# Patient Record
Sex: MALE
[De-identification: names, ages, dates, MRNs, and addresses within clinical notes are randomized per-mention and may not be internally consistent; named-entity substitution may affect disease eponyms.]

---

## 2017-01-13 ENCOUNTER — HOSPITAL ENCOUNTER (OUTPATIENT)
Dept: HOSPITAL 22 - LAB | Age: 52
End: 2017-01-13
Attending: FAMILY MEDICINE
Payer: MEDICAID

## 2017-01-13 DIAGNOSIS — Z79.899: Primary | ICD-10-CM

## 2017-01-13 LAB — AMPHETAMINES/METAMPHETAMINES: NEGATIVE NG/ML (ref ?–1000)

## 2017-02-17 ENCOUNTER — HOSPITAL ENCOUNTER (OUTPATIENT)
Dept: HOSPITAL 22 - LAB | Age: 52
End: 2017-02-17
Attending: FAMILY MEDICINE
Payer: MEDICAID

## 2017-02-17 DIAGNOSIS — Z79.899: Primary | ICD-10-CM

## 2017-02-17 LAB — AMPHETAMINES/METAMPHETAMINES: NEGATIVE NG/ML (ref ?–1000)

## 2018-05-30 ENCOUNTER — TRANSCRIBE ORDERS (OUTPATIENT)
Dept: CARDIOLOGY | Facility: CLINIC | Age: 53
End: 2018-05-30

## 2018-05-30 ENCOUNTER — PREP FOR SURGERY (OUTPATIENT)
Dept: OTHER | Facility: HOSPITAL | Age: 53
End: 2018-05-30

## 2018-05-30 DIAGNOSIS — R94.39 ABNORMAL STRESS TEST: Primary | ICD-10-CM

## 2018-05-30 DIAGNOSIS — I20.9 ANGINA PECTORIS (HCC): Primary | ICD-10-CM

## 2018-05-30 RX ORDER — NITROGLYCERIN 0.4 MG/1
0.4 TABLET SUBLINGUAL
Status: CANCELLED | OUTPATIENT
Start: 2018-05-30

## 2018-05-30 RX ORDER — ASPIRIN 325 MG
325 TABLET ORAL ONCE
Status: CANCELLED | OUTPATIENT
Start: 2018-05-30 | End: 2018-05-30

## 2018-05-30 RX ORDER — SODIUM CHLORIDE 0.9 % (FLUSH) 0.9 %
1-10 SYRINGE (ML) INJECTION AS NEEDED
Status: CANCELLED | OUTPATIENT
Start: 2018-05-30

## 2018-05-30 RX ORDER — ASPIRIN 325 MG
325 TABLET, DELAYED RELEASE (ENTERIC COATED) ORAL DAILY
Status: CANCELLED | OUTPATIENT
Start: 2018-05-31

## 2018-05-30 RX ORDER — ONDANSETRON 2 MG/ML
4 INJECTION INTRAMUSCULAR; INTRAVENOUS EVERY 6 HOURS PRN
Status: CANCELLED | OUTPATIENT
Start: 2018-05-30

## 2018-05-30 RX ORDER — ACETAMINOPHEN 325 MG/1
650 TABLET ORAL EVERY 4 HOURS PRN
Status: CANCELLED | OUTPATIENT
Start: 2018-05-30

## 2018-05-31 ENCOUNTER — APPOINTMENT (OUTPATIENT)
Dept: GENERAL RADIOLOGY | Facility: HOSPITAL | Age: 53
End: 2018-05-31

## 2018-05-31 ENCOUNTER — HOSPITAL ENCOUNTER (OUTPATIENT)
Facility: HOSPITAL | Age: 53
Setting detail: HOSPITAL OUTPATIENT SURGERY
Discharge: HOME | End: 2018-05-31
Attending: INTERNAL MEDICINE | Admitting: INTERNAL MEDICINE

## 2018-05-31 VITALS
RESPIRATION RATE: 18 BRPM | BODY MASS INDEX: 42.16 KG/M2 | WEIGHT: 311.29 LBS | DIASTOLIC BLOOD PRESSURE: 97 MMHG | HEART RATE: 78 BPM | SYSTOLIC BLOOD PRESSURE: 131 MMHG | TEMPERATURE: 97.1 F | HEIGHT: 72 IN | OXYGEN SATURATION: 94 %

## 2018-05-31 DIAGNOSIS — R94.39 ABNORMAL STRESS TEST: ICD-10-CM

## 2018-05-31 LAB
ALBUMIN SERPL-MCNC: 4 G/DL (ref 3.2–4.8)
ALBUMIN/GLOB SERPL: 1.2 G/DL (ref 1.5–2.5)
ALP SERPL-CCNC: 150 U/L (ref 25–100)
ALT SERPL W P-5'-P-CCNC: 62 U/L (ref 7–40)
ANION GAP SERPL CALCULATED.3IONS-SCNC: 6 MMOL/L (ref 3–11)
ARTICHOKE IGE QN: 89 MG/DL (ref 0–130)
AST SERPL-CCNC: 48 U/L (ref 0–33)
BASOPHILS # BLD AUTO: 0.03 10*3/MM3 (ref 0–0.2)
BASOPHILS NFR BLD AUTO: 0.4 % (ref 0–1)
BILIRUB SERPL-MCNC: 0.8 MG/DL (ref 0.3–1.2)
BUN BLD-MCNC: 19 MG/DL (ref 9–23)
BUN/CREAT SERPL: 19 (ref 7–25)
CALCIUM SPEC-SCNC: 9 MG/DL (ref 8.7–10.4)
CHLORIDE SERPL-SCNC: 106 MMOL/L (ref 99–109)
CHOLEST SERPL-MCNC: 157 MG/DL (ref 0–200)
CO2 SERPL-SCNC: 29 MMOL/L (ref 20–31)
CREAT BLD-MCNC: 1 MG/DL (ref 0.6–1.3)
DEPRECATED RDW RBC AUTO: 47.4 FL (ref 37–54)
EOSINOPHIL # BLD AUTO: 0.36 10*3/MM3 (ref 0–0.3)
EOSINOPHIL NFR BLD AUTO: 4.4 % (ref 0–3)
ERYTHROCYTE [DISTWIDTH] IN BLOOD BY AUTOMATED COUNT: 14.6 % (ref 11.3–14.5)
GFR SERPL CREATININE-BSD FRML MDRD: 78 ML/MIN/1.73
GLOBULIN UR ELPH-MCNC: 3.3 GM/DL
GLUCOSE BLD-MCNC: 134 MG/DL (ref 70–100)
HBA1C MFR BLD: 6.5 % (ref 4.8–5.6)
HCT VFR BLD AUTO: 43.2 % (ref 38.9–50.9)
HDLC SERPL-MCNC: 45 MG/DL (ref 40–60)
HGB BLD-MCNC: 13.9 G/DL (ref 13.1–17.5)
IMM GRANULOCYTES # BLD: 0.02 10*3/MM3 (ref 0–0.03)
IMM GRANULOCYTES NFR BLD: 0.2 % (ref 0–0.6)
LYMPHOCYTES # BLD AUTO: 1.58 10*3/MM3 (ref 0.6–4.8)
LYMPHOCYTES NFR BLD AUTO: 19.5 % (ref 24–44)
MCH RBC QN AUTO: 28.8 PG (ref 27–31)
MCHC RBC AUTO-ENTMCNC: 32.2 G/DL (ref 32–36)
MCV RBC AUTO: 89.4 FL (ref 80–99)
MONOCYTES # BLD AUTO: 0.63 10*3/MM3 (ref 0–1)
MONOCYTES NFR BLD AUTO: 7.8 % (ref 0–12)
NEUTROPHILS # BLD AUTO: 5.5 10*3/MM3 (ref 1.5–8.3)
NEUTROPHILS NFR BLD AUTO: 67.9 % (ref 41–71)
PLATELET # BLD AUTO: 155 10*3/MM3 (ref 150–450)
PMV BLD AUTO: 11.6 FL (ref 6–12)
POTASSIUM BLD-SCNC: 3.8 MMOL/L (ref 3.5–5.5)
PROT SERPL-MCNC: 7.3 G/DL (ref 5.7–8.2)
RBC # BLD AUTO: 4.83 10*6/MM3 (ref 4.2–5.76)
SODIUM BLD-SCNC: 141 MMOL/L (ref 132–146)
TRIGL SERPL-MCNC: 127 MG/DL (ref 0–150)
WBC NRBC COR # BLD: 8.1 10*3/MM3 (ref 3.5–10.8)

## 2018-05-31 PROCEDURE — 25010000002 HEPARIN (PORCINE) PER 1000 UNITS: Performed by: INTERNAL MEDICINE

## 2018-05-31 PROCEDURE — 36415 COLL VENOUS BLD VENIPUNCTURE: CPT

## 2018-05-31 PROCEDURE — C1769 GUIDE WIRE: HCPCS | Performed by: INTERNAL MEDICINE

## 2018-05-31 PROCEDURE — 25010000002 FENTANYL CITRATE (PF) 100 MCG/2ML SOLUTION: Performed by: INTERNAL MEDICINE

## 2018-05-31 PROCEDURE — C1894 INTRO/SHEATH, NON-LASER: HCPCS | Performed by: INTERNAL MEDICINE

## 2018-05-31 PROCEDURE — 83036 HEMOGLOBIN GLYCOSYLATED A1C: CPT | Performed by: PHYSICIAN ASSISTANT

## 2018-05-31 PROCEDURE — 25010000002 MIDAZOLAM PER 1 MG: Performed by: INTERNAL MEDICINE

## 2018-05-31 PROCEDURE — 80053 COMPREHEN METABOLIC PANEL: CPT | Performed by: INTERNAL MEDICINE

## 2018-05-31 PROCEDURE — 93458 L HRT ARTERY/VENTRICLE ANGIO: CPT | Performed by: INTERNAL MEDICINE

## 2018-05-31 PROCEDURE — 0 IOPAMIDOL PER 1 ML: Performed by: INTERNAL MEDICINE

## 2018-05-31 PROCEDURE — 71045 X-RAY EXAM CHEST 1 VIEW: CPT

## 2018-05-31 PROCEDURE — 85025 COMPLETE CBC W/AUTO DIFF WBC: CPT | Performed by: INTERNAL MEDICINE

## 2018-05-31 PROCEDURE — S0260 H&P FOR SURGERY: HCPCS | Performed by: INTERNAL MEDICINE

## 2018-05-31 PROCEDURE — 80061 LIPID PANEL: CPT | Performed by: PHYSICIAN ASSISTANT

## 2018-05-31 RX ORDER — PANTOPRAZOLE SODIUM 40 MG/1
40 TABLET, DELAYED RELEASE ORAL DAILY
COMMUNITY

## 2018-05-31 RX ORDER — MIDAZOLAM HYDROCHLORIDE 1 MG/ML
INJECTION INTRAMUSCULAR; INTRAVENOUS AS NEEDED
Status: DISCONTINUED | OUTPATIENT
Start: 2018-05-31 | End: 2018-05-31 | Stop reason: HOSPADM

## 2018-05-31 RX ORDER — METOPROLOL SUCCINATE 25 MG/1
25 TABLET, EXTENDED RELEASE ORAL DAILY
COMMUNITY
End: 2020-01-01 | Stop reason: HOSPADM

## 2018-05-31 RX ORDER — LIDOCAINE HYDROCHLORIDE 10 MG/ML
INJECTION, SOLUTION EPIDURAL; INFILTRATION; INTRACAUDAL; PERINEURAL AS NEEDED
Status: DISCONTINUED | OUTPATIENT
Start: 2018-05-31 | End: 2018-05-31 | Stop reason: HOSPADM

## 2018-05-31 RX ORDER — ACETAMINOPHEN 325 MG/1
650 TABLET ORAL EVERY 4 HOURS PRN
Status: DISCONTINUED | OUTPATIENT
Start: 2018-05-31 | End: 2018-05-31 | Stop reason: HOSPADM

## 2018-05-31 RX ORDER — HYDROCODONE BITARTRATE AND ACETAMINOPHEN 5; 325 MG/1; MG/1
1 TABLET ORAL EVERY 4 HOURS PRN
Status: DISCONTINUED | OUTPATIENT
Start: 2018-05-31 | End: 2018-05-31 | Stop reason: HOSPADM

## 2018-05-31 RX ORDER — NITROGLYCERIN 0.4 MG/1
0.4 TABLET SUBLINGUAL
Status: DISCONTINUED | OUTPATIENT
Start: 2018-05-31 | End: 2018-05-31 | Stop reason: HOSPADM

## 2018-05-31 RX ORDER — SODIUM CHLORIDE 0.9 % (FLUSH) 0.9 %
1-10 SYRINGE (ML) INJECTION AS NEEDED
Status: DISCONTINUED | OUTPATIENT
Start: 2018-05-31 | End: 2018-05-31 | Stop reason: HOSPADM

## 2018-05-31 RX ORDER — ONDANSETRON 2 MG/ML
4 INJECTION INTRAMUSCULAR; INTRAVENOUS EVERY 6 HOURS PRN
Status: DISCONTINUED | OUTPATIENT
Start: 2018-05-31 | End: 2018-05-31 | Stop reason: HOSPADM

## 2018-05-31 RX ORDER — ASPIRIN 325 MG
325 TABLET ORAL ONCE
Status: COMPLETED | OUTPATIENT
Start: 2018-05-31 | End: 2018-05-31

## 2018-05-31 RX ORDER — TIZANIDINE 4 MG/1
6 TABLET ORAL 2 TIMES DAILY
COMMUNITY
End: 2020-01-01

## 2018-05-31 RX ORDER — ASPIRIN 81 MG/1
81 TABLET, CHEWABLE ORAL DAILY
COMMUNITY

## 2018-05-31 RX ORDER — IBUPROFEN 800 MG/1
800 TABLET ORAL 3 TIMES DAILY
Status: ON HOLD | COMMUNITY
End: 2018-05-31

## 2018-05-31 RX ORDER — SODIUM CHLORIDE 9 MG/ML
1-3 INJECTION, SOLUTION INTRAVENOUS CONTINUOUS
Status: DISCONTINUED | OUTPATIENT
Start: 2018-05-31 | End: 2018-05-31 | Stop reason: HOSPADM

## 2018-05-31 RX ORDER — FENTANYL CITRATE 50 UG/ML
INJECTION, SOLUTION INTRAMUSCULAR; INTRAVENOUS AS NEEDED
Status: DISCONTINUED | OUTPATIENT
Start: 2018-05-31 | End: 2018-05-31 | Stop reason: HOSPADM

## 2018-05-31 RX ORDER — ASPIRIN 325 MG
325 TABLET, DELAYED RELEASE (ENTERIC COATED) ORAL DAILY
Status: DISCONTINUED | OUTPATIENT
Start: 2018-06-01 | End: 2018-05-31 | Stop reason: HOSPADM

## 2018-05-31 RX ORDER — LISINOPRIL AND HYDROCHLOROTHIAZIDE 25; 20 MG/1; MG/1
1 TABLET ORAL DAILY
COMMUNITY
End: 2020-01-01 | Stop reason: HOSPADM

## 2018-05-31 RX ADMIN — SODIUM CHLORIDE 3 ML/KG/HR: 9 INJECTION, SOLUTION INTRAVENOUS at 09:09

## 2018-05-31 RX ADMIN — ASPIRIN 325 MG ORAL TABLET 325 MG: 325 PILL ORAL at 09:09

## 2018-06-01 ENCOUNTER — DOCUMENTATION (OUTPATIENT)
Dept: CARDIAC REHAB | Facility: HOSPITAL | Age: 53
End: 2018-06-01

## 2020-01-01 ENCOUNTER — HOSPITAL ENCOUNTER (OUTPATIENT)
Dept: INFUSION THERAPY | Facility: HOSPITAL | Age: 55
Discharge: HOME | End: 2020-05-01
Admitting: INTERNAL MEDICINE

## 2020-01-01 ENCOUNTER — HOSPITAL ENCOUNTER (OUTPATIENT)
Dept: RADIATION ONCOLOGY | Facility: HOSPITAL | Age: 55
Discharge: HOME | End: 2020-05-06

## 2020-01-01 ENCOUNTER — HOSPITAL ENCOUNTER (OUTPATIENT)
Dept: ONCOLOGY | Facility: HOSPITAL | Age: 55
Setting detail: INFUSION SERIES
Discharge: HOME | End: 2020-05-27

## 2020-01-01 ENCOUNTER — HOSPITAL ENCOUNTER (OUTPATIENT)
Dept: SPEECH THERAPY | Facility: HOSPITAL | Age: 55
Setting detail: THERAPIES SERIES
Discharge: HOME | End: 2020-06-05

## 2020-01-01 ENCOUNTER — DOCUMENTATION (OUTPATIENT)
Dept: RADIATION ONCOLOGY | Facility: HOSPITAL | Age: 55
End: 2020-01-01

## 2020-01-01 ENCOUNTER — HOSPITAL ENCOUNTER (OUTPATIENT)
Dept: RADIATION ONCOLOGY | Facility: HOSPITAL | Age: 55
Discharge: HOME | End: 2020-05-08

## 2020-01-01 ENCOUNTER — TELEPHONE (OUTPATIENT)
Dept: PALLIATIVE CARE | Facility: CLINIC | Age: 55
End: 2020-01-01

## 2020-01-01 ENCOUNTER — HOSPITAL ENCOUNTER (OUTPATIENT)
Dept: ONCOLOGY | Facility: HOSPITAL | Age: 55
Setting detail: INFUSION SERIES
Discharge: HOME | End: 2020-06-03

## 2020-01-01 ENCOUNTER — READMISSION MANAGEMENT (OUTPATIENT)
Dept: CALL CENTER | Facility: HOSPITAL | Age: 55
End: 2020-01-01

## 2020-01-01 ENCOUNTER — HOSPITAL ENCOUNTER (OUTPATIENT)
Dept: RADIATION ONCOLOGY | Facility: HOSPITAL | Age: 55
Discharge: HOME | End: 2020-06-01

## 2020-01-01 ENCOUNTER — TELEPHONE (OUTPATIENT)
Dept: ONCOLOGY | Facility: CLINIC | Age: 55
End: 2020-01-01

## 2020-01-01 ENCOUNTER — APPOINTMENT (OUTPATIENT)
Dept: CT IMAGING | Facility: HOSPITAL | Age: 55
End: 2020-01-01

## 2020-01-01 ENCOUNTER — APPOINTMENT (OUTPATIENT)
Dept: NEPHROLOGY | Facility: HOSPITAL | Age: 55
End: 2020-01-01

## 2020-01-01 ENCOUNTER — HOSPITAL ENCOUNTER (OUTPATIENT)
Dept: RADIATION ONCOLOGY | Facility: HOSPITAL | Age: 55
Setting detail: RADIATION/ONCOLOGY SERIES
Discharge: HOME | End: 2020-05-01

## 2020-01-01 ENCOUNTER — HOSPITAL ENCOUNTER (OUTPATIENT)
Dept: RADIATION ONCOLOGY | Facility: HOSPITAL | Age: 55
Discharge: HOME | End: 2020-06-04

## 2020-01-01 ENCOUNTER — APPOINTMENT (OUTPATIENT)
Dept: GENERAL RADIOLOGY | Facility: HOSPITAL | Age: 55
End: 2020-01-01

## 2020-01-01 ENCOUNTER — LAB (OUTPATIENT)
Dept: LAB | Facility: HOSPITAL | Age: 55
End: 2020-01-01

## 2020-01-01 ENCOUNTER — DOCUMENTATION (OUTPATIENT)
Dept: SOCIAL WORK | Facility: HOSPITAL | Age: 55
End: 2020-01-01

## 2020-01-01 ENCOUNTER — TRANSCRIBE ORDERS (OUTPATIENT)
Dept: ADMINISTRATIVE | Facility: HOSPITAL | Age: 55
End: 2020-01-01

## 2020-01-01 ENCOUNTER — HOSPITAL ENCOUNTER (OUTPATIENT)
Dept: RADIATION ONCOLOGY | Facility: HOSPITAL | Age: 55
Discharge: HOME | End: 2020-05-29

## 2020-01-01 ENCOUNTER — OFFICE VISIT (OUTPATIENT)
Dept: ONCOLOGY | Facility: CLINIC | Age: 55
End: 2020-01-01

## 2020-01-01 ENCOUNTER — HOSPITAL ENCOUNTER (OUTPATIENT)
Dept: RADIATION ONCOLOGY | Facility: HOSPITAL | Age: 55
Discharge: HOME | End: 2020-04-20

## 2020-01-01 ENCOUNTER — HOSPITAL ENCOUNTER (OUTPATIENT)
Dept: RADIATION ONCOLOGY | Facility: HOSPITAL | Age: 55
Discharge: HOME | End: 2020-05-07

## 2020-01-01 ENCOUNTER — HOSPITAL ENCOUNTER (OUTPATIENT)
Dept: RADIATION ONCOLOGY | Facility: HOSPITAL | Age: 55
Discharge: HOME | End: 2020-06-18

## 2020-01-01 ENCOUNTER — APPOINTMENT (OUTPATIENT)
Dept: CARDIOLOGY | Facility: HOSPITAL | Age: 55
End: 2020-01-01

## 2020-01-01 ENCOUNTER — TELEMEDICINE (OUTPATIENT)
Dept: PALLIATIVE CARE | Facility: CLINIC | Age: 55
End: 2020-01-01

## 2020-01-01 ENCOUNTER — DOCUMENTATION (OUTPATIENT)
Dept: NUTRITION | Facility: HOSPITAL | Age: 55
End: 2020-01-01

## 2020-01-01 ENCOUNTER — HOSPITAL ENCOUNTER (OUTPATIENT)
Dept: RADIATION ONCOLOGY | Facility: HOSPITAL | Age: 55
Discharge: HOME | End: 2020-06-05

## 2020-01-01 ENCOUNTER — HOSPITAL ENCOUNTER (OUTPATIENT)
Dept: ONCOLOGY | Facility: HOSPITAL | Age: 55
Setting detail: INFUSION SERIES
Discharge: HOME | End: 2020-05-06

## 2020-01-01 ENCOUNTER — HOSPITAL ENCOUNTER (OUTPATIENT)
Dept: RADIATION ONCOLOGY | Facility: HOSPITAL | Age: 55
Discharge: HOME | End: 2020-06-11

## 2020-01-01 ENCOUNTER — HOSPITAL ENCOUNTER (OUTPATIENT)
Dept: RADIATION ONCOLOGY | Facility: HOSPITAL | Age: 55
Discharge: HOME | End: 2020-06-02

## 2020-01-01 ENCOUNTER — APPOINTMENT (OUTPATIENT)
Dept: PREADMISSION TESTING | Facility: HOSPITAL | Age: 55
End: 2020-01-01

## 2020-01-01 ENCOUNTER — HOSPITAL ENCOUNTER (OUTPATIENT)
Dept: ONCOLOGY | Facility: HOSPITAL | Age: 55
Setting detail: INFUSION SERIES
Discharge: HOME | End: 2020-06-10

## 2020-01-01 ENCOUNTER — NURSE TRIAGE (OUTPATIENT)
Dept: CALL CENTER | Facility: HOSPITAL | Age: 55
End: 2020-01-01

## 2020-01-01 ENCOUNTER — HOSPITAL ENCOUNTER (OUTPATIENT)
Dept: RADIATION ONCOLOGY | Facility: HOSPITAL | Age: 55
Discharge: HOME | End: 2020-05-22

## 2020-01-01 ENCOUNTER — OFFICE VISIT (OUTPATIENT)
Dept: PALLIATIVE CARE | Facility: CLINIC | Age: 55
End: 2020-01-01

## 2020-01-01 ENCOUNTER — HOSPITAL ENCOUNTER (OUTPATIENT)
Dept: RADIATION ONCOLOGY | Facility: HOSPITAL | Age: 55
Discharge: HOME | End: 2020-06-10

## 2020-01-01 ENCOUNTER — HOSPITAL ENCOUNTER (OUTPATIENT)
Dept: RADIATION ONCOLOGY | Facility: HOSPITAL | Age: 55
Discharge: HOME | End: 2020-05-13

## 2020-01-01 ENCOUNTER — HOSPITAL ENCOUNTER (OUTPATIENT)
Dept: RADIATION ONCOLOGY | Facility: HOSPITAL | Age: 55
Setting detail: RADIATION/ONCOLOGY SERIES
Discharge: HOME | End: 2020-06-01

## 2020-01-01 ENCOUNTER — HOSPITAL ENCOUNTER (OUTPATIENT)
Dept: RADIATION ONCOLOGY | Facility: HOSPITAL | Age: 55
Discharge: HOME | End: 2020-06-16

## 2020-01-01 ENCOUNTER — HOSPITAL ENCOUNTER (OUTPATIENT)
Dept: ONCOLOGY | Facility: HOSPITAL | Age: 55
Setting detail: INFUSION SERIES
Discharge: HOME | End: 2020-05-13

## 2020-01-01 ENCOUNTER — HOSPITAL ENCOUNTER (OUTPATIENT)
Dept: RADIATION ONCOLOGY | Facility: HOSPITAL | Age: 55
Discharge: HOME | End: 2020-06-03

## 2020-01-01 ENCOUNTER — HOSPITAL ENCOUNTER (OUTPATIENT)
Dept: RADIATION ONCOLOGY | Facility: HOSPITAL | Age: 55
Discharge: HOME | End: 2020-06-08

## 2020-01-01 ENCOUNTER — ANESTHESIA (OUTPATIENT)
Dept: GASTROENTEROLOGY | Facility: HOSPITAL | Age: 55
End: 2020-01-01

## 2020-01-01 ENCOUNTER — HOSPITAL ENCOUNTER (OUTPATIENT)
Dept: RADIATION ONCOLOGY | Facility: HOSPITAL | Age: 55
Discharge: HOME | End: 2020-05-20

## 2020-01-01 ENCOUNTER — HOSPITAL ENCOUNTER (OUTPATIENT)
Dept: RADIATION ONCOLOGY | Facility: HOSPITAL | Age: 55
Discharge: HOME | End: 2020-05-26

## 2020-01-01 ENCOUNTER — APPOINTMENT (OUTPATIENT)
Dept: ULTRASOUND IMAGING | Facility: HOSPITAL | Age: 55
End: 2020-01-01

## 2020-01-01 ENCOUNTER — APPOINTMENT (OUTPATIENT)
Dept: INTERVENTIONAL RADIOLOGY/VASCULAR | Facility: HOSPITAL | Age: 55
End: 2020-01-01

## 2020-01-01 ENCOUNTER — CONSULT (OUTPATIENT)
Dept: ONCOLOGY | Facility: CLINIC | Age: 55
End: 2020-01-01

## 2020-01-01 ENCOUNTER — TELEPHONE (OUTPATIENT)
Dept: SOCIAL WORK | Facility: HOSPITAL | Age: 55
End: 2020-01-01

## 2020-01-01 ENCOUNTER — HOSPITAL ENCOUNTER (OUTPATIENT)
Dept: RADIATION ONCOLOGY | Facility: HOSPITAL | Age: 55
Discharge: HOME | End: 2020-06-12

## 2020-01-01 ENCOUNTER — TELEPHONE (OUTPATIENT)
Dept: RADIATION ONCOLOGY | Facility: HOSPITAL | Age: 55
End: 2020-01-01

## 2020-01-01 ENCOUNTER — HOSPITAL ENCOUNTER (OUTPATIENT)
Dept: GENERAL RADIOLOGY | Facility: HOSPITAL | Age: 55
Discharge: HOME | End: 2020-05-28
Admitting: RADIOLOGY

## 2020-01-01 ENCOUNTER — HOSPITAL ENCOUNTER (OUTPATIENT)
Dept: RADIATION ONCOLOGY | Facility: HOSPITAL | Age: 55
Discharge: HOME | End: 2020-05-18

## 2020-01-01 ENCOUNTER — OFFICE VISIT (OUTPATIENT)
Dept: RADIATION ONCOLOGY | Facility: HOSPITAL | Age: 55
End: 2020-01-01

## 2020-01-01 ENCOUNTER — DOCUMENTATION (OUTPATIENT)
Dept: PALLIATIVE CARE | Facility: CLINIC | Age: 55
End: 2020-01-01

## 2020-01-01 ENCOUNTER — HOSPITAL ENCOUNTER (OUTPATIENT)
Dept: RADIATION ONCOLOGY | Facility: HOSPITAL | Age: 55
Discharge: HOME | End: 2020-06-19

## 2020-01-01 ENCOUNTER — HOSPITAL ENCOUNTER (INPATIENT)
Facility: HOSPITAL | Age: 55
LOS: 2 days | End: 2020-09-02
Attending: EMERGENCY MEDICINE | Admitting: INTERNAL MEDICINE

## 2020-01-01 ENCOUNTER — HOSPITAL ENCOUNTER (OUTPATIENT)
Dept: ONCOLOGY | Facility: HOSPITAL | Age: 55
Setting detail: INFUSION SERIES
Discharge: HOME | End: 2020-05-22

## 2020-01-01 ENCOUNTER — HOSPITAL ENCOUNTER (OUTPATIENT)
Dept: ONCOLOGY | Facility: HOSPITAL | Age: 55
Setting detail: INFUSION SERIES
Discharge: HOME | End: 2020-06-24

## 2020-01-01 ENCOUNTER — NURSE NAVIGATOR (OUTPATIENT)
Dept: ONCOLOGY | Facility: CLINIC | Age: 55
End: 2020-01-01

## 2020-01-01 ENCOUNTER — DOCUMENTATION (OUTPATIENT)
Dept: SPEECH THERAPY | Facility: HOSPITAL | Age: 55
End: 2020-01-01

## 2020-01-01 ENCOUNTER — HOSPITAL ENCOUNTER (INPATIENT)
Facility: HOSPITAL | Age: 55
LOS: 36 days | Discharge: HOME | End: 2020-08-12
Attending: FAMILY MEDICINE | Admitting: INTERNAL MEDICINE

## 2020-01-01 ENCOUNTER — HOSPITAL ENCOUNTER (OUTPATIENT)
Dept: RADIATION ONCOLOGY | Facility: HOSPITAL | Age: 55
Discharge: HOME | End: 2020-05-15

## 2020-01-01 ENCOUNTER — HOSPITAL ENCOUNTER (OUTPATIENT)
Dept: RADIATION ONCOLOGY | Facility: HOSPITAL | Age: 55
Discharge: HOME | End: 2020-05-14

## 2020-01-01 ENCOUNTER — HOSPITAL ENCOUNTER (OUTPATIENT)
Dept: RADIATION ONCOLOGY | Facility: HOSPITAL | Age: 55
Setting detail: RADIATION/ONCOLOGY SERIES
Discharge: HOME | End: 2020-04-03

## 2020-01-01 ENCOUNTER — HOSPITAL ENCOUNTER (OUTPATIENT)
Dept: RADIATION ONCOLOGY | Facility: HOSPITAL | Age: 55
Discharge: HOME | End: 2020-06-17

## 2020-01-01 ENCOUNTER — HOSPITAL ENCOUNTER (OUTPATIENT)
Dept: ONCOLOGY | Facility: HOSPITAL | Age: 55
Setting detail: INFUSION SERIES
Discharge: HOME | End: 2020-06-17

## 2020-01-01 ENCOUNTER — HOSPITAL ENCOUNTER (OUTPATIENT)
Dept: RADIATION ONCOLOGY | Facility: HOSPITAL | Age: 55
Discharge: HOME | End: 2020-06-09

## 2020-01-01 ENCOUNTER — HOSPITAL ENCOUNTER (OUTPATIENT)
Dept: RADIATION ONCOLOGY | Facility: HOSPITAL | Age: 55
Discharge: HOME | End: 2020-06-23

## 2020-01-01 ENCOUNTER — ANESTHESIA EVENT (OUTPATIENT)
Dept: GASTROENTEROLOGY | Facility: HOSPITAL | Age: 55
End: 2020-01-01

## 2020-01-01 ENCOUNTER — OFFICE VISIT (OUTPATIENT)
Dept: PSYCHIATRY | Facility: CLINIC | Age: 55
End: 2020-01-01

## 2020-01-01 ENCOUNTER — HOSPITAL ENCOUNTER (OUTPATIENT)
Dept: RADIATION ONCOLOGY | Facility: HOSPITAL | Age: 55
Discharge: HOME | End: 2020-05-19

## 2020-01-01 ENCOUNTER — HOSPITAL ENCOUNTER (OUTPATIENT)
Dept: RADIATION ONCOLOGY | Facility: HOSPITAL | Age: 55
Discharge: HOME | End: 2020-06-22

## 2020-01-01 ENCOUNTER — HOSPITAL ENCOUNTER (OUTPATIENT)
Dept: RADIATION ONCOLOGY | Facility: HOSPITAL | Age: 55
Discharge: HOME | End: 2020-05-28

## 2020-01-01 ENCOUNTER — HOSPITAL ENCOUNTER (OUTPATIENT)
Dept: RADIATION ONCOLOGY | Facility: HOSPITAL | Age: 55
Discharge: HOME | End: 2020-05-12

## 2020-01-01 ENCOUNTER — HOSPITAL ENCOUNTER (OUTPATIENT)
Dept: ONCOLOGY | Facility: HOSPITAL | Age: 55
Discharge: HOME | End: 2020-05-20
Admitting: INTERNAL MEDICINE

## 2020-01-01 ENCOUNTER — HOSPITAL ENCOUNTER (OUTPATIENT)
Dept: RADIATION ONCOLOGY | Facility: HOSPITAL | Age: 55
Discharge: HOME | End: 2020-06-15

## 2020-01-01 ENCOUNTER — HOSPITAL ENCOUNTER (OUTPATIENT)
Dept: RADIATION ONCOLOGY | Facility: HOSPITAL | Age: 55
Discharge: HOME | End: 2020-06-24

## 2020-01-01 ENCOUNTER — HOSPITAL ENCOUNTER (OUTPATIENT)
Dept: RADIATION ONCOLOGY | Facility: HOSPITAL | Age: 55
Discharge: HOME | End: 2020-05-11

## 2020-01-01 ENCOUNTER — HOSPITAL ENCOUNTER (OUTPATIENT)
Dept: PET IMAGING | Facility: HOSPITAL | Age: 55
Discharge: HOME | End: 2020-03-30
Admitting: INTERNAL MEDICINE

## 2020-01-01 ENCOUNTER — HOSPITAL ENCOUNTER (OUTPATIENT)
Dept: GENERAL RADIOLOGY | Facility: HOSPITAL | Age: 55
Discharge: HOME | End: 2020-05-13
Admitting: INTERNAL MEDICINE

## 2020-01-01 ENCOUNTER — HOSPITAL ENCOUNTER (OUTPATIENT)
Dept: RADIATION ONCOLOGY | Facility: HOSPITAL | Age: 55
Discharge: HOME | End: 2020-05-21

## 2020-01-01 ENCOUNTER — HOSPITAL ENCOUNTER (OUTPATIENT)
Dept: PET IMAGING | Facility: HOSPITAL | Age: 55
Discharge: HOME | End: 2020-03-30

## 2020-01-01 VITALS — BODY MASS INDEX: 42.82 KG/M2 | WEIGHT: 315 LBS

## 2020-01-01 VITALS
TEMPERATURE: 98 F | RESPIRATION RATE: 18 BRPM | WEIGHT: 315 LBS | HEIGHT: 72 IN | SYSTOLIC BLOOD PRESSURE: 138 MMHG | BODY MASS INDEX: 42.66 KG/M2 | HEART RATE: 113 BPM | DIASTOLIC BLOOD PRESSURE: 82 MMHG

## 2020-01-01 VITALS
OXYGEN SATURATION: 98 % | WEIGHT: 311 LBS | DIASTOLIC BLOOD PRESSURE: 77 MMHG | SYSTOLIC BLOOD PRESSURE: 109 MMHG | RESPIRATION RATE: 20 BRPM | HEIGHT: 72 IN | HEART RATE: 85 BPM | BODY MASS INDEX: 42.12 KG/M2 | TEMPERATURE: 97.7 F

## 2020-01-01 VITALS
SYSTOLIC BLOOD PRESSURE: 125 MMHG | DIASTOLIC BLOOD PRESSURE: 65 MMHG | HEART RATE: 93 BPM | WEIGHT: 303 LBS | TEMPERATURE: 98.4 F | BODY MASS INDEX: 41.04 KG/M2 | RESPIRATION RATE: 20 BRPM | HEIGHT: 72 IN

## 2020-01-01 VITALS
DIASTOLIC BLOOD PRESSURE: 78 MMHG | SYSTOLIC BLOOD PRESSURE: 114 MMHG | TEMPERATURE: 97.9 F | HEART RATE: 78 BPM | RESPIRATION RATE: 16 BRPM | HEIGHT: 72 IN | OXYGEN SATURATION: 95 % | BODY MASS INDEX: 42.66 KG/M2 | WEIGHT: 315 LBS

## 2020-01-01 VITALS
HEIGHT: 72 IN | BODY MASS INDEX: 37.5 KG/M2 | DIASTOLIC BLOOD PRESSURE: 81 MMHG | TEMPERATURE: 98.5 F | SYSTOLIC BLOOD PRESSURE: 127 MMHG | RESPIRATION RATE: 16 BRPM | OXYGEN SATURATION: 100 % | WEIGHT: 276.9 LBS | HEART RATE: 98 BPM

## 2020-01-01 VITALS
DIASTOLIC BLOOD PRESSURE: 83 MMHG | SYSTOLIC BLOOD PRESSURE: 120 MMHG | RESPIRATION RATE: 16 BRPM | TEMPERATURE: 98.1 F | HEART RATE: 83 BPM | HEIGHT: 72 IN | BODY MASS INDEX: 42.66 KG/M2 | WEIGHT: 315 LBS

## 2020-01-01 VITALS
WEIGHT: 315 LBS | BODY MASS INDEX: 43.82 KG/M2 | TEMPERATURE: 98 F | DIASTOLIC BLOOD PRESSURE: 69 MMHG | OXYGEN SATURATION: 96 % | RESPIRATION RATE: 20 BRPM | HEART RATE: 79 BPM | SYSTOLIC BLOOD PRESSURE: 133 MMHG

## 2020-01-01 VITALS
BODY MASS INDEX: 42.66 KG/M2 | DIASTOLIC BLOOD PRESSURE: 88 MMHG | HEART RATE: 87 BPM | WEIGHT: 315 LBS | SYSTOLIC BLOOD PRESSURE: 134 MMHG | RESPIRATION RATE: 20 BRPM | HEIGHT: 72 IN | TEMPERATURE: 98 F

## 2020-01-01 VITALS
RESPIRATION RATE: 16 BRPM | HEART RATE: 80 BPM | DIASTOLIC BLOOD PRESSURE: 90 MMHG | TEMPERATURE: 97.7 F | SYSTOLIC BLOOD PRESSURE: 120 MMHG

## 2020-01-01 VITALS — WEIGHT: 300.8 LBS | BODY MASS INDEX: 40.79 KG/M2

## 2020-01-01 VITALS
WEIGHT: 295 LBS | BODY MASS INDEX: 40 KG/M2 | OXYGEN SATURATION: 97 % | TEMPERATURE: 97.1 F | DIASTOLIC BLOOD PRESSURE: 62 MMHG | HEART RATE: 92 BPM | SYSTOLIC BLOOD PRESSURE: 111 MMHG

## 2020-01-01 VITALS
DIASTOLIC BLOOD PRESSURE: 76 MMHG | HEART RATE: 84 BPM | OXYGEN SATURATION: 95 % | BODY MASS INDEX: 44.34 KG/M2 | TEMPERATURE: 97.6 F | WEIGHT: 315 LBS | SYSTOLIC BLOOD PRESSURE: 137 MMHG | RESPIRATION RATE: 16 BRPM

## 2020-01-01 VITALS
HEIGHT: 72 IN | TEMPERATURE: 97.5 F | BODY MASS INDEX: 28.44 KG/M2 | DIASTOLIC BLOOD PRESSURE: 89 MMHG | RESPIRATION RATE: 18 BRPM | SYSTOLIC BLOOD PRESSURE: 114 MMHG | WEIGHT: 210 LBS

## 2020-01-01 VITALS — BODY MASS INDEX: 40.1 KG/M2 | WEIGHT: 295.7 LBS

## 2020-01-01 VITALS
SYSTOLIC BLOOD PRESSURE: 134 MMHG | BODY MASS INDEX: 42.74 KG/M2 | HEART RATE: 93 BPM | TEMPERATURE: 97.9 F | OXYGEN SATURATION: 98 % | DIASTOLIC BLOOD PRESSURE: 76 MMHG | WEIGHT: 315 LBS

## 2020-01-01 VITALS
BODY MASS INDEX: 39.96 KG/M2 | HEART RATE: 101 BPM | RESPIRATION RATE: 20 BRPM | OXYGEN SATURATION: 92 % | SYSTOLIC BLOOD PRESSURE: 105 MMHG | DIASTOLIC BLOOD PRESSURE: 68 MMHG | WEIGHT: 295 LBS | HEIGHT: 72 IN | TEMPERATURE: 97.4 F

## 2020-01-01 VITALS
RESPIRATION RATE: 18 BRPM | OXYGEN SATURATION: 91 % | DIASTOLIC BLOOD PRESSURE: 79 MMHG | SYSTOLIC BLOOD PRESSURE: 121 MMHG | TEMPERATURE: 99 F

## 2020-01-01 VITALS
DIASTOLIC BLOOD PRESSURE: 80 MMHG | TEMPERATURE: 98.3 F | SYSTOLIC BLOOD PRESSURE: 138 MMHG | WEIGHT: 310 LBS | BODY MASS INDEX: 41.99 KG/M2 | HEART RATE: 101 BPM | HEIGHT: 72 IN | RESPIRATION RATE: 20 BRPM

## 2020-01-01 VITALS — BODY MASS INDEX: 42.55 KG/M2 | WEIGHT: 313.8 LBS

## 2020-01-01 VITALS — BODY MASS INDEX: 41.28 KG/M2 | WEIGHT: 304.4 LBS

## 2020-01-01 VITALS — BODY MASS INDEX: 41.95 KG/M2 | WEIGHT: 309.3 LBS

## 2020-01-01 VITALS
DIASTOLIC BLOOD PRESSURE: 50 MMHG | RESPIRATION RATE: 20 BRPM | HEART RATE: 84 BPM | SYSTOLIC BLOOD PRESSURE: 93 MMHG | TEMPERATURE: 98.2 F

## 2020-01-01 VITALS — BODY MASS INDEX: 44.34 KG/M2 | WEIGHT: 315 LBS

## 2020-01-01 DIAGNOSIS — Z45.2 PICC (PERIPHERALLY INSERTED CENTRAL CATHETER) IN PLACE: ICD-10-CM

## 2020-01-01 DIAGNOSIS — R07.81 RIB PAIN ON RIGHT SIDE: ICD-10-CM

## 2020-01-01 DIAGNOSIS — C09.9 CANCER OF TONSIL (HCC): ICD-10-CM

## 2020-01-01 DIAGNOSIS — T40.2X5A CONSTIPATION DUE TO OPIOID THERAPY: ICD-10-CM

## 2020-01-01 DIAGNOSIS — K74.60 LIVER CIRRHOSIS SECONDARY TO NASH (NONALCOHOLIC STEATOHEPATITIS) (HCC): ICD-10-CM

## 2020-01-01 DIAGNOSIS — R45.851 SUICIDAL IDEATION: ICD-10-CM

## 2020-01-01 DIAGNOSIS — C09.9 CANCER OF TONSIL (HCC): Primary | ICD-10-CM
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
## 2020-01-01 DIAGNOSIS — T40.2X5A CONSTIPATION DUE TO OPIOID THERAPY: Primary | ICD-10-CM

## 2020-01-01 DIAGNOSIS — Z74.09 IMPAIRED MOBILITY AND ADLS: ICD-10-CM

## 2020-01-01 DIAGNOSIS — R11.2 NAUSEA AND VOMITING, INTRACTABILITY OF VOMITING NOT SPECIFIED, UNSPECIFIED VOMITING TYPE: ICD-10-CM

## 2020-01-01 DIAGNOSIS — C76.0 HEAD AND NECK CANCER (HCC): ICD-10-CM

## 2020-01-01 DIAGNOSIS — Z51.81 THERAPEUTIC DRUG MONITORING: Primary | ICD-10-CM

## 2020-01-01 DIAGNOSIS — M54.40 CHRONIC BILATERAL LOW BACK PAIN WITH SCIATICA, SCIATICA LATERALITY UNSPECIFIED: ICD-10-CM

## 2020-01-01 DIAGNOSIS — K59.03 CONSTIPATION DUE TO OPIOID THERAPY: ICD-10-CM

## 2020-01-01 DIAGNOSIS — R39.11 URINARY HESITANCY: ICD-10-CM

## 2020-01-01 DIAGNOSIS — R05.9 COUGH: ICD-10-CM

## 2020-01-01 DIAGNOSIS — K75.81 LIVER CIRRHOSIS SECONDARY TO NASH (NONALCOHOLIC STEATOHEPATITIS) (HCC): ICD-10-CM

## 2020-01-01 DIAGNOSIS — G89.3 PAIN, CANCER: Primary | ICD-10-CM

## 2020-01-01 DIAGNOSIS — R22.0 HEAD MASS: ICD-10-CM

## 2020-01-01 DIAGNOSIS — R53.81 PHYSICAL DEBILITY: ICD-10-CM

## 2020-01-01 DIAGNOSIS — G89.3 PAIN, CANCER: ICD-10-CM

## 2020-01-01 DIAGNOSIS — K59.03 CONSTIPATION DUE TO OPIOID THERAPY: Primary | ICD-10-CM

## 2020-01-01 DIAGNOSIS — R07.0 THROAT PAIN IN ADULT: ICD-10-CM

## 2020-01-01 DIAGNOSIS — K92.0 GASTROINTESTINAL HEMORRHAGE WITH HEMATEMESIS: ICD-10-CM

## 2020-01-01 DIAGNOSIS — F41.9 ANXIETY DISORDER, UNSPECIFIED TYPE: ICD-10-CM

## 2020-01-01 DIAGNOSIS — R79.89 ELEVATED SERUM CREATININE: ICD-10-CM

## 2020-01-01 DIAGNOSIS — G47.9 SLEEP DISTURBANCE: ICD-10-CM

## 2020-01-01 DIAGNOSIS — Z51.81 THERAPEUTIC DRUG MONITORING: ICD-10-CM

## 2020-01-01 DIAGNOSIS — D62 ACUTE BLOOD LOSS ANEMIA: ICD-10-CM

## 2020-01-01 DIAGNOSIS — L85.3 XEROSIS OF SKIN: ICD-10-CM

## 2020-01-01 DIAGNOSIS — F33.2 SEVERE EPISODE OF RECURRENT MAJOR DEPRESSIVE DISORDER, WITHOUT PSYCHOTIC FEATURES (HCC): Primary | ICD-10-CM

## 2020-01-01 DIAGNOSIS — F32.2 CURRENT SEVERE EPISODE OF MAJOR DEPRESSIVE DISORDER WITHOUT PSYCHOTIC FEATURES, UNSPECIFIED WHETHER RECURRENT (HCC): ICD-10-CM

## 2020-01-01 DIAGNOSIS — C76.0 HEAD AND NECK CANCER (HCC): Primary | ICD-10-CM

## 2020-01-01 DIAGNOSIS — R18.8 OTHER ASCITES: ICD-10-CM

## 2020-01-01 DIAGNOSIS — A41.02 MRSA (METHICILLIN RESISTANT STAPHYLOCOCCUS AUREUS) SEPTICEMIA (HCC): ICD-10-CM

## 2020-01-01 DIAGNOSIS — M79.89 SWOLLEN ARM: Primary | ICD-10-CM

## 2020-01-01 DIAGNOSIS — Z74.09 IMPAIRED FUNCTIONAL MOBILITY, BALANCE, GAIT, AND ENDURANCE: ICD-10-CM

## 2020-01-01 DIAGNOSIS — R53.1 GENERALIZED WEAKNESS: ICD-10-CM

## 2020-01-01 DIAGNOSIS — Z63.79 STRESSFUL LIFE EVENT AFFECTING FAMILY: ICD-10-CM

## 2020-01-01 DIAGNOSIS — Z74.8 DEPENDENT FOR TRANSPORTATION: ICD-10-CM

## 2020-01-01 DIAGNOSIS — G89.29 CHRONIC BILATERAL LOW BACK PAIN WITH SCIATICA, SCIATICA LATERALITY UNSPECIFIED: ICD-10-CM

## 2020-01-01 DIAGNOSIS — E86.1 HYPOTENSION DUE TO HYPOVOLEMIA: Primary | ICD-10-CM
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
## 2020-01-01 DIAGNOSIS — R13.13 PHARYNGEAL DYSPHAGIA: Primary | ICD-10-CM

## 2020-01-01 DIAGNOSIS — A41.9 SEPSIS, DUE TO UNSPECIFIED ORGANISM, UNSPECIFIED WHETHER ACUTE ORGAN DYSFUNCTION PRESENT (HCC): Primary | ICD-10-CM

## 2020-01-01 DIAGNOSIS — K74.60 CIRRHOSIS OF LIVER WITH ASCITES, UNSPECIFIED HEPATIC CIRRHOSIS TYPE (HCC): Primary | ICD-10-CM

## 2020-01-01 DIAGNOSIS — Z78.9 IMPAIRED MOBILITY AND ADLS: ICD-10-CM

## 2020-01-01 DIAGNOSIS — Z02.89 PAIN MEDICATION AGREEMENT SIGNED: ICD-10-CM

## 2020-01-01 DIAGNOSIS — M79.89 SWOLLEN ARM: ICD-10-CM

## 2020-01-01 DIAGNOSIS — K62.5 RECTAL BLEEDING: ICD-10-CM

## 2020-01-01 DIAGNOSIS — R18.8 CIRRHOSIS OF LIVER WITH ASCITES, UNSPECIFIED HEPATIC CIRRHOSIS TYPE (HCC): Primary | ICD-10-CM

## 2020-01-01 DIAGNOSIS — I95.89 HYPOTENSION DUE TO HYPOVOLEMIA: Primary | ICD-10-CM

## 2020-01-01 DIAGNOSIS — S22.31XA CLOSED FRACTURE OF ONE RIB OF RIGHT SIDE, INITIAL ENCOUNTER: ICD-10-CM

## 2020-01-01 DIAGNOSIS — E86.0 DEHYDRATION: ICD-10-CM

## 2020-01-01 DIAGNOSIS — R41.82 ALTERED MENTAL STATUS, UNSPECIFIED ALTERED MENTAL STATUS TYPE: ICD-10-CM

## 2020-01-01 DIAGNOSIS — R22.0 HEAD MASS: Primary | ICD-10-CM

## 2020-01-01 LAB
25(OH)D3 SERPL-MCNC: 11.2 NG/ML (ref 30–100)
A1AT SERPL-MCNC: 46 MG/DL (ref 101–187)
ABO GROUP BLD: NORMAL
ALBUMIN FLD-MCNC: 0.8 G/DL
ALBUMIN FLD-MCNC: 1 G/DL
ALBUMIN SERPL-MCNC: 2.2 G/DL (ref 3.5–5.2)
ALBUMIN SERPL-MCNC: 2.6 G/DL (ref 3.5–5.2)
ALBUMIN SERPL-MCNC: 2.6 G/DL (ref 3.5–5.2)
ALBUMIN SERPL-MCNC: 2.7 G/DL (ref 2.9–4.4)
ALBUMIN SERPL-MCNC: 2.7 G/DL (ref 3.5–5.2)
ALBUMIN SERPL-MCNC: 2.8 G/DL (ref 3.5–5.2)
ALBUMIN SERPL-MCNC: 2.9 G/DL (ref 3.5–5.2)
ALBUMIN SERPL-MCNC: 3 G/DL (ref 3.5–5.2)
ALBUMIN SERPL-MCNC: 3.1 G/DL (ref 3.5–5.2)
ALBUMIN SERPL-MCNC: 3.2 G/DL (ref 3.5–5.2)
ALBUMIN SERPL-MCNC: 3.3 G/DL (ref 3.5–5.2)
ALBUMIN SERPL-MCNC: 3.3 G/DL (ref 3.5–5.2)
ALBUMIN SERPL-MCNC: 3.4 G/DL (ref 3.5–5.2)
ALBUMIN SERPL-MCNC: 3.5 G/DL (ref 3.5–5.2)
ALBUMIN SERPL-MCNC: 3.5 G/DL (ref 3.5–5.2)
ALBUMIN/GLOB SERPL: 0.6 G/DL
ALBUMIN/GLOB SERPL: 0.7 G/DL
ALBUMIN/GLOB SERPL: 0.8 G/DL
ALBUMIN/GLOB SERPL: 0.9 G/DL
ALBUMIN/GLOB SERPL: 0.9 {RATIO} (ref 0.7–1.7)
ALBUMIN/GLOB SERPL: 1 G/DL
ALBUMIN/GLOB SERPL: 1.1 G/DL
ALBUMIN/GLOB SERPL: 1.2 G/DL
ALBUMIN/GLOB SERPL: 1.2 G/DL
ALBUMIN/GLOB SERPL: 1.3 G/DL
ALBUMIN/GLOB SERPL: 1.3 G/DL
ALBUMIN/GLOB SERPL: 1.4 G/DL
ALBUMIN/GLOB SERPL: 1.5 G/DL
ALP SERPL-CCNC: 119 U/L (ref 39–117)
ALP SERPL-CCNC: 130 U/L (ref 39–117)
ALP SERPL-CCNC: 131 U/L (ref 39–117)
ALP SERPL-CCNC: 137 U/L (ref 39–117)
ALP SERPL-CCNC: 138 U/L (ref 39–117)
ALP SERPL-CCNC: 144 U/L (ref 39–117)
ALP SERPL-CCNC: 148 U/L (ref 39–117)
ALP SERPL-CCNC: 153 U/L (ref 39–117)
ALP SERPL-CCNC: 153 U/L (ref 39–117)
ALP SERPL-CCNC: 158 U/L (ref 39–117)
ALP SERPL-CCNC: 161 U/L (ref 39–117)
ALP SERPL-CCNC: 162 U/L (ref 39–117)
ALP SERPL-CCNC: 164 U/L (ref 39–117)
ALP SERPL-CCNC: 164 U/L (ref 39–117)
ALP SERPL-CCNC: 165 U/L (ref 39–117)
ALP SERPL-CCNC: 166 U/L (ref 39–117)
ALP SERPL-CCNC: 175 U/L (ref 39–117)
ALP SERPL-CCNC: 183 U/L (ref 39–117)
ALP SERPL-CCNC: 190 U/L (ref 39–117)
ALP SERPL-CCNC: 208 U/L (ref 39–117)
ALP SERPL-CCNC: 218 U/L (ref 39–117)
ALP SERPL-CCNC: 230 U/L (ref 39–117)
ALP SERPL-CCNC: 249 U/L (ref 39–117)
ALP SERPL-CCNC: 254 U/L (ref 39–117)
ALP SERPL-CCNC: 270 U/L (ref 39–117)
ALP SERPL-CCNC: 281 U/L (ref 39–117)
ALP SERPL-CCNC: 339 U/L (ref 39–117)
ALPHA-FETOPROTEIN: 2.1 NG/ML (ref 0–8.3)
ALPHA1 GLOB FLD ELPH-MCNC: 0.1 G/DL (ref 0–0.4)
ALPHA2 GLOB SERPL ELPH-MCNC: 0.6 G/DL (ref 0.4–1)
ALT SERPL W P-5'-P-CCNC: 17 U/L (ref 1–41)
ALT SERPL W P-5'-P-CCNC: 18 U/L (ref 1–41)
ALT SERPL W P-5'-P-CCNC: 19 U/L (ref 1–41)
ALT SERPL W P-5'-P-CCNC: 19 U/L (ref 1–41)
ALT SERPL W P-5'-P-CCNC: 21 U/L (ref 1–41)
ALT SERPL W P-5'-P-CCNC: 21 U/L (ref 1–41)
ALT SERPL W P-5'-P-CCNC: 28 U/L (ref 1–41)
ALT SERPL W P-5'-P-CCNC: 28 U/L (ref 1–41)
ALT SERPL W P-5'-P-CCNC: 36 U/L (ref 1–41)
ALT SERPL W P-5'-P-CCNC: 36 U/L (ref 1–41)
ALT SERPL W P-5'-P-CCNC: 37 U/L (ref 1–41)
ALT SERPL W P-5'-P-CCNC: 38 U/L (ref 1–41)
ALT SERPL W P-5'-P-CCNC: 39 U/L (ref 1–41)
ALT SERPL W P-5'-P-CCNC: 39 U/L (ref 1–41)
ALT SERPL W P-5'-P-CCNC: 41 U/L (ref 1–41)
ALT SERPL W P-5'-P-CCNC: 42 U/L (ref 1–41)
ALT SERPL W P-5'-P-CCNC: 44 U/L (ref 1–41)
ALT SERPL W P-5'-P-CCNC: 46 U/L (ref 1–41)
ALT SERPL W P-5'-P-CCNC: 48 U/L (ref 1–41)
ALT SERPL W P-5'-P-CCNC: 52 U/L (ref 1–41)
ALT SERPL W P-5'-P-CCNC: 52 U/L (ref 1–41)
ALT SERPL W P-5'-P-CCNC: 58 U/L (ref 1–41)
ALT SERPL W P-5'-P-CCNC: 66 U/L (ref 1–41)
AMMONIA BLD-SCNC: 63 UMOL/L (ref 16–60)
AMMONIA BLD-SCNC: 78 UMOL/L (ref 16–60)
AMPHET+METHAMPHET UR QL: NEGATIVE
AMPHET+METHAMPHET UR QL: NEGATIVE
AMPHETAMINES UR QL: NEGATIVE
AMPHETAMINES UR QL: NEGATIVE
ANA SER QL: NEGATIVE
ANION GAP SERPL CALCULATED.3IONS-SCNC: 11 MMOL/L (ref 5–15)
ANION GAP SERPL CALCULATED.3IONS-SCNC: 12 MMOL/L (ref 5–15)
ANION GAP SERPL CALCULATED.3IONS-SCNC: 13 MMOL/L (ref 5–15)
ANION GAP SERPL CALCULATED.3IONS-SCNC: 14 MMOL/L (ref 5–15)
ANION GAP SERPL CALCULATED.3IONS-SCNC: 15 MMOL/L (ref 5–15)
ANION GAP SERPL CALCULATED.3IONS-SCNC: 16 MMOL/L (ref 5–15)
ANION GAP SERPL CALCULATED.3IONS-SCNC: 17 MMOL/L (ref 5–15)
ANION GAP SERPL CALCULATED.3IONS-SCNC: 18 MMOL/L (ref 5–15)
ANION GAP SERPL CALCULATED.3IONS-SCNC: 19 MMOL/L (ref 5–15)
ANION GAP SERPL CALCULATED.3IONS-SCNC: 20 MMOL/L (ref 5–15)
ANION GAP SERPL CALCULATED.3IONS-SCNC: 21 MMOL/L (ref 5–15)
ANION GAP SERPL CALCULATED.3IONS-SCNC: 24 MMOL/L (ref 5–15)
ANION GAP SERPL CALCULATED.3IONS-SCNC: 24 MMOL/L (ref 5–15)
ANION GAP SERPL CALCULATED.3IONS-SCNC: 9 MMOL/L (ref 5–15)
ANISOCYTOSIS BLD QL: NORMAL
APPEARANCE FLD: ABNORMAL
APPEARANCE FLD: CLEAR
APTT PPP: 84.8 SECONDS (ref 24–37)
ARTERIAL PATENCY WRIST A: ABNORMAL
AST SERPL-CCNC: 102 U/L (ref 1–40)
AST SERPL-CCNC: 110 U/L (ref 1–40)
AST SERPL-CCNC: 52 U/L (ref 1–40)
AST SERPL-CCNC: 53 U/L (ref 1–40)
AST SERPL-CCNC: 61 U/L (ref 1–40)
AST SERPL-CCNC: 62 U/L (ref 1–40)
AST SERPL-CCNC: 62 U/L (ref 1–40)
AST SERPL-CCNC: 63 U/L (ref 1–40)
AST SERPL-CCNC: 63 U/L (ref 1–40)
AST SERPL-CCNC: 64 U/L (ref 1–40)
AST SERPL-CCNC: 64 U/L (ref 1–40)
AST SERPL-CCNC: 67 U/L (ref 1–40)
AST SERPL-CCNC: 67 U/L (ref 1–40)
AST SERPL-CCNC: 70 U/L (ref 1–40)
AST SERPL-CCNC: 71 U/L (ref 1–40)
AST SERPL-CCNC: 71 U/L (ref 1–40)
AST SERPL-CCNC: 72 U/L (ref 1–40)
AST SERPL-CCNC: 72 U/L (ref 1–40)
AST SERPL-CCNC: 73 U/L (ref 1–40)
AST SERPL-CCNC: 78 U/L (ref 1–40)
AST SERPL-CCNC: 79 U/L (ref 1–40)
AST SERPL-CCNC: 83 U/L (ref 1–40)
AST SERPL-CCNC: 83 U/L (ref 1–40)
AST SERPL-CCNC: 85 U/L (ref 1–40)
AST SERPL-CCNC: 85 U/L (ref 1–40)
AST SERPL-CCNC: 86 U/L (ref 1–40)
AST SERPL-CCNC: 98 U/L (ref 1–40)
ATMOSPHERIC PRESS: ABNORMAL MM[HG]
ATMOSPHERIC PRESS: ABNORMAL MM[HG]
B-GLOBULIN SERPL ELPH-MCNC: 1.2 G/DL (ref 0.7–1.3)
BACTERIA BLD CULT: ABNORMAL
BACTERIA BLD CULT: ABNORMAL
BACTERIA FLD CULT: NORMAL
BACTERIA SPEC AEROBE CULT: ABNORMAL
BACTERIA SPEC AEROBE CULT: ABNORMAL
BACTERIA SPEC AEROBE CULT: NORMAL
BACTERIA SPEC ANAEROBE CULT: NORMAL
BACTERIA UR QL AUTO: NORMAL
BARBITURATES UR QL SCN: NEGATIVE
BARBITURATES UR QL SCN: NEGATIVE
BASE EXCESS BLDA CALC-SCNC: -8.6 MMOL/L (ref 0–2)
BASE EXCESS BLDV CALC-SCNC: 1.3 MMOL/L (ref -2–2)
BASO STIPL COARSE BLD QL SMEAR: ABNORMAL
BASO STIPL COARSE BLD QL SMEAR: NORMAL
BASOPHILS # BLD AUTO: 0.01 10*3/MM3 (ref 0–0.2)
BASOPHILS # BLD AUTO: 0.01 10*3/MM3 (ref 0–0.2)
BASOPHILS # BLD AUTO: 0.02 10*3/MM3 (ref 0–0.2)
BASOPHILS # BLD AUTO: 0.03 10*3/MM3 (ref 0–0.2)
BASOPHILS # BLD AUTO: 0.04 10*3/MM3 (ref 0–0.2)
BASOPHILS # BLD AUTO: 0.06 10*3/MM3 (ref 0–0.2)
BASOPHILS # BLD MANUAL: 0 10*3/MM3 (ref 0–0.2)
BASOPHILS # BLD MANUAL: 0 10*3/MM3 (ref 0–0.2)
BASOPHILS NFR BLD AUTO: 0 % (ref 0–1.5)
BASOPHILS NFR BLD AUTO: 0 % (ref 0–1.5)
BASOPHILS NFR BLD AUTO: 0.2 % (ref 0–1.5)
BASOPHILS NFR BLD AUTO: 0.2 % (ref 0–1.5)
BASOPHILS NFR BLD AUTO: 0.3 % (ref 0–1.5)
BASOPHILS NFR BLD AUTO: 0.4 % (ref 0–1.5)
BASOPHILS NFR BLD AUTO: 0.5 % (ref 0–1.5)
BASOPHILS NFR BLD AUTO: 0.6 % (ref 0–1.5)
BASOPHILS NFR BLD AUTO: 0.7 % (ref 0–1.5)
BASOPHILS NFR BLD AUTO: 0.8 % (ref 0–1.5)
BASOPHILS NFR BLD AUTO: 0.9 % (ref 0–1.5)
BASOPHILS NFR BLD AUTO: 1.1 % (ref 0–1.5)
BDY SITE: ABNORMAL
BDY SITE: ABNORMAL
BENZODIAZ UR QL SCN: NEGATIVE
BENZODIAZ UR QL SCN: POSITIVE
BENZODIAZ UR QL: NEGATIVE
BH BB BLOOD EXPIRATION DATE: NORMAL
BH BB BLOOD TYPE BARCODE: 600
BH BB BLOOD TYPE BARCODE: 600
BH BB BLOOD TYPE BARCODE: 6200
BH BB DISPENSE STATUS: NORMAL
BH BB PRODUCT CODE: NORMAL
BH BB UNIT NUMBER: NORMAL
BH CV ECHO MEAS - AO MAX PG (FULL): 12.6 MMHG
BH CV ECHO MEAS - AO MAX PG (FULL): 4.9 MMHG
BH CV ECHO MEAS - AO MAX PG (FULL): 9.2 MMHG
BH CV ECHO MEAS - AO MAX PG: 14.2 MMHG
BH CV ECHO MEAS - AO MAX PG: 16.4 MMHG
BH CV ECHO MEAS - AO MAX PG: 21.3 MMHG
BH CV ECHO MEAS - AO MEAN PG (FULL): 3.7 MMHG
BH CV ECHO MEAS - AO MEAN PG (FULL): 5.4 MMHG
BH CV ECHO MEAS - AO MEAN PG (FULL): 7 MMHG
BH CV ECHO MEAS - AO MEAN PG: 12 MMHG
BH CV ECHO MEAS - AO MEAN PG: 8.1 MMHG
BH CV ECHO MEAS - AO MEAN PG: 9.3 MMHG
BH CV ECHO MEAS - AO ROOT AREA (BSA CORRECTED): 1.5
BH CV ECHO MEAS - AO ROOT AREA (BSA CORRECTED): 1.6
BH CV ECHO MEAS - AO ROOT AREA: 10.3 CM^2
BH CV ECHO MEAS - AO ROOT AREA: 11.9 CM^2
BH CV ECHO MEAS - AO ROOT DIAM: 3.6 CM
BH CV ECHO MEAS - AO ROOT DIAM: 3.9 CM
BH CV ECHO MEAS - AO V2 MAX: 188.7 CM/SEC
BH CV ECHO MEAS - AO V2 MAX: 202.2 CM/SEC
BH CV ECHO MEAS - AO V2 MAX: 230.8 CM/SEC
BH CV ECHO MEAS - AO V2 MEAN: 133.8 CM/SEC
BH CV ECHO MEAS - AO V2 MEAN: 136.2 CM/SEC
BH CV ECHO MEAS - AO V2 MEAN: 163.9 CM/SEC
BH CV ECHO MEAS - AO V2 VTI: 33 CM
BH CV ECHO MEAS - AO V2 VTI: 38.1 CM
BH CV ECHO MEAS - AO V2 VTI: 41.7 CM
BH CV ECHO MEAS - AVA(I,A): 2.2 CM^2
BH CV ECHO MEAS - AVA(I,A): 2.4 CM^2
BH CV ECHO MEAS - AVA(I,A): 2.9 CM^2
BH CV ECHO MEAS - AVA(I,D): 2.2 CM^2
BH CV ECHO MEAS - AVA(I,D): 2.4 CM^2
BH CV ECHO MEAS - AVA(I,D): 2.9 CM^2
BH CV ECHO MEAS - AVA(V,A): 2.1 CM^2
BH CV ECHO MEAS - AVA(V,A): 2.1 CM^2
BH CV ECHO MEAS - AVA(V,A): 3.1 CM^2
BH CV ECHO MEAS - AVA(V,D): 2.1 CM^2
BH CV ECHO MEAS - AVA(V,D): 2.1 CM^2
BH CV ECHO MEAS - AVA(V,D): 3.1 CM^2
BH CV ECHO MEAS - BSA(HAYCOCK): 2.2 M^2
BH CV ECHO MEAS - BSA(HAYCOCK): 2.6 M^2
BH CV ECHO MEAS - BSA: 2.2 M^2
BH CV ECHO MEAS - BSA: 2.4 M^2
BH CV ECHO MEAS - BSA: 2.4 M^2
BH CV ECHO MEAS - BSA: 2.5 M^2
BH CV ECHO MEAS - BZI_BMI: 28.5 KILOGRAMS/M^2
BH CV ECHO MEAS - BZI_BMI: 37.4 KILOGRAMS/M^2
BH CV ECHO MEAS - BZI_BMI: 37.4 KILOGRAMS/M^2
BH CV ECHO MEAS - BZI_BMI: 38 KILOGRAMS/M^2
BH CV ECHO MEAS - BZI_METRIC_HEIGHT: 182.9 CM
BH CV ECHO MEAS - BZI_METRIC_WEIGHT: 125.2 KG
BH CV ECHO MEAS - BZI_METRIC_WEIGHT: 125.2 KG
BH CV ECHO MEAS - BZI_METRIC_WEIGHT: 127 KG
BH CV ECHO MEAS - BZI_METRIC_WEIGHT: 95.3 KG
BH CV ECHO MEAS - EDV(CUBED): 131.6 ML
BH CV ECHO MEAS - EDV(CUBED): 152.6 ML
BH CV ECHO MEAS - EDV(CUBED): 153.1 ML
BH CV ECHO MEAS - EDV(MOD-SP2): 113 ML
BH CV ECHO MEAS - EDV(MOD-SP2): 50 ML
BH CV ECHO MEAS - EDV(MOD-SP4): 104 ML
BH CV ECHO MEAS - EDV(MOD-SP4): 169 ML
BH CV ECHO MEAS - EDV(TEICH): 123.1 ML
BH CV ECHO MEAS - EDV(TEICH): 137.9 ML
BH CV ECHO MEAS - EDV(TEICH): 138.3 ML
BH CV ECHO MEAS - EF(CUBED): 68.8 %
BH CV ECHO MEAS - EF(CUBED): 74.6 %
BH CV ECHO MEAS - EF(CUBED): 79.5 %
BH CV ECHO MEAS - EF(MOD-BP): 63 %
BH CV ECHO MEAS - EF(MOD-SP2): 51.3 %
BH CV ECHO MEAS - EF(MOD-SP2): 56 %
BH CV ECHO MEAS - EF(MOD-SP4): 69.2 %
BH CV ECHO MEAS - EF(MOD-SP4): 69.8 %
BH CV ECHO MEAS - EF(TEICH): 59.9 %
BH CV ECHO MEAS - EF(TEICH): 66 %
BH CV ECHO MEAS - EF(TEICH): 71.6 %
BH CV ECHO MEAS - EF{MOD-BP}: 63 %
BH CV ECHO MEAS - ESV(CUBED): 27 ML
BH CV ECHO MEAS - ESV(CUBED): 38.8 ML
BH CV ECHO MEAS - ESV(CUBED): 47.8 ML
BH CV ECHO MEAS - ESV(MOD-SP2): 22 ML
BH CV ECHO MEAS - ESV(MOD-SP2): 55 ML
BH CV ECHO MEAS - ESV(MOD-SP4): 32 ML
BH CV ECHO MEAS - ESV(MOD-SP4): 51 ML
BH CV ECHO MEAS - ESV(TEICH): 35 ML
BH CV ECHO MEAS - ESV(TEICH): 46.9 ML
BH CV ECHO MEAS - ESV(TEICH): 55.5 ML
BH CV ECHO MEAS - FS: 32.2 %
BH CV ECHO MEAS - FS: 36.7 %
BH CV ECHO MEAS - FS: 41 %
BH CV ECHO MEAS - IVS/LVPW: 0.88
BH CV ECHO MEAS - IVS/LVPW: 0.92
BH CV ECHO MEAS - IVS/LVPW: 1
BH CV ECHO MEAS - IVSD: 0.82 CM
BH CV ECHO MEAS - IVSD: 1 CM
BH CV ECHO MEAS - IVSD: 1.1 CM
BH CV ECHO MEAS - LA DIMENSION: 3.4 CM
BH CV ECHO MEAS - LA DIMENSION: 3.6 CM
BH CV ECHO MEAS - LA/AO: 0.89
BH CV ECHO MEAS - LA/AO: 0.98
BH CV ECHO MEAS - LAD MAJOR: 5.5 CM
BH CV ECHO MEAS - LAD MAJOR: 5.6 CM
BH CV ECHO MEAS - LAD MAJOR: 6 CM
BH CV ECHO MEAS - LAT PEAK E' VEL: 11.6 CM/SEC
BH CV ECHO MEAS - LAT PEAK E' VEL: 7 CM/SEC
BH CV ECHO MEAS - LAT PEAK E' VEL: 8.8 CM/SEC
BH CV ECHO MEAS - LATERAL E/E' RATIO: 11.7
BH CV ECHO MEAS - LATERAL E/E' RATIO: 7.1
BH CV ECHO MEAS - LATERAL E/E' RATIO: 8.1
BH CV ECHO MEAS - LV DIASTOLIC VOL/BSA (35-75): 42.6 ML/M^2
BH CV ECHO MEAS - LV DIASTOLIC VOL/BSA (35-75): 68.8 ML/M^2
BH CV ECHO MEAS - LV MASS(C)D: 169.8 GRAMS
BH CV ECHO MEAS - LV MASS(C)D: 208.3 GRAMS
BH CV ECHO MEAS - LV MASS(C)D: 219.1 GRAMS
BH CV ECHO MEAS - LV MASS(C)DI: 78.1 GRAMS/M^2
BH CV ECHO MEAS - LV MASS(C)DI: 85.3 GRAMS/M^2
BH CV ECHO MEAS - LV MASS(C)DI: 89.1 GRAMS/M^2
BH CV ECHO MEAS - LV MAX PG: 7.2 MMHG
BH CV ECHO MEAS - LV MAX PG: 8.7 MMHG
BH CV ECHO MEAS - LV MAX PG: 9.3 MMHG
BH CV ECHO MEAS - LV MEAN PG: 3.9 MMHG
BH CV ECHO MEAS - LV MEAN PG: 4.5 MMHG
BH CV ECHO MEAS - LV MEAN PG: 5 MMHG
BH CV ECHO MEAS - LV SYSTOLIC VOL/BSA (12-30): 13.1 ML/M^2
BH CV ECHO MEAS - LV SYSTOLIC VOL/BSA (12-30): 20.7 ML/M^2
BH CV ECHO MEAS - LV V1 MAX: 133.8 CM/SEC
BH CV ECHO MEAS - LV V1 MAX: 147.1 CM/SEC
BH CV ECHO MEAS - LV V1 MAX: 152.4 CM/SEC
BH CV ECHO MEAS - LV V1 MEAN: 88.7 CM/SEC
BH CV ECHO MEAS - LV V1 MEAN: 96.1 CM/SEC
BH CV ECHO MEAS - LV V1 MEAN: 99.9 CM/SEC
BH CV ECHO MEAS - LV V1 VTI: 25.4 CM
BH CV ECHO MEAS - LV V1 VTI: 28.1 CM
BH CV ECHO MEAS - LV V1 VTI: 28.7 CM
BH CV ECHO MEAS - LVIDD: 5.1 CM
BH CV ECHO MEAS - LVIDD: 5.3 CM
BH CV ECHO MEAS - LVIDD: 5.3 CM
BH CV ECHO MEAS - LVIDS: 3 CM
BH CV ECHO MEAS - LVIDS: 3.4 CM
BH CV ECHO MEAS - LVIDS: 3.6 CM
BH CV ECHO MEAS - LVLD AP2: 10.8 CM
BH CV ECHO MEAS - LVLD AP2: 8.4 CM
BH CV ECHO MEAS - LVLD AP4: 11.4 CM
BH CV ECHO MEAS - LVLD AP4: 8.6 CM
BH CV ECHO MEAS - LVLS AP2: 6.2 CM
BH CV ECHO MEAS - LVLS AP2: 8.9 CM
BH CV ECHO MEAS - LVLS AP4: 6.5 CM
BH CV ECHO MEAS - LVLS AP4: 8.9 CM
BH CV ECHO MEAS - LVOT AREA (M): 3.1 CM^2
BH CV ECHO MEAS - LVOT AREA (M): 3.1 CM^2
BH CV ECHO MEAS - LVOT AREA (M): 3.8 CM^2
BH CV ECHO MEAS - LVOT AREA: 3.2 CM^2
BH CV ECHO MEAS - LVOT AREA: 3.2 CM^2
BH CV ECHO MEAS - LVOT AREA: 3.8 CM^2
BH CV ECHO MEAS - LVOT DIAM: 2 CM
BH CV ECHO MEAS - LVOT DIAM: 2 CM
BH CV ECHO MEAS - LVOT DIAM: 2.2 CM
BH CV ECHO MEAS - LVPWD: 0.93 CM
BH CV ECHO MEAS - LVPWD: 1.1 CM
BH CV ECHO MEAS - LVPWD: 1.1 CM
BH CV ECHO MEAS - MED PEAK E' VEL: 5.8 CM/SEC
BH CV ECHO MEAS - MED PEAK E' VEL: 7.3 CM/SEC
BH CV ECHO MEAS - MED PEAK E' VEL: 7.8 CM/SEC
BH CV ECHO MEAS - MEDIAL E/E' RATIO: 10.5
BH CV ECHO MEAS - MEDIAL E/E' RATIO: 14.1
BH CV ECHO MEAS - MEDIAL E/E' RATIO: 9.6
BH CV ECHO MEAS - MV A MAX VEL: 92.3 CM/SEC
BH CV ECHO MEAS - MV A MAX VEL: 94.3 CM/SEC
BH CV ECHO MEAS - MV A MAX VEL: 97.7 CM/SEC
BH CV ECHO MEAS - MV DEC SLOPE: 385.5 CM/SEC^2
BH CV ECHO MEAS - MV DEC TIME: 0.19 SEC
BH CV ECHO MEAS - MV DEC TIME: 0.21 SEC
BH CV ECHO MEAS - MV DEC TIME: 0.3 SEC
BH CV ECHO MEAS - MV E MAX VEL: 72.1 CM/SEC
BH CV ECHO MEAS - MV E MAX VEL: 83.4 CM/SEC
BH CV ECHO MEAS - MV E MAX VEL: 83.9 CM/SEC
BH CV ECHO MEAS - MV E/A: 0.78
BH CV ECHO MEAS - MV E/A: 0.86
BH CV ECHO MEAS - MV E/A: 0.88
BH CV ECHO MEAS - MV MAX PG: 3.6 MMHG
BH CV ECHO MEAS - MV MEAN PG: 1.9 MMHG
BH CV ECHO MEAS - MV P1/2T MAX VEL: 118.1 CM/SEC
BH CV ECHO MEAS - MV P1/2T: 89.7 MSEC
BH CV ECHO MEAS - MV V2 MAX: 94.8 CM/SEC
BH CV ECHO MEAS - MV V2 MEAN: 66 CM/SEC
BH CV ECHO MEAS - MV V2 VTI: 26 CM
BH CV ECHO MEAS - MVA P1/2T LCG: 1.9 CM^2
BH CV ECHO MEAS - MVA(P1/2T): 2.5 CM^2
BH CV ECHO MEAS - MVA(VTI): 3.7 CM^2
BH CV ECHO MEAS - PA ACC SLOPE: 830.6 CM/SEC^2
BH CV ECHO MEAS - PA ACC TIME: 0.14 SEC
BH CV ECHO MEAS - PA MAX PG: 10.4 MMHG
BH CV ECHO MEAS - PA PR(ACCEL): 15.6 MMHG
BH CV ECHO MEAS - PA V2 MAX: 161.5 CM/SEC
BH CV ECHO MEAS - RAP SYSTOLE: 3 MMHG
BH CV ECHO MEAS - RAP SYSTOLE: 8 MMHG
BH CV ECHO MEAS - RVSP: 16 MMHG
BH CV ECHO MEAS - RVSP: 20 MMHG
BH CV ECHO MEAS - SI(AO): 138.5 ML/M^2
BH CV ECHO MEAS - SI(AO): 183.7 ML/M^2
BH CV ECHO MEAS - SI(CUBED): 42.8 ML/M^2
BH CV ECHO MEAS - SI(CUBED): 42.8 ML/M^2
BH CV ECHO MEAS - SI(CUBED): 52.3 ML/M^2
BH CV ECHO MEAS - SI(LVOT): 36.8 ML/M^2
BH CV ECHO MEAS - SI(LVOT): 39.4 ML/M^2
BH CV ECHO MEAS - SI(LVOT): 42.1 ML/M^2
BH CV ECHO MEAS - SI(MOD-SP2): 11.5 ML/M^2
BH CV ECHO MEAS - SI(MOD-SP2): 23.6 ML/M^2
BH CV ECHO MEAS - SI(MOD-SP4): 29.5 ML/M^2
BH CV ECHO MEAS - SI(MOD-SP4): 48 ML/M^2
BH CV ECHO MEAS - SI(TEICH): 33.7 ML/M^2
BH CV ECHO MEAS - SI(TEICH): 36.1 ML/M^2
BH CV ECHO MEAS - SI(TEICH): 41.8 ML/M^2
BH CV ECHO MEAS - SV(AO): 338.3 ML
BH CV ECHO MEAS - SV(AO): 451.4 ML
BH CV ECHO MEAS - SV(CUBED): 104.6 ML
BH CV ECHO MEAS - SV(CUBED): 105.3 ML
BH CV ECHO MEAS - SV(CUBED): 113.8 ML
BH CV ECHO MEAS - SV(LVOT): 90.6 ML
BH CV ECHO MEAS - SV(LVOT): 91.7 ML
BH CV ECHO MEAS - SV(LVOT): 96.4 ML
BH CV ECHO MEAS - SV(MOD-SP2): 28 ML
BH CV ECHO MEAS - SV(MOD-SP2): 58 ML
BH CV ECHO MEAS - SV(MOD-SP4): 118 ML
BH CV ECHO MEAS - SV(MOD-SP4): 72 ML
BH CV ECHO MEAS - SV(TEICH): 82.8 ML
BH CV ECHO MEAS - SV(TEICH): 88.1 ML
BH CV ECHO MEAS - SV(TEICH): 91 ML
BH CV ECHO MEAS - TAPSE (>1.6): 2.2 CM2
BH CV ECHO MEAS - TAPSE (>1.6): 3.6 CM2
BH CV ECHO MEAS - TR MAX PG: 12 MMHG
BH CV ECHO MEAS - TR MAX PG: 13 MMHG
BH CV ECHO MEAS - TR MAX VEL: 173.7 CM/SEC
BH CV ECHO MEAS - TR MAX VEL: 176.5 CM/SEC
BH CV ECHO MEASUREMENTS AVERAGE E/E' RATIO: 13.11
BH CV ECHO MEASUREMENTS AVERAGE E/E' RATIO: 8.6
BH CV ECHO MEASUREMENTS AVERAGE E/E' RATIO: 8.96
BH CV VAS BP RIGHT ARM: NORMAL MMHG
BH CV XLRA - RV BASE: 3.2 CM
BH CV XLRA - RV BASE: 4.8 CM
BH CV XLRA - RV LENGTH: 7.6 CM
BH CV XLRA - RV LENGTH: 8.5 CM
BH CV XLRA - RV MID: 2.8 CM
BH CV XLRA - RV MID: 4.5 CM
BH CV XLRA - TDI S': 14.5 CM/SEC
BH CV XLRA - TDI S': 20.4 CM/SEC
BILIRUB CONJ SERPL-MCNC: 1.4 MG/DL (ref 0–0.3)
BILIRUB INDIRECT SERPL-MCNC: 1.6 MG/DL
BILIRUB SERPL-MCNC: 1.4 MG/DL (ref 0.2–1.2)
BILIRUB SERPL-MCNC: 1.7 MG/DL (ref 0.2–1.2)
BILIRUB SERPL-MCNC: 1.7 MG/DL (ref 0–1.2)
BILIRUB SERPL-MCNC: 1.7 MG/DL (ref 0–1.2)
BILIRUB SERPL-MCNC: 1.8 MG/DL (ref 0.2–1.2)
BILIRUB SERPL-MCNC: 1.8 MG/DL (ref 0–1.2)
BILIRUB SERPL-MCNC: 2 MG/DL (ref 0.2–1.2)
BILIRUB SERPL-MCNC: 2 MG/DL (ref 0–1.2)
BILIRUB SERPL-MCNC: 2.2 MG/DL (ref 0–1.2)
BILIRUB SERPL-MCNC: 2.4 MG/DL (ref 0–1.2)
BILIRUB SERPL-MCNC: 2.5 MG/DL (ref 0.2–1.2)
BILIRUB SERPL-MCNC: 2.5 MG/DL (ref 0–1.2)
BILIRUB SERPL-MCNC: 2.6 MG/DL (ref 0–1.2)
BILIRUB SERPL-MCNC: 2.7 MG/DL (ref 0–1.2)
BILIRUB SERPL-MCNC: 2.8 MG/DL (ref 0–1.2)
BILIRUB SERPL-MCNC: 2.8 MG/DL (ref 0–1.2)
BILIRUB SERPL-MCNC: 3 MG/DL (ref 0–1.2)
BILIRUB SERPL-MCNC: 3 MG/DL (ref 0–1.2)
BILIRUB SERPL-MCNC: 3.1 MG/DL (ref 0–1.2)
BILIRUB SERPL-MCNC: 3.3 MG/DL (ref 0–1.2)
BILIRUB SERPL-MCNC: 3.3 MG/DL (ref 0–1.2)
BILIRUB SERPL-MCNC: 3.6 MG/DL (ref 0–1.2)
BILIRUB SERPL-MCNC: 3.9 MG/DL (ref 0–1.2)
BILIRUB SERPL-MCNC: 5.4 MG/DL (ref 0–1.2)
BILIRUB SERPL-MCNC: 6.3 MG/DL (ref 0–1.2)
BILIRUB UR QL STRIP: NEGATIVE
BILIRUB UR QL STRIP: NORMAL
BLD GP AB SCN SERPL QL: NEGATIVE
BODY TEMPERATURE: 37 C
BODY TEMPERATURE: 37 C
BUN BLD-MCNC: 17 MG/DL (ref 6–20)
BUN BLD-MCNC: 17 MG/DL (ref 6–20)
BUN BLD-MCNC: 29 MG/DL (ref 6–20)
BUN BLD-MCNC: 37 MG/DL (ref 6–20)
BUN BLD-MCNC: 38 MG/DL (ref 6–20)
BUN BLD-MCNC: 38 MG/DL (ref 6–20)
BUN BLD-MCNC: 45 MG/DL (ref 6–20)
BUN BLD-MCNC: 48 MG/DL (ref 6–20)
BUN BLD-MCNC: 75 MG/DL (ref 6–20)
BUN SERPL-MCNC: 107 MG/DL (ref 6–20)
BUN SERPL-MCNC: 120 MG/DL (ref 6–20)
BUN SERPL-MCNC: 121 MG/DL (ref 6–20)
BUN SERPL-MCNC: 128 MG/DL (ref 6–20)
BUN SERPL-MCNC: 21 MG/DL (ref 6–20)
BUN SERPL-MCNC: 23 MG/DL (ref 6–20)
BUN SERPL-MCNC: 24 MG/DL (ref 6–20)
BUN SERPL-MCNC: 24 MG/DL (ref 6–20)
BUN SERPL-MCNC: 27 MG/DL (ref 6–20)
BUN SERPL-MCNC: 28 MG/DL (ref 6–20)
BUN SERPL-MCNC: 29 MG/DL (ref 6–20)
BUN SERPL-MCNC: 29 MG/DL (ref 6–20)
BUN SERPL-MCNC: 32 MG/DL (ref 6–20)
BUN SERPL-MCNC: 34 MG/DL (ref 6–20)
BUN SERPL-MCNC: 37 MG/DL (ref 6–20)
BUN SERPL-MCNC: 37 MG/DL (ref 6–20)
BUN SERPL-MCNC: 38 MG/DL (ref 6–20)
BUN SERPL-MCNC: 40 MG/DL (ref 6–20)
BUN SERPL-MCNC: 44 MG/DL (ref 6–20)
BUN SERPL-MCNC: 52 MG/DL (ref 6–20)
BUN SERPL-MCNC: 53 MG/DL (ref 6–20)
BUN SERPL-MCNC: 58 MG/DL (ref 6–20)
BUN SERPL-MCNC: 61 MG/DL (ref 6–20)
BUN SERPL-MCNC: 61 MG/DL (ref 6–20)
BUN SERPL-MCNC: 62 MG/DL (ref 6–20)
BUN SERPL-MCNC: 63 MG/DL (ref 6–20)
BUN SERPL-MCNC: 63 MG/DL (ref 6–20)
BUN SERPL-MCNC: 64 MG/DL (ref 6–20)
BUN SERPL-MCNC: 64 MG/DL (ref 6–20)
BUN SERPL-MCNC: 66 MG/DL (ref 6–20)
BUN SERPL-MCNC: 67 MG/DL (ref 6–20)
BUN SERPL-MCNC: 69 MG/DL (ref 6–20)
BUN SERPL-MCNC: 71 MG/DL (ref 6–20)
BUN SERPL-MCNC: 72 MG/DL (ref 6–20)
BUN SERPL-MCNC: 94 MG/DL (ref 6–20)
BUN/CREAT SERPL: 10.7 (ref 7–25)
BUN/CREAT SERPL: 11 (ref 7–25)
BUN/CREAT SERPL: 11.5 (ref 7–25)
BUN/CREAT SERPL: 11.6 (ref 7–25)
BUN/CREAT SERPL: 12.2 (ref 7–25)
BUN/CREAT SERPL: 12.6 (ref 7–25)
BUN/CREAT SERPL: 13.7 (ref 7–25)
BUN/CREAT SERPL: 13.8 (ref 7–25)
BUN/CREAT SERPL: 14.3 (ref 7–25)
BUN/CREAT SERPL: 14.4 (ref 7–25)
BUN/CREAT SERPL: 14.9 (ref 7–25)
BUN/CREAT SERPL: 15.2 (ref 7–25)
BUN/CREAT SERPL: 16 (ref 7–25)
BUN/CREAT SERPL: 16.8 (ref 7–25)
BUN/CREAT SERPL: 16.9 (ref 7–25)
BUN/CREAT SERPL: 17.2 (ref 7–25)
BUN/CREAT SERPL: 17.5 (ref 7–25)
BUN/CREAT SERPL: 18.1 (ref 7–25)
BUN/CREAT SERPL: 18.3 (ref 7–25)
BUN/CREAT SERPL: 18.4 (ref 7–25)
BUN/CREAT SERPL: 19.2 (ref 7–25)
BUN/CREAT SERPL: 19.5 (ref 7–25)
BUN/CREAT SERPL: 20.9 (ref 7–25)
BUN/CREAT SERPL: 22.7 (ref 7–25)
BUN/CREAT SERPL: 25.7 (ref 7–25)
BUN/CREAT SERPL: 27 (ref 7–25)
BUN/CREAT SERPL: 27.4 (ref 7–25)
BUN/CREAT SERPL: 28.9 (ref 7–25)
BUN/CREAT SERPL: 5.1 (ref 7–25)
BUN/CREAT SERPL: 5.1 (ref 7–25)
BUN/CREAT SERPL: 5.3 (ref 7–25)
BUN/CREAT SERPL: 5.7 (ref 7–25)
BUN/CREAT SERPL: 5.8 (ref 7–25)
BUN/CREAT SERPL: 6.1 (ref 7–25)
BUN/CREAT SERPL: 6.4 (ref 7–25)
BUN/CREAT SERPL: 6.7 (ref 7–25)
BUN/CREAT SERPL: 6.9 (ref 7–25)
BUN/CREAT SERPL: 7.2 (ref 7–25)
BUN/CREAT SERPL: 7.5 (ref 7–25)
BUN/CREAT SERPL: 7.6 (ref 7–25)
BUN/CREAT SERPL: 7.8 (ref 7–25)
BUN/CREAT SERPL: 7.9 (ref 7–25)
BUN/CREAT SERPL: 8 (ref 7–25)
BUN/CREAT SERPL: 8.2 (ref 7–25)
BUN/CREAT SERPL: 8.3 (ref 7–25)
BUN/CREAT SERPL: 8.3 (ref 7–25)
BUN/CREAT SERPL: 8.6 (ref 7–25)
BUN/CREAT SERPL: 9.9 (ref 7–25)
BUPRENORPHINE SERPL-MCNC: NEGATIVE NG/ML
BUPRENORPHINE SERPL-MCNC: NEGATIVE NG/ML
CA-I SERPL ISE-MCNC: 1.06 MMOL/L (ref 1.12–1.32)
CA-I SERPL ISE-MCNC: 1.1 MMOL/L (ref 1.12–1.32)
CALCIUM SPEC-SCNC: 7.3 MG/DL (ref 8.6–10.5)
CALCIUM SPEC-SCNC: 7.5 MG/DL (ref 8.6–10.5)
CALCIUM SPEC-SCNC: 7.6 MG/DL (ref 8.6–10.5)
CALCIUM SPEC-SCNC: 7.7 MG/DL (ref 8.6–10.5)
CALCIUM SPEC-SCNC: 7.8 MG/DL (ref 8.6–10.5)
CALCIUM SPEC-SCNC: 7.9 MG/DL (ref 8.6–10.5)
CALCIUM SPEC-SCNC: 7.9 MG/DL (ref 8.6–10.5)
CALCIUM SPEC-SCNC: 8 MG/DL (ref 8.6–10.5)
CALCIUM SPEC-SCNC: 8.1 MG/DL (ref 8.6–10.5)
CALCIUM SPEC-SCNC: 8.1 MG/DL (ref 8.6–10.5)
CALCIUM SPEC-SCNC: 8.2 MG/DL (ref 8.6–10.5)
CALCIUM SPEC-SCNC: 8.3 MG/DL (ref 8.6–10.5)
CALCIUM SPEC-SCNC: 8.4 MG/DL (ref 8.6–10.5)
CALCIUM SPEC-SCNC: 8.5 MG/DL (ref 8.6–10.5)
CALCIUM SPEC-SCNC: 8.6 MG/DL (ref 8.6–10.5)
CALCIUM SPEC-SCNC: 8.7 MG/DL (ref 8.6–10.5)
CALCIUM SPEC-SCNC: 8.8 MG/DL (ref 8.6–10.5)
CALCIUM SPEC-SCNC: 8.9 MG/DL (ref 8.6–10.5)
CALCIUM SPEC-SCNC: 9 MG/DL (ref 8.6–10.5)
CALCIUM SPEC-SCNC: 9.1 MG/DL (ref 8.6–10.5)
CALCIUM SPEC-SCNC: 9.2 MG/DL (ref 8.6–10.5)
CALCIUM SPEC-SCNC: 9.5 MG/DL (ref 8.6–10.5)
CANNABINOIDS SERPL QL: POSITIVE
CANNABINOIDS SERPL QL: POSITIVE
CERULOPLASMIN SERPL-MCNC: 20 MG/DL (ref 16–31)
CHLORIDE SERPL-SCNC: 100 MMOL/L (ref 98–107)
CHLORIDE SERPL-SCNC: 101 MMOL/L (ref 98–107)
CHLORIDE SERPL-SCNC: 102 MMOL/L (ref 98–107)
CHLORIDE SERPL-SCNC: 103 MMOL/L (ref 98–107)
CHLORIDE SERPL-SCNC: 104 MMOL/L (ref 98–107)
CHLORIDE SERPL-SCNC: 105 MMOL/L (ref 98–107)
CHLORIDE SERPL-SCNC: 106 MMOL/L (ref 98–107)
CHLORIDE SERPL-SCNC: 92 MMOL/L (ref 98–107)
CHLORIDE SERPL-SCNC: 94 MMOL/L (ref 98–107)
CHLORIDE SERPL-SCNC: 95 MMOL/L (ref 98–107)
CHLORIDE SERPL-SCNC: 95 MMOL/L (ref 98–107)
CHLORIDE SERPL-SCNC: 96 MMOL/L (ref 98–107)
CHLORIDE SERPL-SCNC: 97 MMOL/L (ref 98–107)
CHLORIDE SERPL-SCNC: 98 MMOL/L (ref 98–107)
CHLORIDE SERPL-SCNC: 99 MMOL/L (ref 98–107)
CLARITY UR: CLEAR
CLARITY UR: NORMAL
CO2 BLDA-SCNC: 16.3 MMOL/L (ref 22–33)
CO2 BLDA-SCNC: 26.2 MMOL/L (ref 22–33)
CO2 SERPL-SCNC: 17 MMOL/L (ref 22–29)
CO2 SERPL-SCNC: 17 MMOL/L (ref 22–29)
CO2 SERPL-SCNC: 18 MMOL/L (ref 22–29)
CO2 SERPL-SCNC: 19 MMOL/L (ref 22–29)
CO2 SERPL-SCNC: 20 MMOL/L (ref 22–29)
CO2 SERPL-SCNC: 21 MMOL/L (ref 22–29)
CO2 SERPL-SCNC: 21 MMOL/L (ref 22–29)
CO2 SERPL-SCNC: 22 MMOL/L (ref 22–29)
CO2 SERPL-SCNC: 23 MMOL/L (ref 22–29)
CO2 SERPL-SCNC: 24 MMOL/L (ref 22–29)
CO2 SERPL-SCNC: 25 MMOL/L (ref 22–29)
CO2 SERPL-SCNC: 26 MMOL/L (ref 22–29)
CO2 SERPL-SCNC: 26 MMOL/L (ref 22–29)
CO2 SERPL-SCNC: 27 MMOL/L (ref 22–29)
COCAINE UR QL: NEGATIVE
COCAINE UR QL: NEGATIVE
CODEINE UR QL: NEGATIVE
COHGB MFR BLD: 1.1 % (ref 0–2)
COHGB MFR BLD: 1.8 %
COLOR FLD: YELLOW
COLOR UR: NORMAL
COLOR UR: YELLOW
CORTIS SERPL-MCNC: 20.38 MCG/DL
CREAT BLD-MCNC: 0.94 MG/DL (ref 0.76–1.27)
CREAT BLD-MCNC: 1.01 MG/DL (ref 0.76–1.27)
CREAT BLD-MCNC: 1.28 MG/DL (ref 0.76–1.27)
CREAT BLD-MCNC: 1.28 MG/DL (ref 0.76–1.27)
CREAT BLD-MCNC: 1.41 MG/DL (ref 0.76–1.27)
CREAT BLD-MCNC: 1.48 MG/DL (ref 0.76–1.27)
CREAT BLD-MCNC: 1.64 MG/DL (ref 0.76–1.27)
CREAT BLD-MCNC: 3.15 MG/DL (ref 0.76–1.27)
CREAT BLD-MCNC: 3.85 MG/DL (ref 0.76–1.27)
CREAT BLDA-MCNC: 0.9 MG/DL (ref 0.6–1.3)
CREAT BLDA-MCNC: 1.3 MG/DL
CREAT BLDA-MCNC: 1.3 MG/DL (ref 0.6–1.3)
CREAT BLDA-MCNC: 1.3 MG/DL (ref 0.6–1.3)
CREAT BLDA-MCNC: 1.4 MG/DL (ref 0.6–1.3)
CREAT BLDA-MCNC: 1.5 MG/DL
CREAT BLDA-MCNC: 1.5 MG/DL (ref 0.6–1.3)
CREAT BLDA-MCNC: 1.8 MG/DL (ref 0.6–1.3)
CREAT BLDA-MCNC: 3.3 MG/DL
CREAT SERPL-MCNC: 3.19 MG/DL (ref 0.76–1.27)
CREAT SERPL-MCNC: 3.43 MG/DL (ref 0.76–1.27)
CREAT SERPL-MCNC: 3.7 MG/DL (ref 0.76–1.27)
CREAT SERPL-MCNC: 3.75 MG/DL (ref 0.76–1.27)
CREAT SERPL-MCNC: 3.76 MG/DL (ref 0.76–1.27)
CREAT SERPL-MCNC: 3.9 MG/DL (ref 0.76–1.27)
CREAT SERPL-MCNC: 4 MG/DL (ref 0.76–1.27)
CREAT SERPL-MCNC: 4.18 MG/DL (ref 0.76–1.27)
CREAT SERPL-MCNC: 4.21 MG/DL (ref 0.76–1.27)
CREAT SERPL-MCNC: 4.24 MG/DL (ref 0.76–1.27)
CREAT SERPL-MCNC: 4.35 MG/DL (ref 0.76–1.27)
CREAT SERPL-MCNC: 4.35 MG/DL (ref 0.76–1.27)
CREAT SERPL-MCNC: 4.4 MG/DL (ref 0.76–1.27)
CREAT SERPL-MCNC: 4.45 MG/DL (ref 0.76–1.27)
CREAT SERPL-MCNC: 4.5 MG/DL (ref 0.76–1.27)
CREAT SERPL-MCNC: 4.52 MG/DL (ref 0.76–1.27)
CREAT SERPL-MCNC: 4.62 MG/DL (ref 0.76–1.27)
CREAT SERPL-MCNC: 4.69 MG/DL (ref 0.76–1.27)
CREAT SERPL-MCNC: 4.79 MG/DL (ref 0.76–1.27)
CREAT SERPL-MCNC: 4.79 MG/DL (ref 0.76–1.27)
CREAT SERPL-MCNC: 4.89 MG/DL (ref 0.76–1.27)
CREAT SERPL-MCNC: 4.9 MG/DL (ref 0.76–1.27)
CREAT SERPL-MCNC: 4.92 MG/DL (ref 0.76–1.27)
CREAT SERPL-MCNC: 5.03 MG/DL (ref 0.76–1.27)
CREAT SERPL-MCNC: 5.08 MG/DL (ref 0.76–1.27)
CREAT SERPL-MCNC: 5.09 MG/DL (ref 0.76–1.27)
CREAT SERPL-MCNC: 5.27 MG/DL (ref 0.76–1.27)
CREAT SERPL-MCNC: 5.5 MG/DL (ref 0.76–1.27)
CREAT SERPL-MCNC: 5.53 MG/DL (ref 0.76–1.27)
CREAT SERPL-MCNC: 5.56 MG/DL (ref 0.76–1.27)
CREAT SERPL-MCNC: 5.84 MG/DL (ref 0.76–1.27)
CREAT SERPL-MCNC: 5.97 MG/DL (ref 0.76–1.27)
CREAT SERPL-MCNC: 6.32 MG/DL (ref 0.76–1.27)
CREAT SERPL-MCNC: 6.56 MG/DL (ref 0.76–1.27)
CREAT SERPL-MCNC: 6.61 MG/DL (ref 0.76–1.27)
CREAT SERPL-MCNC: 6.92 MG/DL (ref 0.76–1.27)
CREAT SERPL-MCNC: 7.39 MG/DL (ref 0.76–1.27)
CREAT SERPL-MCNC: 7.46 MG/DL (ref 0.76–1.27)
CREAT SERPL-MCNC: 7.7 MG/DL (ref 0.76–1.27)
CREAT SERPL-MCNC: 7.97 MG/DL (ref 0.76–1.27)
CREAT SERPL-MCNC: 8.31 MG/DL (ref 0.76–1.27)
CREAT UR-MCNC: 140.1 MG/DL
CROSSMATCH INTERPRETATION: NORMAL
CRP SERPL-MCNC: 5.49 MG/DL (ref 0–0.5)
CYTO UR: NORMAL
D-LACTATE SERPL-SCNC: 1.2 MMOL/L (ref 0.5–2)
D-LACTATE SERPL-SCNC: 1.8 MMOL/L (ref 0.5–2)
D-LACTATE SERPL-SCNC: 2.1 MMOL/L (ref 0.5–2)
D-LACTATE SERPL-SCNC: 2.2 MMOL/L (ref 0.5–2)
D-LACTATE SERPL-SCNC: 2.3 MMOL/L (ref 0.5–2)
D-LACTATE SERPL-SCNC: 2.5 MMOL/L (ref 0.5–2)
D-LACTATE SERPL-SCNC: 2.6 MMOL/L (ref 0.5–2)
D-LACTATE SERPL-SCNC: 2.7 MMOL/L (ref 0.5–2)
D-LACTATE SERPL-SCNC: 3 MMOL/L (ref 0.5–2)
D-LACTATE SERPL-SCNC: 3.2 MMOL/L (ref 0.5–2)
D-LACTATE SERPL-SCNC: 3.7 MMOL/L (ref 0.5–2)
D-LACTATE SERPL-SCNC: 5.6 MMOL/L (ref 0.5–2)
D-LACTATE SERPL-SCNC: 7.8 MMOL/L (ref 0.5–2)
D-LACTATE SERPL-SCNC: 8.4 MMOL/L (ref 0.5–2)
DEPRECATED RDW RBC AUTO: 60 FL (ref 37–54)
DEPRECATED RDW RBC AUTO: 60.8 FL (ref 37–54)
DEPRECATED RDW RBC AUTO: 61 FL (ref 37–54)
DEPRECATED RDW RBC AUTO: 61.1 FL (ref 37–54)
DEPRECATED RDW RBC AUTO: 61.8 FL (ref 37–54)
DEPRECATED RDW RBC AUTO: 62.4 FL (ref 37–54)
DEPRECATED RDW RBC AUTO: 62.4 FL (ref 37–54)
DEPRECATED RDW RBC AUTO: 63 FL (ref 37–54)
DEPRECATED RDW RBC AUTO: 63.8 FL (ref 37–54)
DEPRECATED RDW RBC AUTO: 64 FL (ref 37–54)
DEPRECATED RDW RBC AUTO: 64.4 FL (ref 37–54)
DEPRECATED RDW RBC AUTO: 64.5 FL (ref 37–54)
DEPRECATED RDW RBC AUTO: 64.8 FL (ref 37–54)
DEPRECATED RDW RBC AUTO: 65.5 FL (ref 37–54)
DEPRECATED RDW RBC AUTO: 66 FL (ref 37–54)
DEPRECATED RDW RBC AUTO: 66.3 FL (ref 37–54)
DEPRECATED RDW RBC AUTO: 66.3 FL (ref 37–54)
DEPRECATED RDW RBC AUTO: 66.4 FL (ref 37–54)
DEPRECATED RDW RBC AUTO: 66.4 FL (ref 37–54)
DEPRECATED RDW RBC AUTO: 66.5 FL (ref 37–54)
DEPRECATED RDW RBC AUTO: 66.7 FL (ref 37–54)
DEPRECATED RDW RBC AUTO: 66.9 FL (ref 37–54)
DEPRECATED RDW RBC AUTO: 67 FL (ref 37–54)
DEPRECATED RDW RBC AUTO: 67.1 FL (ref 37–54)
DEPRECATED RDW RBC AUTO: 67.1 FL (ref 37–54)
DEPRECATED RDW RBC AUTO: 67.3 FL (ref 37–54)
DEPRECATED RDW RBC AUTO: 67.5 FL (ref 37–54)
DEPRECATED RDW RBC AUTO: 67.8 FL (ref 37–54)
DEPRECATED RDW RBC AUTO: 67.9 FL (ref 37–54)
DEPRECATED RDW RBC AUTO: 67.9 FL (ref 37–54)
DEPRECATED RDW RBC AUTO: 68.3 FL (ref 37–54)
DEPRECATED RDW RBC AUTO: 68.4 FL (ref 37–54)
DEPRECATED RDW RBC AUTO: 68.6 FL (ref 37–54)
DEPRECATED RDW RBC AUTO: 69 FL (ref 37–54)
DEPRECATED RDW RBC AUTO: 69 FL (ref 37–54)
DEPRECATED RDW RBC AUTO: 69.2 FL (ref 37–54)
DEPRECATED RDW RBC AUTO: 69.8 FL (ref 37–54)
DEPRECATED RDW RBC AUTO: 70.3 FL (ref 37–54)
DEPRECATED RDW RBC AUTO: 71 FL (ref 37–54)
DEPRECATED RDW RBC AUTO: 71.9 FL (ref 37–54)
DEPRECATED RDW RBC AUTO: 73.7 FL (ref 37–54)
EOSINOPHIL # BLD AUTO: 0.01 10*3/MM3 (ref 0–0.4)
EOSINOPHIL # BLD AUTO: 0.02 10*3/MM3 (ref 0–0.4)
EOSINOPHIL # BLD AUTO: 0.04 10*3/MM3 (ref 0–0.4)
EOSINOPHIL # BLD AUTO: 0.05 10*3/MM3 (ref 0–0.4)
EOSINOPHIL # BLD AUTO: 0.06 10*3/MM3 (ref 0–0.4)
EOSINOPHIL # BLD AUTO: 0.07 10*3/MM3 (ref 0–0.4)
EOSINOPHIL # BLD AUTO: 0.08 10*3/MM3 (ref 0–0.4)
EOSINOPHIL # BLD AUTO: 0.09 10*3/MM3 (ref 0–0.4)
EOSINOPHIL # BLD MANUAL: 0 10*3/MM3 (ref 0–0.4)
EOSINOPHIL # BLD MANUAL: 0 10*3/MM3 (ref 0–0.4)
EOSINOPHIL NFR BLD AUTO: 0.1 % (ref 0.3–6.2)
EOSINOPHIL NFR BLD AUTO: 0.2 % (ref 0.3–6.2)
EOSINOPHIL NFR BLD AUTO: 0.3 % (ref 0.3–6.2)
EOSINOPHIL NFR BLD AUTO: 0.5 % (ref 0.3–6.2)
EOSINOPHIL NFR BLD AUTO: 0.5 % (ref 0.3–6.2)
EOSINOPHIL NFR BLD AUTO: 0.6 % (ref 0.3–6.2)
EOSINOPHIL NFR BLD AUTO: 1 % (ref 0.3–6.2)
EOSINOPHIL NFR BLD AUTO: 1.2 % (ref 0.3–6.2)
EOSINOPHIL NFR BLD AUTO: 1.3 % (ref 0.3–6.2)
EOSINOPHIL NFR BLD AUTO: 1.3 % (ref 0.3–6.2)
EOSINOPHIL NFR BLD AUTO: 1.5 % (ref 0.3–6.2)
EOSINOPHIL NFR BLD AUTO: 1.6 % (ref 0.3–6.2)
EOSINOPHIL NFR BLD AUTO: 1.7 % (ref 0.3–6.2)
EOSINOPHIL NFR BLD AUTO: 1.8 % (ref 0.3–6.2)
EOSINOPHIL NFR BLD AUTO: 1.8 % (ref 0.3–6.2)
EOSINOPHIL NFR BLD AUTO: 1.9 % (ref 0.3–6.2)
EOSINOPHIL NFR BLD AUTO: 2 % (ref 0.3–6.2)
EOSINOPHIL NFR BLD MANUAL: 0 % (ref 0.3–6.2)
EOSINOPHIL NFR BLD MANUAL: 0 % (ref 0.3–6.2)
EPAP: 0
EPAP: 0
ERYTHROCYTE [DISTWIDTH] IN BLOOD BY AUTOMATED COUNT: 15.9 % (ref 12.3–15.4)
ERYTHROCYTE [DISTWIDTH] IN BLOOD BY AUTOMATED COUNT: 16.4 % (ref 12.3–15.4)
ERYTHROCYTE [DISTWIDTH] IN BLOOD BY AUTOMATED COUNT: 16.7 % (ref 12.3–15.4)
ERYTHROCYTE [DISTWIDTH] IN BLOOD BY AUTOMATED COUNT: 17.5 % (ref 12.3–15.4)
ERYTHROCYTE [DISTWIDTH] IN BLOOD BY AUTOMATED COUNT: 17.5 % (ref 12.3–15.4)
ERYTHROCYTE [DISTWIDTH] IN BLOOD BY AUTOMATED COUNT: 18.3 % (ref 12.3–15.4)
ERYTHROCYTE [DISTWIDTH] IN BLOOD BY AUTOMATED COUNT: 18.6 % (ref 12.3–15.4)
ERYTHROCYTE [DISTWIDTH] IN BLOOD BY AUTOMATED COUNT: 18.6 % (ref 12.3–15.4)
ERYTHROCYTE [DISTWIDTH] IN BLOOD BY AUTOMATED COUNT: 18.7 % (ref 12.3–15.4)
ERYTHROCYTE [DISTWIDTH] IN BLOOD BY AUTOMATED COUNT: 19 % (ref 12.3–15.4)
ERYTHROCYTE [DISTWIDTH] IN BLOOD BY AUTOMATED COUNT: 19 % (ref 12.3–15.4)
ERYTHROCYTE [DISTWIDTH] IN BLOOD BY AUTOMATED COUNT: 19.1 % (ref 12.3–15.4)
ERYTHROCYTE [DISTWIDTH] IN BLOOD BY AUTOMATED COUNT: 19.1 % (ref 12.3–15.4)
ERYTHROCYTE [DISTWIDTH] IN BLOOD BY AUTOMATED COUNT: 19.2 % (ref 12.3–15.4)
ERYTHROCYTE [DISTWIDTH] IN BLOOD BY AUTOMATED COUNT: 19.2 % (ref 12.3–15.4)
ERYTHROCYTE [DISTWIDTH] IN BLOOD BY AUTOMATED COUNT: 19.3 % (ref 12.3–15.4)
ERYTHROCYTE [DISTWIDTH] IN BLOOD BY AUTOMATED COUNT: 19.3 % (ref 12.3–15.4)
ERYTHROCYTE [DISTWIDTH] IN BLOOD BY AUTOMATED COUNT: 19.4 % (ref 12.3–15.4)
ERYTHROCYTE [DISTWIDTH] IN BLOOD BY AUTOMATED COUNT: 19.5 % (ref 12.3–15.4)
ERYTHROCYTE [DISTWIDTH] IN BLOOD BY AUTOMATED COUNT: 19.6 % (ref 12.3–15.4)
ERYTHROCYTE [DISTWIDTH] IN BLOOD BY AUTOMATED COUNT: 19.6 % (ref 12.3–15.4)
ERYTHROCYTE [DISTWIDTH] IN BLOOD BY AUTOMATED COUNT: 19.7 % (ref 12.3–15.4)
ERYTHROCYTE [DISTWIDTH] IN BLOOD BY AUTOMATED COUNT: 19.8 % (ref 12.3–15.4)
ERYTHROCYTE [DISTWIDTH] IN BLOOD BY AUTOMATED COUNT: 19.8 % (ref 12.3–15.4)
ERYTHROCYTE [DISTWIDTH] IN BLOOD BY AUTOMATED COUNT: 19.9 % (ref 12.3–15.4)
ERYTHROCYTE [DISTWIDTH] IN BLOOD BY AUTOMATED COUNT: 19.9 % (ref 12.3–15.4)
ERYTHROCYTE [DISTWIDTH] IN BLOOD BY AUTOMATED COUNT: 20 % (ref 12.3–15.4)
ERYTHROCYTE [DISTWIDTH] IN BLOOD BY AUTOMATED COUNT: 20.1 % (ref 12.3–15.4)
ERYTHROCYTE [DISTWIDTH] IN BLOOD BY AUTOMATED COUNT: 20.2 % (ref 12.3–15.4)
ERYTHROCYTE [DISTWIDTH] IN BLOOD BY AUTOMATED COUNT: 20.3 % (ref 12.3–15.4)
ERYTHROCYTE [DISTWIDTH] IN BLOOD BY AUTOMATED COUNT: 20.3 % (ref 12.3–15.4)
ERYTHROCYTE [DISTWIDTH] IN BLOOD BY AUTOMATED COUNT: 20.4 % (ref 12.3–15.4)
ERYTHROCYTE [DISTWIDTH] IN BLOOD BY AUTOMATED COUNT: 20.4 % (ref 12.3–15.4)
ERYTHROCYTE [DISTWIDTH] IN BLOOD BY AUTOMATED COUNT: 20.5 % (ref 12.3–15.4)
ERYTHROCYTE [DISTWIDTH] IN BLOOD BY AUTOMATED COUNT: 20.5 % (ref 12.3–15.4)
ERYTHROCYTE [DISTWIDTH] IN BLOOD BY AUTOMATED COUNT: 20.6 % (ref 12.3–15.4)
ERYTHROCYTE [DISTWIDTH] IN BLOOD BY AUTOMATED COUNT: 21.2 % (ref 12.3–15.4)
ERYTHROCYTE [DISTWIDTH] IN BLOOD BY AUTOMATED COUNT: 21.3 % (ref 12.3–15.4)
ERYTHROCYTE [DISTWIDTH] IN BLOOD BY AUTOMATED COUNT: 21.4 % (ref 12.3–15.4)
ERYTHROCYTE [SEDIMENTATION RATE] IN BLOOD: 4 MM/HR (ref 0–20)
FERRITIN SERPL-MCNC: 1195 NG/ML (ref 30–400)
FERRITIN SERPL-MCNC: 1321 NG/ML (ref 30–400)
GAMMA GLOB SERPL ELPH-MCNC: 1.1 G/DL (ref 0.4–1.8)
GFR SERPL CREATININE-BSD FRML MDRD: 10 ML/MIN/1.73
GFR SERPL CREATININE-BSD FRML MDRD: 10 ML/MIN/1.73
GFR SERPL CREATININE-BSD FRML MDRD: 11 ML/MIN/1.73
GFR SERPL CREATININE-BSD FRML MDRD: 12 ML/MIN/1.73
GFR SERPL CREATININE-BSD FRML MDRD: 13 ML/MIN/1.73
GFR SERPL CREATININE-BSD FRML MDRD: 14 ML/MIN/1.73
GFR SERPL CREATININE-BSD FRML MDRD: 15 ML/MIN/1.73
GFR SERPL CREATININE-BSD FRML MDRD: 16 ML/MIN/1.73
GFR SERPL CREATININE-BSD FRML MDRD: 17 ML/MIN/1.73
GFR SERPL CREATININE-BSD FRML MDRD: 19 ML/MIN/1.73
GFR SERPL CREATININE-BSD FRML MDRD: 20 ML/MIN/1.73
GFR SERPL CREATININE-BSD FRML MDRD: 21 ML/MIN/1.73
GFR SERPL CREATININE-BSD FRML MDRD: 44 ML/MIN/1.73
GFR SERPL CREATININE-BSD FRML MDRD: 49 ML/MIN/1.73
GFR SERPL CREATININE-BSD FRML MDRD: 52 ML/MIN/1.73
GFR SERPL CREATININE-BSD FRML MDRD: 58 ML/MIN/1.73
GFR SERPL CREATININE-BSD FRML MDRD: 58 ML/MIN/1.73
GFR SERPL CREATININE-BSD FRML MDRD: 7 ML/MIN/1.73
GFR SERPL CREATININE-BSD FRML MDRD: 76 ML/MIN/1.73
GFR SERPL CREATININE-BSD FRML MDRD: 8 ML/MIN/1.73
GFR SERPL CREATININE-BSD FRML MDRD: 83 ML/MIN/1.73
GFR SERPL CREATININE-BSD FRML MDRD: 9 ML/MIN/1.73
GFR SERPL CREATININE-BSD FRML MDRD: ABNORMAL ML/MIN/{1.73_M2}
GLOBULIN SER CALC-MCNC: 2.9 G/DL (ref 2.2–3.9)
GLOBULIN UR ELPH-MCNC: 2 GM/DL
GLOBULIN UR ELPH-MCNC: 2.4 GM/DL
GLOBULIN UR ELPH-MCNC: 2.4 GM/DL
GLOBULIN UR ELPH-MCNC: 2.5 GM/DL
GLOBULIN UR ELPH-MCNC: 2.6 GM/DL
GLOBULIN UR ELPH-MCNC: 2.6 GM/DL
GLOBULIN UR ELPH-MCNC: 2.7 GM/DL
GLOBULIN UR ELPH-MCNC: 2.8 GM/DL
GLOBULIN UR ELPH-MCNC: 2.9 GM/DL
GLOBULIN UR ELPH-MCNC: 3 GM/DL
GLOBULIN UR ELPH-MCNC: 3.1 GM/DL
GLOBULIN UR ELPH-MCNC: 3.1 GM/DL
GLOBULIN UR ELPH-MCNC: 3.2 GM/DL
GLOBULIN UR ELPH-MCNC: 3.2 GM/DL
GLOBULIN UR ELPH-MCNC: 3.3 GM/DL
GLOBULIN UR ELPH-MCNC: 3.4 GM/DL
GLOBULIN UR ELPH-MCNC: 3.8 GM/DL
GLOBULIN UR ELPH-MCNC: 4 GM/DL
GLOBULIN UR ELPH-MCNC: 4 GM/DL
GLOBULIN UR ELPH-MCNC: 4.2 GM/DL
GLUCOSE BLD-MCNC: 110 MG/DL (ref 65–99)
GLUCOSE BLD-MCNC: 115 MG/DL (ref 65–99)
GLUCOSE BLD-MCNC: 131 MG/DL (ref 65–99)
GLUCOSE BLD-MCNC: 133 MG/DL (ref 65–99)
GLUCOSE BLD-MCNC: 135 MG/DL (ref 65–99)
GLUCOSE BLD-MCNC: 149 MG/DL (ref 65–99)
GLUCOSE BLD-MCNC: 153 MG/DL (ref 65–99)
GLUCOSE BLD-MCNC: 168 MG/DL (ref 65–99)
GLUCOSE BLD-MCNC: 169 MG/DL (ref 65–99)
GLUCOSE BLDC GLUCOMTR-MCNC: 103 MG/DL (ref 70–130)
GLUCOSE BLDC GLUCOMTR-MCNC: 103 MG/DL (ref 70–130)
GLUCOSE BLDC GLUCOMTR-MCNC: 104 MG/DL (ref 70–130)
GLUCOSE BLDC GLUCOMTR-MCNC: 104 MG/DL (ref 70–130)
GLUCOSE BLDC GLUCOMTR-MCNC: 105 MG/DL (ref 70–130)
GLUCOSE BLDC GLUCOMTR-MCNC: 105 MG/DL (ref 70–130)
GLUCOSE BLDC GLUCOMTR-MCNC: 106 MG/DL (ref 70–130)
GLUCOSE BLDC GLUCOMTR-MCNC: 107 MG/DL (ref 70–130)
GLUCOSE BLDC GLUCOMTR-MCNC: 109 MG/DL (ref 70–130)
GLUCOSE BLDC GLUCOMTR-MCNC: 110 MG/DL (ref 70–130)
GLUCOSE BLDC GLUCOMTR-MCNC: 112 MG/DL (ref 70–130)
GLUCOSE BLDC GLUCOMTR-MCNC: 112 MG/DL (ref 70–130)
GLUCOSE BLDC GLUCOMTR-MCNC: 113 MG/DL (ref 70–130)
GLUCOSE BLDC GLUCOMTR-MCNC: 114 MG/DL (ref 70–130)
GLUCOSE BLDC GLUCOMTR-MCNC: 114 MG/DL (ref 70–130)
GLUCOSE BLDC GLUCOMTR-MCNC: 115 MG/DL (ref 70–130)
GLUCOSE BLDC GLUCOMTR-MCNC: 115 MG/DL (ref 70–130)
GLUCOSE BLDC GLUCOMTR-MCNC: 116 MG/DL (ref 70–130)
GLUCOSE BLDC GLUCOMTR-MCNC: 117 MG/DL (ref 70–130)
GLUCOSE BLDC GLUCOMTR-MCNC: 119 MG/DL (ref 70–130)
GLUCOSE BLDC GLUCOMTR-MCNC: 121 MG/DL (ref 70–130)
GLUCOSE BLDC GLUCOMTR-MCNC: 124 MG/DL (ref 70–130)
GLUCOSE BLDC GLUCOMTR-MCNC: 125 MG/DL (ref 70–130)
GLUCOSE BLDC GLUCOMTR-MCNC: 126 MG/DL (ref 70–130)
GLUCOSE BLDC GLUCOMTR-MCNC: 126 MG/DL (ref 70–130)
GLUCOSE BLDC GLUCOMTR-MCNC: 127 MG/DL (ref 70–130)
GLUCOSE BLDC GLUCOMTR-MCNC: 128 MG/DL (ref 70–130)
GLUCOSE BLDC GLUCOMTR-MCNC: 128 MG/DL (ref 70–130)
GLUCOSE BLDC GLUCOMTR-MCNC: 129 MG/DL (ref 70–130)
GLUCOSE BLDC GLUCOMTR-MCNC: 130 MG/DL (ref 70–130)
GLUCOSE BLDC GLUCOMTR-MCNC: 131 MG/DL (ref 70–130)
GLUCOSE BLDC GLUCOMTR-MCNC: 134 MG/DL (ref 70–130)
GLUCOSE BLDC GLUCOMTR-MCNC: 136 MG/DL (ref 70–130)
GLUCOSE BLDC GLUCOMTR-MCNC: 136 MG/DL (ref 70–130)
GLUCOSE BLDC GLUCOMTR-MCNC: 138 MG/DL (ref 70–130)
GLUCOSE BLDC GLUCOMTR-MCNC: 138 MG/DL (ref 70–130)
GLUCOSE BLDC GLUCOMTR-MCNC: 140 MG/DL (ref 70–130)
GLUCOSE BLDC GLUCOMTR-MCNC: 146 MG/DL (ref 70–130)
GLUCOSE BLDC GLUCOMTR-MCNC: 148 MG/DL (ref 70–130)
GLUCOSE BLDC GLUCOMTR-MCNC: 148 MG/DL (ref 70–130)
GLUCOSE BLDC GLUCOMTR-MCNC: 150 MG/DL (ref 70–130)
GLUCOSE BLDC GLUCOMTR-MCNC: 157 MG/DL (ref 70–130)
GLUCOSE BLDC GLUCOMTR-MCNC: 160 MG/DL (ref 70–130)
GLUCOSE BLDC GLUCOMTR-MCNC: 160 MG/DL (ref 70–130)
GLUCOSE BLDC GLUCOMTR-MCNC: 163 MG/DL (ref 70–130)
GLUCOSE BLDC GLUCOMTR-MCNC: 183 MG/DL (ref 70–130)
GLUCOSE BLDC GLUCOMTR-MCNC: 191 MG/DL (ref 70–130)
GLUCOSE BLDC GLUCOMTR-MCNC: 192 MG/DL (ref 70–130)
GLUCOSE BLDC GLUCOMTR-MCNC: 216 MG/DL (ref 70–130)
GLUCOSE BLDC GLUCOMTR-MCNC: 59 MG/DL (ref 70–130)
GLUCOSE BLDC GLUCOMTR-MCNC: 79 MG/DL (ref 70–130)
GLUCOSE BLDC GLUCOMTR-MCNC: 84 MG/DL (ref 70–130)
GLUCOSE BLDC GLUCOMTR-MCNC: 88 MG/DL (ref 70–130)
GLUCOSE BLDC GLUCOMTR-MCNC: 98 MG/DL (ref 70–130)
GLUCOSE BLDC GLUCOMTR-MCNC: 98 MG/DL (ref 70–130)
GLUCOSE BLDC GLUCOMTR-MCNC: 99 MG/DL (ref 70–130)
GLUCOSE FLD-MCNC: 95 MG/DL
GLUCOSE SERPL-MCNC: 100 MG/DL (ref 65–99)
GLUCOSE SERPL-MCNC: 101 MG/DL (ref 65–99)
GLUCOSE SERPL-MCNC: 102 MG/DL (ref 65–99)
GLUCOSE SERPL-MCNC: 103 MG/DL (ref 65–99)
GLUCOSE SERPL-MCNC: 108 MG/DL (ref 65–99)
GLUCOSE SERPL-MCNC: 111 MG/DL (ref 65–99)
GLUCOSE SERPL-MCNC: 112 MG/DL (ref 65–99)
GLUCOSE SERPL-MCNC: 113 MG/DL (ref 65–99)
GLUCOSE SERPL-MCNC: 113 MG/DL (ref 65–99)
GLUCOSE SERPL-MCNC: 115 MG/DL (ref 65–99)
GLUCOSE SERPL-MCNC: 115 MG/DL (ref 65–99)
GLUCOSE SERPL-MCNC: 117 MG/DL (ref 65–99)
GLUCOSE SERPL-MCNC: 117 MG/DL (ref 65–99)
GLUCOSE SERPL-MCNC: 118 MG/DL (ref 65–99)
GLUCOSE SERPL-MCNC: 118 MG/DL (ref 65–99)
GLUCOSE SERPL-MCNC: 120 MG/DL (ref 65–99)
GLUCOSE SERPL-MCNC: 122 MG/DL (ref 65–99)
GLUCOSE SERPL-MCNC: 122 MG/DL (ref 65–99)
GLUCOSE SERPL-MCNC: 123 MG/DL (ref 65–99)
GLUCOSE SERPL-MCNC: 124 MG/DL (ref 65–99)
GLUCOSE SERPL-MCNC: 125 MG/DL (ref 65–99)
GLUCOSE SERPL-MCNC: 127 MG/DL (ref 65–99)
GLUCOSE SERPL-MCNC: 129 MG/DL (ref 65–99)
GLUCOSE SERPL-MCNC: 132 MG/DL (ref 65–99)
GLUCOSE SERPL-MCNC: 134 MG/DL (ref 65–99)
GLUCOSE SERPL-MCNC: 134 MG/DL (ref 65–99)
GLUCOSE SERPL-MCNC: 139 MG/DL (ref 65–99)
GLUCOSE SERPL-MCNC: 139 MG/DL (ref 65–99)
GLUCOSE SERPL-MCNC: 140 MG/DL (ref 65–99)
GLUCOSE SERPL-MCNC: 87 MG/DL (ref 65–99)
GLUCOSE SERPL-MCNC: 90 MG/DL (ref 65–99)
GLUCOSE SERPL-MCNC: 92 MG/DL (ref 65–99)
GLUCOSE SERPL-MCNC: 94 MG/DL (ref 65–99)
GLUCOSE SERPL-MCNC: 95 MG/DL (ref 65–99)
GLUCOSE SERPL-MCNC: 96 MG/DL (ref 65–99)
GLUCOSE UR STRIP-MCNC: NEGATIVE MG/DL
GLUCOSE UR STRIP-MCNC: NORMAL MG/DL
GRAM STN SPEC: ABNORMAL
GRAM STN SPEC: NORMAL
HAV AB SER QL IA: NEGATIVE
HAV IGM SERPL QL IA: NORMAL
HAV IGM SERPL QL IA: NORMAL
HBV CORE IGM SERPL QL IA: NORMAL
HBV CORE IGM SERPL QL IA: NORMAL
HBV SURFACE AG SERPL QL IA: NORMAL
HBV SURFACE AG SERPL QL IA: NORMAL
HCO3 BLDA-SCNC: 15.5 MMOL/L (ref 20–26)
HCO3 BLDV-SCNC: 25.1 MMOL/L (ref 22–28)
HCT VFR BLD AUTO: 20.8 % (ref 37.5–51)
HCT VFR BLD AUTO: 21.4 % (ref 37.5–51)
HCT VFR BLD AUTO: 22.3 % (ref 37.5–51)
HCT VFR BLD AUTO: 22.4 % (ref 37.5–51)
HCT VFR BLD AUTO: 22.4 % (ref 37.5–51)
HCT VFR BLD AUTO: 22.6 % (ref 37.5–51)
HCT VFR BLD AUTO: 22.9 % (ref 37.5–51)
HCT VFR BLD AUTO: 22.9 % (ref 37.5–51)
HCT VFR BLD AUTO: 23 % (ref 37.5–51)
HCT VFR BLD AUTO: 23.2 % (ref 37.5–51)
HCT VFR BLD AUTO: 23.3 % (ref 37.5–51)
HCT VFR BLD AUTO: 23.3 % (ref 37.5–51)
HCT VFR BLD AUTO: 23.6 % (ref 37.5–51)
HCT VFR BLD AUTO: 23.8 % (ref 37.5–51)
HCT VFR BLD AUTO: 23.9 % (ref 37.5–51)
HCT VFR BLD AUTO: 24 % (ref 37.5–51)
HCT VFR BLD AUTO: 24.1 % (ref 37.5–51)
HCT VFR BLD AUTO: 24.3 % (ref 37.5–51)
HCT VFR BLD AUTO: 24.4 % (ref 37.5–51)
HCT VFR BLD AUTO: 24.5 % (ref 37.5–51)
HCT VFR BLD AUTO: 24.5 % (ref 37.5–51)
HCT VFR BLD AUTO: 24.6 % (ref 37.5–51)
HCT VFR BLD AUTO: 24.8 % (ref 37.5–51)
HCT VFR BLD AUTO: 25 % (ref 37.5–51)
HCT VFR BLD AUTO: 25 % (ref 37.5–51)
HCT VFR BLD AUTO: 25.1 % (ref 37.5–51)
HCT VFR BLD AUTO: 25.4 % (ref 37.5–51)
HCT VFR BLD AUTO: 25.7 % (ref 37.5–51)
HCT VFR BLD AUTO: 25.7 % (ref 37.5–51)
HCT VFR BLD AUTO: 25.8 % (ref 37.5–51)
HCT VFR BLD AUTO: 26 % (ref 37.5–51)
HCT VFR BLD AUTO: 26.3 % (ref 37.5–51)
HCT VFR BLD AUTO: 26.3 % (ref 37.5–51)
HCT VFR BLD AUTO: 26.4 % (ref 37.5–51)
HCT VFR BLD AUTO: 26.9 % (ref 37.5–51)
HCT VFR BLD AUTO: 26.9 % (ref 37.5–51)
HCT VFR BLD AUTO: 27 % (ref 37.5–51)
HCT VFR BLD AUTO: 28.3 % (ref 37.5–51)
HCT VFR BLD AUTO: 28.4 % (ref 37.5–51)
HCT VFR BLD AUTO: 29.2 % (ref 37.5–51)
HCT VFR BLD AUTO: 31 % (ref 37.5–51)
HCT VFR BLD AUTO: 31 % (ref 37.5–51)
HCT VFR BLD AUTO: 32 % (ref 37.5–51)
HCT VFR BLD AUTO: 32 % (ref 37.5–51)
HCT VFR BLD AUTO: 34.6 % (ref 37.5–51)
HCT VFR BLD AUTO: 34.7 % (ref 37.5–51)
HCT VFR BLD AUTO: 35.5 % (ref 37.5–51)
HCT VFR BLD AUTO: 37.5 % (ref 37.5–51)
HCT VFR BLD AUTO: 37.7 % (ref 37.5–51)
HCT VFR BLD AUTO: 38.1 % (ref 37.5–51)
HCT VFR BLD AUTO: 41.4 % (ref 37.5–51)
HCT VFR BLD AUTO: 41.5 % (ref 37.5–51)
HCT VFR BLD CALC: 22.9 %
HCV AB SER DONR QL: NORMAL
HCV AB SER DONR QL: NORMAL
HEMOCCULT STL QL: NEGATIVE
HFE GENE MUT ANL BLD/T: NORMAL
HGB BLD-MCNC: 10.1 G/DL (ref 13–17.7)
HGB BLD-MCNC: 10.2 G/DL (ref 13–17.7)
HGB BLD-MCNC: 10.5 G/DL (ref 13–17.7)
HGB BLD-MCNC: 10.6 G/DL (ref 13–17.7)
HGB BLD-MCNC: 10.7 G/DL (ref 13–17.7)
HGB BLD-MCNC: 10.9 G/DL (ref 13–17.7)
HGB BLD-MCNC: 11 G/DL (ref 13–17.7)
HGB BLD-MCNC: 11.7 G/DL (ref 13–17.7)
HGB BLD-MCNC: 11.7 G/DL (ref 13–17.7)
HGB BLD-MCNC: 11.9 G/DL (ref 13–17.7)
HGB BLD-MCNC: 12.8 G/DL (ref 13–17.7)
HGB BLD-MCNC: 12.9 G/DL (ref 13–17.7)
HGB BLD-MCNC: 6.8 G/DL (ref 13–17.7)
HGB BLD-MCNC: 7 G/DL (ref 13–17.7)
HGB BLD-MCNC: 7.3 G/DL (ref 13–17.7)
HGB BLD-MCNC: 7.4 G/DL (ref 13–17.7)
HGB BLD-MCNC: 7.5 G/DL (ref 13–17.7)
HGB BLD-MCNC: 7.6 G/DL (ref 13–17.7)
HGB BLD-MCNC: 7.7 G/DL (ref 13–17.7)
HGB BLD-MCNC: 7.7 G/DL (ref 13–17.7)
HGB BLD-MCNC: 7.8 G/DL (ref 13–17.7)
HGB BLD-MCNC: 7.9 G/DL (ref 13–17.7)
HGB BLD-MCNC: 8.1 G/DL (ref 13–17.7)
HGB BLD-MCNC: 8.2 G/DL (ref 13–17.7)
HGB BLD-MCNC: 8.3 G/DL (ref 13–17.7)
HGB BLD-MCNC: 8.5 G/DL (ref 13–17.7)
HGB BLD-MCNC: 8.6 G/DL (ref 13–17.7)
HGB BLD-MCNC: 8.6 G/DL (ref 13–17.7)
HGB BLD-MCNC: 8.7 G/DL (ref 13–17.7)
HGB BLD-MCNC: 8.7 G/DL (ref 13–17.7)
HGB BLD-MCNC: 8.8 G/DL (ref 13–17.7)
HGB BLD-MCNC: 8.8 G/DL (ref 13–17.7)
HGB BLD-MCNC: 8.9 G/DL (ref 13–17.7)
HGB BLD-MCNC: 9.2 G/DL (ref 13–17.7)
HGB BLD-MCNC: 9.3 G/DL (ref 13–17.7)
HGB BLD-MCNC: 9.7 G/DL (ref 13–17.7)
HGB BLDA-MCNC: 11.1 G/DL (ref 13.5–17.5)
HGB BLDA-MCNC: 7.5 G/DL (ref 13.5–17.5)
HGB UR QL STRIP.AUTO: NEGATIVE
HGB UR QL STRIP.AUTO: NORMAL
HIV1+2 AB SER QL: NORMAL
HOLD SPECIMEN: NORMAL
HYALINE CASTS UR QL AUTO: NORMAL
HYDROCODONE UR CFM-MCNC: 794 NG/ML
HYDROCODONE UR QL: POSITIVE
HYDROMORPHONE SERPL-MCNC: POSITIVE NG/ML
HYDROMORPHONE UR CFM-MCNC: 465 NG/ML
IMM GRANULOCYTES # BLD AUTO: 0.02 10*3/MM3 (ref 0–0.05)
IMM GRANULOCYTES # BLD AUTO: 0.03 10*3/MM3 (ref 0–0.05)
IMM GRANULOCYTES # BLD AUTO: 0.04 10*3/MM3 (ref 0–0.05)
IMM GRANULOCYTES # BLD AUTO: 0.04 10*3/MM3 (ref 0–0.05)
IMM GRANULOCYTES # BLD AUTO: 0.05 10*3/MM3 (ref 0–0.05)
IMM GRANULOCYTES # BLD AUTO: 0.05 10*3/MM3 (ref 0–0.05)
IMM GRANULOCYTES # BLD AUTO: 0.06 10*3/MM3 (ref 0–0.05)
IMM GRANULOCYTES # BLD AUTO: 0.07 10*3/MM3 (ref 0–0.05)
IMM GRANULOCYTES # BLD AUTO: 0.07 10*3/MM3 (ref 0–0.05)
IMM GRANULOCYTES # BLD AUTO: 0.08 10*3/MM3 (ref 0–0.05)
IMM GRANULOCYTES # BLD AUTO: 0.09 10*3/MM3 (ref 0–0.05)
IMM GRANULOCYTES # BLD AUTO: 0.1 10*3/MM3 (ref 0–0.05)
IMM GRANULOCYTES # BLD AUTO: 0.11 10*3/MM3 (ref 0–0.05)
IMM GRANULOCYTES # BLD AUTO: 0.15 10*3/MM3 (ref 0–0.05)
IMM GRANULOCYTES # BLD AUTO: 0.16 10*3/MM3 (ref 0–0.05)
IMM GRANULOCYTES # BLD AUTO: 0.39 10*3/MM3 (ref 0–0.05)
IMM GRANULOCYTES NFR BLD AUTO: 0.6 % (ref 0–0.5)
IMM GRANULOCYTES NFR BLD AUTO: 0.7 % (ref 0–0.5)
IMM GRANULOCYTES NFR BLD AUTO: 0.7 % (ref 0–0.5)
IMM GRANULOCYTES NFR BLD AUTO: 1 % (ref 0–0.5)
IMM GRANULOCYTES NFR BLD AUTO: 1.1 % (ref 0–0.5)
IMM GRANULOCYTES NFR BLD AUTO: 1.1 % (ref 0–0.5)
IMM GRANULOCYTES NFR BLD AUTO: 1.3 % (ref 0–0.5)
IMM GRANULOCYTES NFR BLD AUTO: 1.4 % (ref 0–0.5)
IMM GRANULOCYTES NFR BLD AUTO: 1.5 % (ref 0–0.5)
IMM GRANULOCYTES NFR BLD AUTO: 1.6 % (ref 0–0.5)
IMM GRANULOCYTES NFR BLD AUTO: 1.7 % (ref 0–0.5)
IMM GRANULOCYTES NFR BLD AUTO: 1.7 % (ref 0–0.5)
IMM GRANULOCYTES NFR BLD AUTO: 2 % (ref 0–0.5)
IMM GRANULOCYTES NFR BLD AUTO: 2 % (ref 0–0.5)
IMM GRANULOCYTES NFR BLD AUTO: 2.5 % (ref 0–0.5)
IMM GRANULOCYTES NFR BLD AUTO: 2.6 % (ref 0–0.5)
IMM GRANULOCYTES NFR BLD AUTO: 3.2 % (ref 0–0.5)
INHALED O2 CONCENTRATION: 21 %
INHALED O2 CONCENTRATION: 28 %
INR PPP: 1.78 (ref 0.85–1.16)
INR PPP: 1.83 (ref 0.85–1.16)
INR PPP: 1.84 (ref 0.85–1.16)
INR PPP: 1.84 (ref 0.85–1.16)
INR PPP: 1.91 (ref 0.85–1.16)
INR PPP: 1.94 (ref 0.85–1.16)
INR PPP: 1.95 (ref 0.85–1.16)
INR PPP: 2 (ref 0.85–1.16)
INR PPP: 2.07 (ref 0.85–1.16)
INR PPP: 2.08 (ref 0.85–1.16)
INR PPP: 2.11 (ref 0.85–1.16)
INR PPP: 2.12 (ref 0.85–1.16)
INR PPP: 2.16 (ref 0.85–1.16)
INR PPP: 2.16 (ref 0.85–1.16)
INR PPP: 2.17 (ref 0.85–1.16)
INR PPP: 2.18 (ref 0.85–1.16)
INR PPP: 2.18 (ref 0.85–1.16)
INR PPP: 2.2 (ref 0.85–1.16)
INR PPP: 2.27 (ref 0.85–1.16)
INR PPP: 2.29 (ref 0.85–1.16)
INR PPP: 2.3 (ref 0.85–1.16)
INR PPP: 2.38 (ref 0.85–1.16)
INR PPP: 2.83 (ref 0.85–1.16)
IPAP: 0
IPAP: 0
IRON 24H UR-MRATE: 77 MCG/DL (ref 59–158)
IRON 24H UR-MRATE: 93 MCG/DL (ref 59–158)
IRON SATN MFR SERPL: 84 % (ref 20–50)
IRON SATN MFR SERPL: 94 % (ref 20–50)
ISOLATED FROM: ABNORMAL
ISOLATED FROM: ABNORMAL
KETONES UR QL STRIP: NEGATIVE
KETONES UR QL STRIP: NORMAL
LAB AP CASE REPORT: NORMAL
LAB AP CLINICAL INFORMATION: NORMAL
LACTATE HOLD SPECIMEN: NORMAL
LACTATE HOLD SPECIMEN: NORMAL
LARGE PLATELETS: ABNORMAL
LARGE PLATELETS: NORMAL
LDH FLD-CCNC: 96 U/L
LEFT ATRIUM VOLUME INDEX: 17.5 ML/M^2
LEFT ATRIUM VOLUME INDEX: 20.1 ML/M^2
LEFT ATRIUM VOLUME: 38 ML
LEFT ATRIUM VOLUME: 49 ML
LEUKOCYTE ESTERASE UR QL STRIP.AUTO: NEGATIVE
LEUKOCYTE ESTERASE UR QL STRIP.AUTO: NORMAL
LIPASE SERPL-CCNC: 26 U/L (ref 13–60)
LV EF 2D ECHO EST: 70 %
LYMPHOCYTES # BLD AUTO: 0.39 10*3/MM3 (ref 0.7–3.1)
LYMPHOCYTES # BLD AUTO: 0.58 10*3/MM3 (ref 0.7–3.1)
LYMPHOCYTES # BLD AUTO: 0.63 10*3/MM3 (ref 0.7–3.1)
LYMPHOCYTES # BLD AUTO: 0.65 10*3/MM3 (ref 0.7–3.1)
LYMPHOCYTES # BLD AUTO: 0.7 10*3/MM3 (ref 0.7–3.1)
LYMPHOCYTES # BLD AUTO: 0.71 10*3/MM3 (ref 0.7–3.1)
LYMPHOCYTES # BLD AUTO: 0.77 10*3/MM3 (ref 0.7–3.1)
LYMPHOCYTES # BLD AUTO: 0.78 10*3/MM3 (ref 0.7–3.1)
LYMPHOCYTES # BLD AUTO: 0.78 10*3/MM3 (ref 0.7–3.1)
LYMPHOCYTES # BLD AUTO: 0.8 10*3/MM3 (ref 0.7–3.1)
LYMPHOCYTES # BLD AUTO: 0.81 10*3/MM3 (ref 0.7–3.1)
LYMPHOCYTES # BLD AUTO: 0.91 10*3/MM3 (ref 0.7–3.1)
LYMPHOCYTES # BLD AUTO: 0.91 10*3/MM3 (ref 0.7–3.1)
LYMPHOCYTES # BLD AUTO: 0.95 10*3/MM3 (ref 0.7–3.1)
LYMPHOCYTES # BLD AUTO: 1 10*3/MM3 (ref 0.7–3.1)
LYMPHOCYTES # BLD AUTO: 1 10*3/MM3 (ref 0.7–3.1)
LYMPHOCYTES # BLD AUTO: 1.04 10*3/MM3 (ref 0.7–3.1)
LYMPHOCYTES # BLD AUTO: 1.04 10*3/MM3 (ref 0.7–3.1)
LYMPHOCYTES # BLD AUTO: 1.1 10*3/MM3 (ref 0.7–3.1)
LYMPHOCYTES # BLD AUTO: 1.1 10*3/MM3 (ref 0.7–3.1)
LYMPHOCYTES # BLD AUTO: 1.11 10*3/MM3 (ref 0.7–3.1)
LYMPHOCYTES # BLD AUTO: 1.12 10*3/MM3 (ref 0.7–3.1)
LYMPHOCYTES # BLD AUTO: 1.2 10*3/MM3 (ref 0.7–3.1)
LYMPHOCYTES # BLD AUTO: 1.21 10*3/MM3 (ref 0.7–3.1)
LYMPHOCYTES # BLD AUTO: 1.21 10*3/MM3 (ref 0.7–3.1)
LYMPHOCYTES # BLD AUTO: 1.25 10*3/MM3 (ref 0.7–3.1)
LYMPHOCYTES # BLD AUTO: 1.3 10*3/MM3 (ref 0.7–3.1)
LYMPHOCYTES # BLD AUTO: 1.4 10*3/MM3 (ref 0.7–3.1)
LYMPHOCYTES # BLD AUTO: 1.8 10*3/MM3 (ref 0.7–3.1)
LYMPHOCYTES # BLD AUTO: 1.8 10*3/MM3 (ref 0.7–3.1)
LYMPHOCYTES # BLD AUTO: 2 10*3/MM3 (ref 0.7–3.1)
LYMPHOCYTES # BLD MANUAL: 0.71 10*3/MM3 (ref 0.7–3.1)
LYMPHOCYTES # BLD MANUAL: 1.1 10*3/MM3 (ref 0.7–3.1)
LYMPHOCYTES NFR BLD AUTO: 1.7 % (ref 19.6–45.3)
LYMPHOCYTES NFR BLD AUTO: 12.3 % (ref 19.6–45.3)
LYMPHOCYTES NFR BLD AUTO: 13.6 % (ref 19.6–45.3)
LYMPHOCYTES NFR BLD AUTO: 14 % (ref 19.6–45.3)
LYMPHOCYTES NFR BLD AUTO: 14.4 % (ref 19.6–45.3)
LYMPHOCYTES NFR BLD AUTO: 14.9 % (ref 19.6–45.3)
LYMPHOCYTES NFR BLD AUTO: 15.2 % (ref 19.6–45.3)
LYMPHOCYTES NFR BLD AUTO: 15.2 % (ref 19.6–45.3)
LYMPHOCYTES NFR BLD AUTO: 15.6 % (ref 19.6–45.3)
LYMPHOCYTES NFR BLD AUTO: 15.7 % (ref 19.6–45.3)
LYMPHOCYTES NFR BLD AUTO: 16.4 % (ref 19.6–45.3)
LYMPHOCYTES NFR BLD AUTO: 18.3 % (ref 19.6–45.3)
LYMPHOCYTES NFR BLD AUTO: 18.7 % (ref 19.6–45.3)
LYMPHOCYTES NFR BLD AUTO: 18.7 % (ref 19.6–45.3)
LYMPHOCYTES NFR BLD AUTO: 18.8 % (ref 19.6–45.3)
LYMPHOCYTES NFR BLD AUTO: 19.1 % (ref 19.6–45.3)
LYMPHOCYTES NFR BLD AUTO: 19.2 % (ref 19.6–45.3)
LYMPHOCYTES NFR BLD AUTO: 19.2 % (ref 19.6–45.3)
LYMPHOCYTES NFR BLD AUTO: 19.6 % (ref 19.6–45.3)
LYMPHOCYTES NFR BLD AUTO: 19.7 % (ref 19.6–45.3)
LYMPHOCYTES NFR BLD AUTO: 20.2 % (ref 19.6–45.3)
LYMPHOCYTES NFR BLD AUTO: 21.7 % (ref 19.6–45.3)
LYMPHOCYTES NFR BLD AUTO: 22.2 % (ref 19.6–45.3)
LYMPHOCYTES NFR BLD AUTO: 22.5 % (ref 19.6–45.3)
LYMPHOCYTES NFR BLD AUTO: 22.8 % (ref 19.6–45.3)
LYMPHOCYTES NFR BLD AUTO: 23.4 % (ref 19.6–45.3)
LYMPHOCYTES NFR BLD AUTO: 23.5 % (ref 19.6–45.3)
LYMPHOCYTES NFR BLD AUTO: 25.1 % (ref 19.6–45.3)
LYMPHOCYTES NFR BLD AUTO: 25.6 % (ref 19.6–45.3)
LYMPHOCYTES NFR BLD AUTO: 25.9 % (ref 19.6–45.3)
LYMPHOCYTES NFR BLD AUTO: 27.2 % (ref 19.6–45.3)
LYMPHOCYTES NFR BLD AUTO: 27.8 % (ref 19.6–45.3)
LYMPHOCYTES NFR BLD AUTO: 29.4 % (ref 19.6–45.3)
LYMPHOCYTES NFR BLD AUTO: 31.8 % (ref 19.6–45.3)
LYMPHOCYTES NFR BLD MANUAL: 18 % (ref 19.6–45.3)
LYMPHOCYTES NFR BLD MANUAL: 33 % (ref 5–12)
LYMPHOCYTES NFR BLD MANUAL: 4 % (ref 19.6–45.3)
LYMPHOCYTES NFR BLD MANUAL: 6 % (ref 5–12)
LYMPHOCYTES NFR FLD MANUAL: 11 %
LYMPHOCYTES NFR FLD MANUAL: 25 %
LYMPHOCYTES NFR FLD MANUAL: 29 %
LYMPHOCYTES NFR FLD MANUAL: 65 %
Lab: ABNORMAL
Lab: ABNORMAL
M-SPIKE: ABNORMAL G/DL
MACROCYTES BLD QL SMEAR: NORMAL
MACROPHAGE FLUID: 13 %
MACROPHAGE FLUID: 56 %
MACROPHAGE FLUID: 6 %
MACROPHAGE FLUID: 73 %
MAGNESIUM SERPL-MCNC: 1.4 MG/DL (ref 1.6–2.6)
MAGNESIUM SERPL-MCNC: 1.5 MG/DL (ref 1.6–2.6)
MAGNESIUM SERPL-MCNC: 1.5 MG/DL (ref 1.6–2.6)
MAGNESIUM SERPL-MCNC: 1.6 MG/DL (ref 1.6–2.6)
MAGNESIUM SERPL-MCNC: 1.7 MG/DL (ref 1.6–2.6)
MAGNESIUM SERPL-MCNC: 1.8 MG/DL (ref 1.6–2.6)
MAGNESIUM SERPL-MCNC: 1.9 MG/DL (ref 1.6–2.6)
MAGNESIUM SERPL-MCNC: 2 MG/DL (ref 1.6–2.6)
MAGNESIUM SERPL-MCNC: 2.1 MG/DL (ref 1.6–2.6)
MAGNESIUM SERPL-MCNC: 2.4 MG/DL (ref 1.6–2.6)
MAGNESIUM SERPL-MCNC: 2.4 MG/DL (ref 1.6–2.6)
MAXIMAL PREDICTED HEART RATE: 164 BPM
MCH RBC QN AUTO: 28.9 PG (ref 26.6–33)
MCH RBC QN AUTO: 29.1 PG (ref 26.6–33)
MCH RBC QN AUTO: 29.2 PG (ref 26.6–33)
MCH RBC QN AUTO: 29.4 PG (ref 26.6–33)
MCH RBC QN AUTO: 29.4 PG (ref 26.6–33)
MCH RBC QN AUTO: 29.5 PG (ref 26.6–33)
MCH RBC QN AUTO: 29.7 PG (ref 26.6–33)
MCH RBC QN AUTO: 29.9 PG (ref 26.6–33)
MCH RBC QN AUTO: 30.3 PG (ref 26.6–33)
MCH RBC QN AUTO: 30.4 PG (ref 26.6–33)
MCH RBC QN AUTO: 30.4 PG (ref 26.6–33)
MCH RBC QN AUTO: 30.5 PG (ref 26.6–33)
MCH RBC QN AUTO: 30.5 PG (ref 26.6–33)
MCH RBC QN AUTO: 30.6 PG (ref 26.6–33)
MCH RBC QN AUTO: 30.7 PG (ref 26.6–33)
MCH RBC QN AUTO: 30.8 PG (ref 26.6–33)
MCH RBC QN AUTO: 30.9 PG (ref 26.6–33)
MCH RBC QN AUTO: 30.9 PG (ref 26.6–33)
MCH RBC QN AUTO: 31 PG (ref 26.6–33)
MCH RBC QN AUTO: 31.1 PG (ref 26.6–33)
MCH RBC QN AUTO: 31.2 PG (ref 26.6–33)
MCH RBC QN AUTO: 31.3 PG (ref 26.6–33)
MCH RBC QN AUTO: 31.4 PG (ref 26.6–33)
MCH RBC QN AUTO: 31.4 PG (ref 26.6–33)
MCH RBC QN AUTO: 31.5 PG (ref 26.6–33)
MCH RBC QN AUTO: 31.6 PG (ref 26.6–33)
MCH RBC QN AUTO: 31.9 PG (ref 26.6–33)
MCHC RBC AUTO-ENTMCNC: 30.4 G/DL (ref 31.5–35.7)
MCHC RBC AUTO-ENTMCNC: 30.8 G/DL (ref 31.5–35.7)
MCHC RBC AUTO-ENTMCNC: 30.8 G/DL (ref 31.5–35.7)
MCHC RBC AUTO-ENTMCNC: 30.9 G/DL (ref 31.5–35.7)
MCHC RBC AUTO-ENTMCNC: 31 G/DL (ref 31.5–35.7)
MCHC RBC AUTO-ENTMCNC: 31 G/DL (ref 31.5–35.7)
MCHC RBC AUTO-ENTMCNC: 31.2 G/DL (ref 31.5–35.7)
MCHC RBC AUTO-ENTMCNC: 31.5 G/DL (ref 31.5–35.7)
MCHC RBC AUTO-ENTMCNC: 31.7 G/DL (ref 31.5–35.7)
MCHC RBC AUTO-ENTMCNC: 31.7 G/DL (ref 31.5–35.7)
MCHC RBC AUTO-ENTMCNC: 31.8 G/DL (ref 31.5–35.7)
MCHC RBC AUTO-ENTMCNC: 32.2 G/DL (ref 31.5–35.7)
MCHC RBC AUTO-ENTMCNC: 32.2 G/DL (ref 31.5–35.7)
MCHC RBC AUTO-ENTMCNC: 32.3 G/DL (ref 31.5–35.7)
MCHC RBC AUTO-ENTMCNC: 32.4 G/DL (ref 31.5–35.7)
MCHC RBC AUTO-ENTMCNC: 32.4 G/DL (ref 31.5–35.7)
MCHC RBC AUTO-ENTMCNC: 32.5 G/DL (ref 31.5–35.7)
MCHC RBC AUTO-ENTMCNC: 32.6 G/DL (ref 31.5–35.7)
MCHC RBC AUTO-ENTMCNC: 32.7 G/DL (ref 31.5–35.7)
MCHC RBC AUTO-ENTMCNC: 32.8 G/DL (ref 31.5–35.7)
MCHC RBC AUTO-ENTMCNC: 32.8 G/DL (ref 31.5–35.7)
MCHC RBC AUTO-ENTMCNC: 32.9 G/DL (ref 31.5–35.7)
MCHC RBC AUTO-ENTMCNC: 33.1 G/DL (ref 31.5–35.7)
MCHC RBC AUTO-ENTMCNC: 33.2 G/DL (ref 31.5–35.7)
MCHC RBC AUTO-ENTMCNC: 33.3 G/DL (ref 31.5–35.7)
MCHC RBC AUTO-ENTMCNC: 33.5 G/DL (ref 31.5–35.7)
MCHC RBC AUTO-ENTMCNC: 33.6 G/DL (ref 31.5–35.7)
MCHC RBC AUTO-ENTMCNC: 33.8 G/DL (ref 31.5–35.7)
MCHC RBC AUTO-ENTMCNC: 33.9 G/DL (ref 31.5–35.7)
MCHC RBC AUTO-ENTMCNC: 33.9 G/DL (ref 31.5–35.7)
MCHC RBC AUTO-ENTMCNC: 34.2 G/DL (ref 31.5–35.7)
MCHC RBC AUTO-ENTMCNC: 34.4 G/DL (ref 31.5–35.7)
MCV RBC AUTO: 90.9 FL (ref 79–97)
MCV RBC AUTO: 90.9 FL (ref 79–97)
MCV RBC AUTO: 92 FL (ref 79–97)
MCV RBC AUTO: 92 FL (ref 79–97)
MCV RBC AUTO: 92.3 FL (ref 79–97)
MCV RBC AUTO: 92.4 FL (ref 79–97)
MCV RBC AUTO: 92.5 FL (ref 79–97)
MCV RBC AUTO: 92.6 FL (ref 79–97)
MCV RBC AUTO: 92.6 FL (ref 79–97)
MCV RBC AUTO: 92.8 FL (ref 79–97)
MCV RBC AUTO: 92.9 FL (ref 79–97)
MCV RBC AUTO: 93.2 FL (ref 79–97)
MCV RBC AUTO: 93.2 FL (ref 79–97)
MCV RBC AUTO: 93.4 FL (ref 79–97)
MCV RBC AUTO: 93.5 FL (ref 79–97)
MCV RBC AUTO: 93.6 FL (ref 79–97)
MCV RBC AUTO: 93.6 FL (ref 79–97)
MCV RBC AUTO: 93.7 FL (ref 79–97)
MCV RBC AUTO: 93.9 FL (ref 79–97)
MCV RBC AUTO: 94 FL (ref 79–97)
MCV RBC AUTO: 94.1 FL (ref 79–97)
MCV RBC AUTO: 94.2 FL (ref 79–97)
MCV RBC AUTO: 94.3 FL (ref 79–97)
MCV RBC AUTO: 94.3 FL (ref 79–97)
MCV RBC AUTO: 94.4 FL (ref 79–97)
MCV RBC AUTO: 94.5 FL (ref 79–97)
MCV RBC AUTO: 94.6 FL (ref 79–97)
MCV RBC AUTO: 94.6 FL (ref 79–97)
MCV RBC AUTO: 94.9 FL (ref 79–97)
MCV RBC AUTO: 94.9 FL (ref 79–97)
MCV RBC AUTO: 95 FL (ref 79–97)
MCV RBC AUTO: 95.4 FL (ref 79–97)
MCV RBC AUTO: 95.6 FL (ref 79–97)
MCV RBC AUTO: 95.8 FL (ref 79–97)
MCV RBC AUTO: 95.8 FL (ref 79–97)
MCV RBC AUTO: 96.1 FL (ref 79–97)
MCV RBC AUTO: 96.1 FL (ref 79–97)
MCV RBC AUTO: 96.3 FL (ref 79–97)
MCV RBC AUTO: 96.3 FL (ref 79–97)
MCV RBC AUTO: 96.4 FL (ref 79–97)
MCV RBC AUTO: 96.6 FL (ref 79–97)
MCV RBC AUTO: 97.2 FL (ref 79–97)
MCV RBC AUTO: 97.3 FL (ref 79–97)
MCV RBC AUTO: 97.5 FL (ref 79–97)
MCV RBC AUTO: 99.1 FL (ref 79–97)
MESOTHL CELL NFR FLD MANUAL: 14 %
MESOTHL CELL NFR FLD MANUAL: 2 %
MESOTHL CELL NFR FLD MANUAL: 52 %
MESOTHL CELL NFR FLD MANUAL: 8 %
METHADONE UR QL SCN: NEGATIVE
METHADONE UR QL SCN: NEGATIVE
METHGB BLD QL: 0.3 %
METHGB BLD QL: 0.4 % (ref 0–1.5)
MODALITY: ABNORMAL
MODALITY: ABNORMAL
MONOCYTES # BLD AUTO: 0.2 10*3/MM3 (ref 0.1–0.9)
MONOCYTES # BLD AUTO: 0.3 10*3/MM3 (ref 0.1–0.9)
MONOCYTES # BLD AUTO: 0.3 10*3/MM3 (ref 0.1–0.9)
MONOCYTES # BLD AUTO: 0.32 10*3/MM3 (ref 0.1–0.9)
MONOCYTES # BLD AUTO: 0.4 10*3/MM3 (ref 0.1–0.9)
MONOCYTES # BLD AUTO: 0.4 10*3/MM3 (ref 0.1–0.9)
MONOCYTES # BLD AUTO: 0.48 10*3/MM3 (ref 0.1–0.9)
MONOCYTES # BLD AUTO: 0.49 10*3/MM3 (ref 0.1–0.9)
MONOCYTES # BLD AUTO: 0.49 10*3/MM3 (ref 0.1–0.9)
MONOCYTES # BLD AUTO: 0.5 10*3/MM3 (ref 0.1–0.9)
MONOCYTES # BLD AUTO: 0.5 10*3/MM3 (ref 0.1–0.9)
MONOCYTES # BLD AUTO: 0.53 10*3/MM3 (ref 0.1–0.9)
MONOCYTES # BLD AUTO: 0.55 10*3/MM3 (ref 0.1–0.9)
MONOCYTES # BLD AUTO: 0.57 10*3/MM3 (ref 0.1–0.9)
MONOCYTES # BLD AUTO: 0.6 10*3/MM3 (ref 0.1–0.9)
MONOCYTES # BLD AUTO: 0.61 10*3/MM3 (ref 0.1–0.9)
MONOCYTES # BLD AUTO: 0.61 10*3/MM3 (ref 0.1–0.9)
MONOCYTES # BLD AUTO: 0.64 10*3/MM3 (ref 0.1–0.9)
MONOCYTES # BLD AUTO: 0.65 10*3/MM3 (ref 0.1–0.9)
MONOCYTES # BLD AUTO: 0.67 10*3/MM3 (ref 0.1–0.9)
MONOCYTES # BLD AUTO: 0.67 10*3/MM3 (ref 0.1–0.9)
MONOCYTES # BLD AUTO: 0.7 10*3/MM3 (ref 0.1–0.9)
MONOCYTES # BLD AUTO: 0.7 10*3/MM3 (ref 0.1–0.9)
MONOCYTES # BLD AUTO: 0.74 10*3/MM3 (ref 0.1–0.9)
MONOCYTES # BLD AUTO: 0.79 10*3/MM3 (ref 0.1–0.9)
MONOCYTES # BLD AUTO: 0.81 10*3/MM3 (ref 0.1–0.9)
MONOCYTES # BLD AUTO: 0.86 10*3/MM3 (ref 0.1–0.9)
MONOCYTES # BLD AUTO: 0.87 10*3/MM3 (ref 0.1–0.9)
MONOCYTES # BLD AUTO: 0.92 10*3/MM3 (ref 0.1–0.9)
MONOCYTES # BLD AUTO: 1.01 10*3/MM3 (ref 0.1–0.9)
MONOCYTES # BLD AUTO: 1.07 10*3/MM3 (ref 0.1–0.9)
MONOCYTES # BLD AUTO: 1.21 10*3/MM3 (ref 0.1–0.9)
MONOCYTES # BLD AUTO: 2.02 10*3/MM3 (ref 0.1–0.9)
MONOCYTES NFR BLD AUTO: 10.2 % (ref 5–12)
MONOCYTES NFR BLD AUTO: 11.1 % (ref 5–12)
MONOCYTES NFR BLD AUTO: 11.6 % (ref 5–12)
MONOCYTES NFR BLD AUTO: 12.4 % (ref 5–12)
MONOCYTES NFR BLD AUTO: 13.1 % (ref 5–12)
MONOCYTES NFR BLD AUTO: 13.1 % (ref 5–12)
MONOCYTES NFR BLD AUTO: 13.6 % (ref 5–12)
MONOCYTES NFR BLD AUTO: 14.3 % (ref 5–12)
MONOCYTES NFR BLD AUTO: 14.3 % (ref 5–12)
MONOCYTES NFR BLD AUTO: 14.4 % (ref 5–12)
MONOCYTES NFR BLD AUTO: 14.5 % (ref 5–12)
MONOCYTES NFR BLD AUTO: 15.1 % (ref 5–12)
MONOCYTES NFR BLD AUTO: 15.1 % (ref 5–12)
MONOCYTES NFR BLD AUTO: 15.6 % (ref 5–12)
MONOCYTES NFR BLD AUTO: 15.6 % (ref 5–12)
MONOCYTES NFR BLD AUTO: 15.7 % (ref 5–12)
MONOCYTES NFR BLD AUTO: 15.8 % (ref 5–12)
MONOCYTES NFR BLD AUTO: 15.9 % (ref 5–12)
MONOCYTES NFR BLD AUTO: 16.1 % (ref 5–12)
MONOCYTES NFR BLD AUTO: 17.3 % (ref 5–12)
MONOCYTES NFR BLD AUTO: 17.6 % (ref 5–12)
MONOCYTES NFR BLD AUTO: 18.4 % (ref 5–12)
MONOCYTES NFR BLD AUTO: 19.5 % (ref 5–12)
MONOCYTES NFR BLD AUTO: 21.8 % (ref 5–12)
MONOCYTES NFR BLD AUTO: 3 % (ref 5–12)
MONOCYTES NFR BLD AUTO: 4.3 % (ref 5–12)
MONOCYTES NFR BLD AUTO: 5.3 % (ref 5–12)
MONOCYTES NFR BLD AUTO: 5.3 % (ref 5–12)
MONOCYTES NFR BLD AUTO: 5.7 % (ref 5–12)
MONOCYTES NFR BLD AUTO: 6.3 % (ref 5–12)
MONOCYTES NFR BLD AUTO: 6.3 % (ref 5–12)
MONOCYTES NFR BLD AUTO: 8.2 % (ref 5–12)
MONOCYTES NFR BLD AUTO: 9 % (ref 5–12)
MONOCYTES NFR BLD AUTO: 9.1 % (ref 5–12)
MONOCYTES NFR FLD: 13 %
MONOCYTES NFR FLD: 4 %
MONOCYTES NFR FLD: 6 %
MORPHINE UR QL: NEGATIVE
NEUTROPHILS # BLD AUTO: 15.98 10*3/MM3 (ref 1.7–7)
NEUTROPHILS # BLD AUTO: 2.5 10*3/MM3 (ref 1.7–7)
NEUTROPHILS # BLD AUTO: 2.8 10*3/MM3 (ref 1.7–7)
NEUTROPHILS # BLD AUTO: 2.99 10*3/MM3 (ref 1.7–7)
NEUTROPHILS # BLD AUTO: 4 10*3/MM3 (ref 1.7–7)
NEUTROPHILS # BLD AUTO: 4.2 10*3/MM3 (ref 1.7–7)
NEUTROPHILS # BLD AUTO: 5.4 10*3/MM3 (ref 1.7–7)
NEUTROPHILS # BLD AUTO: 5.5 10*3/MM3 (ref 1.7–7)
NEUTROPHILS # BLD AUTO: 6.1 10*3/MM3 (ref 1.7–7)
NEUTROPHILS # BLD AUTO: 7 10*3/MM3 (ref 1.7–7)
NEUTROPHILS # BLD AUTO: 7 10*3/MM3 (ref 1.7–7)
NEUTROPHILS NFR BLD AUTO: 1.09 10*3/MM3 (ref 1.7–7)
NEUTROPHILS NFR BLD AUTO: 1.6 10*3/MM3 (ref 1.7–7)
NEUTROPHILS NFR BLD AUTO: 1.61 10*3/MM3 (ref 1.7–7)
NEUTROPHILS NFR BLD AUTO: 1.67 10*3/MM3 (ref 1.7–7)
NEUTROPHILS NFR BLD AUTO: 1.67 10*3/MM3 (ref 1.7–7)
NEUTROPHILS NFR BLD AUTO: 1.86 10*3/MM3 (ref 1.7–7)
NEUTROPHILS NFR BLD AUTO: 2.01 10*3/MM3 (ref 1.7–7)
NEUTROPHILS NFR BLD AUTO: 2.3 10*3/MM3 (ref 1.7–7)
NEUTROPHILS NFR BLD AUTO: 2.46 10*3/MM3 (ref 1.7–7)
NEUTROPHILS NFR BLD AUTO: 2.51 10*3/MM3 (ref 1.7–7)
NEUTROPHILS NFR BLD AUTO: 2.6 10*3/MM3 (ref 1.7–7)
NEUTROPHILS NFR BLD AUTO: 2.71 10*3/MM3 (ref 1.7–7)
NEUTROPHILS NFR BLD AUTO: 2.78 10*3/MM3 (ref 1.7–7)
NEUTROPHILS NFR BLD AUTO: 2.79 10*3/MM3 (ref 1.7–7)
NEUTROPHILS NFR BLD AUTO: 20.79 10*3/MM3 (ref 1.7–7)
NEUTROPHILS NFR BLD AUTO: 3.17 10*3/MM3 (ref 1.7–7)
NEUTROPHILS NFR BLD AUTO: 3.39 10*3/MM3 (ref 1.7–7)
NEUTROPHILS NFR BLD AUTO: 3.45 10*3/MM3 (ref 1.7–7)
NEUTROPHILS NFR BLD AUTO: 3.47 10*3/MM3 (ref 1.7–7)
NEUTROPHILS NFR BLD AUTO: 3.85 10*3/MM3 (ref 1.7–7)
NEUTROPHILS NFR BLD AUTO: 4.15 10*3/MM3 (ref 1.7–7)
NEUTROPHILS NFR BLD AUTO: 4.33 10*3/MM3 (ref 1.7–7)
NEUTROPHILS NFR BLD AUTO: 4.96 10*3/MM3 (ref 1.7–7)
NEUTROPHILS NFR BLD AUTO: 49.6 % (ref 42.7–76)
NEUTROPHILS NFR BLD AUTO: 49.8 % (ref 42.7–76)
NEUTROPHILS NFR BLD AUTO: 5.51 10*3/MM3 (ref 1.7–7)
NEUTROPHILS NFR BLD AUTO: 5.81 10*3/MM3 (ref 1.7–7)
NEUTROPHILS NFR BLD AUTO: 52 % (ref 42.7–76)
NEUTROPHILS NFR BLD AUTO: 53.3 % (ref 42.7–76)
NEUTROPHILS NFR BLD AUTO: 53.6 % (ref 42.7–76)
NEUTROPHILS NFR BLD AUTO: 53.6 % (ref 42.7–76)
NEUTROPHILS NFR BLD AUTO: 54.1 % (ref 42.7–76)
NEUTROPHILS NFR BLD AUTO: 54.2 % (ref 42.7–76)
NEUTROPHILS NFR BLD AUTO: 55.1 % (ref 42.7–76)
NEUTROPHILS NFR BLD AUTO: 59 % (ref 42.7–76)
NEUTROPHILS NFR BLD AUTO: 59.8 % (ref 42.7–76)
NEUTROPHILS NFR BLD AUTO: 62.3 % (ref 42.7–76)
NEUTROPHILS NFR BLD AUTO: 62.4 % (ref 42.7–76)
NEUTROPHILS NFR BLD AUTO: 63.3 % (ref 42.7–76)
NEUTROPHILS NFR BLD AUTO: 64.4 % (ref 42.7–76)
NEUTROPHILS NFR BLD AUTO: 64.7 % (ref 42.7–76)
NEUTROPHILS NFR BLD AUTO: 65.2 % (ref 42.7–76)
NEUTROPHILS NFR BLD AUTO: 66.7 % (ref 42.7–76)
NEUTROPHILS NFR BLD AUTO: 67.3 % (ref 42.7–76)
NEUTROPHILS NFR BLD AUTO: 67.7 % (ref 42.7–76)
NEUTROPHILS NFR BLD AUTO: 69.2 % (ref 42.7–76)
NEUTROPHILS NFR BLD AUTO: 69.3 % (ref 42.7–76)
NEUTROPHILS NFR BLD AUTO: 71 % (ref 42.7–76)
NEUTROPHILS NFR BLD AUTO: 71.2 % (ref 42.7–76)
NEUTROPHILS NFR BLD AUTO: 71.5 % (ref 42.7–76)
NEUTROPHILS NFR BLD AUTO: 71.9 % (ref 42.7–76)
NEUTROPHILS NFR BLD AUTO: 72.2 % (ref 42.7–76)
NEUTROPHILS NFR BLD AUTO: 74.5 % (ref 42.7–76)
NEUTROPHILS NFR BLD AUTO: 74.5 % (ref 42.7–76)
NEUTROPHILS NFR BLD AUTO: 75.3 % (ref 42.7–76)
NEUTROPHILS NFR BLD AUTO: 76.1 % (ref 42.7–76)
NEUTROPHILS NFR BLD AUTO: 76.4 % (ref 42.7–76)
NEUTROPHILS NFR BLD AUTO: 80.1 % (ref 42.7–76)
NEUTROPHILS NFR BLD AUTO: 93.3 % (ref 42.7–76)
NEUTROPHILS NFR BLD MANUAL: 49 % (ref 42.7–76)
NEUTROPHILS NFR BLD MANUAL: 88 % (ref 42.7–76)
NEUTROPHILS NFR FLD MANUAL: 2 %
NEUTROPHILS NFR FLD MANUAL: 7 %
NEUTS BAND NFR BLD MANUAL: 2 % (ref 0–5)
NITRITE UR QL STRIP: NEGATIVE
NITRITE UR QL STRIP: NORMAL
NOTE: ABNORMAL
NOTE: ABNORMAL
NRBC BLD AUTO-RTO: 0 /100 WBC (ref 0–0.2)
NRBC BLD AUTO-RTO: 0.4 /100 WBC (ref 0–0.2)
NRBC BLD AUTO-RTO: 0.4 /100 WBC (ref 0–0.2)
NRBC BLD AUTO-RTO: 0.5 /100 WBC (ref 0–0.2)
NRBC BLD AUTO-RTO: 0.6 /100 WBC (ref 0–0.2)
NRBC BLD AUTO-RTO: 0.6 /100 WBC (ref 0–0.2)
NRBC BLD AUTO-RTO: 0.7 /100 WBC (ref 0–0.2)
NRBC BLD AUTO-RTO: 0.9 /100 WBC (ref 0–0.2)
OPIATES UR QL SCN: POSITIVE NG/ML
OPIATES UR QL: NEGATIVE
OPIATES UR QL: POSITIVE
OSMOLALITY UR: 384 MOSM/KG (ref 300–1100)
OVALOCYTES BLD QL SMEAR: ABNORMAL
OVALOCYTES BLD QL SMEAR: NORMAL
OXYCODONE UR QL SCN: NEGATIVE
OXYCODONE UR QL SCN: POSITIVE
OXYHGB MFR BLDV: 82.5 %
OXYHGB MFR BLDV: 92.2 % (ref 94–99)
PATH REPORT.FINAL DX SPEC: NORMAL
PATH REPORT.GROSS SPEC: NORMAL
PAW @ PEAK INSP FLOW SETTING VENT: 0 CMH2O
PAW @ PEAK INSP FLOW SETTING VENT: 0 CMH2O
PCO2 BLDA: 26 MM HG (ref 35–45)
PCO2 BLDV: 35.9 MM HG (ref 41–51)
PCO2 TEMP ADJ BLD: 26 MM HG (ref 35–48)
PCP UR QL SCN: NEGATIVE
PCP UR QL SCN: NEGATIVE
PH BLDA: 7.38 PH UNITS (ref 7.35–7.45)
PH BLDV: 7.45 PH UNITS
PH UR STRIP.AUTO: <=5 [PH] (ref 5–8)
PH UR STRIP.AUTO: NORMAL [PH]
PH, TEMP CORRECTED: 7.38 PH UNITS
PHENOTYPE: ABNORMAL
PHOSPHATE SERPL-MCNC: 2.3 MG/DL (ref 2.5–4.5)
PHOSPHATE SERPL-MCNC: 2.4 MG/DL (ref 2.5–4.5)
PHOSPHATE SERPL-MCNC: 2.4 MG/DL (ref 2.5–4.5)
PHOSPHATE SERPL-MCNC: 2.6 MG/DL (ref 2.5–4.5)
PHOSPHATE SERPL-MCNC: 2.9 MG/DL (ref 2.5–4.5)
PHOSPHATE SERPL-MCNC: 3 MG/DL (ref 2.5–4.5)
PHOSPHATE SERPL-MCNC: 3.1 MG/DL (ref 2.5–4.5)
PHOSPHATE SERPL-MCNC: 3.2 MG/DL (ref 2.5–4.5)
PHOSPHATE SERPL-MCNC: 3.2 MG/DL (ref 2.5–4.5)
PHOSPHATE SERPL-MCNC: 3.4 MG/DL (ref 2.5–4.5)
PHOSPHATE SERPL-MCNC: 3.5 MG/DL (ref 2.5–4.5)
PHOSPHATE SERPL-MCNC: 3.7 MG/DL (ref 2.5–4.5)
PHOSPHATE SERPL-MCNC: 4 MG/DL (ref 2.5–4.5)
PHOSPHATE SERPL-MCNC: 4.3 MG/DL (ref 2.5–4.5)
PHOSPHATE SERPL-MCNC: 4.4 MG/DL (ref 2.5–4.5)
PHOSPHATE SERPL-MCNC: 4.9 MG/DL (ref 2.5–4.5)
PHOSPHATE SERPL-MCNC: 6.7 MG/DL (ref 2.5–4.5)
PHOSPHATE SERPL-MCNC: 6.7 MG/DL (ref 2.5–4.5)
PHOSPHATE SERPL-MCNC: 7.3 MG/DL (ref 2.5–4.5)
PLAT MORPH BLD: NORMAL
PLATELET # BLD AUTO: 102 10*3/MM3 (ref 140–450)
PLATELET # BLD AUTO: 109 10*3/MM3 (ref 140–450)
PLATELET # BLD AUTO: 128 10*3/MM3 (ref 140–450)
PLATELET # BLD AUTO: 131 10*3/MM3 (ref 140–450)
PLATELET # BLD AUTO: 34 10*3/MM3 (ref 140–450)
PLATELET # BLD AUTO: 37 10*3/MM3 (ref 140–450)
PLATELET # BLD AUTO: 38 10*3/MM3 (ref 140–450)
PLATELET # BLD AUTO: 42 10*3/MM3 (ref 140–450)
PLATELET # BLD AUTO: 44 10*3/MM3 (ref 140–450)
PLATELET # BLD AUTO: 46 10*3/MM3 (ref 140–450)
PLATELET # BLD AUTO: 48 10*3/MM3 (ref 140–450)
PLATELET # BLD AUTO: 49 10*3/MM3 (ref 140–450)
PLATELET # BLD AUTO: 50 10*3/MM3 (ref 140–450)
PLATELET # BLD AUTO: 52 10*3/MM3 (ref 140–450)
PLATELET # BLD AUTO: 52 10*3/MM3 (ref 140–450)
PLATELET # BLD AUTO: 55 10*3/MM3 (ref 140–450)
PLATELET # BLD AUTO: 56 10*3/MM3 (ref 140–450)
PLATELET # BLD AUTO: 57 10*3/MM3 (ref 140–450)
PLATELET # BLD AUTO: 58 10*3/MM3 (ref 140–450)
PLATELET # BLD AUTO: 58 10*3/MM3 (ref 140–450)
PLATELET # BLD AUTO: 60 10*3/MM3 (ref 140–450)
PLATELET # BLD AUTO: 62 10*3/MM3 (ref 140–450)
PLATELET # BLD AUTO: 62 10*3/MM3 (ref 140–450)
PLATELET # BLD AUTO: 63 10*3/MM3 (ref 140–450)
PLATELET # BLD AUTO: 63 10*3/MM3 (ref 140–450)
PLATELET # BLD AUTO: 64 10*3/MM3 (ref 140–450)
PLATELET # BLD AUTO: 64 10*3/MM3 (ref 140–450)
PLATELET # BLD AUTO: 65 10*3/MM3 (ref 140–450)
PLATELET # BLD AUTO: 69 10*3/MM3 (ref 140–450)
PLATELET # BLD AUTO: 70 10*3/MM3 (ref 140–450)
PLATELET # BLD AUTO: 70 10*3/MM3 (ref 140–450)
PLATELET # BLD AUTO: 71 10*3/MM3 (ref 140–450)
PLATELET # BLD AUTO: 72 10*3/MM3 (ref 140–450)
PLATELET # BLD AUTO: 72 10*3/MM3 (ref 140–450)
PLATELET # BLD AUTO: 73 10*3/MM3 (ref 140–450)
PLATELET # BLD AUTO: 74 10*3/MM3 (ref 140–450)
PLATELET # BLD AUTO: 74 10*3/MM3 (ref 140–450)
PLATELET # BLD AUTO: 75 10*3/MM3 (ref 140–450)
PLATELET # BLD AUTO: 78 10*3/MM3 (ref 140–450)
PLATELET # BLD AUTO: 78 10*3/MM3 (ref 140–450)
PLATELET # BLD AUTO: 79 10*3/MM3 (ref 140–450)
PLATELET # BLD AUTO: 84 10*3/MM3 (ref 140–450)
PLATELET # BLD AUTO: 85 10*3/MM3 (ref 140–450)
PLATELET # BLD AUTO: 87 10*3/MM3 (ref 140–450)
PLATELET # BLD AUTO: 92 10*3/MM3 (ref 140–450)
PLATELET # BLD AUTO: 95 10*3/MM3 (ref 140–450)
PLATELET # BLD AUTO: 95 10*3/MM3 (ref 140–450)
PLATELET # BLD AUTO: 96 10*3/MM3 (ref 140–450)
PLATELET # BLD AUTO: 98 10*3/MM3 (ref 140–450)
PMV BLD AUTO: 10.9 FL (ref 6–12)
PMV BLD AUTO: 11.3 FL (ref 6–12)
PMV BLD AUTO: 11.3 FL (ref 6–12)
PMV BLD AUTO: 11.4 FL (ref 6–12)
PMV BLD AUTO: 11.4 FL (ref 6–12)
PMV BLD AUTO: 11.5 FL (ref 6–12)
PMV BLD AUTO: 11.6 FL (ref 6–12)
PMV BLD AUTO: 11.6 FL (ref 6–12)
PMV BLD AUTO: 11.7 FL (ref 6–12)
PMV BLD AUTO: 11.8 FL (ref 6–12)
PMV BLD AUTO: 11.8 FL (ref 6–12)
PMV BLD AUTO: 11.9 FL (ref 6–12)
PMV BLD AUTO: 12 FL (ref 6–12)
PMV BLD AUTO: 12.1 FL (ref 6–12)
PMV BLD AUTO: 12.1 FL (ref 6–12)
PMV BLD AUTO: 12.2 FL (ref 6–12)
PMV BLD AUTO: 12.3 FL (ref 6–12)
PMV BLD AUTO: 12.4 FL (ref 6–12)
PMV BLD AUTO: 12.5 FL (ref 6–12)
PMV BLD AUTO: 12.6 FL (ref 6–12)
PMV BLD AUTO: 12.8 FL (ref 6–12)
PMV BLD AUTO: 12.8 FL (ref 6–12)
PMV BLD AUTO: 13 FL (ref 6–12)
PMV BLD AUTO: 13 FL (ref 6–12)
PMV BLD AUTO: 13.3 FL (ref 6–12)
PMV BLD AUTO: 13.9 FL (ref 6–12)
PMV BLD AUTO: 7.7 FL (ref 6–12)
PMV BLD AUTO: 7.8 FL (ref 6–12)
PMV BLD AUTO: 7.8 FL (ref 6–12)
PMV BLD AUTO: 8.1 FL (ref 6–12)
PMV BLD AUTO: 8.2 FL (ref 6–12)
PMV BLD AUTO: 8.2 FL (ref 6–12)
PMV BLD AUTO: 8.3 FL (ref 6–12)
PMV BLD AUTO: 8.4 FL (ref 6–12)
PMV BLD AUTO: 8.8 FL (ref 6–12)
PO2 BLDA: 69.6 MM HG (ref 83–108)
PO2 BLDV: 48.4 MM HG (ref 27–53)
PO2 TEMP ADJ BLD: 69.6 MM HG (ref 83–108)
POLYCHROMASIA BLD QL SMEAR: ABNORMAL
POLYCHROMASIA BLD QL SMEAR: NORMAL
POTASSIUM BLD-SCNC: 3.1 MMOL/L (ref 3.5–5.2)
POTASSIUM BLD-SCNC: 3.4 MMOL/L (ref 3.5–5.2)
POTASSIUM BLD-SCNC: 3.6 MMOL/L (ref 3.5–5.2)
POTASSIUM BLD-SCNC: 3.7 MMOL/L (ref 3.5–5.2)
POTASSIUM BLD-SCNC: 3.8 MMOL/L (ref 3.5–5.2)
POTASSIUM BLD-SCNC: 4 MMOL/L (ref 3.5–5.2)
POTASSIUM BLD-SCNC: 4 MMOL/L (ref 3.5–5.2)
POTASSIUM BLD-SCNC: 4.2 MMOL/L (ref 3.5–5.2)
POTASSIUM BLD-SCNC: 4.2 MMOL/L (ref 3.5–5.2)
POTASSIUM SERPL-SCNC: 3.1 MMOL/L (ref 3.5–5.2)
POTASSIUM SERPL-SCNC: 3.2 MMOL/L (ref 3.5–5.2)
POTASSIUM SERPL-SCNC: 3.2 MMOL/L (ref 3.5–5.2)
POTASSIUM SERPL-SCNC: 3.3 MMOL/L (ref 3.5–5.2)
POTASSIUM SERPL-SCNC: 3.5 MMOL/L (ref 3.5–5.2)
POTASSIUM SERPL-SCNC: 3.6 MMOL/L (ref 3.5–5.2)
POTASSIUM SERPL-SCNC: 3.7 MMOL/L (ref 3.5–5.2)
POTASSIUM SERPL-SCNC: 3.8 MMOL/L (ref 3.5–5.2)
POTASSIUM SERPL-SCNC: 3.9 MMOL/L (ref 3.5–5.2)
POTASSIUM SERPL-SCNC: 4 MMOL/L (ref 3.5–5.2)
POTASSIUM SERPL-SCNC: 4.2 MMOL/L (ref 3.5–5.2)
POTASSIUM SERPL-SCNC: 4.2 MMOL/L (ref 3.5–5.2)
POTASSIUM SERPL-SCNC: 4.3 MMOL/L (ref 3.5–5.2)
POTASSIUM SERPL-SCNC: 4.5 MMOL/L (ref 3.5–5.2)
POTASSIUM SERPL-SCNC: 4.6 MMOL/L (ref 3.5–5.2)
POTASSIUM SERPL-SCNC: 4.7 MMOL/L (ref 3.5–5.2)
POTASSIUM SERPL-SCNC: 4.7 MMOL/L (ref 3.5–5.2)
POTASSIUM SERPL-SCNC: 5 MMOL/L (ref 3.5–5.2)
POTASSIUM SERPL-SCNC: 5 MMOL/L (ref 3.5–5.2)
POTASSIUM SERPL-SCNC: 5.1 MMOL/L (ref 3.5–5.2)
PROCALCITONIN SERPL-MCNC: 0.25 NG/ML (ref 0–0.25)
PROCALCITONIN SERPL-MCNC: 0.26 NG/ML (ref 0–0.25)
PROCALCITONIN SERPL-MCNC: 0.3 NG/ML (ref 0–0.25)
PROCALCITONIN SERPL-MCNC: 0.34 NG/ML (ref 0–0.25)
PROCALCITONIN SERPL-MCNC: 0.36 NG/ML (ref 0–0.25)
PROCALCITONIN SERPL-MCNC: 1.2 NG/ML (ref 0–0.25)
PROCALCITONIN SERPL-MCNC: 3.22 NG/ML (ref 0–0.25)
PROPOXYPH UR QL: NEGATIVE
PROPOXYPH UR QL: NEGATIVE
PROT FLD-MCNC: 2 G/DL
PROT FLD-MCNC: 2.2 G/DL
PROT PATTERN SERPL ELPH-IMP: ABNORMAL
PROT SERPL-MCNC: 5 G/DL (ref 6–8.5)
PROT SERPL-MCNC: 5.3 G/DL (ref 6–8.5)
PROT SERPL-MCNC: 5.3 G/DL (ref 6–8.5)
PROT SERPL-MCNC: 5.4 G/DL (ref 6–8.5)
PROT SERPL-MCNC: 5.5 G/DL (ref 6–8.5)
PROT SERPL-MCNC: 5.5 G/DL (ref 6–8.5)
PROT SERPL-MCNC: 5.6 G/DL (ref 6–8.5)
PROT SERPL-MCNC: 5.7 G/DL (ref 6–8.5)
PROT SERPL-MCNC: 5.8 G/DL (ref 6–8.5)
PROT SERPL-MCNC: 5.9 G/DL (ref 6–8.5)
PROT SERPL-MCNC: 6 G/DL (ref 6–8.5)
PROT SERPL-MCNC: 6.1 G/DL (ref 6–8.5)
PROT SERPL-MCNC: 6.1 G/DL (ref 6–8.5)
PROT SERPL-MCNC: 6.2 G/DL (ref 6–8.5)
PROT SERPL-MCNC: 6.4 G/DL (ref 6–8.5)
PROT SERPL-MCNC: 6.7 G/DL (ref 6–8.5)
PROT SERPL-MCNC: 6.7 G/DL (ref 6–8.5)
PROT SERPL-MCNC: 7.3 G/DL (ref 6–8.5)
PROT SERPL-MCNC: 7.4 G/DL (ref 6–8.5)
PROT UR QL STRIP: NEGATIVE
PROT UR QL STRIP: NORMAL
PROTHROMBIN TIME: 20.4 SECONDS (ref 11.5–14)
PROTHROMBIN TIME: 20.8 SECONDS (ref 11.5–14)
PROTHROMBIN TIME: 20.9 SECONDS (ref 11.5–14)
PROTHROMBIN TIME: 20.9 SECONDS (ref 11.5–14)
PROTHROMBIN TIME: 21.6 SECONDS (ref 11.5–14)
PROTHROMBIN TIME: 21.8 SECONDS (ref 11.5–14)
PROTHROMBIN TIME: 21.9 SECONDS (ref 11.5–14)
PROTHROMBIN TIME: 22.4 SECONDS (ref 11.5–14)
PROTHROMBIN TIME: 23 SECONDS (ref 11.5–14)
PROTHROMBIN TIME: 23.1 SECONDS (ref 11.5–14)
PROTHROMBIN TIME: 23.4 SECONDS (ref 11.5–14)
PROTHROMBIN TIME: 23.4 SECONDS (ref 11.5–14)
PROTHROMBIN TIME: 23.8 SECONDS (ref 11.5–14)
PROTHROMBIN TIME: 23.9 SECONDS (ref 11.5–14)
PROTHROMBIN TIME: 24 SECONDS (ref 11.5–14)
PROTHROMBIN TIME: 24.1 SECONDS (ref 11.5–14)
PROTHROMBIN TIME: 24.7 SECONDS (ref 11.5–14)
PROTHROMBIN TIME: 24.9 SECONDS (ref 11.5–14)
PROTHROMBIN TIME: 24.9 SECONDS (ref 11.5–14)
PROTHROMBIN TIME: 25.7 SECONDS (ref 11.5–14)
PROTHROMBIN TIME: 29.5 SECONDS (ref 11.5–14)
RBC # BLD AUTO: 2.2 10*6/MM3 (ref 4.14–5.8)
RBC # BLD AUTO: 2.31 10*6/MM3 (ref 4.14–5.8)
RBC # BLD AUTO: 2.35 10*6/MM3 (ref 4.14–5.8)
RBC # BLD AUTO: 2.37 10*6/MM3 (ref 4.14–5.8)
RBC # BLD AUTO: 2.39 10*6/MM3 (ref 4.14–5.8)
RBC # BLD AUTO: 2.39 10*6/MM3 (ref 4.14–5.8)
RBC # BLD AUTO: 2.4 10*6/MM3 (ref 4.14–5.8)
RBC # BLD AUTO: 2.41 10*6/MM3 (ref 4.14–5.8)
RBC # BLD AUTO: 2.41 10*6/MM3 (ref 4.14–5.8)
RBC # BLD AUTO: 2.44 10*6/MM3 (ref 4.14–5.8)
RBC # BLD AUTO: 2.47 10*6/MM3 (ref 4.14–5.8)
RBC # BLD AUTO: 2.48 10*6/MM3 (ref 4.14–5.8)
RBC # BLD AUTO: 2.5 10*6/MM3 (ref 4.14–5.8)
RBC # BLD AUTO: 2.51 10*6/MM3 (ref 4.14–5.8)
RBC # BLD AUTO: 2.54 10*6/MM3 (ref 4.14–5.8)
RBC # BLD AUTO: 2.57 10*6/MM3 (ref 4.14–5.8)
RBC # BLD AUTO: 2.61 10*6/MM3 (ref 4.14–5.8)
RBC # BLD AUTO: 2.61 10*6/MM3 (ref 4.14–5.8)
RBC # BLD AUTO: 2.63 10*6/MM3 (ref 4.14–5.8)
RBC # BLD AUTO: 2.64 10*6/MM3 (ref 4.14–5.8)
RBC # BLD AUTO: 2.65 10*6/MM3 (ref 4.14–5.8)
RBC # BLD AUTO: 2.66 10*6/MM3 (ref 4.14–5.8)
RBC # BLD AUTO: 2.69 10*6/MM3 (ref 4.14–5.8)
RBC # BLD AUTO: 2.73 10*6/MM3 (ref 4.14–5.8)
RBC # BLD AUTO: 2.76 10*6/MM3 (ref 4.14–5.8)
RBC # BLD AUTO: 2.77 10*6/MM3 (ref 4.14–5.8)
RBC # BLD AUTO: 2.77 10*6/MM3 (ref 4.14–5.8)
RBC # BLD AUTO: 2.79 10*6/MM3 (ref 4.14–5.8)
RBC # BLD AUTO: 2.81 10*6/MM3 (ref 4.14–5.8)
RBC # BLD AUTO: 2.87 10*6/MM3 (ref 4.14–5.8)
RBC # BLD AUTO: 2.87 10*6/MM3 (ref 4.14–5.8)
RBC # BLD AUTO: 3.01 10*6/MM3 (ref 4.14–5.8)
RBC # BLD AUTO: 3.07 10*6/MM3 (ref 4.14–5.8)
RBC # BLD AUTO: 3.12 10*6/MM3 (ref 4.14–5.8)
RBC # BLD AUTO: 3.3 10*6/MM3 (ref 4.14–5.8)
RBC # BLD AUTO: 3.35 10*6/MM3 (ref 4.14–5.8)
RBC # BLD AUTO: 3.41 10*6/MM3 (ref 4.14–5.8)
RBC # BLD AUTO: 3.48 10*6/MM3 (ref 4.14–5.8)
RBC # BLD AUTO: 3.59 10*6/MM3 (ref 4.14–5.8)
RBC # BLD AUTO: 3.62 10*6/MM3 (ref 4.14–5.8)
RBC # BLD AUTO: 3.71 10*6/MM3 (ref 4.14–5.8)
RBC # BLD AUTO: 3.98 10*6/MM3 (ref 4.14–5.8)
RBC # BLD AUTO: 4.02 10*6/MM3 (ref 4.14–5.8)
RBC # BLD AUTO: 4.06 10*6/MM3 (ref 4.14–5.8)
RBC # BLD AUTO: 4.37 10*6/MM3 (ref 4.14–5.8)
RBC # BLD AUTO: 4.38 10*6/MM3 (ref 4.14–5.8)
RBC # FLD AUTO: 2000 /MM3
RBC # FLD AUTO: 3000 /MM3
RBC # FLD AUTO: <2000 /MM3
RBC # FLD AUTO: <2000 /MM3
RBC # UR: NORMAL /UL
RBC MORPH BLD: NORMAL
REF LAB TEST METHOD: NORMAL
REF LAB TEST METHOD: NORMAL
RH BLD: POSITIVE
SARS-COV-2 RDRP RESP QL NAA+PROBE: NOT DETECTED
SARS-COV-2 RNA RESP QL NAA+PROBE: NOT DETECTED
SMALL PLATELETS BLD QL SMEAR: ABNORMAL
SMALL PLATELETS BLD QL SMEAR: ABNORMAL
SMALL PLATELETS BLD QL SMEAR: NORMAL
SODIUM BLD-SCNC: 138 MMOL/L (ref 136–145)
SODIUM BLD-SCNC: 139 MMOL/L (ref 136–145)
SODIUM BLD-SCNC: 140 MMOL/L (ref 136–145)
SODIUM BLD-SCNC: 141 MMOL/L (ref 136–145)
SODIUM BLD-SCNC: 141 MMOL/L (ref 136–145)
SODIUM SERPL-SCNC: 131 MMOL/L (ref 136–145)
SODIUM SERPL-SCNC: 133 MMOL/L (ref 136–145)
SODIUM SERPL-SCNC: 134 MMOL/L (ref 136–145)
SODIUM SERPL-SCNC: 135 MMOL/L (ref 136–145)
SODIUM SERPL-SCNC: 135 MMOL/L (ref 136–145)
SODIUM SERPL-SCNC: 136 MMOL/L (ref 136–145)
SODIUM SERPL-SCNC: 137 MMOL/L (ref 136–145)
SODIUM SERPL-SCNC: 138 MMOL/L (ref 136–145)
SODIUM SERPL-SCNC: 139 MMOL/L (ref 136–145)
SODIUM SERPL-SCNC: 139 MMOL/L (ref 136–145)
SODIUM SERPL-SCNC: 140 MMOL/L (ref 136–145)
SODIUM SERPL-SCNC: 141 MMOL/L (ref 136–145)
SODIUM SERPL-SCNC: 142 MMOL/L (ref 136–145)
SODIUM SERPL-SCNC: 143 MMOL/L (ref 136–145)
SODIUM SERPL-SCNC: 144 MMOL/L (ref 136–145)
SODIUM SERPL-SCNC: 146 MMOL/L (ref 136–145)
SODIUM UR-SCNC: <20 MMOL/L
SP GR UR STRIP: 1.01 (ref 1–1.03)
SP GR UR STRIP: NORMAL
SQUAMOUS #/AREA URNS HPF: NORMAL /[HPF]
STRESS TARGET HR: 139 BPM
T&S EXPIRATION DATE: NORMAL
TARGETS BLD QL SMEAR: ABNORMAL
TARGETS BLD QL SMEAR: NORMAL
TIBC SERPL-MCNC: 110 MCG/DL (ref 298–536)
TIBC SERPL-MCNC: 82 MCG/DL (ref 298–536)
TOTAL RATE: 0 BREATHS/MINUTE
TOTAL RATE: 0 BREATHS/MINUTE
TRANSFERRIN SERPL-MCNC: 55 MG/DL (ref 200–360)
TRANSFERRIN SERPL-MCNC: 74 MG/DL (ref 200–360)
TRICYCLICS UR QL SCN: NEGATIVE
TRICYCLICS UR QL SCN: NEGATIVE
TROPONIN T SERPL-MCNC: 0.05 NG/ML (ref 0–0.03)
TROPONIN T SERPL-MCNC: 0.06 NG/ML (ref 0–0.03)
TROPONIN T SERPL-MCNC: 0.06 NG/ML (ref 0–0.03)
TROPONIN T SERPL-MCNC: 0.07 NG/ML (ref 0–0.03)
TSH SERPL DL<=0.05 MIU/L-ACNC: 0.55 UIU/ML (ref 0.27–4.2)
UNIT  ABO: NORMAL
UNIT  RH: NORMAL
URATE SERPL-MCNC: 18.7 MG/DL (ref 3.4–7)
UROBILINOGEN UR QL STRIP: NORMAL
UROBILINOGEN UR QL STRIP: NORMAL
VANCOMYCIN TROUGH SERPL-MCNC: 14.2 MCG/ML (ref 5–20)
WBC # BLD AUTO: 17.76 10*3/MM3 (ref 3.4–10.8)
WBC # BLD AUTO: 2.2 10*3/MM3 (ref 3.4–10.8)
WBC # BLD AUTO: 2.52 10*3/MM3 (ref 3.4–10.8)
WBC # BLD AUTO: 2.98 10*3/MM3 (ref 3.4–10.8)
WBC # BLD AUTO: 22.3 10*3/MM3 (ref 3.4–10.8)
WBC # BLD AUTO: 3.07 10*3/MM3 (ref 3.4–10.8)
WBC # BLD AUTO: 3.1 10*3/MM3 (ref 3.4–10.8)
WBC # BLD AUTO: 3.13 10*3/MM3 (ref 3.4–10.8)
WBC # BLD AUTO: 3.15 10*3/MM3 (ref 3.4–10.8)
WBC # BLD AUTO: 3.15 10*3/MM3 (ref 3.4–10.8)
WBC # BLD AUTO: 3.17 10*3/MM3 (ref 3.4–10.8)
WBC # BLD AUTO: 3.82 10*3/MM3 (ref 3.4–10.8)
WBC # BLD AUTO: 3.83 10*3/MM3 (ref 3.4–10.8)
WBC # BLD AUTO: 3.96 10*3/MM3 (ref 3.4–10.8)
WBC # BLD AUTO: 4.03 10*3/MM3 (ref 3.4–10.8)
WBC # BLD AUTO: 4.03 10*3/MM3 (ref 3.4–10.8)
WBC # BLD AUTO: 4.18 10*3/MM3 (ref 3.4–10.8)
WBC # BLD AUTO: 4.22 10*3/MM3 (ref 3.4–10.8)
WBC # BLD AUTO: 4.25 10*3/MM3 (ref 3.4–10.8)
WBC # BLD AUTO: 4.68 10*3/MM3 (ref 3.4–10.8)
WBC # BLD AUTO: 4.72 10*3/MM3 (ref 3.4–10.8)
WBC # BLD AUTO: 4.79 10*3/MM3 (ref 3.4–10.8)
WBC # BLD AUTO: 5.18 10*3/MM3 (ref 3.4–10.8)
WBC # BLD AUTO: 5.19 10*3/MM3 (ref 3.4–10.8)
WBC # BLD AUTO: 5.34 10*3/MM3 (ref 3.4–10.8)
WBC # BLD AUTO: 5.43 10*3/MM3 (ref 3.4–10.8)
WBC # BLD AUTO: 5.48 10*3/MM3 (ref 3.4–10.8)
WBC # BLD AUTO: 5.56 10*3/MM3 (ref 3.4–10.8)
WBC # BLD AUTO: 5.65 10*3/MM3 (ref 3.4–10.8)
WBC # BLD AUTO: 5.8 10*3/MM3 (ref 3.4–10.8)
WBC # BLD AUTO: 5.85 10*3/MM3 (ref 3.4–10.8)
WBC # BLD AUTO: 5.99 10*3/MM3 (ref 3.4–10.8)
WBC # BLD AUTO: 6.11 10*3/MM3 (ref 3.4–10.8)
WBC # BLD AUTO: 6.22 10*3/MM3 (ref 3.4–10.8)
WBC # BLD AUTO: 6.44 10*3/MM3 (ref 3.4–10.8)
WBC # BLD AUTO: 6.64 10*3/MM3 (ref 3.4–10.8)
WBC # BLD AUTO: 6.86 10*3/MM3 (ref 3.4–10.8)
WBC # BLD AUTO: 6.92 10*3/MM3 (ref 3.4–10.8)
WBC # BLD AUTO: 6.98 10*3/MM3 (ref 3.4–10.8)
WBC # BLD AUTO: 7.72 10*3/MM3 (ref 3.4–10.8)
WBC # BLD AUTO: 7.73 10*3/MM3 (ref 3.4–10.8)
WBC # FLD AUTO: 106 /MM3
WBC # FLD AUTO: 106 /MM3
WBC # FLD AUTO: 160 /MM3
WBC # FLD AUTO: 213 /MM3
WBC MORPH BLD: NORMAL
WBC NRBC COR # BLD: 3.5 10*3/MM3 (ref 3.4–10.8)
WBC NRBC COR # BLD: 3.7 10*3/MM3 (ref 3.4–10.8)
WBC NRBC COR # BLD: 5.4 10*3/MM3 (ref 3.4–10.8)
WBC NRBC COR # BLD: 5.8 10*3/MM3 (ref 3.4–10.8)
WBC NRBC COR # BLD: 7.1 10*3/MM3 (ref 3.4–10.8)
WBC NRBC COR # BLD: 8 10*3/MM3 (ref 3.4–10.8)
WBC NRBC COR # BLD: 8.6 10*3/MM3 (ref 3.4–10.8)
WBC NRBC COR # BLD: 8.8 10*3/MM3 (ref 3.4–10.8)
WBC NRBC COR # BLD: 9.2 10*3/MM3 (ref 3.4–10.8)
WBC UR QL AUTO: NORMAL
WHOLE BLOOD HOLD SPECIMEN: NORMAL
WHOLE BLOOD HOLD SPECIMEN: NORMAL

## 2020-01-01 PROCEDURE — 25010000002 MORPHINE PER 10 MG: Performed by: INTERNAL MEDICINE

## 2020-01-01 PROCEDURE — 71045 X-RAY EXAM CHEST 1 VIEW: CPT

## 2020-01-01 PROCEDURE — 86923 COMPATIBILITY TEST ELECTRIC: CPT

## 2020-01-01 PROCEDURE — 87147 CULTURE TYPE IMMUNOLOGIC: CPT | Performed by: INTERNAL MEDICINE

## 2020-01-01 PROCEDURE — 25010000002 PIPERACILLIN SOD-TAZOBACTAM PER 1 G

## 2020-01-01 PROCEDURE — 25010000002 AMIODARONE PER 30 MG: Performed by: NURSE PRACTITIONER

## 2020-01-01 PROCEDURE — 83735 ASSAY OF MAGNESIUM: CPT | Performed by: INTERNAL MEDICINE

## 2020-01-01 PROCEDURE — 25010000003 LIDOCAINE 1 % SOLUTION: Performed by: RADIOLOGY

## 2020-01-01 PROCEDURE — 25010000002 MICAFUNGIN SODIUM 100 MG RECONSTITUTED SOLUTION: Performed by: INTERNAL MEDICINE

## 2020-01-01 PROCEDURE — 86900 BLOOD TYPING SEROLOGIC ABO: CPT | Performed by: FAMILY MEDICINE

## 2020-01-01 PROCEDURE — 77386: CPT | Performed by: RADIOLOGY

## 2020-01-01 PROCEDURE — 25010000002 PIPERACILLIN SOD-TAZOBACTAM PER 1 G: Performed by: NURSE PRACTITIONER

## 2020-01-01 PROCEDURE — P9041 ALBUMIN (HUMAN),5%, 50ML: HCPCS | Performed by: NURSE PRACTITIONER

## 2020-01-01 PROCEDURE — C1729 CATH, DRAINAGE: HCPCS

## 2020-01-01 PROCEDURE — 82962 GLUCOSE BLOOD TEST: CPT

## 2020-01-01 PROCEDURE — 77300 RADIATION THERAPY DOSE PLAN: CPT | Performed by: RADIOLOGY

## 2020-01-01 PROCEDURE — 25010000002 CEFTAZIDIME PER 500 MG

## 2020-01-01 PROCEDURE — 87070 CULTURE OTHR SPECIMN AEROBIC: CPT | Performed by: INTERNAL MEDICINE

## 2020-01-01 PROCEDURE — 82570 ASSAY OF URINE CREATININE: CPT | Performed by: NURSE PRACTITIONER

## 2020-01-01 PROCEDURE — 80048 BASIC METABOLIC PNL TOTAL CA: CPT | Performed by: INTERNAL MEDICINE

## 2020-01-01 PROCEDURE — 86927 PLASMA FRESH FROZEN: CPT

## 2020-01-01 PROCEDURE — 97116 GAIT TRAINING THERAPY: CPT

## 2020-01-01 PROCEDURE — 25010000002 POTASSIUM CHLORIDE PER 2 MEQ OF POTASSIUM: Performed by: INTERNAL MEDICINE

## 2020-01-01 PROCEDURE — 99232 SBSQ HOSP IP/OBS MODERATE 35: CPT | Performed by: INTERNAL MEDICINE

## 2020-01-01 PROCEDURE — 99231 SBSQ HOSP IP/OBS SF/LOW 25: CPT | Performed by: INTERNAL MEDICINE

## 2020-01-01 PROCEDURE — 25010000003 PHYTONADIONE 10 MG/ML SOLUTION: Performed by: INTERNAL MEDICINE

## 2020-01-01 PROCEDURE — 85007 BL SMEAR W/DIFF WBC COUNT: CPT | Performed by: INTERNAL MEDICINE

## 2020-01-01 PROCEDURE — 25010000002 VITAMIN K1 PER 1 MG: Performed by: INTERNAL MEDICINE

## 2020-01-01 PROCEDURE — 85610 PROTHROMBIN TIME: CPT | Performed by: INTERNAL MEDICINE

## 2020-01-01 PROCEDURE — 97168 OT RE-EVAL EST PLAN CARE: CPT

## 2020-01-01 PROCEDURE — 80053 COMPREHEN METABOLIC PANEL: CPT | Performed by: FAMILY MEDICINE

## 2020-01-01 PROCEDURE — 25010000002 DEXAMETHASONE PER 1 MG: Performed by: INTERNAL MEDICINE

## 2020-01-01 PROCEDURE — P9041 ALBUMIN (HUMAN),5%, 50ML: HCPCS | Performed by: INTERNAL MEDICINE

## 2020-01-01 PROCEDURE — 85025 COMPLETE CBC W/AUTO DIFF WBC: CPT | Performed by: NURSE PRACTITIONER

## 2020-01-01 PROCEDURE — 80069 RENAL FUNCTION PANEL: CPT | Performed by: INTERNAL MEDICINE

## 2020-01-01 PROCEDURE — 86900 BLOOD TYPING SEROLOGIC ABO: CPT | Performed by: INTERNAL MEDICINE

## 2020-01-01 PROCEDURE — 97166 OT EVAL MOD COMPLEX 45 MIN: CPT

## 2020-01-01 PROCEDURE — 84132 ASSAY OF SERUM POTASSIUM: CPT | Performed by: INTERNAL MEDICINE

## 2020-01-01 PROCEDURE — 83605 ASSAY OF LACTIC ACID: CPT | Performed by: EMERGENCY MEDICINE

## 2020-01-01 PROCEDURE — 25010000002 ALBUMIN HUMAN 25% PER 50 ML: Performed by: INTERNAL MEDICINE

## 2020-01-01 PROCEDURE — 25010000002 ONDANSETRON PER 1 MG: Performed by: INTERNAL MEDICINE

## 2020-01-01 PROCEDURE — 0DB68ZX EXCISION OF STOMACH, VIA NATURAL OR ARTIFICIAL OPENING ENDOSCOPIC, DIAGNOSTIC: ICD-10-PCS | Performed by: INTERNAL MEDICINE

## 2020-01-01 PROCEDURE — 96413 CHEMO IV INFUSION 1 HR: CPT

## 2020-01-01 PROCEDURE — C1894 INTRO/SHEATH, NON-LASER: HCPCS

## 2020-01-01 PROCEDURE — 80053 COMPREHEN METABOLIC PANEL: CPT | Performed by: INTERNAL MEDICINE

## 2020-01-01 PROCEDURE — 99152 MOD SED SAME PHYS/QHP 5/>YRS: CPT

## 2020-01-01 PROCEDURE — 02PY33Z REMOVAL OF INFUSION DEVICE FROM GREAT VESSEL, PERCUTANEOUS APPROACH: ICD-10-PCS | Performed by: INTERNAL MEDICINE

## 2020-01-01 PROCEDURE — 85025 COMPLETE CBC W/AUTO DIFF WBC: CPT | Performed by: INTERNAL MEDICINE

## 2020-01-01 PROCEDURE — P9047 ALBUMIN (HUMAN), 25%, 50ML: HCPCS | Performed by: INTERNAL MEDICINE

## 2020-01-01 PROCEDURE — 25010000002 MAGNESIUM SULFATE 2 GM/50ML SOLUTION: Performed by: INTERNAL MEDICINE

## 2020-01-01 PROCEDURE — 99233 SBSQ HOSP IP/OBS HIGH 50: CPT | Performed by: INTERNAL MEDICINE

## 2020-01-01 PROCEDURE — 97164 PT RE-EVAL EST PLAN CARE: CPT | Performed by: PHYSICAL THERAPIST

## 2020-01-01 PROCEDURE — 82042 OTHER SOURCE ALBUMIN QUAN EA: CPT | Performed by: INTERNAL MEDICINE

## 2020-01-01 PROCEDURE — 83735 ASSAY OF MAGNESIUM: CPT | Performed by: FAMILY MEDICINE

## 2020-01-01 PROCEDURE — 97530 THERAPEUTIC ACTIVITIES: CPT

## 2020-01-01 PROCEDURE — 82565 ASSAY OF CREATININE: CPT

## 2020-01-01 PROCEDURE — 25010000002 MAGNESIUM SULFATE PER 500 MG OF MAGNESIUM: Performed by: INTERNAL MEDICINE

## 2020-01-01 PROCEDURE — 85027 COMPLETE CBC AUTOMATED: CPT | Performed by: NURSE PRACTITIONER

## 2020-01-01 PROCEDURE — 71250 CT THORAX DX C-: CPT

## 2020-01-01 PROCEDURE — P9037 PLATE PHERES LEUKOREDU IRRAD: HCPCS

## 2020-01-01 PROCEDURE — C1750 CATH, HEMODIALYSIS,LONG-TERM: HCPCS

## 2020-01-01 PROCEDURE — 63710000001 INSULIN LISPRO (HUMAN) PER 5 UNITS: Performed by: NURSE PRACTITIONER

## 2020-01-01 PROCEDURE — 82805 BLOOD GASES W/O2 SATURATION: CPT

## 2020-01-01 PROCEDURE — 85610 PROTHROMBIN TIME: CPT | Performed by: NURSE PRACTITIONER

## 2020-01-01 PROCEDURE — 86900 BLOOD TYPING SEROLOGIC ABO: CPT | Performed by: NURSE PRACTITIONER

## 2020-01-01 PROCEDURE — 93306 TTE W/DOPPLER COMPLETE: CPT

## 2020-01-01 PROCEDURE — 25010000002 MEROPENEM PER 100 MG

## 2020-01-01 PROCEDURE — 49083 ABD PARACENTESIS W/IMAGING: CPT | Performed by: INTERNAL MEDICINE

## 2020-01-01 PROCEDURE — 0JPT3XZ REMOVAL OF TUNNELED VASCULAR ACCESS DEVICE FROM TRUNK SUBCUTANEOUS TISSUE AND FASCIA, PERCUTANEOUS APPROACH: ICD-10-PCS | Performed by: INTERNAL MEDICINE

## 2020-01-01 PROCEDURE — 87635 SARS-COV-2 COVID-19 AMP PRB: CPT | Performed by: EMERGENCY MEDICINE

## 2020-01-01 PROCEDURE — 86900 BLOOD TYPING SEROLOGIC ABO: CPT

## 2020-01-01 PROCEDURE — 87150 DNA/RNA AMPLIFIED PROBE: CPT | Performed by: EMERGENCY MEDICINE

## 2020-01-01 PROCEDURE — 94799 UNLISTED PULMONARY SVC/PX: CPT

## 2020-01-01 PROCEDURE — B548ZZA ULTRASONOGRAPHY OF SUPERIOR VENA CAVA, GUIDANCE: ICD-10-PCS | Performed by: RADIOLOGY

## 2020-01-01 PROCEDURE — 82945 GLUCOSE OTHER FLUID: CPT | Performed by: INTERNAL MEDICINE

## 2020-01-01 PROCEDURE — 25010000002 HYDROCORTISONE SODIUM SUCCINATE 100 MG RECONSTITUTED SOLUTION: Performed by: INTERNAL MEDICINE

## 2020-01-01 PROCEDURE — 97535 SELF CARE MNGMENT TRAINING: CPT

## 2020-01-01 PROCEDURE — 85027 COMPLETE CBC AUTOMATED: CPT | Performed by: INTERNAL MEDICINE

## 2020-01-01 PROCEDURE — 25010000002 ONDANSETRON PER 1 MG: Performed by: NURSE PRACTITIONER

## 2020-01-01 PROCEDURE — 93306 TTE W/DOPPLER COMPLETE: CPT | Performed by: INTERNAL MEDICINE

## 2020-01-01 PROCEDURE — 25010000002 MIDAZOLAM PER 1 MG: Performed by: INTERNAL MEDICINE

## 2020-01-01 PROCEDURE — 88112 CYTOPATH CELL ENHANCE TECH: CPT | Performed by: INTERNAL MEDICINE

## 2020-01-01 PROCEDURE — 87205 SMEAR GRAM STAIN: CPT | Performed by: INTERNAL MEDICINE

## 2020-01-01 PROCEDURE — 99205 OFFICE O/P NEW HI 60 MIN: CPT | Performed by: INTERNAL MEDICINE

## 2020-01-01 PROCEDURE — 25010000002 CISPLATIN PER 50 MG: Performed by: INTERNAL MEDICINE

## 2020-01-01 PROCEDURE — 96361 HYDRATE IV INFUSION ADD-ON: CPT

## 2020-01-01 PROCEDURE — 97164 PT RE-EVAL EST PLAN CARE: CPT

## 2020-01-01 PROCEDURE — 25010000002 METOCLOPRAMIDE PER 10 MG: Performed by: INTERNAL MEDICINE

## 2020-01-01 PROCEDURE — 86850 RBC ANTIBODY SCREEN: CPT | Performed by: NURSE PRACTITIONER

## 2020-01-01 PROCEDURE — G0480 DRUG TEST DEF 1-7 CLASSES: HCPCS | Performed by: INTERNAL MEDICINE

## 2020-01-01 PROCEDURE — 80074 ACUTE HEPATITIS PANEL: CPT | Performed by: INTERNAL MEDICINE

## 2020-01-01 PROCEDURE — 84466 ASSAY OF TRANSFERRIN: CPT | Performed by: INTERNAL MEDICINE

## 2020-01-01 PROCEDURE — 99153 MOD SED SAME PHYS/QHP EA: CPT

## 2020-01-01 PROCEDURE — 25010000002 METHYLNALTREXONE 12 MG/0.6ML SOLUTION: Performed by: NURSE PRACTITIONER

## 2020-01-01 PROCEDURE — P9016 RBC LEUKOCYTES REDUCED: HCPCS

## 2020-01-01 PROCEDURE — G0463 HOSPITAL OUTPT CLINIC VISIT: HCPCS

## 2020-01-01 PROCEDURE — 25010000002 PIPERACILLIN SOD-TAZOBACTAM PER 1 G: Performed by: EMERGENCY MEDICINE

## 2020-01-01 PROCEDURE — 96365 THER/PROPH/DIAG IV INF INIT: CPT

## 2020-01-01 PROCEDURE — 36430 TRANSFUSION BLD/BLD COMPNT: CPT

## 2020-01-01 PROCEDURE — 96360 HYDRATION IV INFUSION INIT: CPT

## 2020-01-01 PROCEDURE — 86708 HEPATITIS A ANTIBODY: CPT | Performed by: INTERNAL MEDICINE

## 2020-01-01 PROCEDURE — 25010000002 DIPHENHYDRAMINE PER 50 MG: Performed by: NURSE PRACTITIONER

## 2020-01-01 PROCEDURE — 93010 ELECTROCARDIOGRAM REPORT: CPT | Performed by: INTERNAL MEDICINE

## 2020-01-01 PROCEDURE — 90632 HEPA VACCINE ADULT IM: CPT | Performed by: INTERNAL MEDICINE

## 2020-01-01 PROCEDURE — 84145 PROCALCITONIN (PCT): CPT | Performed by: NURSE PRACTITIONER

## 2020-01-01 PROCEDURE — 84145 PROCALCITONIN (PCT): CPT | Performed by: INTERNAL MEDICINE

## 2020-01-01 PROCEDURE — 85025 COMPLETE CBC W/AUTO DIFF WBC: CPT | Performed by: FAMILY MEDICINE

## 2020-01-01 PROCEDURE — 77336 RADIATION PHYSICS CONSULT: CPT | Performed by: RADIOLOGY

## 2020-01-01 PROCEDURE — 83735 ASSAY OF MAGNESIUM: CPT

## 2020-01-01 PROCEDURE — 87040 BLOOD CULTURE FOR BACTERIA: CPT | Performed by: EMERGENCY MEDICINE

## 2020-01-01 PROCEDURE — 90740 HEPB VACC 3 DOSE IMMUNSUP IM: CPT | Performed by: INTERNAL MEDICINE

## 2020-01-01 PROCEDURE — 29581 APPL MULTLAYER CMPRN SYS LEG: CPT

## 2020-01-01 PROCEDURE — 86140 C-REACTIVE PROTEIN: CPT | Performed by: NURSE PRACTITIONER

## 2020-01-01 PROCEDURE — 82140 ASSAY OF AMMONIA: CPT | Performed by: INTERNAL MEDICINE

## 2020-01-01 PROCEDURE — 83735 ASSAY OF MAGNESIUM: CPT | Performed by: EMERGENCY MEDICINE

## 2020-01-01 PROCEDURE — G0432 EIA HIV-1/HIV-2 SCREEN: HCPCS | Performed by: NURSE PRACTITIONER

## 2020-01-01 PROCEDURE — 25010000002 ALBUMIN HUMAN 5% PER 50 ML: Performed by: INTERNAL MEDICINE

## 2020-01-01 PROCEDURE — 87040 BLOOD CULTURE FOR BACTERIA: CPT | Performed by: INTERNAL MEDICINE

## 2020-01-01 PROCEDURE — 90471 IMMUNIZATION ADMIN: CPT | Performed by: INTERNAL MEDICINE

## 2020-01-01 PROCEDURE — 80053 COMPREHEN METABOLIC PANEL: CPT

## 2020-01-01 PROCEDURE — 76937 US GUIDE VASCULAR ACCESS: CPT

## 2020-01-01 PROCEDURE — 83690 ASSAY OF LIPASE: CPT | Performed by: FAMILY MEDICINE

## 2020-01-01 PROCEDURE — 96374 THER/PROPH/DIAG INJ IV PUSH: CPT

## 2020-01-01 PROCEDURE — 86038 ANTINUCLEAR ANTIBODIES: CPT | Performed by: INTERNAL MEDICINE

## 2020-01-01 PROCEDURE — 5A1D70Z PERFORMANCE OF URINARY FILTRATION, INTERMITTENT, LESS THAN 6 HOURS PER DAY: ICD-10-PCS | Performed by: INTERNAL MEDICINE

## 2020-01-01 PROCEDURE — 25010000002 EPINEPHRINE 1 MG/10ML SOLUTION PREFILLED SYRINGE: Performed by: NURSE PRACTITIONER

## 2020-01-01 PROCEDURE — 74176 CT ABD & PELVIS W/O CONTRAST: CPT

## 2020-01-01 PROCEDURE — 77001 FLUOROGUIDE FOR VEIN DEVICE: CPT

## 2020-01-01 PROCEDURE — 82728 ASSAY OF FERRITIN: CPT | Performed by: INTERNAL MEDICINE

## 2020-01-01 PROCEDURE — 99239 HOSP IP/OBS DSCHRG MGMT >30: CPT | Performed by: NURSE PRACTITIONER

## 2020-01-01 PROCEDURE — 96367 TX/PROPH/DG ADDL SEQ IV INF: CPT

## 2020-01-01 PROCEDURE — 93005 ELECTROCARDIOGRAM TRACING: CPT | Performed by: FAMILY MEDICINE

## 2020-01-01 PROCEDURE — 93325 DOPPLER ECHO COLOR FLOW MAPG: CPT | Performed by: INTERNAL MEDICINE

## 2020-01-01 PROCEDURE — 25010000002 MIDAZOLAM PER 1 MG: Performed by: RADIOLOGY

## 2020-01-01 PROCEDURE — 84132 ASSAY OF SERUM POTASSIUM: CPT | Performed by: ANESTHESIOLOGY

## 2020-01-01 PROCEDURE — 80202 ASSAY OF VANCOMYCIN: CPT | Performed by: NURSE PRACTITIONER

## 2020-01-01 PROCEDURE — 25010000002 HYDROMORPHONE 1 MG/ML SOLUTION: Performed by: INTERNAL MEDICINE

## 2020-01-01 PROCEDURE — 80306 DRUG TEST PRSMV INSTRMNT: CPT

## 2020-01-01 PROCEDURE — 99223 1ST HOSP IP/OBS HIGH 75: CPT | Performed by: INTERNAL MEDICINE

## 2020-01-01 PROCEDURE — 99232 SBSQ HOSP IP/OBS MODERATE 35: CPT | Performed by: NURSE PRACTITIONER

## 2020-01-01 PROCEDURE — P9017 PLASMA 1 DONOR FRZ W/IN 8 HR: HCPCS

## 2020-01-01 PROCEDURE — 96375 TX/PRO/DX INJ NEW DRUG ADDON: CPT

## 2020-01-01 PROCEDURE — 25010000002 HEPARIN (PORCINE) PER 1000 UNITS

## 2020-01-01 PROCEDURE — 25010000002 VITAMIN K1 1 MG/1 ML SOLUTION: Performed by: INTERNAL MEDICINE

## 2020-01-01 PROCEDURE — 87075 CULTR BACTERIA EXCEPT BLOOD: CPT | Performed by: INTERNAL MEDICINE

## 2020-01-01 PROCEDURE — 99215 OFFICE O/P EST HI 40 MIN: CPT | Performed by: INTERNAL MEDICINE

## 2020-01-01 PROCEDURE — 99291 CRITICAL CARE FIRST HOUR: CPT | Performed by: INTERNAL MEDICINE

## 2020-01-01 PROCEDURE — 93312 ECHO TRANSESOPHAGEAL: CPT | Performed by: INTERNAL MEDICINE

## 2020-01-01 PROCEDURE — 25010000002 HEPARIN (PORCINE) PER 1000 UNITS: Performed by: INTERNAL MEDICINE

## 2020-01-01 PROCEDURE — C1751 CATH, INF, PER/CENT/MIDLINE: HCPCS

## 2020-01-01 PROCEDURE — 84100 ASSAY OF PHOSPHORUS: CPT | Performed by: INTERNAL MEDICINE

## 2020-01-01 PROCEDURE — 93321 DOPPLER ECHO F-UP/LMTD STD: CPT

## 2020-01-01 PROCEDURE — 25010000002 PIPERACILLIN SOD-TAZOBACTAM PER 1 G: Performed by: INTERNAL MEDICINE

## 2020-01-01 PROCEDURE — 85014 HEMATOCRIT: CPT | Performed by: INTERNAL MEDICINE

## 2020-01-01 PROCEDURE — 81003 URINALYSIS AUTO W/O SCOPE: CPT | Performed by: FAMILY MEDICINE

## 2020-01-01 PROCEDURE — 75989 ABSCESS DRAINAGE UNDER X-RAY: CPT

## 2020-01-01 PROCEDURE — 25010000002 FOSAPREPITANT PER 1 MG: Performed by: INTERNAL MEDICINE

## 2020-01-01 PROCEDURE — 25010000002 PALONOSETRON PER 25 MCG: Performed by: INTERNAL MEDICINE

## 2020-01-01 PROCEDURE — 0JH63XZ INSERTION OF TUNNELED VASCULAR ACCESS DEVICE INTO CHEST SUBCUTANEOUS TISSUE AND FASCIA, PERCUTANEOUS APPROACH: ICD-10-PCS | Performed by: RADIOLOGY

## 2020-01-01 PROCEDURE — 93005 ELECTROCARDIOGRAM TRACING: CPT

## 2020-01-01 PROCEDURE — 85007 BL SMEAR W/DIFF WBC COUNT: CPT | Performed by: EMERGENCY MEDICINE

## 2020-01-01 PROCEDURE — 02HV33Z INSERTION OF INFUSION DEVICE INTO SUPERIOR VENA CAVA, PERCUTANEOUS APPROACH: ICD-10-PCS | Performed by: RADIOLOGY

## 2020-01-01 PROCEDURE — 76705 ECHO EXAM OF ABDOMEN: CPT

## 2020-01-01 PROCEDURE — 86901 BLOOD TYPING SEROLOGIC RH(D): CPT | Performed by: FAMILY MEDICINE

## 2020-01-01 PROCEDURE — 25010000002 PHENYLEPHRINE PER 1 ML: Performed by: NURSE ANESTHETIST, CERTIFIED REGISTERED

## 2020-01-01 PROCEDURE — 0W9G3ZZ DRAINAGE OF PERITONEAL CAVITY, PERCUTANEOUS APPROACH: ICD-10-PCS | Performed by: RADIOLOGY

## 2020-01-01 PROCEDURE — 87635 SARS-COV-2 COVID-19 AMP PRB: CPT | Performed by: INTERNAL MEDICINE

## 2020-01-01 PROCEDURE — P9035 PLATELET PHERES LEUKOREDUCED: HCPCS

## 2020-01-01 PROCEDURE — 92611 MOTION FLUOROSCOPY/SWALLOW: CPT

## 2020-01-01 PROCEDURE — 87150 DNA/RNA AMPLIFIED PROBE: CPT | Performed by: INTERNAL MEDICINE

## 2020-01-01 PROCEDURE — 92610 EVALUATE SWALLOWING FUNCTION: CPT

## 2020-01-01 PROCEDURE — 86850 RBC ANTIBODY SCREEN: CPT | Performed by: INTERNAL MEDICINE

## 2020-01-01 PROCEDURE — 99215 OFFICE O/P EST HI 40 MIN: CPT | Performed by: NURSE PRACTITIONER

## 2020-01-01 PROCEDURE — 84484 ASSAY OF TROPONIN QUANT: CPT | Performed by: EMERGENCY MEDICINE

## 2020-01-01 PROCEDURE — 78815 PET IMAGE W/CT SKULL-THIGH: CPT

## 2020-01-01 PROCEDURE — 80076 HEPATIC FUNCTION PANEL: CPT | Performed by: INTERNAL MEDICINE

## 2020-01-01 PROCEDURE — 83605 ASSAY OF LACTIC ACID: CPT | Performed by: NURSE PRACTITIONER

## 2020-01-01 PROCEDURE — 87186 SC STD MICRODIL/AGAR DIL: CPT | Performed by: EMERGENCY MEDICINE

## 2020-01-01 PROCEDURE — 82104 ALPHA-1-ANTITRYPSIN PHENO: CPT | Performed by: INTERNAL MEDICINE

## 2020-01-01 PROCEDURE — 89051 BODY FLUID CELL COUNT: CPT | Performed by: INTERNAL MEDICINE

## 2020-01-01 PROCEDURE — 83605 ASSAY OF LACTIC ACID: CPT | Performed by: INTERNAL MEDICINE

## 2020-01-01 PROCEDURE — 25010000002 HEPATITIS A 1440 EL U/ML SUSPENSION: Performed by: INTERNAL MEDICINE

## 2020-01-01 PROCEDURE — 82140 ASSAY OF AMMONIA: CPT | Performed by: EMERGENCY MEDICINE

## 2020-01-01 PROCEDURE — 85025 COMPLETE CBC W/AUTO DIFF WBC: CPT

## 2020-01-01 PROCEDURE — 99214 OFFICE O/P EST MOD 30 MIN: CPT | Performed by: NURSE PRACTITIONER

## 2020-01-01 PROCEDURE — 74150 CT ABDOMEN W/O CONTRAST: CPT

## 2020-01-01 PROCEDURE — 99231 SBSQ HOSP IP/OBS SF/LOW 25: CPT | Performed by: NURSE PRACTITIONER

## 2020-01-01 PROCEDURE — 25010000002 MEROPENEM PER 100 MG: Performed by: INTERNAL MEDICINE

## 2020-01-01 PROCEDURE — 25010000002 PALONOSETRON 0.25 MG/5ML SOLUTION PREFILLED SYRINGE: Performed by: INTERNAL MEDICINE

## 2020-01-01 PROCEDURE — 0 FLUDEOXYGLUCOSE F18 SOLUTION: Performed by: INTERNAL MEDICINE

## 2020-01-01 PROCEDURE — 25010000002 ALBUMIN HUMAN 5% PER 50 ML: Performed by: NURSE PRACTITIONER

## 2020-01-01 PROCEDURE — 25010000002 FENTANYL CITRATE (PF) 100 MCG/2ML SOLUTION: Performed by: RADIOLOGY

## 2020-01-01 PROCEDURE — 0DJD8ZZ INSPECTION OF LOWER INTESTINAL TRACT, VIA NATURAL OR ARTIFICIAL OPENING ENDOSCOPIC: ICD-10-PCS | Performed by: INTERNAL MEDICINE

## 2020-01-01 PROCEDURE — 25010000002 PROPOFOL 10 MG/ML EMULSION: Performed by: NURSE ANESTHETIST, CERTIFIED REGISTERED

## 2020-01-01 PROCEDURE — 85007 BL SMEAR W/DIFF WBC COUNT: CPT | Performed by: NURSE PRACTITIONER

## 2020-01-01 PROCEDURE — 02PYX3Z REMOVAL OF INFUSION DEVICE FROM GREAT VESSEL, EXTERNAL APPROACH: ICD-10-PCS | Performed by: RADIOLOGY

## 2020-01-01 PROCEDURE — 96366 THER/PROPH/DIAG IV INF ADDON: CPT

## 2020-01-01 PROCEDURE — 96376 TX/PRO/DX INJ SAME DRUG ADON: CPT

## 2020-01-01 PROCEDURE — 25010000002 SULFUR HEXAFLUORIDE MICROSPH 60.7-25 MG RECONSTITUTED SUSPENSION: Performed by: INTERNAL MEDICINE

## 2020-01-01 PROCEDURE — 87635 SARS-COV-2 COVID-19 AMP PRB: CPT | Performed by: NURSE PRACTITIONER

## 2020-01-01 PROCEDURE — 25010000002 FENTANYL CITRATE (PF) 100 MCG/2ML SOLUTION: Performed by: INTERNAL MEDICINE

## 2020-01-01 PROCEDURE — 93325 DOPPLER ECHO COLOR FLOW MAPG: CPT

## 2020-01-01 PROCEDURE — 45378 DIAGNOSTIC COLONOSCOPY: CPT | Performed by: INTERNAL MEDICINE

## 2020-01-01 PROCEDURE — 36558 INSERT TUNNELED CV CATH: CPT

## 2020-01-01 PROCEDURE — 84443 ASSAY THYROID STIM HORMONE: CPT | Performed by: NURSE PRACTITIONER

## 2020-01-01 PROCEDURE — C9803 HOPD COVID-19 SPEC COLLECT: HCPCS | Performed by: INTERNAL MEDICINE

## 2020-01-01 PROCEDURE — 84550 ASSAY OF BLOOD/URIC ACID: CPT | Performed by: INTERNAL MEDICINE

## 2020-01-01 PROCEDURE — 82103 ALPHA-1-ANTITRYPSIN TOTAL: CPT | Performed by: INTERNAL MEDICINE

## 2020-01-01 PROCEDURE — 85652 RBC SED RATE AUTOMATED: CPT | Performed by: NURSE PRACTITIONER

## 2020-01-01 PROCEDURE — 31500 INSERT EMERGENCY AIRWAY: CPT

## 2020-01-01 PROCEDURE — 86901 BLOOD TYPING SEROLOGIC RH(D): CPT

## 2020-01-01 PROCEDURE — 87147 CULTURE TYPE IMMUNOLOGIC: CPT | Performed by: EMERGENCY MEDICINE

## 2020-01-01 PROCEDURE — 88305 TISSUE EXAM BY PATHOLOGIST: CPT | Performed by: INTERNAL MEDICINE

## 2020-01-01 PROCEDURE — 80053 COMPREHEN METABOLIC PANEL: CPT | Performed by: NURSE PRACTITIONER

## 2020-01-01 PROCEDURE — 25010000002 THIAMINE PER 100 MG: Performed by: NURSE PRACTITIONER

## 2020-01-01 PROCEDURE — 85730 THROMBOPLASTIN TIME PARTIAL: CPT | Performed by: INTERNAL MEDICINE

## 2020-01-01 PROCEDURE — 97110 THERAPEUTIC EXERCISES: CPT

## 2020-01-01 PROCEDURE — 83540 ASSAY OF IRON: CPT | Performed by: INTERNAL MEDICINE

## 2020-01-01 PROCEDURE — 25010000002 CALCIUM GLUCONATE PER 10 ML: Performed by: INTERNAL MEDICINE

## 2020-01-01 PROCEDURE — 82820 HEMOGLOBIN-OXYGEN AFFINITY: CPT

## 2020-01-01 PROCEDURE — 71046 X-RAY EXAM CHEST 2 VIEWS: CPT

## 2020-01-01 PROCEDURE — A9552 F18 FDG: HCPCS | Performed by: INTERNAL MEDICINE

## 2020-01-01 PROCEDURE — 83935 ASSAY OF URINE OSMOLALITY: CPT | Performed by: NURSE PRACTITIONER

## 2020-01-01 PROCEDURE — 84484 ASSAY OF TROPONIN QUANT: CPT | Performed by: INTERNAL MEDICINE

## 2020-01-01 PROCEDURE — 74230 X-RAY XM SWLNG FUNCJ C+: CPT

## 2020-01-01 PROCEDURE — 83605 ASSAY OF LACTIC ACID: CPT | Performed by: FAMILY MEDICINE

## 2020-01-01 PROCEDURE — 82533 TOTAL CORTISOL: CPT | Performed by: NURSE PRACTITIONER

## 2020-01-01 PROCEDURE — 94640 AIRWAY INHALATION TREATMENT: CPT

## 2020-01-01 PROCEDURE — 25010000002 THIAMINE PER 100 MG: Performed by: INTERNAL MEDICINE

## 2020-01-01 PROCEDURE — C1769 GUIDE WIRE: HCPCS

## 2020-01-01 PROCEDURE — 93005 ELECTROCARDIOGRAM TRACING: CPT | Performed by: EMERGENCY MEDICINE

## 2020-01-01 PROCEDURE — 80053 COMPREHEN METABOLIC PANEL: CPT | Performed by: EMERGENCY MEDICINE

## 2020-01-01 PROCEDURE — 97163 PT EVAL HIGH COMPLEX 45 MIN: CPT

## 2020-01-01 PROCEDURE — 84484 ASSAY OF TROPONIN QUANT: CPT | Performed by: NURSE PRACTITIONER

## 2020-01-01 PROCEDURE — 83615 LACTATE (LD) (LDH) ENZYME: CPT | Performed by: INTERNAL MEDICINE

## 2020-01-01 PROCEDURE — 99214 OFFICE O/P EST MOD 30 MIN: CPT | Performed by: INTERNAL MEDICINE

## 2020-01-01 PROCEDURE — 90792 PSYCH DIAG EVAL W/MED SRVCS: CPT | Performed by: NURSE PRACTITIONER

## 2020-01-01 PROCEDURE — 86850 RBC ANTIBODY SCREEN: CPT | Performed by: FAMILY MEDICINE

## 2020-01-01 PROCEDURE — 97116 GAIT TRAINING THERAPY: CPT | Performed by: PHYSICAL THERAPIST

## 2020-01-01 PROCEDURE — 82330 ASSAY OF CALCIUM: CPT | Performed by: INTERNAL MEDICINE

## 2020-01-01 PROCEDURE — 84157 ASSAY OF PROTEIN OTHER: CPT | Performed by: INTERNAL MEDICINE

## 2020-01-01 PROCEDURE — 25010000002 VANCOMYCIN 10 G RECONSTITUTED SOLUTION: Performed by: NURSE PRACTITIONER

## 2020-01-01 PROCEDURE — 93312 ECHO TRANSESOPHAGEAL: CPT

## 2020-01-01 PROCEDURE — 82306 VITAMIN D 25 HYDROXY: CPT | Performed by: INTERNAL MEDICINE

## 2020-01-01 PROCEDURE — 25010000002 VANCOMYCIN 10 G RECONSTITUTED SOLUTION: Performed by: INTERNAL MEDICINE

## 2020-01-01 PROCEDURE — 25010000002 HEPATITIS B VACCINE (RECOMBINANT) 20 MCG/ML SUSPENSION: Performed by: INTERNAL MEDICINE

## 2020-01-01 PROCEDURE — 99204 OFFICE O/P NEW MOD 45 MIN: CPT | Performed by: INTERNAL MEDICINE

## 2020-01-01 PROCEDURE — 86901 BLOOD TYPING SEROLOGIC RH(D): CPT | Performed by: INTERNAL MEDICINE

## 2020-01-01 PROCEDURE — 76775 US EXAM ABDO BACK WALL LIM: CPT

## 2020-01-01 PROCEDURE — 84100 ASSAY OF PHOSPHORUS: CPT

## 2020-01-01 PROCEDURE — 70450 CT HEAD/BRAIN W/O DYE: CPT

## 2020-01-01 PROCEDURE — 99213 OFFICE O/P EST LOW 20 MIN: CPT | Performed by: INTERNAL MEDICINE

## 2020-01-01 PROCEDURE — B5181ZA FLUOROSCOPY OF SUPERIOR VENA CAVA USING LOW OSMOLAR CONTRAST, GUIDANCE: ICD-10-PCS | Performed by: RADIOLOGY

## 2020-01-01 PROCEDURE — 99254 IP/OBS CNSLTJ NEW/EST MOD 60: CPT | Performed by: INTERNAL MEDICINE

## 2020-01-01 PROCEDURE — 77338 DESIGN MLC DEVICE FOR IMRT: CPT | Performed by: RADIOLOGY

## 2020-01-01 PROCEDURE — 92950 HEART/LUNG RESUSCITATION CPR: CPT

## 2020-01-01 PROCEDURE — 87102 FUNGUS ISOLATION CULTURE: CPT | Performed by: INTERNAL MEDICINE

## 2020-01-01 PROCEDURE — 63710000001 INSULIN LISPRO (HUMAN) PER 5 UNITS: Performed by: INTERNAL MEDICINE

## 2020-01-01 PROCEDURE — G0010 ADMIN HEPATITIS B VACCINE: HCPCS | Performed by: INTERNAL MEDICINE

## 2020-01-01 PROCEDURE — 85018 HEMOGLOBIN: CPT | Performed by: INTERNAL MEDICINE

## 2020-01-01 PROCEDURE — 99238 HOSP IP/OBS DSCHRG MGMT 30/<: CPT | Performed by: NURSE PRACTITIONER

## 2020-01-01 PROCEDURE — 93320 DOPPLER ECHO COMPLETE: CPT | Performed by: INTERNAL MEDICINE

## 2020-01-01 PROCEDURE — 0W9G3ZZ DRAINAGE OF PERITONEAL CAVITY, PERCUTANEOUS APPROACH: ICD-10-PCS | Performed by: INTERNAL MEDICINE

## 2020-01-01 PROCEDURE — 77301 RADIOTHERAPY DOSE PLAN IMRT: CPT | Performed by: RADIOLOGY

## 2020-01-01 PROCEDURE — 82390 ASSAY OF CERULOPLASMIN: CPT | Performed by: INTERNAL MEDICINE

## 2020-01-01 PROCEDURE — 36591 DRAW BLOOD OFF VENOUS DEVICE: CPT

## 2020-01-01 PROCEDURE — 82105 ALPHA-FETOPROTEIN SERUM: CPT | Performed by: INTERNAL MEDICINE

## 2020-01-01 PROCEDURE — 80074 ACUTE HEPATITIS PANEL: CPT | Performed by: NURSE PRACTITIONER

## 2020-01-01 PROCEDURE — 25010000003 POTASSIUM CHLORIDE 10 MEQ/100ML SOLUTION: Performed by: INTERNAL MEDICINE

## 2020-01-01 PROCEDURE — 82272 OCCULT BLD FECES 1-3 TESTS: CPT | Performed by: NURSE PRACTITIONER

## 2020-01-01 PROCEDURE — 77334 RADIATION TREATMENT AID(S): CPT | Performed by: RADIOLOGY

## 2020-01-01 PROCEDURE — 87015 SPECIMEN INFECT AGNT CONCNTJ: CPT | Performed by: INTERNAL MEDICINE

## 2020-01-01 PROCEDURE — U0004 COV-19 TEST NON-CDC HGH THRU: HCPCS

## 2020-01-01 PROCEDURE — 49422 REMOVE TUNNELED IP CATH: CPT

## 2020-01-01 PROCEDURE — U0002 COVID-19 LAB TEST NON-CDC: HCPCS

## 2020-01-01 PROCEDURE — 84300 ASSAY OF URINE SODIUM: CPT | Performed by: NURSE PRACTITIONER

## 2020-01-01 PROCEDURE — 77470 SPECIAL RADIATION TREATMENT: CPT | Performed by: RADIOLOGY

## 2020-01-01 PROCEDURE — 99285 EMERGENCY DEPT VISIT HI MDM: CPT

## 2020-01-01 PROCEDURE — 97110 THERAPEUTIC EXERCISES: CPT | Performed by: PHYSICAL THERAPIST

## 2020-01-01 PROCEDURE — 86901 BLOOD TYPING SEROLOGIC RH(D): CPT | Performed by: NURSE PRACTITIONER

## 2020-01-01 PROCEDURE — 87186 SC STD MICRODIL/AGAR DIL: CPT | Performed by: INTERNAL MEDICINE

## 2020-01-01 PROCEDURE — 36415 COLL VENOUS BLD VENIPUNCTURE: CPT

## 2020-01-01 PROCEDURE — 36600 WITHDRAWAL OF ARTERIAL BLOOD: CPT

## 2020-01-01 PROCEDURE — 25010000002 VANCOMYCIN

## 2020-01-01 PROCEDURE — 99223 1ST HOSP IP/OBS HIGH 75: CPT | Performed by: FAMILY MEDICINE

## 2020-01-01 PROCEDURE — 25010000002 DESMOPRESSIN PER 1 MCG: Performed by: RADIOLOGY

## 2020-01-01 PROCEDURE — 85049 AUTOMATED PLATELET COUNT: CPT | Performed by: INTERNAL MEDICINE

## 2020-01-01 PROCEDURE — 84165 PROTEIN E-PHORESIS SERUM: CPT | Performed by: INTERNAL MEDICINE

## 2020-01-01 PROCEDURE — C1752 CATH,HEMODIALYSIS,SHORT-TERM: HCPCS

## 2020-01-01 PROCEDURE — 81256 HFE GENE: CPT | Performed by: INTERNAL MEDICINE

## 2020-01-01 PROCEDURE — 96368 THER/DIAG CONCURRENT INF: CPT

## 2020-01-01 PROCEDURE — 25010000002 METHYLNALTREXONE 12 MG/0.6ML SOLUTION: Performed by: INTERNAL MEDICINE

## 2020-01-01 PROCEDURE — 80307 DRUG TEST PRSMV CHEM ANLYZR: CPT | Performed by: INTERNAL MEDICINE

## 2020-01-01 PROCEDURE — 87077 CULTURE AEROBIC IDENTIFY: CPT | Performed by: INTERNAL MEDICINE

## 2020-01-01 PROCEDURE — 25010000002 PHENYLEPHRINE 10 MG/ML SOLUTION: Performed by: NURSE PRACTITIONER

## 2020-01-01 PROCEDURE — 85025 COMPLETE CBC W/AUTO DIFF WBC: CPT | Performed by: EMERGENCY MEDICINE

## 2020-01-01 PROCEDURE — C9803 HOPD COVID-19 SPEC COLLECT: HCPCS

## 2020-01-01 RX ORDER — SODIUM CHLORIDE 9 MG/ML
1000 INJECTION, SOLUTION INTRAVENOUS ONCE
Status: COMPLETED | OUTPATIENT
Start: 2020-01-01 | End: 2020-01-01

## 2020-01-01 RX ORDER — NALOXONE HYDROCHLORIDE 4 MG/.1ML
1 SPRAY NASAL AS NEEDED
Qty: 2 EACH | Refills: 0 | Status: SHIPPED | OUTPATIENT
Start: 2020-01-01

## 2020-01-01 RX ORDER — MIDAZOLAM HYDROCHLORIDE 1 MG/ML
INJECTION INTRAMUSCULAR; INTRAVENOUS
Status: COMPLETED | OUTPATIENT
Start: 2020-01-01 | End: 2020-01-01

## 2020-01-01 RX ORDER — MORPHINE SULFATE 2 MG/ML
1 INJECTION, SOLUTION INTRAMUSCULAR; INTRAVENOUS EVERY 4 HOURS PRN
Status: DISPENSED | OUTPATIENT
Start: 2020-01-01 | End: 2020-01-01

## 2020-01-01 RX ORDER — LIDOCAINE HYDROCHLORIDE 10 MG/ML
INJECTION, SOLUTION EPIDURAL; INFILTRATION; INTRACAUDAL; PERINEURAL
Status: COMPLETED
Start: 2020-01-01 | End: 2020-01-01

## 2020-01-01 RX ORDER — SODIUM BICARBONATE 650 MG/1
1300 TABLET ORAL 3 TIMES DAILY
Status: DISCONTINUED | OUTPATIENT
Start: 2020-01-01 | End: 2020-01-01 | Stop reason: HOSPADM

## 2020-01-01 RX ORDER — LIDOCAINE HYDROCHLORIDE 10 MG/ML
20 INJECTION, SOLUTION INFILTRATION; PERINEURAL ONCE
Status: COMPLETED | OUTPATIENT
Start: 2020-01-01 | End: 2020-01-01

## 2020-01-01 RX ORDER — ALBUMIN (HUMAN) 12.5 G/50ML
12.5 SOLUTION INTRAVENOUS AS NEEDED
Status: ACTIVE | OUTPATIENT
Start: 2020-01-01 | End: 2020-01-01

## 2020-01-01 RX ORDER — SODIUM CHLORIDE, SODIUM LACTATE, POTASSIUM CHLORIDE, CALCIUM CHLORIDE 600; 310; 30; 20 MG/100ML; MG/100ML; MG/100ML; MG/100ML
9 INJECTION, SOLUTION INTRAVENOUS CONTINUOUS
Status: CANCELLED | OUTPATIENT
Start: 2020-01-01

## 2020-01-01 RX ORDER — PALONOSETRON 0.05 MG/ML
0.25 INJECTION, SOLUTION INTRAVENOUS ONCE
Status: CANCELLED | OUTPATIENT
Start: 2020-01-01

## 2020-01-01 RX ORDER — DIPHENHYDRAMINE HCL 25 MG
25 CAPSULE ORAL EVERY 6 HOURS PRN
Start: 2020-01-01

## 2020-01-01 RX ORDER — MAGNESIUM SULFATE HEPTAHYDRATE 40 MG/ML
2 INJECTION, SOLUTION INTRAVENOUS ONCE
Status: COMPLETED | OUTPATIENT
Start: 2020-01-01 | End: 2020-01-01

## 2020-01-01 RX ORDER — HEPARIN SODIUM 5000 [USP'U]/ML
5000 INJECTION, SOLUTION INTRAVENOUS; SUBCUTANEOUS EVERY 8 HOURS SCHEDULED
Status: CANCELLED | OUTPATIENT
Start: 2020-01-01

## 2020-01-01 RX ORDER — OXYCODONE HYDROCHLORIDE 10 MG/1
10 TABLET ORAL EVERY 6 HOURS PRN
Qty: 24 TABLET | Refills: 0 | Status: SHIPPED | OUTPATIENT
Start: 2020-01-01 | End: 2020-01-01

## 2020-01-01 RX ORDER — SODIUM CHLORIDE 0.9 % (FLUSH) 0.9 %
10 SYRINGE (ML) INJECTION AS NEEDED
Status: DISCONTINUED | OUTPATIENT
Start: 2020-01-01 | End: 2020-01-01 | Stop reason: HOSPADM

## 2020-01-01 RX ORDER — BISACODYL 10 MG
10 SUPPOSITORY, RECTAL RECTAL ONCE
Status: DISCONTINUED | OUTPATIENT
Start: 2020-01-01 | End: 2020-01-01

## 2020-01-01 RX ORDER — LIDOCAINE HYDROCHLORIDE 20 MG/ML
5 SOLUTION OROPHARYNGEAL AS NEEDED
Qty: 100 ML | Refills: 5 | Status: SHIPPED | OUTPATIENT
Start: 2020-01-01 | End: 2020-01-01

## 2020-01-01 RX ORDER — LACTULOSE 10 G/15ML
10 SOLUTION ORAL 3 TIMES DAILY
Status: DISCONTINUED | OUTPATIENT
Start: 2020-01-01 | End: 2020-01-01 | Stop reason: HOSPADM

## 2020-01-01 RX ORDER — ALBUMIN (HUMAN) 12.5 G/50ML
12.5 SOLUTION INTRAVENOUS AS NEEDED
Status: DISCONTINUED | OUTPATIENT
Start: 2020-01-01 | End: 2020-01-01 | Stop reason: HOSPADM

## 2020-01-01 RX ORDER — ONDANSETRON 2 MG/ML
4 INJECTION INTRAMUSCULAR; INTRAVENOUS ONCE AS NEEDED
Status: DISCONTINUED | OUTPATIENT
Start: 2020-01-01 | End: 2020-01-01

## 2020-01-01 RX ORDER — OLANZAPINE 5 MG/1
5 TABLET, ORALLY DISINTEGRATING ORAL NIGHTLY
Status: DISPENSED | OUTPATIENT
Start: 2020-01-01 | End: 2020-01-01

## 2020-01-01 RX ORDER — MIDODRINE HYDROCHLORIDE 10 MG/1
20 TABLET ORAL 3 TIMES DAILY
Qty: 40 TABLET | Refills: 0 | Status: SHIPPED | OUTPATIENT
Start: 2020-01-01

## 2020-01-01 RX ORDER — HEPARIN SODIUM 1000 [USP'U]/ML
INJECTION, SOLUTION INTRAVENOUS; SUBCUTANEOUS
Status: COMPLETED
Start: 2020-01-01 | End: 2020-01-01

## 2020-01-01 RX ORDER — PROPOFOL 10 MG/ML
VIAL (ML) INTRAVENOUS AS NEEDED
Status: DISCONTINUED | OUTPATIENT
Start: 2020-01-01 | End: 2020-01-01 | Stop reason: SURG

## 2020-01-01 RX ORDER — PALONOSETRON 0.05 MG/ML
0.25 INJECTION, SOLUTION INTRAVENOUS ONCE
Status: COMPLETED | OUTPATIENT
Start: 2020-01-01 | End: 2020-01-01

## 2020-01-01 RX ORDER — SUCRALFATE 1 G/1
1 TABLET ORAL
Status: DISCONTINUED | OUTPATIENT
Start: 2020-01-01 | End: 2020-01-01

## 2020-01-01 RX ORDER — AMOXICILLIN 250 MG
2 CAPSULE ORAL 2 TIMES DAILY
Qty: 120 TABLET | Refills: 0 | Status: ON HOLD | OUTPATIENT
Start: 2020-01-01 | End: 2020-01-01

## 2020-01-01 RX ORDER — PHYTONADIONE 2 MG/ML
5 INJECTION, EMULSION INTRAMUSCULAR; INTRAVENOUS; SUBCUTANEOUS ONCE
Status: COMPLETED | OUTPATIENT
Start: 2020-01-01 | End: 2020-01-01

## 2020-01-01 RX ORDER — METOCLOPRAMIDE HYDROCHLORIDE 5 MG/ML
5 INJECTION INTRAMUSCULAR; INTRAVENOUS EVERY 6 HOURS
Status: DISCONTINUED | OUTPATIENT
Start: 2020-01-01 | End: 2020-01-01

## 2020-01-01 RX ORDER — FENTANYL CITRATE 50 UG/ML
INJECTION, SOLUTION INTRAMUSCULAR; INTRAVENOUS
Status: COMPLETED | OUTPATIENT
Start: 2020-01-01 | End: 2020-01-01

## 2020-01-01 RX ORDER — LIDOCAINE HYDROCHLORIDE 10 MG/ML
INJECTION, SOLUTION EPIDURAL; INFILTRATION; INTRACAUDAL; PERINEURAL AS NEEDED
Status: DISCONTINUED | OUTPATIENT
Start: 2020-01-01 | End: 2020-01-01 | Stop reason: SURG

## 2020-01-01 RX ORDER — METOCLOPRAMIDE 5 MG/1
5 TABLET ORAL
Status: DISCONTINUED | OUTPATIENT
Start: 2020-01-01 | End: 2020-01-01 | Stop reason: HOSPADM

## 2020-01-01 RX ORDER — HYDROMORPHONE HYDROCHLORIDE 1 MG/ML
0.5 INJECTION, SOLUTION INTRAMUSCULAR; INTRAVENOUS; SUBCUTANEOUS
Status: DISCONTINUED | OUTPATIENT
Start: 2020-01-01 | End: 2020-01-01

## 2020-01-01 RX ORDER — MIDODRINE HYDROCHLORIDE 5 MG/1
20 TABLET ORAL
Status: DISCONTINUED | OUTPATIENT
Start: 2020-01-01 | End: 2020-01-01 | Stop reason: HOSPADM

## 2020-01-01 RX ORDER — ALBUMIN (HUMAN) 12.5 G/50ML
50 SOLUTION INTRAVENOUS ONCE
Status: COMPLETED | OUTPATIENT
Start: 2020-01-01 | End: 2020-01-01

## 2020-01-01 RX ORDER — POTASSIUM CHLORIDE 750 MG/1
40 CAPSULE, EXTENDED RELEASE ORAL ONCE
Status: COMPLETED | OUTPATIENT
Start: 2020-01-01 | End: 2020-01-01

## 2020-01-01 RX ORDER — OXYCODONE HYDROCHLORIDE 5 MG/1
10 TABLET ORAL EVERY 6 HOURS PRN
Status: DISCONTINUED | OUTPATIENT
Start: 2020-01-01 | End: 2020-01-01 | Stop reason: HOSPADM

## 2020-01-01 RX ORDER — SODIUM CHLORIDE 9 MG/ML
250 INJECTION, SOLUTION INTRAVENOUS ONCE
Status: CANCELLED | OUTPATIENT
Start: 2020-01-01

## 2020-01-01 RX ORDER — PANTOPRAZOLE SODIUM 40 MG/10ML
40 INJECTION, POWDER, LYOPHILIZED, FOR SOLUTION INTRAVENOUS ONCE
Status: COMPLETED | OUTPATIENT
Start: 2020-01-01 | End: 2020-01-01

## 2020-01-01 RX ORDER — PHENYLEPHRINE HCL IN 0.9% NACL 0.5 MG/5ML
.5-3 SYRINGE (ML) INTRAVENOUS
Status: DISCONTINUED | OUTPATIENT
Start: 2020-01-01 | End: 2020-01-01 | Stop reason: HOSPADM

## 2020-01-01 RX ORDER — ONDANSETRON 2 MG/ML
4 INJECTION INTRAMUSCULAR; INTRAVENOUS EVERY 6 HOURS PRN
Status: CANCELLED | OUTPATIENT
Start: 2020-01-01

## 2020-01-01 RX ORDER — OXYCODONE HYDROCHLORIDE 10 MG/1
10 TABLET ORAL EVERY 6 HOURS PRN
Qty: 12 TABLET | Refills: 0 | Status: SHIPPED | OUTPATIENT
Start: 2020-01-01 | End: 2020-01-01 | Stop reason: SDUPTHER

## 2020-01-01 RX ORDER — BUMETANIDE 0.25 MG/ML
2 INJECTION INTRAMUSCULAR; INTRAVENOUS ONCE
Status: COMPLETED | OUTPATIENT
Start: 2020-01-01 | End: 2020-01-01

## 2020-01-01 RX ORDER — SODIUM CHLORIDE 0.9 % (FLUSH) 0.9 %
10 SYRINGE (ML) INJECTION EVERY 12 HOURS SCHEDULED
Status: DISCONTINUED | OUTPATIENT
Start: 2020-01-01 | End: 2020-01-01 | Stop reason: HOSPADM

## 2020-01-01 RX ORDER — DIAPER,BRIEF,INFANT-TODD,DISP
EACH MISCELLANEOUS EVERY 12 HOURS SCHEDULED
Status: DISCONTINUED | OUTPATIENT
Start: 2020-01-01 | End: 2020-01-01 | Stop reason: HOSPADM

## 2020-01-01 RX ORDER — PEG-3350, SODIUM SULFATE, SODIUM CHLORIDE, POTASSIUM CHLORIDE, SODIUM ASCORBATE AND ASCORBIC ACID 7.5-2.691G
1000 KIT ORAL
Status: COMPLETED | OUTPATIENT
Start: 2020-01-01 | End: 2020-01-01

## 2020-01-01 RX ORDER — OLANZAPINE 5 MG/1
5 TABLET, ORALLY DISINTEGRATING ORAL NIGHTLY
Qty: 10 TABLET | Refills: 1 | Status: SHIPPED | OUTPATIENT
Start: 2020-01-01

## 2020-01-01 RX ORDER — ACETAMINOPHEN 325 MG/1
650 TABLET ORAL EVERY 4 HOURS PRN
Status: CANCELLED | OUTPATIENT
Start: 2020-01-01

## 2020-01-01 RX ORDER — SODIUM CHLORIDE 9 MG/ML
125 INJECTION, SOLUTION INTRAVENOUS CONTINUOUS
Status: DISCONTINUED | OUTPATIENT
Start: 2020-01-01 | End: 2020-01-01

## 2020-01-01 RX ORDER — TAMSULOSIN HYDROCHLORIDE 0.4 MG/1
1 CAPSULE ORAL NIGHTLY
Qty: 30 CAPSULE | Refills: 0 | Status: SHIPPED | OUTPATIENT
Start: 2020-01-01 | End: 2020-01-01

## 2020-01-01 RX ORDER — PANTOPRAZOLE SODIUM 40 MG/1
40 TABLET, DELAYED RELEASE ORAL
Status: DISCONTINUED | OUTPATIENT
Start: 2020-01-01 | End: 2020-01-01

## 2020-01-01 RX ORDER — ONDANSETRON 2 MG/ML
4 INJECTION INTRAMUSCULAR; INTRAVENOUS ONCE
Status: COMPLETED | OUTPATIENT
Start: 2020-01-01 | End: 2020-01-01

## 2020-01-01 RX ORDER — BUMETANIDE 0.25 MG/ML
2 INJECTION INTRAMUSCULAR; INTRAVENOUS DAILY
Status: DISCONTINUED | OUTPATIENT
Start: 2020-01-01 | End: 2020-01-01

## 2020-01-01 RX ORDER — OXYCODONE HYDROCHLORIDE 10 MG/1
10 TABLET ORAL EVERY 6 HOURS PRN
Qty: 60 TABLET | Refills: 0 | Status: SHIPPED | OUTPATIENT
Start: 2020-01-01 | End: 2020-09-05

## 2020-01-01 RX ORDER — POTASSIUM CHLORIDE 750 MG/1
40 CAPSULE, EXTENDED RELEASE ORAL 2 TIMES DAILY WITH MEALS
Status: DISCONTINUED | OUTPATIENT
Start: 2020-01-01 | End: 2020-01-01

## 2020-01-01 RX ORDER — BUMETANIDE 1 MG/1
2 TABLET ORAL DAILY
Status: DISCONTINUED | OUTPATIENT
Start: 2020-01-01 | End: 2020-01-01

## 2020-01-01 RX ORDER — ALPRAZOLAM 0.5 MG/1
0.5 TABLET ORAL
Qty: 10 TABLET | Refills: 0 | Status: SHIPPED | OUTPATIENT
Start: 2020-01-01 | End: 2020-01-01 | Stop reason: SDUPTHER

## 2020-01-01 RX ORDER — POTASSIUM CHLORIDE 29.8 MG/ML
20 INJECTION INTRAVENOUS AS NEEDED
Status: DISCONTINUED | OUTPATIENT
Start: 2020-01-01 | End: 2020-01-01 | Stop reason: HOSPADM

## 2020-01-01 RX ORDER — FENTANYL CITRATE 50 UG/ML
INJECTION, SOLUTION INTRAMUSCULAR; INTRAVENOUS
Status: DISPENSED
Start: 2020-01-01 | End: 2020-01-01

## 2020-01-01 RX ORDER — METOCLOPRAMIDE 5 MG/1
5 TABLET ORAL
Qty: 90 TABLET | Refills: 0 | Status: SHIPPED | OUTPATIENT
Start: 2020-01-01

## 2020-01-01 RX ORDER — POTASSIUM CHLORIDE 7.45 MG/ML
10 INJECTION INTRAVENOUS ONCE
Status: COMPLETED | OUTPATIENT
Start: 2020-01-01 | End: 2020-01-01

## 2020-01-01 RX ORDER — ALBUMIN, HUMAN INJ 5% 5 %
1000 SOLUTION INTRAVENOUS ONCE
Status: DISCONTINUED | OUTPATIENT
Start: 2020-01-01 | End: 2020-01-01

## 2020-01-01 RX ORDER — SODIUM CHLORIDE 9 MG/ML
INJECTION, SOLUTION INTRAVENOUS CONTINUOUS PRN
Status: DISCONTINUED | OUTPATIENT
Start: 2020-01-01 | End: 2020-01-01 | Stop reason: SURG

## 2020-01-01 RX ORDER — FAMOTIDINE 10 MG/ML
20 INJECTION, SOLUTION INTRAVENOUS ONCE
Status: CANCELLED | OUTPATIENT
Start: 2020-01-01 | End: 2020-01-01

## 2020-01-01 RX ORDER — PANTOPRAZOLE SODIUM 40 MG/1
40 TABLET, DELAYED RELEASE ORAL DAILY
Status: DISCONTINUED | OUTPATIENT
Start: 2020-01-01 | End: 2020-01-01 | Stop reason: HOSPADM

## 2020-01-01 RX ORDER — ALBUMIN (HUMAN) 12.5 G/50ML
25 SOLUTION INTRAVENOUS ONCE
Status: COMPLETED | OUTPATIENT
Start: 2020-01-01 | End: 2020-01-01

## 2020-01-01 RX ORDER — MIDODRINE HYDROCHLORIDE 10 MG/1
TABLET ORAL
Qty: 40 TABLET | Refills: 0 | Status: SHIPPED | OUTPATIENT
Start: 2020-01-01

## 2020-01-01 RX ORDER — ALBUTEROL SULFATE 90 UG/1
2 AEROSOL, METERED RESPIRATORY (INHALATION) EVERY 4 HOURS PRN
Status: DISCONTINUED | OUTPATIENT
Start: 2020-01-01 | End: 2020-01-01 | Stop reason: HOSPADM

## 2020-01-01 RX ORDER — ALBUTEROL SULFATE 2.5 MG/3ML
2.5 SOLUTION RESPIRATORY (INHALATION) EVERY 6 HOURS PRN
Status: DISCONTINUED | OUTPATIENT
Start: 2020-01-01 | End: 2020-01-01 | Stop reason: HOSPADM

## 2020-01-01 RX ORDER — SODIUM CHLORIDE, SODIUM LACTATE, POTASSIUM CHLORIDE, CALCIUM CHLORIDE 600; 310; 30; 20 MG/100ML; MG/100ML; MG/100ML; MG/100ML
INJECTION, SOLUTION INTRAVENOUS CONTINUOUS PRN
Status: DISCONTINUED | OUTPATIENT
Start: 2020-01-01 | End: 2020-01-01 | Stop reason: SURG

## 2020-01-01 RX ORDER — SODIUM CHLORIDE, SODIUM LACTATE, POTASSIUM CHLORIDE, CALCIUM CHLORIDE 600; 310; 30; 20 MG/100ML; MG/100ML; MG/100ML; MG/100ML
100 INJECTION, SOLUTION INTRAVENOUS CONTINUOUS
Status: ACTIVE | OUTPATIENT
Start: 2020-01-01 | End: 2020-01-01

## 2020-01-01 RX ORDER — BUMETANIDE 0.25 MG/ML
2.5 INJECTION INTRAMUSCULAR; INTRAVENOUS ONCE
Status: COMPLETED | OUTPATIENT
Start: 2020-01-01 | End: 2020-01-01

## 2020-01-01 RX ORDER — SODIUM CHLORIDE 0.9 % (FLUSH) 0.9 %
10 SYRINGE (ML) INJECTION AS NEEDED
Status: CANCELLED | OUTPATIENT
Start: 2020-01-01

## 2020-01-01 RX ORDER — LACTULOSE 10 G/15ML
10 SOLUTION ORAL 3 TIMES DAILY
Qty: 90 ML | Refills: 0 | Status: SHIPPED | OUTPATIENT
Start: 2020-01-01

## 2020-01-01 RX ORDER — AMOXICILLIN 250 MG
1 CAPSULE ORAL DAILY
Qty: 30 TABLET | Refills: 0 | Status: SHIPPED | OUTPATIENT
Start: 2020-01-01 | End: 2020-01-01

## 2020-01-01 RX ORDER — GABAPENTIN 100 MG/1
CAPSULE ORAL
COMMUNITY
Start: 2020-01-01 | End: 2020-01-01

## 2020-01-01 RX ORDER — ALBUMIN, HUMAN INJ 5% 5 %
25 SOLUTION INTRAVENOUS 3 TIMES DAILY
Status: COMPLETED | OUTPATIENT
Start: 2020-01-01 | End: 2020-01-01

## 2020-01-01 RX ORDER — OXYCODONE HYDROCHLORIDE 5 MG/1
TABLET ORAL
Qty: 100 TABLET | Refills: 0 | Status: SHIPPED | OUTPATIENT
Start: 2020-01-01 | End: 2020-01-01 | Stop reason: DRUGHIGH

## 2020-01-01 RX ORDER — MIRTAZAPINE 15 MG/1
15-30 TABLET, FILM COATED ORAL
Qty: 60 TABLET | Refills: 2 | Status: SHIPPED | OUTPATIENT
Start: 2020-01-01 | End: 2020-01-01

## 2020-01-01 RX ORDER — MIDODRINE HYDROCHLORIDE 5 MG/1
10 TABLET ORAL
Status: DISCONTINUED | OUTPATIENT
Start: 2020-01-01 | End: 2020-01-01

## 2020-01-01 RX ORDER — LIDOCAINE 50 MG/G
1 PATCH TOPICAL
Status: DISCONTINUED | OUTPATIENT
Start: 2020-01-01 | End: 2020-01-01 | Stop reason: HOSPADM

## 2020-01-01 RX ORDER — OXYCODONE HYDROCHLORIDE 10 MG/1
10 TABLET ORAL EVERY 6 HOURS PRN
Qty: 60 TABLET | Refills: 0 | Status: SHIPPED | OUTPATIENT
Start: 2020-01-01 | End: 2020-01-01

## 2020-01-01 RX ORDER — MIDAZOLAM HYDROCHLORIDE 1 MG/ML
INJECTION INTRAMUSCULAR; INTRAVENOUS
Status: DISPENSED
Start: 2020-01-01 | End: 2020-01-01

## 2020-01-01 RX ORDER — ALBUMIN (HUMAN) 12.5 G/50ML
12.5 SOLUTION INTRAVENOUS AS NEEDED
Status: DISCONTINUED | OUTPATIENT
Start: 2020-01-01 | End: 2020-01-01

## 2020-01-01 RX ORDER — ALPRAZOLAM 0.5 MG/1
0.5 TABLET ORAL
Qty: 10 TABLET | Refills: 0 | Status: SHIPPED | OUTPATIENT
Start: 2020-01-01 | End: 2020-01-01

## 2020-01-01 RX ORDER — ALBUMIN, HUMAN INJ 5% 5 %
500 SOLUTION INTRAVENOUS ONCE
Status: COMPLETED | OUTPATIENT
Start: 2020-01-01 | End: 2020-01-01

## 2020-01-01 RX ORDER — MIDODRINE HYDROCHLORIDE 5 MG/1
10 TABLET ORAL EVERY 8 HOURS
Status: DISCONTINUED | OUTPATIENT
Start: 2020-01-01 | End: 2020-01-01

## 2020-01-01 RX ORDER — GUAIFENESIN 600 MG/1
1200 TABLET, EXTENDED RELEASE ORAL 2 TIMES DAILY
Qty: 120 TABLET | Refills: 0 | Status: SHIPPED | OUTPATIENT
Start: 2020-01-01 | End: 2020-01-01

## 2020-01-01 RX ORDER — SODIUM CHLORIDE 9 MG/ML
250 INJECTION, SOLUTION INTRAVENOUS ONCE
Status: COMPLETED | OUTPATIENT
Start: 2020-01-01 | End: 2020-01-01

## 2020-01-01 RX ORDER — OXYCODONE HYDROCHLORIDE 10 MG/1
10 TABLET ORAL EVERY 6 HOURS PRN
Qty: 60 TABLET | Refills: 0 | Status: ON HOLD | OUTPATIENT
Start: 2020-01-01 | End: 2020-01-01

## 2020-01-01 RX ORDER — OXYCODONE HYDROCHLORIDE 10 MG/1
10 TABLET ORAL EVERY 6 HOURS PRN
Qty: 60 TABLET | Refills: 0 | Status: SHIPPED | OUTPATIENT
Start: 2020-01-01 | End: 2020-01-01 | Stop reason: SDUPTHER

## 2020-01-01 RX ORDER — ONDANSETRON 2 MG/ML
4 INJECTION INTRAMUSCULAR; INTRAVENOUS EVERY 6 HOURS PRN
Status: DISCONTINUED | OUTPATIENT
Start: 2020-01-01 | End: 2020-01-01 | Stop reason: HOSPADM

## 2020-01-01 RX ORDER — SERTRALINE HYDROCHLORIDE 25 MG/1
25 TABLET, FILM COATED ORAL DAILY
Qty: 30 TABLET | Refills: 1 | Status: SHIPPED | OUTPATIENT
Start: 2020-01-01

## 2020-01-01 RX ORDER — SERTRALINE HYDROCHLORIDE 25 MG/1
25 TABLET, FILM COATED ORAL DAILY
Status: DISCONTINUED | OUTPATIENT
Start: 2020-01-01 | End: 2020-01-01 | Stop reason: HOSPADM

## 2020-01-01 RX ORDER — MAGNESIUM SULFATE HEPTAHYDRATE 40 MG/ML
2 INJECTION, SOLUTION INTRAVENOUS AS NEEDED
Status: DISCONTINUED | OUTPATIENT
Start: 2020-01-01 | End: 2020-01-01 | Stop reason: HOSPADM

## 2020-01-01 RX ORDER — ALPRAZOLAM 0.5 MG/1
0.5 TABLET ORAL
Qty: 10 TABLET | Refills: 0 | Status: CANCELLED | OUTPATIENT
Start: 2020-01-01

## 2020-01-01 RX ORDER — AMOXICILLIN 250 MG
2 CAPSULE ORAL 2 TIMES DAILY
Qty: 120 TABLET | Refills: 0 | Status: SHIPPED | OUTPATIENT
Start: 2020-01-01

## 2020-01-01 RX ORDER — SODIUM CHLORIDE 0.9 % (FLUSH) 0.9 %
10 SYRINGE (ML) INJECTION EVERY 12 HOURS SCHEDULED
Status: CANCELLED | OUTPATIENT
Start: 2020-01-01

## 2020-01-01 RX ORDER — METOPROLOL SUCCINATE 50 MG/1
50 TABLET, EXTENDED RELEASE ORAL DAILY
COMMUNITY
Start: 2020-01-01 | End: 2020-01-01 | Stop reason: SDUPTHER

## 2020-01-01 RX ORDER — ALBUTEROL SULFATE 90 UG/1
2 AEROSOL, METERED RESPIRATORY (INHALATION) EVERY 4 HOURS PRN
Qty: 18 G | Refills: 3 | Status: SHIPPED | OUTPATIENT
Start: 2020-01-01

## 2020-01-01 RX ORDER — ANTICOAGULANT CITRATE DEXTROSE SOLUTION FORMULA A 12.25; 11; 3.65 G/500ML; G/500ML; G/500ML
0-2 SOLUTION INTRAVENOUS AS NEEDED
Status: DISCONTINUED | OUTPATIENT
Start: 2020-01-01 | End: 2020-01-01 | Stop reason: HOSPADM

## 2020-01-01 RX ORDER — ALBUMIN, HUMAN INJ 5% 5 %
25 SOLUTION INTRAVENOUS ONCE
Status: COMPLETED | OUTPATIENT
Start: 2020-01-01 | End: 2020-01-01

## 2020-01-01 RX ORDER — OXYCODONE HYDROCHLORIDE 5 MG/1
10 TABLET ORAL EVERY 6 HOURS PRN
Status: DISPENSED | OUTPATIENT
Start: 2020-01-01 | End: 2020-01-01

## 2020-01-01 RX ORDER — LIDOCAINE HYDROCHLORIDE 10 MG/ML
0.5 INJECTION, SOLUTION EPIDURAL; INFILTRATION; INTRACAUDAL; PERINEURAL ONCE AS NEEDED
Status: CANCELLED | OUTPATIENT
Start: 2020-01-01

## 2020-01-01 RX ORDER — SODIUM CHLORIDE AND POTASSIUM CHLORIDE 150; 900 MG/100ML; MG/100ML
100 INJECTION, SOLUTION INTRAVENOUS CONTINUOUS
Status: CANCELLED | OUTPATIENT
Start: 2020-01-01

## 2020-01-01 RX ORDER — LIDOCAINE 50 MG/G
1 PATCH TOPICAL
Status: DISCONTINUED | OUTPATIENT
Start: 2020-01-01 | End: 2020-01-01

## 2020-01-01 RX ORDER — ACETAMINOPHEN 325 MG/1
650 TABLET ORAL EVERY 4 HOURS PRN
Status: DISCONTINUED | OUTPATIENT
Start: 2020-01-01 | End: 2020-01-01 | Stop reason: HOSPADM

## 2020-01-01 RX ORDER — EPINEPHRINE 0.1 MG/ML
SYRINGE (ML) INJECTION
Status: COMPLETED | OUTPATIENT
Start: 2020-01-01 | End: 2020-01-01

## 2020-01-01 RX ORDER — POTASSIUM CHLORIDE 750 MG/1
40 CAPSULE, EXTENDED RELEASE ORAL AS NEEDED
Status: DISCONTINUED | OUTPATIENT
Start: 2020-01-01 | End: 2020-01-01

## 2020-01-01 RX ORDER — HEPARIN SODIUM 1000 [USP'U]/ML
1600 INJECTION, SOLUTION INTRAVENOUS; SUBCUTANEOUS AS NEEDED
Status: DISCONTINUED | OUTPATIENT
Start: 2020-01-01 | End: 2020-01-01 | Stop reason: HOSPADM

## 2020-01-01 RX ORDER — DOCUSATE SODIUM 100 MG/1
100 CAPSULE, LIQUID FILLED ORAL DAILY
Status: DISCONTINUED | OUTPATIENT
Start: 2020-01-01 | End: 2020-01-01

## 2020-01-01 RX ORDER — BUMETANIDE 1 MG/1
4 TABLET ORAL DAILY
Status: DISCONTINUED | OUTPATIENT
Start: 2020-01-01 | End: 2020-01-01 | Stop reason: HOSPADM

## 2020-01-01 RX ORDER — ALBUMIN, HUMAN INJ 5% 5 %
SOLUTION INTRAVENOUS
Status: DISCONTINUED
Start: 2020-01-01 | End: 2020-01-01 | Stop reason: WASHOUT

## 2020-01-01 RX ORDER — DOCUSATE SODIUM 100 MG/1
100 CAPSULE, LIQUID FILLED ORAL DAILY
COMMUNITY

## 2020-01-01 RX ORDER — ALBUMIN (HUMAN) 12.5 G/50ML
25 SOLUTION INTRAVENOUS AS NEEDED
Status: DISCONTINUED | OUTPATIENT
Start: 2020-01-01 | End: 2020-01-01 | Stop reason: HOSPADM

## 2020-01-01 RX ORDER — FAMOTIDINE 20 MG/1
20 TABLET, FILM COATED ORAL ONCE
Status: CANCELLED | OUTPATIENT
Start: 2020-01-01 | End: 2020-01-01

## 2020-01-01 RX ORDER — OLANZAPINE 5 MG/1
5 TABLET, ORALLY DISINTEGRATING ORAL NIGHTLY
Qty: 10 TABLET | Refills: 1 | Status: ON HOLD | OUTPATIENT
Start: 2020-01-01 | End: 2020-01-01

## 2020-01-01 RX ORDER — BUMETANIDE 2 MG/1
4 TABLET ORAL DAILY
Qty: 60 TABLET | Refills: 0 | Status: SHIPPED | OUTPATIENT
Start: 2020-01-01

## 2020-01-01 RX ORDER — PANTOPRAZOLE SODIUM 40 MG/1
40 TABLET, DELAYED RELEASE ORAL DAILY
Status: DISCONTINUED | OUTPATIENT
Start: 2020-01-01 | End: 2020-01-01

## 2020-01-01 RX ORDER — PANTOPRAZOLE SODIUM 40 MG/10ML
40 INJECTION, POWDER, LYOPHILIZED, FOR SOLUTION INTRAVENOUS EVERY 12 HOURS SCHEDULED
Status: DISCONTINUED | OUTPATIENT
Start: 2020-01-01 | End: 2020-01-01

## 2020-01-01 RX ORDER — HEPARIN SODIUM 5000 [USP'U]/ML
5000 INJECTION, SOLUTION INTRAVENOUS; SUBCUTANEOUS EVERY 8 HOURS SCHEDULED
Status: DISCONTINUED | OUTPATIENT
Start: 2020-01-01 | End: 2020-01-01

## 2020-01-01 RX ORDER — SODIUM CHLORIDE 9 MG/ML
1000 INJECTION, SOLUTION INTRAVENOUS CONTINUOUS
Status: ACTIVE | OUTPATIENT
Start: 2020-01-01 | End: 2020-01-01

## 2020-01-01 RX ORDER — ONDANSETRON HYDROCHLORIDE 8 MG/1
8 TABLET, FILM COATED ORAL 3 TIMES DAILY PRN
Qty: 30 TABLET | Refills: 5 | Status: SHIPPED | OUTPATIENT
Start: 2020-01-01

## 2020-01-01 RX ORDER — ASPIRIN 81 MG/1
81 TABLET, CHEWABLE ORAL DAILY
Status: DISCONTINUED | OUTPATIENT
Start: 2020-01-01 | End: 2020-01-01 | Stop reason: HOSPADM

## 2020-01-01 RX ORDER — CHOLECALCIFEROL (VITAMIN D3) 125 MCG
5 CAPSULE ORAL NIGHTLY PRN
Status: DISCONTINUED | OUTPATIENT
Start: 2020-01-01 | End: 2020-01-01

## 2020-01-01 RX ORDER — SODIUM CHLORIDE 9 MG/ML
250 INJECTION, SOLUTION INTRAVENOUS ONCE
Status: DISCONTINUED | OUTPATIENT
Start: 2020-01-01 | End: 2020-01-01 | Stop reason: HOSPADM

## 2020-01-01 RX ORDER — ALBUMIN (HUMAN) 12.5 G/50ML
25 SOLUTION INTRAVENOUS AS NEEDED
Status: DISPENSED | OUTPATIENT
Start: 2020-01-01 | End: 2020-01-01

## 2020-01-01 RX ORDER — DIPHENHYDRAMINE HYDROCHLORIDE 50 MG/ML
25 INJECTION INTRAMUSCULAR; INTRAVENOUS EVERY 6 HOURS
Status: DISCONTINUED | OUTPATIENT
Start: 2020-01-01 | End: 2020-01-01

## 2020-01-01 RX ORDER — ALUMINA, MAGNESIA, AND SIMETHICONE 2400; 2400; 240 MG/30ML; MG/30ML; MG/30ML
15 SUSPENSION ORAL EVERY 6 HOURS PRN
Status: DISCONTINUED | OUTPATIENT
Start: 2020-01-01 | End: 2020-01-01

## 2020-01-01 RX ORDER — MIRTAZAPINE 15 MG/1
15 TABLET, FILM COATED ORAL NIGHTLY
Status: DISCONTINUED | OUTPATIENT
Start: 2020-01-01 | End: 2020-01-01 | Stop reason: HOSPADM

## 2020-01-01 RX ORDER — DEXAMETHASONE 4 MG/1
8 TABLET ORAL DAILY
Qty: 6 TABLET | Refills: 5 | Status: SHIPPED | OUTPATIENT
Start: 2020-01-01 | End: 2020-01-01

## 2020-01-01 RX ORDER — NOREPINEPHRINE BIT/0.9 % NACL 8 MG/250ML
.02-.3 INFUSION BOTTLE (ML) INTRAVENOUS
Status: DISCONTINUED | OUTPATIENT
Start: 2020-01-01 | End: 2020-01-01

## 2020-01-01 RX ORDER — SODIUM CHLORIDE, SODIUM LACTATE, POTASSIUM CHLORIDE, CALCIUM CHLORIDE 600; 310; 30; 20 MG/100ML; MG/100ML; MG/100ML; MG/100ML
100 INJECTION, SOLUTION INTRAVENOUS CONTINUOUS
Status: DISCONTINUED | OUTPATIENT
Start: 2020-01-01 | End: 2020-01-01

## 2020-01-01 RX ORDER — ALUMINA, MAGNESIA, AND SIMETHICONE 2400; 2400; 240 MG/30ML; MG/30ML; MG/30ML
15 SUSPENSION ORAL EVERY 6 HOURS PRN
Status: CANCELLED | OUTPATIENT
Start: 2020-01-01

## 2020-01-01 RX ORDER — EPHEDRINE SULFATE 50 MG/ML
INJECTION, SOLUTION INTRAVENOUS AS NEEDED
Status: DISCONTINUED | OUTPATIENT
Start: 2020-01-01 | End: 2020-01-01 | Stop reason: SURG

## 2020-01-01 RX ORDER — DIPHENHYDRAMINE HCL 25 MG
25 CAPSULE ORAL EVERY 6 HOURS PRN
Status: DISCONTINUED | OUTPATIENT
Start: 2020-01-01 | End: 2020-01-01 | Stop reason: HOSPADM

## 2020-01-01 RX ORDER — HYDROCODONE BITARTRATE AND ACETAMINOPHEN 5; 325 MG/1; MG/1
1 TABLET ORAL EVERY 6 HOURS PRN
Qty: 120 TABLET | Refills: 0 | Status: SHIPPED | OUTPATIENT
Start: 2020-01-01 | End: 2020-01-01

## 2020-01-01 RX ORDER — MAGNESIUM SULFATE HEPTAHYDRATE 40 MG/ML
2 INJECTION, SOLUTION INTRAVENOUS AS NEEDED
Status: DISCONTINUED | OUTPATIENT
Start: 2020-01-01 | End: 2020-01-01

## 2020-01-01 RX ORDER — HYDROCORTISONE ACETATE 25 MG/1
25 SUPPOSITORY RECTAL 2 TIMES DAILY
Status: DISCONTINUED | OUTPATIENT
Start: 2020-01-01 | End: 2020-01-01

## 2020-01-01 RX ORDER — AMOXICILLIN 250 MG
2 CAPSULE ORAL 2 TIMES DAILY
Status: DISCONTINUED | OUTPATIENT
Start: 2020-01-01 | End: 2020-01-01

## 2020-01-01 RX ORDER — MORPHINE SULFATE 2 MG/ML
1 INJECTION, SOLUTION INTRAMUSCULAR; INTRAVENOUS EVERY 4 HOURS PRN
Status: DISCONTINUED | OUTPATIENT
Start: 2020-01-01 | End: 2020-01-01

## 2020-01-01 RX ORDER — ALBUMIN, HUMAN INJ 5% 5 %
1000 SOLUTION INTRAVENOUS ONCE
Status: COMPLETED | OUTPATIENT
Start: 2020-01-01 | End: 2020-01-01

## 2020-01-01 RX ORDER — POTASSIUM CHLORIDE 20 MEQ/1
20 TABLET, EXTENDED RELEASE ORAL 2 TIMES DAILY
Qty: 60 TABLET | Refills: 0 | Status: SHIPPED | OUTPATIENT
Start: 2020-01-01 | End: 2020-01-01

## 2020-01-01 RX ORDER — HYDROCODONE BITARTRATE AND ACETAMINOPHEN 5; 325 MG/1; MG/1
1 TABLET ORAL EVERY 6 HOURS PRN
Qty: 120 TABLET | Refills: 0 | Status: SHIPPED | OUTPATIENT
Start: 2020-01-01 | End: 2020-01-01 | Stop reason: SDUPTHER

## 2020-01-01 RX ORDER — CALCIUM CHLORIDE 100 MG/ML
INJECTION INTRAVENOUS; INTRAVENTRICULAR
Status: COMPLETED | OUTPATIENT
Start: 2020-01-01 | End: 2020-01-01

## 2020-01-01 RX ORDER — AMIODARONE HYDROCHLORIDE 50 MG/ML
INJECTION, SOLUTION INTRAVENOUS
Status: COMPLETED | OUTPATIENT
Start: 2020-01-01 | End: 2020-01-01

## 2020-01-01 RX ORDER — LIDOCAINE HYDROCHLORIDE 10 MG/ML
INJECTION, SOLUTION EPIDURAL; INFILTRATION; INTRACAUDAL; PERINEURAL
Status: DISPENSED
Start: 2020-01-01 | End: 2020-01-01

## 2020-01-01 RX ORDER — POTASSIUM CHLORIDE 7.45 MG/ML
10 INJECTION INTRAVENOUS ONCE
Status: CANCELLED | OUTPATIENT
Start: 2020-01-01

## 2020-01-01 RX ORDER — DEXAMETHASONE 4 MG/1
8 TABLET ORAL DAILY
Qty: 6 TABLET | Refills: 5 | Status: SHIPPED | OUTPATIENT
Start: 2020-01-01 | End: 2020-01-01 | Stop reason: SDUPTHER

## 2020-01-01 RX ORDER — MIDODRINE HYDROCHLORIDE 5 MG/1
10 TABLET ORAL
Status: DISCONTINUED | OUTPATIENT
Start: 2020-01-01 | End: 2020-01-01 | Stop reason: HOSPADM

## 2020-01-01 RX ORDER — ALPRAZOLAM 0.5 MG/1
0.5 TABLET ORAL 2 TIMES DAILY PRN
Qty: 10 TABLET | Refills: 0 | Status: SHIPPED | OUTPATIENT
Start: 2020-01-01 | End: 2020-01-01 | Stop reason: SDUPTHER

## 2020-01-01 RX ADMIN — OXYCODONE 10 MG: 5 TABLET ORAL at 02:19

## 2020-01-01 RX ADMIN — OXYCODONE 10 MG: 5 TABLET ORAL at 15:12

## 2020-01-01 RX ADMIN — ASPIRIN 81 MG 81 MG: 81 TABLET ORAL at 12:24

## 2020-01-01 RX ADMIN — MIRTAZAPINE 15 MG: 15 TABLET, FILM COATED ORAL at 19:52

## 2020-01-01 RX ADMIN — POLYETHYLENE GLYCOL 3350, SODIUM SULFATE, SODIUM CHLORIDE, POTASSIUM CHLORIDE, ASCORBIC ACID, SODIUM ASCORBATE 1000 ML: KIT at 04:32

## 2020-01-01 RX ADMIN — OXYCODONE 10 MG: 5 TABLET ORAL at 03:05

## 2020-01-01 RX ADMIN — VANCOMYCIN HYDROCHLORIDE 2000 MG: 10 INJECTION, POWDER, LYOPHILIZED, FOR SOLUTION INTRAVENOUS at 22:47

## 2020-01-01 RX ADMIN — MIDODRINE HYDROCHLORIDE 10 MG: 5 TABLET ORAL at 13:56

## 2020-01-01 RX ADMIN — LACTULOSE 10 G: 10 SOLUTION ORAL at 16:01

## 2020-01-01 RX ADMIN — MIDODRINE HYDROCHLORIDE 10 MG: 5 TABLET ORAL at 11:54

## 2020-01-01 RX ADMIN — POTASSIUM CHLORIDE 500 ML: 2 INJECTION, SOLUTION, CONCENTRATE INTRAVENOUS at 11:19

## 2020-01-01 RX ADMIN — METOCLOPRAMIDE 5 MG: 5 TABLET ORAL at 17:31

## 2020-01-01 RX ADMIN — BUMETANIDE 2.5 MG: 0.25 INJECTION INTRAMUSCULAR; INTRAVENOUS at 10:05

## 2020-01-01 RX ADMIN — OXYCODONE 10 MG: 5 TABLET ORAL at 13:33

## 2020-01-01 RX ADMIN — RIFAXIMIN 550 MG: 550 TABLET ORAL at 20:04

## 2020-01-01 RX ADMIN — LACTULOSE 10 G: 10 SOLUTION ORAL at 08:52

## 2020-01-01 RX ADMIN — PHENYLEPHRINE HYDROCHLORIDE 50 MCG: 10 INJECTION INTRAVENOUS at 11:36

## 2020-01-01 RX ADMIN — METOCLOPRAMIDE 5 MG: 5 TABLET ORAL at 05:04

## 2020-01-01 RX ADMIN — METOCLOPRAMIDE 5 MG: 5 TABLET ORAL at 17:15

## 2020-01-01 RX ADMIN — OXYCODONE 10 MG: 5 TABLET ORAL at 05:04

## 2020-01-01 RX ADMIN — PANTOPRAZOLE SODIUM 40 MG: 40 TABLET, DELAYED RELEASE ORAL at 16:02

## 2020-01-01 RX ADMIN — METOCLOPRAMIDE 5 MG: 5 TABLET ORAL at 17:02

## 2020-01-01 RX ADMIN — HYDROCORTISONE 1 APPLICATION: 1 CREAM TOPICAL at 22:29

## 2020-01-01 RX ADMIN — METOCLOPRAMIDE 5 MG: 5 TABLET ORAL at 11:27

## 2020-01-01 RX ADMIN — LACTULOSE 10 G: 10 SOLUTION ORAL at 09:08

## 2020-01-01 RX ADMIN — ASPIRIN 81 MG: 81 TABLET, CHEWABLE ORAL at 08:16

## 2020-01-01 RX ADMIN — MIRTAZAPINE 15 MG: 15 TABLET, FILM COATED ORAL at 20:25

## 2020-01-01 RX ADMIN — METOCLOPRAMIDE 5 MG: 5 TABLET ORAL at 08:52

## 2020-01-01 RX ADMIN — SODIUM CHLORIDE 1000 ML: 9 INJECTION, SOLUTION INTRAVENOUS at 10:27

## 2020-01-01 RX ADMIN — SODIUM CHLORIDE, PRESERVATIVE FREE 10 ML: 5 INJECTION INTRAVENOUS at 20:37

## 2020-01-01 RX ADMIN — ALBUMIN HUMAN 25 G: 0.05 INJECTION, SOLUTION INTRAVENOUS at 20:58

## 2020-01-01 RX ADMIN — MIRTAZAPINE 15 MG: 15 TABLET, FILM COATED ORAL at 20:55

## 2020-01-01 RX ADMIN — LACTULOSE 10 G: 10 SOLUTION ORAL at 12:53

## 2020-01-01 RX ADMIN — DOCUSATE SODIUM 50MG AND SENNOSIDES 8.6MG 2 TABLET: 8.6; 5 TABLET, FILM COATED ORAL at 08:40

## 2020-01-01 RX ADMIN — PALONOSETRON 0.25 MG: 0.25 INJECTION, SOLUTION INTRAVENOUS at 11:58

## 2020-01-01 RX ADMIN — BUMETANIDE 4 MG: 1 TABLET ORAL at 09:55

## 2020-01-01 RX ADMIN — OXYCODONE 10 MG: 5 TABLET ORAL at 11:19

## 2020-01-01 RX ADMIN — MIRTAZAPINE 15 MG: 15 TABLET, FILM COATED ORAL at 20:21

## 2020-01-01 RX ADMIN — LACTULOSE 10 G: 10 SOLUTION ORAL at 17:37

## 2020-01-01 RX ADMIN — TAZOBACTAM SODIUM AND PIPERACILLIN SODIUM 4.5 G: 500; 4 INJECTION, SOLUTION INTRAVENOUS at 15:58

## 2020-01-01 RX ADMIN — PALONOSETRON 0.25 MG: 0.25 INJECTION, SOLUTION INTRAVENOUS at 10:46

## 2020-01-01 RX ADMIN — PANTOPRAZOLE SODIUM 40 MG: 40 TABLET, DELAYED RELEASE ORAL at 05:04

## 2020-01-01 RX ADMIN — BUMETANIDE 4 MG: 1 TABLET ORAL at 08:52

## 2020-01-01 RX ADMIN — EPINEPHRINE 1 MG: 0.1 INJECTION, SOLUTION ENDOTRACHEAL; INTRACARDIAC; INTRAVENOUS at 05:56

## 2020-01-01 RX ADMIN — PANTOPRAZOLE SODIUM 40 MG: 40 INJECTION, POWDER, FOR SOLUTION INTRAVENOUS at 20:43

## 2020-01-01 RX ADMIN — MIDODRINE HYDROCHLORIDE 10 MG: 5 TABLET ORAL at 21:20

## 2020-01-01 RX ADMIN — BUMETANIDE 4 MG: 1 TABLET ORAL at 09:25

## 2020-01-01 RX ADMIN — MIDAZOLAM 1 MG: 1 INJECTION INTRAMUSCULAR; INTRAVENOUS at 14:21

## 2020-01-01 RX ADMIN — ALBUMIN HUMAN 1000 ML: 0.05 INJECTION, SOLUTION INTRAVENOUS at 02:01

## 2020-01-01 RX ADMIN — SODIUM CHLORIDE, PRESERVATIVE FREE 10 ML: 5 INJECTION INTRAVENOUS at 08:00

## 2020-01-01 RX ADMIN — ACETAMINOPHEN 650 MG: 325 TABLET, FILM COATED ORAL at 09:55

## 2020-01-01 RX ADMIN — SERTRALINE 25 MG: 25 TABLET, FILM COATED ORAL at 10:14

## 2020-01-01 RX ADMIN — THIAMINE HYDROCHLORIDE 500 MG: 100 INJECTION, SOLUTION INTRAMUSCULAR; INTRAVENOUS at 22:21

## 2020-01-01 RX ADMIN — MIDODRINE HYDROCHLORIDE 20 MG: 5 TABLET ORAL at 12:14

## 2020-01-01 RX ADMIN — PHENYLEPHRINE HYDROCHLORIDE 1.2 MCG/KG/MIN: 10 INJECTION INTRAVENOUS at 15:31

## 2020-01-01 RX ADMIN — BUMETANIDE 4 MG: 1 TABLET ORAL at 08:26

## 2020-01-01 RX ADMIN — OLANZAPINE 5 MG: 5 TABLET, ORALLY DISINTEGRATING ORAL at 20:54

## 2020-01-01 RX ADMIN — LACTULOSE 10 G: 10 SOLUTION ORAL at 08:04

## 2020-01-01 RX ADMIN — MELATONIN TAB 5 MG 5 MG: 5 TAB at 22:19

## 2020-01-01 RX ADMIN — OXYCODONE 10 MG: 5 TABLET ORAL at 17:44

## 2020-01-01 RX ADMIN — Medication 5 ML: at 21:09

## 2020-01-01 RX ADMIN — PANTOPRAZOLE SODIUM 40 MG: 40 TABLET, DELAYED RELEASE ORAL at 09:00

## 2020-01-01 RX ADMIN — DESMOPRESSIN ACETATE 41.72 MCG: 4 SOLUTION INTRAVENOUS at 16:30

## 2020-01-01 RX ADMIN — PANTOPRAZOLE SODIUM 40 MG: 40 TABLET, DELAYED RELEASE ORAL at 16:46

## 2020-01-01 RX ADMIN — TAZOBACTAM SODIUM AND PIPERACILLIN SODIUM 3.38 G: 375; 3 INJECTION, SOLUTION INTRAVENOUS at 08:01

## 2020-01-01 RX ADMIN — OXYCODONE 10 MG: 5 TABLET ORAL at 05:47

## 2020-01-01 RX ADMIN — LACTULOSE 10 G: 10 SOLUTION ORAL at 18:13

## 2020-01-01 RX ADMIN — ASPIRIN 81 MG 81 MG: 81 TABLET ORAL at 08:27

## 2020-01-01 RX ADMIN — ONDANSETRON 4 MG: 2 INJECTION INTRAMUSCULAR; INTRAVENOUS at 15:44

## 2020-01-01 RX ADMIN — PANTOPRAZOLE SODIUM 40 MG: 40 TABLET, DELAYED RELEASE ORAL at 08:38

## 2020-01-01 RX ADMIN — Medication 10 ML: at 17:49

## 2020-01-01 RX ADMIN — SODIUM CHLORIDE, PRESERVATIVE FREE 10 ML: 5 INJECTION INTRAVENOUS at 20:22

## 2020-01-01 RX ADMIN — METOCLOPRAMIDE 5 MG: 5 TABLET ORAL at 17:44

## 2020-01-01 RX ADMIN — PHENYLEPHRINE HYDROCHLORIDE 160 MCG: 10 INJECTION INTRAVENOUS at 14:38

## 2020-01-01 RX ADMIN — SODIUM CHLORIDE, PRESERVATIVE FREE 10 ML: 5 INJECTION INTRAVENOUS at 21:24

## 2020-01-01 RX ADMIN — CALCIUM GLUCONATE 2 G: 98 INJECTION, SOLUTION INTRAVENOUS at 11:00

## 2020-01-01 RX ADMIN — ALBUMIN HUMAN 50 G: 0.25 SOLUTION INTRAVENOUS at 09:17

## 2020-01-01 RX ADMIN — ALBUMIN HUMAN 25 G: 0.05 INJECTION, SOLUTION INTRAVENOUS at 11:05

## 2020-01-01 RX ADMIN — PANTOPRAZOLE SODIUM 40 MG: 40 TABLET, DELAYED RELEASE ORAL at 17:02

## 2020-01-01 RX ADMIN — OXYCODONE 10 MG: 5 TABLET ORAL at 08:54

## 2020-01-01 RX ADMIN — PANTOPRAZOLE SODIUM 40 MG: 40 TABLET, DELAYED RELEASE ORAL at 08:51

## 2020-01-01 RX ADMIN — CALCIUM CHLORIDE, MAGNESIUM CHLORIDE, SODIUM CHLORIDE, SODIUM BICARBONATE, POTASSIUM CHLORIDE AND SODIUM PHOSPHATE DIBASIC DIHYDRATE 1000 ML/HR: 3.68; 3.05; 6.34; 3.09; .314; .187 INJECTION INTRAVENOUS at 13:56

## 2020-01-01 RX ADMIN — METOCLOPRAMIDE 5 MG: 5 INJECTION, SOLUTION INTRAMUSCULAR; INTRAVENOUS at 21:40

## 2020-01-01 RX ADMIN — LACTULOSE 10 G: 10 SOLUTION ORAL at 21:35

## 2020-01-01 RX ADMIN — OXYCODONE 10 MG: 5 TABLET ORAL at 15:53

## 2020-01-01 RX ADMIN — ALBUMIN HUMAN 25 G: 0.25 SOLUTION INTRAVENOUS at 09:49

## 2020-01-01 RX ADMIN — Medication 5 ML: at 17:21

## 2020-01-01 RX ADMIN — LACTULOSE 10 G: 10 SOLUTION ORAL at 21:09

## 2020-01-01 RX ADMIN — MICAFUNGIN SODIUM 100 MG: 100 INJECTION, POWDER, LYOPHILIZED, FOR SOLUTION INTRAVENOUS at 20:05

## 2020-01-01 RX ADMIN — SODIUM BICARBONATE 50 MEQ: 84 INJECTION, SOLUTION INTRAVENOUS at 05:51

## 2020-01-01 RX ADMIN — METOCLOPRAMIDE 5 MG: 5 TABLET ORAL at 09:55

## 2020-01-01 RX ADMIN — MIDODRINE HYDROCHLORIDE 20 MG: 5 TABLET ORAL at 18:03

## 2020-01-01 RX ADMIN — POTASSIUM CHLORIDE 500 ML: 2 INJECTION, SOLUTION, CONCENTRATE INTRAVENOUS at 09:35

## 2020-01-01 RX ADMIN — HYDROMORPHONE HYDROCHLORIDE 1 MG: 1 INJECTION, SOLUTION INTRAMUSCULAR; INTRAVENOUS; SUBCUTANEOUS at 10:09

## 2020-01-01 RX ADMIN — FLUDEOXYGLUCOSE F18 1 DOSE: 300 INJECTION INTRAVENOUS at 12:12

## 2020-01-01 RX ADMIN — Medication 5 ML: at 13:36

## 2020-01-01 RX ADMIN — OXYCODONE 10 MG: 5 TABLET ORAL at 10:00

## 2020-01-01 RX ADMIN — CEFTAZIDIME 1 G: 1 INJECTION, POWDER, FOR SOLUTION INTRAMUSCULAR; INTRAVENOUS at 12:43

## 2020-01-01 RX ADMIN — OXYCODONE 10 MG: 5 TABLET ORAL at 18:35

## 2020-01-01 RX ADMIN — POTASSIUM CHLORIDE 10 MEQ: 7.46 INJECTION, SOLUTION INTRAVENOUS at 11:00

## 2020-01-01 RX ADMIN — OXYCODONE 10 MG: 5 TABLET ORAL at 21:11

## 2020-01-01 RX ADMIN — DOCUSATE SODIUM 50MG AND SENNOSIDES 8.6MG 2 TABLET: 8.6; 5 TABLET, FILM COATED ORAL at 20:43

## 2020-01-01 RX ADMIN — OXYCODONE 10 MG: 5 TABLET ORAL at 07:27

## 2020-01-01 RX ADMIN — METOCLOPRAMIDE 5 MG: 5 INJECTION, SOLUTION INTRAMUSCULAR; INTRAVENOUS at 15:58

## 2020-01-01 RX ADMIN — MIRTAZAPINE 15 MG: 15 TABLET, FILM COATED ORAL at 20:54

## 2020-01-01 RX ADMIN — LACTULOSE 10 G: 10 SOLUTION ORAL at 12:24

## 2020-01-01 RX ADMIN — HEPARIN SODIUM 1600 UNITS: 1000 INJECTION INTRAVENOUS; SUBCUTANEOUS at 10:13

## 2020-01-01 RX ADMIN — SODIUM CHLORIDE, PRESERVATIVE FREE 10 ML: 5 INJECTION INTRAVENOUS at 21:23

## 2020-01-01 RX ADMIN — PANTOPRAZOLE SODIUM 40 MG: 40 TABLET, DELAYED RELEASE ORAL at 17:21

## 2020-01-01 RX ADMIN — MEROPENEM 500 MG: 500 INJECTION, POWDER, FOR SOLUTION INTRAVENOUS at 13:19

## 2020-01-01 RX ADMIN — SERTRALINE 25 MG: 25 TABLET, FILM COATED ORAL at 09:53

## 2020-01-01 RX ADMIN — Medication 5 ML: at 20:00

## 2020-01-01 RX ADMIN — VANCOMYCIN HYDROCHLORIDE 1500 MG: 10 INJECTION, POWDER, LYOPHILIZED, FOR SOLUTION INTRAVENOUS at 18:45

## 2020-01-01 RX ADMIN — MIDODRINE HYDROCHLORIDE 10 MG: 5 TABLET ORAL at 08:52

## 2020-01-01 RX ADMIN — SODIUM CHLORIDE, PRESERVATIVE FREE 10 ML: 5 INJECTION INTRAVENOUS at 09:25

## 2020-01-01 RX ADMIN — PHYTONADIONE 5 MG: 10 INJECTION, EMULSION INTRAMUSCULAR; INTRAVENOUS; SUBCUTANEOUS at 15:41

## 2020-01-01 RX ADMIN — ASPIRIN 81 MG 81 MG: 81 TABLET ORAL at 09:09

## 2020-01-01 RX ADMIN — SODIUM CHLORIDE, PRESERVATIVE FREE 10 ML: 5 INJECTION INTRAVENOUS at 21:57

## 2020-01-01 RX ADMIN — SODIUM CHLORIDE, PRESERVATIVE FREE 10 ML: 5 INJECTION INTRAVENOUS at 14:53

## 2020-01-01 RX ADMIN — PANTOPRAZOLE SODIUM 40 MG: 40 TABLET, DELAYED RELEASE ORAL at 17:08

## 2020-01-01 RX ADMIN — PANTOPRAZOLE SODIUM 40 MG: 40 INJECTION, POWDER, FOR SOLUTION INTRAVENOUS at 20:54

## 2020-01-01 RX ADMIN — SODIUM CHLORIDE, POTASSIUM CHLORIDE, SODIUM LACTATE AND CALCIUM CHLORIDE: 600; 310; 30; 20 INJECTION, SOLUTION INTRAVENOUS at 11:21

## 2020-01-01 RX ADMIN — DOCUSATE SODIUM 100 MG: 100 CAPSULE, LIQUID FILLED ORAL at 12:35

## 2020-01-01 RX ADMIN — ASPIRIN 81 MG 81 MG: 81 TABLET ORAL at 09:55

## 2020-01-01 RX ADMIN — OXYCODONE 10 MG: 5 TABLET ORAL at 21:02

## 2020-01-01 RX ADMIN — METOCLOPRAMIDE 5 MG: 5 TABLET ORAL at 16:02

## 2020-01-01 RX ADMIN — METOCLOPRAMIDE 5 MG: 5 TABLET ORAL at 18:05

## 2020-01-01 RX ADMIN — INSULIN LISPRO 2 UNITS: 100 INJECTION, SOLUTION INTRAVENOUS; SUBCUTANEOUS at 17:03

## 2020-01-01 RX ADMIN — MIRTAZAPINE 15 MG: 15 TABLET, FILM COATED ORAL at 20:09

## 2020-01-01 RX ADMIN — MIDODRINE HYDROCHLORIDE 10 MG: 5 TABLET ORAL at 20:30

## 2020-01-01 RX ADMIN — MIDODRINE HYDROCHLORIDE 10 MG: 5 TABLET ORAL at 05:07

## 2020-01-01 RX ADMIN — MIDODRINE HYDROCHLORIDE 10 MG: 5 TABLET ORAL at 21:40

## 2020-01-01 RX ADMIN — METOCLOPRAMIDE 5 MG: 5 TABLET ORAL at 11:47

## 2020-01-01 RX ADMIN — OXYCODONE 10 MG: 5 TABLET ORAL at 05:54

## 2020-01-01 RX ADMIN — LACTULOSE 10 G: 10 SOLUTION ORAL at 20:34

## 2020-01-01 RX ADMIN — PANTOPRAZOLE SODIUM 40 MG: 40 INJECTION, POWDER, FOR SOLUTION INTRAVENOUS at 08:40

## 2020-01-01 RX ADMIN — ONDANSETRON 4 MG: 2 INJECTION INTRAMUSCULAR; INTRAVENOUS at 07:44

## 2020-01-01 RX ADMIN — PHYTONADIONE 10 MG: 10 INJECTION, EMULSION INTRAMUSCULAR; INTRAVENOUS; SUBCUTANEOUS at 17:03

## 2020-01-01 RX ADMIN — MEROPENEM 500 MG: 500 INJECTION, POWDER, FOR SOLUTION INTRAVENOUS at 15:06

## 2020-01-01 RX ADMIN — FENTANYL CITRATE 50 MCG: 50 INJECTION, SOLUTION INTRAMUSCULAR; INTRAVENOUS at 12:00

## 2020-01-01 RX ADMIN — MIDODRINE HYDROCHLORIDE 10 MG: 5 TABLET ORAL at 05:59

## 2020-01-01 RX ADMIN — OXYCODONE 10 MG: 5 TABLET ORAL at 15:39

## 2020-01-01 RX ADMIN — OXYCODONE 10 MG: 5 TABLET ORAL at 14:54

## 2020-01-01 RX ADMIN — MORPHINE SULFATE 1 MG: 2 INJECTION, SOLUTION INTRAMUSCULAR; INTRAVENOUS at 20:37

## 2020-01-01 RX ADMIN — ALBUMIN HUMAN 500 ML: 0.05 INJECTION, SOLUTION INTRAVENOUS at 14:33

## 2020-01-01 RX ADMIN — LACTULOSE 10 G: 10 SOLUTION ORAL at 08:25

## 2020-01-01 RX ADMIN — FENTANYL CITRATE 50 MCG: 50 INJECTION, SOLUTION INTRAMUSCULAR; INTRAVENOUS at 11:56

## 2020-01-01 RX ADMIN — SERTRALINE 25 MG: 25 TABLET, FILM COATED ORAL at 09:21

## 2020-01-01 RX ADMIN — OXYCODONE 10 MG: 5 TABLET ORAL at 08:16

## 2020-01-01 RX ADMIN — PANTOPRAZOLE SODIUM 40 MG: 40 INJECTION, POWDER, FOR SOLUTION INTRAVENOUS at 07:57

## 2020-01-01 RX ADMIN — PANTOPRAZOLE SODIUM 40 MG: 40 TABLET, DELAYED RELEASE ORAL at 17:12

## 2020-01-01 RX ADMIN — OLANZAPINE 5 MG: 5 TABLET, ORALLY DISINTEGRATING ORAL at 20:17

## 2020-01-01 RX ADMIN — OXYCODONE 10 MG: 5 TABLET ORAL at 23:42

## 2020-01-01 RX ADMIN — METOCLOPRAMIDE 5 MG: 5 INJECTION, SOLUTION INTRAMUSCULAR; INTRAVENOUS at 09:07

## 2020-01-01 RX ADMIN — MIRTAZAPINE 15 MG: 15 TABLET, FILM COATED ORAL at 20:27

## 2020-01-01 RX ADMIN — HYDROCORTISONE: 1 CREAM TOPICAL at 20:34

## 2020-01-01 RX ADMIN — PANTOPRAZOLE SODIUM 40 MG: 40 TABLET, DELAYED RELEASE ORAL at 17:10

## 2020-01-01 RX ADMIN — POTASSIUM CHLORIDE 500 ML: 2 INJECTION, SOLUTION, CONCENTRATE INTRAVENOUS at 10:52

## 2020-01-01 RX ADMIN — MIRTAZAPINE 15 MG: 15 TABLET, FILM COATED ORAL at 20:45

## 2020-01-01 RX ADMIN — LIDOCAINE HYDROCHLORIDE 2 ML: 10 INJECTION, SOLUTION EPIDURAL; INFILTRATION; INTRACAUDAL; PERINEURAL at 13:16

## 2020-01-01 RX ADMIN — PALONOSETRON 0.25 MG: 0.25 INJECTION, SOLUTION INTRAVENOUS at 09:48

## 2020-01-01 RX ADMIN — METOCLOPRAMIDE 5 MG: 5 TABLET ORAL at 17:21

## 2020-01-01 RX ADMIN — LACTULOSE 10 G: 10 SOLUTION ORAL at 21:40

## 2020-01-01 RX ADMIN — SODIUM CHLORIDE, PRESERVATIVE FREE 10 ML: 5 INJECTION INTRAVENOUS at 07:58

## 2020-01-01 RX ADMIN — SODIUM CHLORIDE, POTASSIUM CHLORIDE, SODIUM LACTATE AND CALCIUM CHLORIDE 100 ML/HR: 600; 310; 30; 20 INJECTION, SOLUTION INTRAVENOUS at 14:33

## 2020-01-01 RX ADMIN — SODIUM CHLORIDE, PRESERVATIVE FREE 10 ML: 5 INJECTION INTRAVENOUS at 20:35

## 2020-01-01 RX ADMIN — LACTULOSE 10 G: 10 SOLUTION ORAL at 17:18

## 2020-01-01 RX ADMIN — MORPHINE SULFATE 1 MG: 2 INJECTION, SOLUTION INTRAMUSCULAR; INTRAVENOUS at 02:38

## 2020-01-01 RX ADMIN — HYDROCORTISONE ACETATE 25 MG: 25 SUPPOSITORY RECTAL at 22:21

## 2020-01-01 RX ADMIN — MIDAZOLAM 2 MG: 1 INJECTION INTRAMUSCULAR; INTRAVENOUS at 14:31

## 2020-01-01 RX ADMIN — HYDROCORTISONE: 1 CREAM TOPICAL at 21:52

## 2020-01-01 RX ADMIN — MIDODRINE HYDROCHLORIDE 10 MG: 5 TABLET ORAL at 21:00

## 2020-01-01 RX ADMIN — HYDROCORTISONE: 1 CREAM TOPICAL at 21:13

## 2020-01-01 RX ADMIN — BUMETANIDE 4 MG: 1 TABLET ORAL at 10:14

## 2020-01-01 RX ADMIN — BUMETANIDE 4 MG: 1 TABLET ORAL at 18:40

## 2020-01-01 RX ADMIN — LACTULOSE 10 G: 10 SOLUTION ORAL at 09:56

## 2020-01-01 RX ADMIN — METOCLOPRAMIDE 5 MG: 5 TABLET ORAL at 08:30

## 2020-01-01 RX ADMIN — PHENYLEPHRINE HYDROCHLORIDE 50 MCG: 10 INJECTION INTRAVENOUS at 11:34

## 2020-01-01 RX ADMIN — MORPHINE SULFATE 1 MG: 2 INJECTION, SOLUTION INTRAMUSCULAR; INTRAVENOUS at 17:37

## 2020-01-01 RX ADMIN — Medication 5 ML: at 12:30

## 2020-01-01 RX ADMIN — LACTULOSE 10 G: 10 SOLUTION ORAL at 20:41

## 2020-01-01 RX ADMIN — METOCLOPRAMIDE 5 MG: 5 TABLET ORAL at 08:23

## 2020-01-01 RX ADMIN — THIAMINE HYDROCHLORIDE 250 MG: 100 INJECTION, SOLUTION INTRAMUSCULAR; INTRAVENOUS at 03:22

## 2020-01-01 RX ADMIN — SODIUM CHLORIDE, PRESERVATIVE FREE 10 ML: 5 INJECTION INTRAVENOUS at 10:54

## 2020-01-01 RX ADMIN — OXYCODONE 10 MG: 5 TABLET ORAL at 02:24

## 2020-01-01 RX ADMIN — Medication 5 ML: at 17:45

## 2020-01-01 RX ADMIN — MIDODRINE HYDROCHLORIDE 10 MG: 5 TABLET ORAL at 14:28

## 2020-01-01 RX ADMIN — LACTULOSE 10 G: 10 SOLUTION ORAL at 21:39

## 2020-01-01 RX ADMIN — LACTULOSE 10 G: 10 SOLUTION ORAL at 09:53

## 2020-01-01 RX ADMIN — BUMETANIDE 2 MG: 0.25 INJECTION INTRAMUSCULAR; INTRAVENOUS at 08:52

## 2020-01-01 RX ADMIN — Medication 5 ML: at 14:53

## 2020-01-01 RX ADMIN — METOCLOPRAMIDE 5 MG: 5 TABLET ORAL at 16:38

## 2020-01-01 RX ADMIN — BUMETANIDE 4 MG: 1 TABLET ORAL at 08:27

## 2020-01-01 RX ADMIN — Medication: at 06:05

## 2020-01-01 RX ADMIN — METOCLOPRAMIDE 5 MG: 5 TABLET ORAL at 10:14

## 2020-01-01 RX ADMIN — Medication 5 ML: at 17:04

## 2020-01-01 RX ADMIN — EPINEPHRINE 1 MG: 0.1 INJECTION, SOLUTION ENDOTRACHEAL; INTRACARDIAC; INTRAVENOUS at 05:49

## 2020-01-01 RX ADMIN — METOCLOPRAMIDE 5 MG: 5 TABLET ORAL at 12:14

## 2020-01-01 RX ADMIN — SODIUM CHLORIDE, PRESERVATIVE FREE 10 ML: 5 INJECTION INTRAVENOUS at 20:05

## 2020-01-01 RX ADMIN — HYDROCORTISONE SODIUM SUCCINATE 100 MG: 100 INJECTION, POWDER, FOR SOLUTION INTRAMUSCULAR; INTRAVENOUS at 02:46

## 2020-01-01 RX ADMIN — PANTOPRAZOLE SODIUM 40 MG: 40 TABLET, DELAYED RELEASE ORAL at 12:25

## 2020-01-01 RX ADMIN — INSULIN LISPRO 2 UNITS: 100 INJECTION, SOLUTION INTRAVENOUS; SUBCUTANEOUS at 17:58

## 2020-01-01 RX ADMIN — SERTRALINE 25 MG: 25 TABLET, FILM COATED ORAL at 12:14

## 2020-01-01 RX ADMIN — PANTOPRAZOLE SODIUM 40 MG: 40 TABLET, DELAYED RELEASE ORAL at 17:30

## 2020-01-01 RX ADMIN — LIDOCAINE HYDROCHLORIDE 20 ML: 10 INJECTION, SOLUTION INFILTRATION; PERINEURAL at 09:55

## 2020-01-01 RX ADMIN — SERTRALINE 25 MG: 25 TABLET, FILM COATED ORAL at 08:53

## 2020-01-01 RX ADMIN — OLANZAPINE 5 MG: 5 TABLET, ORALLY DISINTEGRATING ORAL at 20:29

## 2020-01-01 RX ADMIN — CISPLATIN 104 MG: 1 INJECTION INTRAVENOUS at 11:10

## 2020-01-01 RX ADMIN — HYDROCORTISONE: 1 CREAM TOPICAL at 20:30

## 2020-01-01 RX ADMIN — ACETAMINOPHEN 650 MG: 325 TABLET, FILM COATED ORAL at 18:45

## 2020-01-01 RX ADMIN — SODIUM CHLORIDE, PRESERVATIVE FREE 10 ML: 5 INJECTION INTRAVENOUS at 08:23

## 2020-01-01 RX ADMIN — MIDODRINE HYDROCHLORIDE 10 MG: 5 TABLET ORAL at 12:34

## 2020-01-01 RX ADMIN — PANTOPRAZOLE SODIUM 40 MG: 40 TABLET, DELAYED RELEASE ORAL at 09:25

## 2020-01-01 RX ADMIN — METOCLOPRAMIDE 5 MG: 5 TABLET ORAL at 11:22

## 2020-01-01 RX ADMIN — ALBUMIN HUMAN 25 G: 0.25 SOLUTION INTRAVENOUS at 09:09

## 2020-01-01 RX ADMIN — Medication 5 ML: at 20:34

## 2020-01-01 RX ADMIN — HYDROCORTISONE: 1 CREAM TOPICAL at 08:53

## 2020-01-01 RX ADMIN — SODIUM CHLORIDE, PRESERVATIVE FREE 10 ML: 5 INJECTION INTRAVENOUS at 20:57

## 2020-01-01 RX ADMIN — Medication 5 ML: at 21:40

## 2020-01-01 RX ADMIN — POTASSIUM CHLORIDE 500 ML: 2 INJECTION, SOLUTION, CONCENTRATE INTRAVENOUS at 12:15

## 2020-01-01 RX ADMIN — DEXAMETHASONE SODIUM PHOSPHATE 12 MG: 4 INJECTION, SOLUTION INTRAMUSCULAR; INTRAVENOUS at 09:48

## 2020-01-01 RX ADMIN — PANTOPRAZOLE SODIUM 40 MG: 40 TABLET, DELAYED RELEASE ORAL at 17:44

## 2020-01-01 RX ADMIN — Medication 5 ML: at 09:02

## 2020-01-01 RX ADMIN — SODIUM CHLORIDE 125 ML/HR: 9 INJECTION, SOLUTION INTRAVENOUS at 03:17

## 2020-01-01 RX ADMIN — MIDODRINE HYDROCHLORIDE 10 MG: 5 TABLET ORAL at 22:11

## 2020-01-01 RX ADMIN — ACETAMINOPHEN 650 MG: 325 TABLET, FILM COATED ORAL at 11:56

## 2020-01-01 RX ADMIN — LACTULOSE 10 G: 10 SOLUTION ORAL at 11:27

## 2020-01-01 RX ADMIN — MIDODRINE HYDROCHLORIDE 20 MG: 5 TABLET ORAL at 17:10

## 2020-01-01 RX ADMIN — PHYTONADIONE 5 MG: 1 INJECTION, EMULSION INTRAMUSCULAR; INTRAVENOUS; SUBCUTANEOUS at 17:44

## 2020-01-01 RX ADMIN — MIDAZOLAM 2 MG: 1 INJECTION INTRAMUSCULAR; INTRAVENOUS at 14:17

## 2020-01-01 RX ADMIN — MIDODRINE HYDROCHLORIDE 10 MG: 5 TABLET ORAL at 17:14

## 2020-01-01 RX ADMIN — HEPATITIS B VACCINE (RECOMBINANT) 20 MCG: 20 INJECTION, SUSPENSION INTRAMUSCULAR at 16:20

## 2020-01-01 RX ADMIN — SODIUM CHLORIDE, PRESERVATIVE FREE 10 ML: 5 INJECTION INTRAVENOUS at 21:07

## 2020-01-01 RX ADMIN — PANTOPRAZOLE SODIUM 40 MG: 40 TABLET, DELAYED RELEASE ORAL at 11:27

## 2020-01-01 RX ADMIN — MIDODRINE HYDROCHLORIDE 10 MG: 5 TABLET ORAL at 07:46

## 2020-01-01 RX ADMIN — METOCLOPRAMIDE 5 MG: 5 TABLET ORAL at 18:33

## 2020-01-01 RX ADMIN — METOCLOPRAMIDE 5 MG: 5 TABLET ORAL at 17:14

## 2020-01-01 RX ADMIN — POTASSIUM CHLORIDE 40 MEQ: 10 CAPSULE, COATED, EXTENDED RELEASE ORAL at 10:10

## 2020-01-01 RX ADMIN — RIFAXIMIN 550 MG: 550 TABLET ORAL at 14:31

## 2020-01-01 RX ADMIN — METOCLOPRAMIDE 5 MG: 5 TABLET ORAL at 11:45

## 2020-01-01 RX ADMIN — SODIUM CHLORIDE, PRESERVATIVE FREE 10 ML: 5 INJECTION INTRAVENOUS at 20:30

## 2020-01-01 RX ADMIN — LACTULOSE 10 G: 10 SOLUTION ORAL at 15:59

## 2020-01-01 RX ADMIN — LACTULOSE 10 G: 20 SOLUTION ORAL at 20:30

## 2020-01-01 RX ADMIN — MIRTAZAPINE 15 MG: 15 TABLET, FILM COATED ORAL at 21:40

## 2020-01-01 RX ADMIN — ASPIRIN 81 MG 81 MG: 81 TABLET ORAL at 08:51

## 2020-01-01 RX ADMIN — LACTULOSE 10 G: 10 SOLUTION ORAL at 10:14

## 2020-01-01 RX ADMIN — MIDODRINE HYDROCHLORIDE 20 MG: 5 TABLET ORAL at 11:05

## 2020-01-01 RX ADMIN — Medication 5 ML: at 17:02

## 2020-01-01 RX ADMIN — ONDANSETRON 4 MG: 2 INJECTION INTRAMUSCULAR; INTRAVENOUS at 14:18

## 2020-01-01 RX ADMIN — OXYCODONE 10 MG: 5 TABLET ORAL at 17:08

## 2020-01-01 RX ADMIN — LACTULOSE 10 G: 10 SOLUTION ORAL at 20:24

## 2020-01-01 RX ADMIN — ALBUMIN (HUMAN) 250 ML: 12.5 SOLUTION INTRAVENOUS at 23:22

## 2020-01-01 RX ADMIN — MIDODRINE HYDROCHLORIDE 10 MG: 5 TABLET ORAL at 17:37

## 2020-01-01 RX ADMIN — PROPOFOL 50 MG: 10 INJECTION, EMULSION INTRAVENOUS at 14:32

## 2020-01-01 RX ADMIN — PANTOPRAZOLE SODIUM 40 MG: 40 TABLET, DELAYED RELEASE ORAL at 09:55

## 2020-01-01 RX ADMIN — MORPHINE SULFATE 1 MG: 2 INJECTION, SOLUTION INTRAMUSCULAR; INTRAVENOUS at 20:26

## 2020-01-01 RX ADMIN — OXYCODONE 10 MG: 5 TABLET ORAL at 05:36

## 2020-01-01 RX ADMIN — BUMETANIDE 4 MG: 1 TABLET ORAL at 12:14

## 2020-01-01 RX ADMIN — LACTULOSE 10 G: 10 SOLUTION ORAL at 09:25

## 2020-01-01 RX ADMIN — SODIUM CHLORIDE 1000 ML: 9 INJECTION, SOLUTION INTRAVENOUS at 15:03

## 2020-01-01 RX ADMIN — FENTANYL CITRATE 50 MCG: 50 INJECTION, SOLUTION INTRAMUSCULAR; INTRAVENOUS at 14:31

## 2020-01-01 RX ADMIN — Medication 5 ML: at 17:10

## 2020-01-01 RX ADMIN — LACTULOSE 10 G: 10 SOLUTION ORAL at 09:24

## 2020-01-01 RX ADMIN — METOCLOPRAMIDE 5 MG: 5 TABLET ORAL at 11:40

## 2020-01-01 RX ADMIN — MIDODRINE HYDROCHLORIDE 20 MG: 5 TABLET ORAL at 18:13

## 2020-01-01 RX ADMIN — METHOHEXITAL SODIUM 20 MG: 500 INJECTION, POWDER, LYOPHILIZED, FOR SOLUTION INTRAMUSCULAR; INTRAVENOUS; RECTAL at 14:26

## 2020-01-01 RX ADMIN — ALBUMIN HUMAN 50 G: 0.25 SOLUTION INTRAVENOUS at 18:14

## 2020-01-01 RX ADMIN — LACTULOSE 10 G: 10 SOLUTION ORAL at 20:40

## 2020-01-01 RX ADMIN — BARIUM SULFATE 100 ML: 0.81 POWDER, FOR SUSPENSION ORAL at 11:10

## 2020-01-01 RX ADMIN — PHYTONADIONE 10 MG: 10 INJECTION, EMULSION INTRAMUSCULAR; INTRAVENOUS; SUBCUTANEOUS at 12:44

## 2020-01-01 RX ADMIN — DIPHENHYDRAMINE HYDROCHLORIDE 25 MG: 50 INJECTION INTRAMUSCULAR; INTRAVENOUS at 03:20

## 2020-01-01 RX ADMIN — MIDODRINE HYDROCHLORIDE 20 MG: 5 TABLET ORAL at 13:13

## 2020-01-01 RX ADMIN — SERTRALINE 25 MG: 25 TABLET, FILM COATED ORAL at 11:27

## 2020-01-01 RX ADMIN — SODIUM CHLORIDE, POTASSIUM CHLORIDE, SODIUM LACTATE AND CALCIUM CHLORIDE 100 ML/HR: 600; 310; 30; 20 INJECTION, SOLUTION INTRAVENOUS at 05:00

## 2020-01-01 RX ADMIN — PANTOPRAZOLE SODIUM 40 MG: 40 TABLET, DELAYED RELEASE ORAL at 17:15

## 2020-01-01 RX ADMIN — SODIUM CHLORIDE 250 ML: 9 INJECTION, SOLUTION INTRAVENOUS at 11:34

## 2020-01-01 RX ADMIN — Medication 5 ML: at 20:25

## 2020-01-01 RX ADMIN — OXYCODONE 10 MG: 5 TABLET ORAL at 11:01

## 2020-01-01 RX ADMIN — Medication 5 ML: at 08:54

## 2020-01-01 RX ADMIN — LACTULOSE 10 G: 10 SOLUTION ORAL at 16:46

## 2020-01-01 RX ADMIN — MIDODRINE HYDROCHLORIDE 10 MG: 5 TABLET ORAL at 14:15

## 2020-01-01 RX ADMIN — POTASSIUM CHLORIDE 500 ML: 2 INJECTION, SOLUTION, CONCENTRATE INTRAVENOUS at 08:45

## 2020-01-01 RX ADMIN — SERTRALINE 25 MG: 25 TABLET, FILM COATED ORAL at 09:55

## 2020-01-01 RX ADMIN — PHENYLEPHRINE HYDROCHLORIDE 2.3 MCG/KG/MIN: 10 INJECTION INTRAVENOUS at 21:38

## 2020-01-01 RX ADMIN — ASPIRIN 81 MG 81 MG: 81 TABLET ORAL at 12:13

## 2020-01-01 RX ADMIN — SODIUM CHLORIDE, PRESERVATIVE FREE 10 ML: 5 INJECTION INTRAVENOUS at 21:14

## 2020-01-01 RX ADMIN — PANTOPRAZOLE SODIUM 40 MG: 40 TABLET, DELAYED RELEASE ORAL at 18:03

## 2020-01-01 RX ADMIN — ALBUMIN HUMAN 50 G: 0.25 SOLUTION INTRAVENOUS at 07:32

## 2020-01-01 RX ADMIN — POTASSIUM CHLORIDE 500 ML: 2 INJECTION, SOLUTION, CONCENTRATE INTRAVENOUS at 13:11

## 2020-01-01 RX ADMIN — ALBUMIN HUMAN 12.5 G: 0.25 SOLUTION INTRAVENOUS at 13:29

## 2020-01-01 RX ADMIN — SODIUM CHLORIDE, PRESERVATIVE FREE 10 ML: 5 INJECTION INTRAVENOUS at 08:53

## 2020-01-01 RX ADMIN — HYDROCORTISONE: 1 CREAM TOPICAL at 20:56

## 2020-01-01 RX ADMIN — CEFTAZIDIME 1 G: 1 INJECTION, POWDER, FOR SOLUTION INTRAMUSCULAR; INTRAVENOUS at 18:25

## 2020-01-01 RX ADMIN — EPINEPHRINE 1 MG: 0.1 INJECTION, SOLUTION ENDOTRACHEAL; INTRACARDIAC; INTRAVENOUS at 05:59

## 2020-01-01 RX ADMIN — LACTULOSE 10 G: 10 SOLUTION ORAL at 08:37

## 2020-01-01 RX ADMIN — METOCLOPRAMIDE 5 MG: 5 TABLET ORAL at 17:12

## 2020-01-01 RX ADMIN — DOCUSATE SODIUM 100 MG: 100 CAPSULE, LIQUID FILLED ORAL at 08:40

## 2020-01-01 RX ADMIN — PHYTONADIONE 5 MG: 1 INJECTION, EMULSION INTRAMUSCULAR; INTRAVENOUS; SUBCUTANEOUS at 20:25

## 2020-01-01 RX ADMIN — MIDODRINE HYDROCHLORIDE 10 MG: 5 TABLET ORAL at 17:08

## 2020-01-01 RX ADMIN — SODIUM CHLORIDE, POTASSIUM CHLORIDE, SODIUM LACTATE AND CALCIUM CHLORIDE 100 ML/HR: 600; 310; 30; 20 INJECTION, SOLUTION INTRAVENOUS at 18:11

## 2020-01-01 RX ADMIN — BUMETANIDE 4 MG: 1 TABLET ORAL at 10:04

## 2020-01-01 RX ADMIN — MIDODRINE HYDROCHLORIDE 10 MG: 5 TABLET ORAL at 17:49

## 2020-01-01 RX ADMIN — PROPOFOL 100 MCG/KG/MIN: 10 INJECTION, EMULSION INTRAVENOUS at 11:25

## 2020-01-01 RX ADMIN — HEPARIN SODIUM 3200 UNITS: 1000 INJECTION INTRAVENOUS; SUBCUTANEOUS at 14:55

## 2020-01-01 RX ADMIN — INSULIN LISPRO 2 UNITS: 100 INJECTION, SOLUTION INTRAVENOUS; SUBCUTANEOUS at 21:18

## 2020-01-01 RX ADMIN — METOCLOPRAMIDE 5 MG: 5 TABLET ORAL at 07:46

## 2020-01-01 RX ADMIN — BUMETANIDE 4 MG: 1 TABLET ORAL at 09:07

## 2020-01-01 RX ADMIN — OXYCODONE 10 MG: 5 TABLET ORAL at 23:40

## 2020-01-01 RX ADMIN — ASPIRIN 81 MG 81 MG: 81 TABLET ORAL at 08:40

## 2020-01-01 RX ADMIN — Medication 5 ML: at 12:33

## 2020-01-01 RX ADMIN — METOCLOPRAMIDE 5 MG: 5 TABLET ORAL at 16:48

## 2020-01-01 RX ADMIN — SERTRALINE 25 MG: 25 TABLET, FILM COATED ORAL at 08:27

## 2020-01-01 RX ADMIN — POTASSIUM CHLORIDE 500 ML: 2 INJECTION, SOLUTION, CONCENTRATE INTRAVENOUS at 13:30

## 2020-01-01 RX ADMIN — ASPIRIN 81 MG 81 MG: 81 TABLET ORAL at 08:54

## 2020-01-01 RX ADMIN — OXYCODONE 10 MG: 5 TABLET ORAL at 14:11

## 2020-01-01 RX ADMIN — ASPIRIN 81 MG 81 MG: 81 TABLET ORAL at 09:25

## 2020-01-01 RX ADMIN — SODIUM CHLORIDE, POTASSIUM CHLORIDE, SODIUM LACTATE AND CALCIUM CHLORIDE 100 ML/HR: 600; 310; 30; 20 INJECTION, SOLUTION INTRAVENOUS at 03:09

## 2020-01-01 RX ADMIN — OXYCODONE 10 MG: 5 TABLET ORAL at 12:57

## 2020-01-01 RX ADMIN — ACETAMINOPHEN 650 MG: 325 TABLET, FILM COATED ORAL at 11:45

## 2020-01-01 RX ADMIN — MIDODRINE HYDROCHLORIDE 10 MG: 5 TABLET ORAL at 06:01

## 2020-01-01 RX ADMIN — THIAMINE HYDROCHLORIDE 500 MG: 100 INJECTION, SOLUTION INTRAMUSCULAR; INTRAVENOUS at 13:59

## 2020-01-01 RX ADMIN — LACTULOSE 10 G: 10 SOLUTION ORAL at 16:24

## 2020-01-01 RX ADMIN — MIDODRINE HYDROCHLORIDE 10 MG: 5 TABLET ORAL at 06:31

## 2020-01-01 RX ADMIN — METOCLOPRAMIDE 5 MG: 5 TABLET ORAL at 05:59

## 2020-01-01 RX ADMIN — METOCLOPRAMIDE 5 MG: 5 TABLET ORAL at 17:03

## 2020-01-01 RX ADMIN — OXYCODONE 10 MG: 5 TABLET ORAL at 13:14

## 2020-01-01 RX ADMIN — BUMETANIDE 4 MG: 1 TABLET ORAL at 10:00

## 2020-01-01 RX ADMIN — METHYLNALTREXONE BROMIDE 6 MG: 12 INJECTION, SOLUTION SUBCUTANEOUS at 08:49

## 2020-01-01 RX ADMIN — TAZOBACTAM SODIUM AND PIPERACILLIN SODIUM 3.38 G: 375; 3 INJECTION, SOLUTION INTRAVENOUS at 23:21

## 2020-01-01 RX ADMIN — ASPIRIN 81 MG 81 MG: 81 TABLET ORAL at 10:14

## 2020-01-01 RX ADMIN — METOCLOPRAMIDE 5 MG: 5 TABLET ORAL at 08:24

## 2020-01-01 RX ADMIN — OXYCODONE 10 MG: 5 TABLET ORAL at 14:31

## 2020-01-01 RX ADMIN — METOCLOPRAMIDE 5 MG: 5 TABLET ORAL at 12:34

## 2020-01-01 RX ADMIN — HYDROCORTISONE: 1 CREAM TOPICAL at 10:26

## 2020-01-01 RX ADMIN — PROPOFOL 100 MCG/KG/MIN: 10 INJECTION, EMULSION INTRAVENOUS at 14:32

## 2020-01-01 RX ADMIN — ACETAMINOPHEN 650 MG: 325 TABLET, FILM COATED ORAL at 20:25

## 2020-01-01 RX ADMIN — SODIUM CHLORIDE 1000 ML: 9 INJECTION, SOLUTION INTRAVENOUS at 18:40

## 2020-01-01 RX ADMIN — OXYCODONE 10 MG: 5 TABLET ORAL at 12:45

## 2020-01-01 RX ADMIN — OXYCODONE 10 MG: 5 TABLET ORAL at 06:41

## 2020-01-01 RX ADMIN — MORPHINE SULFATE 1 MG: 2 INJECTION, SOLUTION INTRAMUSCULAR; INTRAVENOUS at 22:20

## 2020-01-01 RX ADMIN — OXYCODONE 10 MG: 5 TABLET ORAL at 18:02

## 2020-01-01 RX ADMIN — LACTULOSE 10 G: 10 SOLUTION ORAL at 15:00

## 2020-01-01 RX ADMIN — METOCLOPRAMIDE 5 MG: 5 TABLET ORAL at 12:05

## 2020-01-01 RX ADMIN — METOCLOPRAMIDE 5 MG: 5 TABLET ORAL at 06:46

## 2020-01-01 RX ADMIN — METOCLOPRAMIDE 5 MG: 5 TABLET ORAL at 11:32

## 2020-01-01 RX ADMIN — MIDODRINE HYDROCHLORIDE 10 MG: 5 TABLET ORAL at 20:54

## 2020-01-01 RX ADMIN — OXYCODONE 10 MG: 5 TABLET ORAL at 18:46

## 2020-01-01 RX ADMIN — BUMETANIDE 4 MG: 1 TABLET ORAL at 11:44

## 2020-01-01 RX ADMIN — MIRTAZAPINE 15 MG: 15 TABLET, FILM COATED ORAL at 20:41

## 2020-01-01 RX ADMIN — METOCLOPRAMIDE 5 MG: 5 TABLET ORAL at 17:10

## 2020-01-01 RX ADMIN — BUMETANIDE 1 MG/HR: 0.25 INJECTION INTRAMUSCULAR; INTRAVENOUS at 10:05

## 2020-01-01 RX ADMIN — SODIUM CHLORIDE 125 ML/HR: 9 INJECTION, SOLUTION INTRAVENOUS at 03:52

## 2020-01-01 RX ADMIN — METOCLOPRAMIDE 5 MG: 5 TABLET ORAL at 07:16

## 2020-01-01 RX ADMIN — MIDODRINE HYDROCHLORIDE 10 MG: 5 TABLET ORAL at 06:45

## 2020-01-01 RX ADMIN — HEPARIN SODIUM 1600 UNITS: 1000 INJECTION INTRAVENOUS; SUBCUTANEOUS at 10:05

## 2020-01-01 RX ADMIN — LACTULOSE 10 G: 10 SOLUTION ORAL at 08:51

## 2020-01-01 RX ADMIN — LACTULOSE 10 G: 10 SOLUTION ORAL at 16:18

## 2020-01-01 RX ADMIN — SODIUM CHLORIDE 1000 ML: 9 INJECTION, SOLUTION INTRAVENOUS at 14:13

## 2020-01-01 RX ADMIN — MIRTAZAPINE 15 MG: 15 TABLET, FILM COATED ORAL at 21:38

## 2020-01-01 RX ADMIN — MIDODRINE HYDROCHLORIDE 20 MG: 5 TABLET ORAL at 09:10

## 2020-01-01 RX ADMIN — LACTULOSE 10 G: 10 SOLUTION ORAL at 15:06

## 2020-01-01 RX ADMIN — MIRTAZAPINE 15 MG: 15 TABLET, FILM COATED ORAL at 20:20

## 2020-01-01 RX ADMIN — PANTOPRAZOLE SODIUM 40 MG: 40 TABLET, DELAYED RELEASE ORAL at 08:16

## 2020-01-01 RX ADMIN — SERTRALINE 25 MG: 25 TABLET, FILM COATED ORAL at 08:51

## 2020-01-01 RX ADMIN — OXYCODONE 10 MG: 5 TABLET ORAL at 17:25

## 2020-01-01 RX ADMIN — OXYCODONE 10 MG: 5 TABLET ORAL at 07:38

## 2020-01-01 RX ADMIN — OXYCODONE 10 MG: 5 TABLET ORAL at 01:36

## 2020-01-01 RX ADMIN — OXYCODONE 10 MG: 5 TABLET ORAL at 05:34

## 2020-01-01 RX ADMIN — LACTULOSE 10 G: 10 SOLUTION ORAL at 15:35

## 2020-01-01 RX ADMIN — MIDODRINE HYDROCHLORIDE 10 MG: 5 TABLET ORAL at 06:46

## 2020-01-01 RX ADMIN — LACTULOSE 10 G: 10 SOLUTION ORAL at 20:44

## 2020-01-01 RX ADMIN — METOCLOPRAMIDE 5 MG: 5 TABLET ORAL at 17:18

## 2020-01-01 RX ADMIN — MIRTAZAPINE 15 MG: 15 TABLET, FILM COATED ORAL at 21:20

## 2020-01-01 RX ADMIN — ASPIRIN 81 MG 81 MG: 81 TABLET ORAL at 08:38

## 2020-01-01 RX ADMIN — OXYCODONE 10 MG: 5 TABLET ORAL at 07:13

## 2020-01-01 RX ADMIN — MIDODRINE HYDROCHLORIDE 10 MG: 5 TABLET ORAL at 15:29

## 2020-01-01 RX ADMIN — SODIUM CHLORIDE, PRESERVATIVE FREE 10 ML: 5 INJECTION INTRAVENOUS at 08:04

## 2020-01-01 RX ADMIN — HEPARIN SODIUM 1600 UNITS: 1000 INJECTION INTRAVENOUS; SUBCUTANEOUS at 11:06

## 2020-01-01 RX ADMIN — LACTULOSE 10 G: 10 SOLUTION ORAL at 09:09

## 2020-01-01 RX ADMIN — LACTULOSE 10 G: 10 SOLUTION ORAL at 15:58

## 2020-01-01 RX ADMIN — MIRTAZAPINE 15 MG: 15 TABLET, FILM COATED ORAL at 20:28

## 2020-01-01 RX ADMIN — POTASSIUM CHLORIDE 40 MEQ: 10 CAPSULE, COATED, EXTENDED RELEASE ORAL at 12:46

## 2020-01-01 RX ADMIN — MORPHINE SULFATE 1 MG: 2 INJECTION, SOLUTION INTRAMUSCULAR; INTRAVENOUS at 00:27

## 2020-01-01 RX ADMIN — PANTOPRAZOLE SODIUM 40 MG: 40 INJECTION, POWDER, FOR SOLUTION INTRAVENOUS at 09:08

## 2020-01-01 RX ADMIN — MIDODRINE HYDROCHLORIDE 10 MG: 5 TABLET ORAL at 14:54

## 2020-01-01 RX ADMIN — LACTULOSE 10 G: 10 SOLUTION ORAL at 09:20

## 2020-01-01 RX ADMIN — SERTRALINE 25 MG: 25 TABLET, FILM COATED ORAL at 09:25

## 2020-01-01 RX ADMIN — MIDODRINE HYDROCHLORIDE 20 MG: 5 TABLET ORAL at 17:41

## 2020-01-01 RX ADMIN — SODIUM CHLORIDE 125 ML/HR: 9 INJECTION, SOLUTION INTRAVENOUS at 12:34

## 2020-01-01 RX ADMIN — LACTULOSE 10 G: 10 SOLUTION ORAL at 20:25

## 2020-01-01 RX ADMIN — FENTANYL CITRATE 50 MCG: 50 INJECTION, SOLUTION INTRAMUSCULAR; INTRAVENOUS at 14:20

## 2020-01-01 RX ADMIN — HYDROCORTISONE: 1 CREAM TOPICAL at 09:10

## 2020-01-01 RX ADMIN — PANTOPRAZOLE SODIUM 40 MG: 40 INJECTION, POWDER, FOR SOLUTION INTRAVENOUS at 08:54

## 2020-01-01 RX ADMIN — Medication 10 ML: at 12:40

## 2020-01-01 RX ADMIN — METOCLOPRAMIDE 5 MG: 5 TABLET ORAL at 10:00

## 2020-01-01 RX ADMIN — MIRTAZAPINE 15 MG: 15 TABLET, FILM COATED ORAL at 19:50

## 2020-01-01 RX ADMIN — HEPARIN SODIUM 300 UNITS: 1000 INJECTION INTRAVENOUS; SUBCUTANEOUS at 12:42

## 2020-01-01 RX ADMIN — MIDODRINE HYDROCHLORIDE 10 MG: 5 TABLET ORAL at 22:19

## 2020-01-01 RX ADMIN — POTASSIUM CHLORIDE 40 MEQ: 10 CAPSULE, COATED, EXTENDED RELEASE ORAL at 16:37

## 2020-01-01 RX ADMIN — MIRTAZAPINE 15 MG: 15 TABLET, FILM COATED ORAL at 20:52

## 2020-01-01 RX ADMIN — METHOHEXITAL SODIUM 20 MG: 500 INJECTION, POWDER, LYOPHILIZED, FOR SOLUTION INTRAMUSCULAR; INTRAVENOUS; RECTAL at 14:20

## 2020-01-01 RX ADMIN — MORPHINE SULFATE 1 MG: 2 INJECTION, SOLUTION INTRAMUSCULAR; INTRAVENOUS at 19:44

## 2020-01-01 RX ADMIN — LACTULOSE 10 G: 10 SOLUTION ORAL at 16:48

## 2020-01-01 RX ADMIN — TAZOBACTAM SODIUM AND PIPERACILLIN SODIUM 4.5 G: 500; 4 INJECTION, SOLUTION INTRAVENOUS at 00:28

## 2020-01-01 RX ADMIN — MORPHINE SULFATE 1 MG: 2 INJECTION, SOLUTION INTRAMUSCULAR; INTRAVENOUS at 19:40

## 2020-01-01 RX ADMIN — MIRTAZAPINE 15 MG: 15 TABLET, FILM COATED ORAL at 20:50

## 2020-01-01 RX ADMIN — ONDANSETRON 4 MG: 2 INJECTION INTRAMUSCULAR; INTRAVENOUS at 20:45

## 2020-01-01 RX ADMIN — BUMETANIDE 2 MG: 1 TABLET ORAL at 08:30

## 2020-01-01 RX ADMIN — SERTRALINE HYDROCHLORIDE 25 MG: 25 TABLET ORAL at 08:16

## 2020-01-01 RX ADMIN — LACTULOSE 10 G: 10 SOLUTION ORAL at 20:28

## 2020-01-01 RX ADMIN — LACTULOSE 10 G: 10 SOLUTION ORAL at 09:59

## 2020-01-01 RX ADMIN — MORPHINE SULFATE 1 MG: 2 INJECTION, SOLUTION INTRAMUSCULAR; INTRAVENOUS at 14:26

## 2020-01-01 RX ADMIN — Medication 5 ML: at 08:02

## 2020-01-01 RX ADMIN — MIRTAZAPINE 15 MG: 15 TABLET, FILM COATED ORAL at 21:24

## 2020-01-01 RX ADMIN — MIDODRINE HYDROCHLORIDE 10 MG: 5 TABLET ORAL at 16:37

## 2020-01-01 RX ADMIN — ACETAMINOPHEN 650 MG: 325 TABLET, FILM COATED ORAL at 09:01

## 2020-01-01 RX ADMIN — OXYCODONE 10 MG: 5 TABLET ORAL at 05:05

## 2020-01-01 RX ADMIN — Medication 5 ML: at 11:17

## 2020-01-01 RX ADMIN — LACTULOSE 10 G: 10 SOLUTION ORAL at 10:05

## 2020-01-01 RX ADMIN — MORPHINE SULFATE 1 MG: 2 INJECTION, SOLUTION INTRAMUSCULAR; INTRAVENOUS at 19:52

## 2020-01-01 RX ADMIN — OXYCODONE 10 MG: 5 TABLET ORAL at 19:41

## 2020-01-01 RX ADMIN — OXYCODONE 10 MG: 5 TABLET ORAL at 23:07

## 2020-01-01 RX ADMIN — METOCLOPRAMIDE 5 MG: 5 TABLET ORAL at 17:37

## 2020-01-01 RX ADMIN — OXYCODONE 10 MG: 5 TABLET ORAL at 14:08

## 2020-01-01 RX ADMIN — Medication 5 ML: at 21:35

## 2020-01-01 RX ADMIN — OLANZAPINE 5 MG: 5 TABLET, ORALLY DISINTEGRATING ORAL at 20:21

## 2020-01-01 RX ADMIN — SODIUM BICARBONATE 50 MEQ: 84 INJECTION, SOLUTION INTRAVENOUS at 05:52

## 2020-01-01 RX ADMIN — HYDROCORTISONE SODIUM SUCCINATE 100 MG: 100 INJECTION, POWDER, FOR SOLUTION INTRAMUSCULAR; INTRAVENOUS at 09:49

## 2020-01-01 RX ADMIN — TAZOBACTAM SODIUM AND PIPERACILLIN SODIUM 4.5 G: 500; 4 INJECTION, SOLUTION INTRAVENOUS at 16:15

## 2020-01-01 RX ADMIN — HYDROCORTISONE: 1 CREAM TOPICAL at 10:36

## 2020-01-01 RX ADMIN — MORPHINE SULFATE 1 MG: 2 INJECTION, SOLUTION INTRAMUSCULAR; INTRAVENOUS at 02:10

## 2020-01-01 RX ADMIN — METOCLOPRAMIDE 5 MG: 5 TABLET ORAL at 12:01

## 2020-01-01 RX ADMIN — METOCLOPRAMIDE 5 MG: 5 TABLET ORAL at 09:25

## 2020-01-01 RX ADMIN — SERTRALINE 25 MG: 25 TABLET, FILM COATED ORAL at 07:46

## 2020-01-01 RX ADMIN — PHYTONADIONE 5 MG: 1 INJECTION, EMULSION INTRAMUSCULAR; INTRAVENOUS; SUBCUTANEOUS at 13:14

## 2020-01-01 RX ADMIN — Medication 5 ML: at 17:22

## 2020-01-01 RX ADMIN — ONDANSETRON 4 MG: 2 INJECTION INTRAMUSCULAR; INTRAVENOUS at 19:31

## 2020-01-01 RX ADMIN — METOCLOPRAMIDE 5 MG: 5 TABLET ORAL at 12:12

## 2020-01-01 RX ADMIN — OXYCODONE 10 MG: 5 TABLET ORAL at 10:10

## 2020-01-01 RX ADMIN — SUCRALFATE 1 G: 1 TABLET ORAL at 11:35

## 2020-01-01 RX ADMIN — MIDODRINE HYDROCHLORIDE 10 MG: 5 TABLET ORAL at 05:04

## 2020-01-01 RX ADMIN — MIDODRINE HYDROCHLORIDE 10 MG: 5 TABLET ORAL at 17:47

## 2020-01-01 RX ADMIN — CISPLATIN 104 MG: 1 INJECTION INTRAVENOUS at 12:02

## 2020-01-01 RX ADMIN — SODIUM CHLORIDE, PRESERVATIVE FREE 10 ML: 5 INJECTION INTRAVENOUS at 00:21

## 2020-01-01 RX ADMIN — DEXAMETHASONE SODIUM PHOSPHATE 12 MG: 4 INJECTION, SOLUTION INTRAMUSCULAR; INTRAVENOUS at 11:57

## 2020-01-01 RX ADMIN — OXYCODONE 10 MG: 5 TABLET ORAL at 11:17

## 2020-01-01 RX ADMIN — MIRTAZAPINE 15 MG: 15 TABLET, FILM COATED ORAL at 20:40

## 2020-01-01 RX ADMIN — SUCRALFATE 1 G: 1 TABLET ORAL at 21:40

## 2020-01-01 RX ADMIN — TAZOBACTAM SODIUM AND PIPERACILLIN SODIUM 4.5 G: 500; 4 INJECTION, SOLUTION INTRAVENOUS at 23:34

## 2020-01-01 RX ADMIN — OXYCODONE 10 MG: 5 TABLET ORAL at 03:17

## 2020-01-01 RX ADMIN — OXYCODONE 10 MG: 5 TABLET ORAL at 21:04

## 2020-01-01 RX ADMIN — Medication 5 ML: at 20:29

## 2020-01-01 RX ADMIN — MIDAZOLAM 1 MG: 1 INJECTION INTRAMUSCULAR; INTRAVENOUS at 12:00

## 2020-01-01 RX ADMIN — OXYCODONE 10 MG: 5 TABLET ORAL at 16:14

## 2020-01-01 RX ADMIN — DOCUSATE SODIUM 50MG AND SENNOSIDES 8.6MG 2 TABLET: 8.6; 5 TABLET, FILM COATED ORAL at 20:55

## 2020-01-01 RX ADMIN — ACETAMINOPHEN 650 MG: 325 TABLET, FILM COATED ORAL at 00:19

## 2020-01-01 RX ADMIN — PANTOPRAZOLE SODIUM 40 MG: 40 TABLET, DELAYED RELEASE ORAL at 08:52

## 2020-01-01 RX ADMIN — ASPIRIN 81 MG 81 MG: 81 TABLET ORAL at 08:16

## 2020-01-01 RX ADMIN — MIDODRINE HYDROCHLORIDE 10 MG: 5 TABLET ORAL at 06:08

## 2020-01-01 RX ADMIN — OXYCODONE 10 MG: 5 TABLET ORAL at 04:13

## 2020-01-01 RX ADMIN — PHYTONADIONE 5 MG: 10 INJECTION, EMULSION INTRAMUSCULAR; INTRAVENOUS; SUBCUTANEOUS at 15:23

## 2020-01-01 RX ADMIN — METOCLOPRAMIDE 5 MG: 5 TABLET ORAL at 18:03

## 2020-01-01 RX ADMIN — LACTULOSE 10 G: 10 SOLUTION ORAL at 17:08

## 2020-01-01 RX ADMIN — PHYTONADIONE 10 MG: 10 INJECTION, EMULSION INTRAMUSCULAR; INTRAVENOUS; SUBCUTANEOUS at 18:29

## 2020-01-01 RX ADMIN — OXYCODONE 10 MG: 5 TABLET ORAL at 00:56

## 2020-01-01 RX ADMIN — LACTULOSE 10 G: 10 SOLUTION ORAL at 13:13

## 2020-01-01 RX ADMIN — OXYCODONE 10 MG: 5 TABLET ORAL at 20:52

## 2020-01-01 RX ADMIN — PANTOPRAZOLE SODIUM 40 MG: 40 INJECTION, POWDER, FOR SOLUTION INTRAVENOUS at 20:18

## 2020-01-01 RX ADMIN — BUMETANIDE 4 MG: 1 TABLET ORAL at 10:26

## 2020-01-01 RX ADMIN — LACTULOSE 10 G: 10 SOLUTION ORAL at 17:14

## 2020-01-01 RX ADMIN — LACTULOSE 10 G: 10 SOLUTION ORAL at 20:49

## 2020-01-01 RX ADMIN — SERTRALINE 25 MG: 25 TABLET, FILM COATED ORAL at 11:05

## 2020-01-01 RX ADMIN — HYDROCORTISONE: 1 CREAM TOPICAL at 21:25

## 2020-01-01 RX ADMIN — ASPIRIN 81 MG 81 MG: 81 TABLET ORAL at 08:02

## 2020-01-01 RX ADMIN — CALCIUM CHLORIDE 1 G: 100 INJECTION INTRAVENOUS; INTRAVENTRICULAR at 05:51

## 2020-01-01 RX ADMIN — MIDODRINE HYDROCHLORIDE 10 MG: 5 TABLET ORAL at 21:38

## 2020-01-01 RX ADMIN — TAZOBACTAM SODIUM AND PIPERACILLIN SODIUM 4.5 G: 500; 4 INJECTION, SOLUTION INTRAVENOUS at 15:35

## 2020-01-01 RX ADMIN — METHYLNALTREXONE BROMIDE 6 MG: 12 INJECTION, SOLUTION SUBCUTANEOUS at 08:03

## 2020-01-01 RX ADMIN — SODIUM CHLORIDE, PRESERVATIVE FREE 10 ML: 5 INJECTION INTRAVENOUS at 20:13

## 2020-01-01 RX ADMIN — OXYCODONE 10 MG: 5 TABLET ORAL at 21:18

## 2020-01-01 RX ADMIN — MIRTAZAPINE 15 MG: 15 TABLET, FILM COATED ORAL at 20:18

## 2020-01-01 RX ADMIN — EPINEPHRINE 1 MG: 0.1 INJECTION, SOLUTION ENDOTRACHEAL; INTRACARDIAC; INTRAVENOUS at 05:52

## 2020-01-01 RX ADMIN — HYDROCORTISONE: 1 CREAM TOPICAL at 08:26

## 2020-01-01 RX ADMIN — BUMETANIDE 4 MG: 1 TABLET ORAL at 11:05

## 2020-01-01 RX ADMIN — MINERAL OIL, PETROLATUM, PHENYLEPHRINE HCL: 14; 74.9; .25 OINTMENT RECTAL at 20:58

## 2020-01-01 RX ADMIN — Medication 5 ML: at 20:50

## 2020-01-01 RX ADMIN — Medication 5 ML: at 10:08

## 2020-01-01 RX ADMIN — Medication 5 ML: at 12:47

## 2020-01-01 RX ADMIN — SUCRALFATE 1 G: 1 TABLET ORAL at 11:05

## 2020-01-01 RX ADMIN — PANTOPRAZOLE SODIUM 40 MG: 40 TABLET, DELAYED RELEASE ORAL at 08:24

## 2020-01-01 RX ADMIN — Medication 5 ML: at 11:47

## 2020-01-01 RX ADMIN — Medication 5 ML: at 17:11

## 2020-01-01 RX ADMIN — MIRTAZAPINE 15 MG: 15 TABLET, FILM COATED ORAL at 21:11

## 2020-01-01 RX ADMIN — MIDAZOLAM 2 MG: 1 INJECTION INTRAMUSCULAR; INTRAVENOUS at 11:55

## 2020-01-01 RX ADMIN — MIDODRINE HYDROCHLORIDE 10 MG: 5 TABLET ORAL at 20:26

## 2020-01-01 RX ADMIN — SODIUM CHLORIDE, PRESERVATIVE FREE 10 ML: 5 INJECTION INTRAVENOUS at 07:59

## 2020-01-01 RX ADMIN — OXYCODONE 10 MG: 5 TABLET ORAL at 00:23

## 2020-01-01 RX ADMIN — MIDAZOLAM 2 MG: 1 INJECTION INTRAMUSCULAR; INTRAVENOUS at 14:19

## 2020-01-01 RX ADMIN — HEPARIN SODIUM 2100 UNITS/ML: 1000 INJECTION INTRAVENOUS; SUBCUTANEOUS at 13:15

## 2020-01-01 RX ADMIN — PANTOPRAZOLE SODIUM 40 MG: 40 INJECTION, POWDER, FOR SOLUTION INTRAVENOUS at 20:20

## 2020-01-01 RX ADMIN — PANTOPRAZOLE SODIUM 40 MG: 40 TABLET, DELAYED RELEASE ORAL at 18:05

## 2020-01-01 RX ADMIN — LACTULOSE 10 G: 10 SOLUTION ORAL at 17:49

## 2020-01-01 RX ADMIN — Medication 5 ML: at 12:01

## 2020-01-01 RX ADMIN — LACTULOSE 10 G: 10 SOLUTION ORAL at 20:54

## 2020-01-01 RX ADMIN — METOCLOPRAMIDE 5 MG: 5 TABLET ORAL at 11:01

## 2020-01-01 RX ADMIN — SODIUM CHLORIDE 1000 ML: 9 INJECTION, SOLUTION INTRAVENOUS at 11:12

## 2020-01-01 RX ADMIN — TAZOBACTAM SODIUM AND PIPERACILLIN SODIUM 3.38 G: 375; 3 INJECTION, SOLUTION INTRAVENOUS at 14:31

## 2020-01-01 RX ADMIN — CEFTAZIDIME 1 G: 1 INJECTION, POWDER, FOR SOLUTION INTRAMUSCULAR; INTRAVENOUS at 14:53

## 2020-01-01 RX ADMIN — ONDANSETRON 4 MG: 2 INJECTION INTRAMUSCULAR; INTRAVENOUS at 09:41

## 2020-01-01 RX ADMIN — Medication 5 ML: at 17:41

## 2020-01-01 RX ADMIN — BUMETANIDE 4 MG: 1 TABLET ORAL at 08:04

## 2020-01-01 RX ADMIN — OLANZAPINE 5 MG: 5 TABLET, ORALLY DISINTEGRATING ORAL at 22:21

## 2020-01-01 RX ADMIN — PANTOPRAZOLE SODIUM 40 MG: 40 TABLET, DELAYED RELEASE ORAL at 17:47

## 2020-01-01 RX ADMIN — BUMETANIDE 4 MG: 1 TABLET ORAL at 11:27

## 2020-01-01 RX ADMIN — OXYCODONE 10 MG: 5 TABLET ORAL at 03:59

## 2020-01-01 RX ADMIN — INSULIN LISPRO 2 UNITS: 100 INJECTION, SOLUTION INTRAVENOUS; SUBCUTANEOUS at 21:14

## 2020-01-01 RX ADMIN — MAGNESIUM SULFATE 2 G: 2 INJECTION INTRAVENOUS at 12:42

## 2020-01-01 RX ADMIN — METOCLOPRAMIDE 5 MG: 5 INJECTION, SOLUTION INTRAMUSCULAR; INTRAVENOUS at 22:20

## 2020-01-01 RX ADMIN — ASPIRIN 81 MG 81 MG: 81 TABLET ORAL at 11:43

## 2020-01-01 RX ADMIN — METOCLOPRAMIDE 5 MG: 5 TABLET ORAL at 12:13

## 2020-01-01 RX ADMIN — Medication 5 ML: at 08:26

## 2020-01-01 RX ADMIN — MIDODRINE HYDROCHLORIDE 20 MG: 5 TABLET ORAL at 08:15

## 2020-01-01 RX ADMIN — MIRTAZAPINE 15 MG: 15 TABLET, FILM COATED ORAL at 19:40

## 2020-01-01 RX ADMIN — MIRTAZAPINE 15 MG: 15 TABLET, FILM COATED ORAL at 21:06

## 2020-01-01 RX ADMIN — OXYCODONE 10 MG: 5 TABLET ORAL at 15:57

## 2020-01-01 RX ADMIN — LACTULOSE 10 G: 10 SOLUTION ORAL at 08:02

## 2020-01-01 RX ADMIN — ALBUMIN HUMAN 25 G: 0.25 SOLUTION INTRAVENOUS at 10:07

## 2020-01-01 RX ADMIN — MIDODRINE HYDROCHLORIDE 10 MG: 5 TABLET ORAL at 22:10

## 2020-01-01 RX ADMIN — LIDOCAINE HYDROCHLORIDE 4 ML: 10 INJECTION, SOLUTION EPIDURAL; INFILTRATION; INTRACAUDAL; PERINEURAL at 14:05

## 2020-01-01 RX ADMIN — LACTULOSE 10 G: 10 SOLUTION ORAL at 17:44

## 2020-01-01 RX ADMIN — ONDANSETRON 4 MG: 2 INJECTION INTRAMUSCULAR; INTRAVENOUS at 19:25

## 2020-01-01 RX ADMIN — OXYCODONE 10 MG: 5 TABLET ORAL at 16:59

## 2020-01-01 RX ADMIN — LACTULOSE 10 G: 10 SOLUTION ORAL at 15:41

## 2020-01-01 RX ADMIN — Medication 5 ML: at 21:52

## 2020-01-01 RX ADMIN — MIDAZOLAM 2 MG: 1 INJECTION INTRAMUSCULAR; INTRAVENOUS at 14:13

## 2020-01-01 RX ADMIN — SODIUM CHLORIDE, PRESERVATIVE FREE 10 ML: 5 INJECTION INTRAVENOUS at 09:54

## 2020-01-01 RX ADMIN — OXYCODONE 10 MG: 5 TABLET ORAL at 08:30

## 2020-01-01 RX ADMIN — MIDODRINE HYDROCHLORIDE 10 MG: 5 TABLET ORAL at 08:23

## 2020-01-01 RX ADMIN — MIRTAZAPINE 15 MG: 15 TABLET, FILM COATED ORAL at 19:59

## 2020-01-01 RX ADMIN — SODIUM CHLORIDE 125 ML/HR: 9 INJECTION, SOLUTION INTRAVENOUS at 16:57

## 2020-01-01 RX ADMIN — LACTULOSE 10 G: 10 SOLUTION ORAL at 14:28

## 2020-01-01 RX ADMIN — ASPIRIN 81 MG 81 MG: 81 TABLET ORAL at 11:26

## 2020-01-01 RX ADMIN — SODIUM CHLORIDE, PRESERVATIVE FREE 10 ML: 5 INJECTION INTRAVENOUS at 20:19

## 2020-01-01 RX ADMIN — MIDODRINE HYDROCHLORIDE 10 MG: 5 TABLET ORAL at 06:56

## 2020-01-01 RX ADMIN — ASPIRIN 81 MG 81 MG: 81 TABLET ORAL at 08:49

## 2020-01-01 RX ADMIN — ONDANSETRON 4 MG: 2 INJECTION INTRAMUSCULAR; INTRAVENOUS at 13:28

## 2020-01-01 RX ADMIN — MIDODRINE HYDROCHLORIDE 10 MG: 5 TABLET ORAL at 05:26

## 2020-01-01 RX ADMIN — Medication 5 ML: at 07:15

## 2020-01-01 RX ADMIN — MIDODRINE HYDROCHLORIDE 10 MG: 5 TABLET ORAL at 21:23

## 2020-01-01 RX ADMIN — BUMETANIDE 4 MG: 1 TABLET ORAL at 08:53

## 2020-01-01 RX ADMIN — PANTOPRAZOLE SODIUM 40 MG: 40 INJECTION, POWDER, FOR SOLUTION INTRAVENOUS at 21:40

## 2020-01-01 RX ADMIN — SODIUM CHLORIDE, PRESERVATIVE FREE 10 ML: 5 INJECTION INTRAVENOUS at 20:24

## 2020-01-01 RX ADMIN — HYDROCORTISONE: 1 CREAM TOPICAL at 20:29

## 2020-01-01 RX ADMIN — HYDROCORTISONE: 1 CREAM TOPICAL at 21:09

## 2020-01-01 RX ADMIN — MIDODRINE HYDROCHLORIDE 10 MG: 5 TABLET ORAL at 13:57

## 2020-01-01 RX ADMIN — METOCLOPRAMIDE 5 MG: 5 TABLET ORAL at 13:14

## 2020-01-01 RX ADMIN — MIDODRINE HYDROCHLORIDE 10 MG: 5 TABLET ORAL at 06:37

## 2020-01-01 RX ADMIN — OXYCODONE 10 MG: 5 TABLET ORAL at 08:15

## 2020-01-01 RX ADMIN — PANTOPRAZOLE SODIUM 40 MG: 40 INJECTION, POWDER, FOR SOLUTION INTRAVENOUS at 08:00

## 2020-01-01 RX ADMIN — MIDODRINE HYDROCHLORIDE 20 MG: 5 TABLET ORAL at 08:05

## 2020-01-01 RX ADMIN — HEPARIN SODIUM 1600 UNITS: 1000 INJECTION INTRAVENOUS; SUBCUTANEOUS at 12:24

## 2020-01-01 RX ADMIN — METHYLNALTREXONE BROMIDE 6 MG: 12 INJECTION, SOLUTION SUBCUTANEOUS at 18:39

## 2020-01-01 RX ADMIN — SULFUR HEXAFLUORIDE 2 ML: KIT at 09:45

## 2020-01-01 RX ADMIN — OLANZAPINE 5 MG: 5 TABLET, ORALLY DISINTEGRATING ORAL at 20:53

## 2020-01-01 RX ADMIN — SERTRALINE 25 MG: 25 TABLET, FILM COATED ORAL at 18:40

## 2020-01-01 RX ADMIN — HEPARIN SODIUM 1600 UNITS: 1000 INJECTION INTRAVENOUS; SUBCUTANEOUS at 15:50

## 2020-01-01 RX ADMIN — SUCRALFATE 1 G: 1 TABLET ORAL at 17:36

## 2020-01-01 RX ADMIN — SODIUM CHLORIDE: 9 INJECTION, SOLUTION INTRAVENOUS at 14:29

## 2020-01-01 RX ADMIN — SERTRALINE 25 MG: 25 TABLET, FILM COATED ORAL at 12:25

## 2020-01-01 RX ADMIN — ALBUMIN HUMAN 50 G: 0.25 SOLUTION INTRAVENOUS at 12:58

## 2020-01-01 RX ADMIN — TAZOBACTAM SODIUM AND PIPERACILLIN SODIUM 3.38 G: 375; 3 INJECTION, SOLUTION INTRAVENOUS at 20:19

## 2020-01-01 RX ADMIN — METOCLOPRAMIDE 5 MG: 5 TABLET ORAL at 16:58

## 2020-01-01 RX ADMIN — DOCUSATE SODIUM 50MG AND SENNOSIDES 8.6MG 2 TABLET: 8.6; 5 TABLET, FILM COATED ORAL at 12:34

## 2020-01-01 RX ADMIN — OXYCODONE 10 MG: 5 TABLET ORAL at 03:21

## 2020-01-01 RX ADMIN — ASPIRIN 81 MG 81 MG: 81 TABLET ORAL at 11:04

## 2020-01-01 RX ADMIN — TAZOBACTAM SODIUM AND PIPERACILLIN SODIUM 3.38 G: 375; 3 INJECTION, SOLUTION INTRAVENOUS at 20:21

## 2020-01-01 RX ADMIN — MIRTAZAPINE 15 MG: 15 TABLET, FILM COATED ORAL at 21:54

## 2020-01-01 RX ADMIN — OLANZAPINE 5 MG: 5 TABLET, ORALLY DISINTEGRATING ORAL at 20:57

## 2020-01-01 RX ADMIN — OXYCODONE 10 MG: 5 TABLET ORAL at 21:57

## 2020-01-01 RX ADMIN — PANTOPRAZOLE SODIUM 40 MG: 40 TABLET, DELAYED RELEASE ORAL at 17:18

## 2020-01-01 RX ADMIN — SODIUM CHLORIDE, PRESERVATIVE FREE 10 ML: 5 INJECTION INTRAVENOUS at 20:14

## 2020-01-01 RX ADMIN — MIDAZOLAM 1 MG: 1 INJECTION INTRAMUSCULAR; INTRAVENOUS at 14:25

## 2020-01-01 RX ADMIN — PANTOPRAZOLE SODIUM 40 MG: 40 INJECTION, POWDER, FOR SOLUTION INTRAVENOUS at 08:16

## 2020-01-01 RX ADMIN — POTASSIUM CHLORIDE 40 MEQ: 10 CAPSULE, COATED, EXTENDED RELEASE ORAL at 11:17

## 2020-01-01 RX ADMIN — HYDROCORTISONE 1 APPLICATION: 1 CREAM TOPICAL at 19:40

## 2020-01-01 RX ADMIN — METOCLOPRAMIDE 5 MG: 5 TABLET ORAL at 08:02

## 2020-01-01 RX ADMIN — Medication 5 ML: at 11:50

## 2020-01-01 RX ADMIN — METOCLOPRAMIDE 5 MG: 5 INJECTION, SOLUTION INTRAMUSCULAR; INTRAVENOUS at 03:50

## 2020-01-01 RX ADMIN — MIDODRINE HYDROCHLORIDE 20 MG: 5 TABLET ORAL at 18:01

## 2020-01-01 RX ADMIN — HYDROCORTISONE ACETATE 25 MG: 25 SUPPOSITORY RECTAL at 20:55

## 2020-01-01 RX ADMIN — METHOHEXITAL SODIUM 20 MG: 500 INJECTION, POWDER, LYOPHILIZED, FOR SOLUTION INTRAMUSCULAR; INTRAVENOUS; RECTAL at 14:15

## 2020-01-01 RX ADMIN — MIDODRINE HYDROCHLORIDE 10 MG: 5 TABLET ORAL at 08:04

## 2020-01-01 RX ADMIN — METOCLOPRAMIDE 5 MG: 5 TABLET ORAL at 11:50

## 2020-01-01 RX ADMIN — OXYCODONE 10 MG: 5 TABLET ORAL at 23:10

## 2020-01-01 RX ADMIN — SODIUM CHLORIDE 150 MG: 9 INJECTION, SOLUTION INTRAVENOUS at 09:48

## 2020-01-01 RX ADMIN — Medication 5 ML: at 19:24

## 2020-01-01 RX ADMIN — OXYCODONE 10 MG: 5 TABLET ORAL at 14:28

## 2020-01-01 RX ADMIN — HEPARIN SODIUM 1600 UNITS: 1000 INJECTION INTRAVENOUS; SUBCUTANEOUS at 11:27

## 2020-01-01 RX ADMIN — ONDANSETRON 4 MG: 2 INJECTION INTRAMUSCULAR; INTRAVENOUS at 09:10

## 2020-01-01 RX ADMIN — SODIUM CHLORIDE, PRESERVATIVE FREE 10 ML: 5 INJECTION INTRAVENOUS at 21:40

## 2020-01-01 RX ADMIN — BUMETANIDE 2 MG: 0.25 INJECTION INTRAMUSCULAR; INTRAVENOUS at 11:32

## 2020-01-01 RX ADMIN — PANTOPRAZOLE SODIUM 40 MG: 40 TABLET, DELAYED RELEASE ORAL at 06:47

## 2020-01-01 RX ADMIN — DEXAMETHASONE SODIUM PHOSPHATE 12 MG: 4 INJECTION, SOLUTION INTRAMUSCULAR; INTRAVENOUS at 10:46

## 2020-01-01 RX ADMIN — ALBUMIN HUMAN 12.5 G: 0.25 SOLUTION INTRAVENOUS at 13:38

## 2020-01-01 RX ADMIN — TAZOBACTAM SODIUM AND PIPERACILLIN SODIUM 4.5 G: 500; 4 INJECTION, SOLUTION INTRAVENOUS at 08:55

## 2020-01-01 RX ADMIN — SODIUM CHLORIDE, PRESERVATIVE FREE 10 ML: 5 INJECTION INTRAVENOUS at 08:57

## 2020-01-01 RX ADMIN — ASPIRIN 81 MG 81 MG: 81 TABLET ORAL at 12:34

## 2020-01-01 RX ADMIN — OXYCODONE 10 MG: 5 TABLET ORAL at 13:00

## 2020-01-01 RX ADMIN — Medication 10 ML: at 06:56

## 2020-01-01 RX ADMIN — MORPHINE SULFATE 1 MG: 2 INJECTION, SOLUTION INTRAMUSCULAR; INTRAVENOUS at 21:55

## 2020-01-01 RX ADMIN — SERTRALINE 25 MG: 25 TABLET, FILM COATED ORAL at 08:06

## 2020-01-01 RX ADMIN — MIRTAZAPINE 15 MG: 15 TABLET, FILM COATED ORAL at 20:30

## 2020-01-01 RX ADMIN — MIRTAZAPINE 15 MG: 15 TABLET, FILM COATED ORAL at 20:24

## 2020-01-01 RX ADMIN — MIDODRINE HYDROCHLORIDE 10 MG: 5 TABLET ORAL at 12:52

## 2020-01-01 RX ADMIN — Medication 5 ML: at 22:24

## 2020-01-01 RX ADMIN — MIDODRINE HYDROCHLORIDE 10 MG: 5 TABLET ORAL at 06:03

## 2020-01-01 RX ADMIN — PANTOPRAZOLE SODIUM 40 MG: 40 TABLET, DELAYED RELEASE ORAL at 10:05

## 2020-01-01 RX ADMIN — Medication 5 ML: at 20:56

## 2020-01-01 RX ADMIN — SODIUM CHLORIDE 1000 ML: 9 INJECTION, SOLUTION INTRAVENOUS at 09:20

## 2020-01-01 RX ADMIN — Medication 5 ML: at 14:09

## 2020-01-01 RX ADMIN — TAZOBACTAM SODIUM AND PIPERACILLIN SODIUM 3.38 G: 375; 3 INJECTION, SOLUTION INTRAVENOUS at 08:49

## 2020-01-01 RX ADMIN — METOCLOPRAMIDE 5 MG: 5 TABLET ORAL at 17:08

## 2020-01-01 RX ADMIN — TAZOBACTAM SODIUM AND PIPERACILLIN SODIUM 4.5 G: 500; 4 INJECTION, SOLUTION INTRAVENOUS at 09:08

## 2020-01-01 RX ADMIN — THIAMINE HYDROCHLORIDE 250 MG: 100 INJECTION, SOLUTION INTRAMUSCULAR; INTRAVENOUS at 17:10

## 2020-01-01 RX ADMIN — OXYCODONE 10 MG: 5 TABLET ORAL at 12:49

## 2020-01-01 RX ADMIN — Medication 5 ML: at 09:53

## 2020-01-01 RX ADMIN — ALBUMIN HUMAN 25 G: 0.25 SOLUTION INTRAVENOUS at 11:38

## 2020-01-01 RX ADMIN — PANTOPRAZOLE SODIUM 40 MG: 40 TABLET, DELAYED RELEASE ORAL at 07:46

## 2020-01-01 RX ADMIN — ALBUMIN HUMAN 25 G: 0.25 SOLUTION INTRAVENOUS at 08:21

## 2020-01-01 RX ADMIN — SERTRALINE 25 MG: 25 TABLET, FILM COATED ORAL at 08:24

## 2020-01-01 RX ADMIN — LACTULOSE 10 G: 10 SOLUTION ORAL at 08:15

## 2020-01-01 RX ADMIN — PANTOPRAZOLE SODIUM 40 MG: 40 TABLET, DELAYED RELEASE ORAL at 06:31

## 2020-01-01 RX ADMIN — SODIUM CHLORIDE 150 MG: 9 INJECTION, SOLUTION INTRAVENOUS at 11:56

## 2020-01-01 RX ADMIN — ALBUMIN HUMAN 12.5 G: 0.25 SOLUTION INTRAVENOUS at 13:39

## 2020-01-01 RX ADMIN — ASPIRIN 81 MG 81 MG: 81 TABLET ORAL at 09:07

## 2020-01-01 RX ADMIN — HYDROCORTISONE: 1 CREAM TOPICAL at 21:40

## 2020-01-01 RX ADMIN — MIDODRINE HYDROCHLORIDE 10 MG: 5 TABLET ORAL at 20:41

## 2020-01-01 RX ADMIN — MIDODRINE HYDROCHLORIDE 10 MG: 5 TABLET ORAL at 05:24

## 2020-01-01 RX ADMIN — OXYCODONE 10 MG: 5 TABLET ORAL at 17:06

## 2020-01-01 RX ADMIN — TAZOBACTAM SODIUM AND PIPERACILLIN SODIUM 3.38 G: 375; 3 INJECTION, SOLUTION INTRAVENOUS at 08:43

## 2020-01-01 RX ADMIN — METOCLOPRAMIDE 5 MG: 5 TABLET ORAL at 06:33

## 2020-01-01 RX ADMIN — INSULIN LISPRO 2 UNITS: 100 INJECTION, SOLUTION INTRAVENOUS; SUBCUTANEOUS at 20:58

## 2020-01-01 RX ADMIN — ASPIRIN 81 MG 81 MG: 81 TABLET ORAL at 09:21

## 2020-01-01 RX ADMIN — MIDODRINE HYDROCHLORIDE 10 MG: 5 TABLET ORAL at 06:49

## 2020-01-01 RX ADMIN — SERTRALINE 25 MG: 25 TABLET, FILM COATED ORAL at 08:38

## 2020-01-01 RX ADMIN — LACTULOSE 10 G: 10 SOLUTION ORAL at 19:53

## 2020-01-01 RX ADMIN — SODIUM CHLORIDE, PRESERVATIVE FREE 10 ML: 5 INJECTION INTRAVENOUS at 20:54

## 2020-01-01 RX ADMIN — CISPLATIN 104 MG: 1 INJECTION INTRAVENOUS at 12:23

## 2020-01-01 RX ADMIN — VANCOMYCIN HYDROCHLORIDE 2000 MG: 10 INJECTION, POWDER, LYOPHILIZED, FOR SOLUTION INTRAVENOUS at 16:56

## 2020-01-01 RX ADMIN — SERTRALINE 25 MG: 25 TABLET, FILM COATED ORAL at 08:02

## 2020-01-01 RX ADMIN — HEPARIN SODIUM 1600 UNITS: 1000 INJECTION INTRAVENOUS; SUBCUTANEOUS at 12:15

## 2020-01-01 RX ADMIN — POTASSIUM CHLORIDE 40 MEQ: 10 CAPSULE, COATED, EXTENDED RELEASE ORAL at 05:59

## 2020-01-01 RX ADMIN — OXYCODONE 10 MG: 5 TABLET ORAL at 21:56

## 2020-01-01 RX ADMIN — PANTOPRAZOLE SODIUM 40 MG: 40 TABLET, DELAYED RELEASE ORAL at 16:48

## 2020-01-01 RX ADMIN — PHENYLEPHRINE HYDROCHLORIDE 1.4 MCG/KG/MIN: 10 INJECTION INTRAVENOUS at 11:00

## 2020-01-01 RX ADMIN — OXYCODONE 10 MG: 5 TABLET ORAL at 00:38

## 2020-01-01 RX ADMIN — ALBUMIN HUMAN 25 G: 0.05 INJECTION, SOLUTION INTRAVENOUS at 09:10

## 2020-01-01 RX ADMIN — OLANZAPINE 5 MG: 5 TABLET, ORALLY DISINTEGRATING ORAL at 20:10

## 2020-01-01 RX ADMIN — CEFTAZIDIME 1 G: 1 INJECTION, POWDER, FOR SOLUTION INTRAMUSCULAR; INTRAVENOUS at 12:17

## 2020-01-01 RX ADMIN — ALBUMIN HUMAN 25 G: 0.05 INJECTION, SOLUTION INTRAVENOUS at 16:02

## 2020-01-01 RX ADMIN — MIRTAZAPINE 15 MG: 15 TABLET, FILM COATED ORAL at 19:53

## 2020-01-01 RX ADMIN — LACTULOSE 10 G: 10 SOLUTION ORAL at 15:39

## 2020-01-01 RX ADMIN — SODIUM CHLORIDE, PRESERVATIVE FREE 10 ML: 5 INJECTION INTRAVENOUS at 09:08

## 2020-01-01 RX ADMIN — HYDROCORTISONE: 1 CREAM TOPICAL at 08:52

## 2020-01-01 RX ADMIN — HYDROCORTISONE: 1 CREAM TOPICAL at 11:45

## 2020-01-01 RX ADMIN — OXYCODONE 10 MG: 5 TABLET ORAL at 09:36

## 2020-01-01 RX ADMIN — PROPOFOL 50 MG: 10 INJECTION, EMULSION INTRAVENOUS at 11:25

## 2020-01-01 RX ADMIN — LACTULOSE 10 G: 10 SOLUTION ORAL at 16:50

## 2020-01-01 RX ADMIN — PHYTONADIONE 5 MG: 10 INJECTION, EMULSION INTRAMUSCULAR; INTRAVENOUS; SUBCUTANEOUS at 13:17

## 2020-01-01 RX ADMIN — ASPIRIN 81 MG 81 MG: 81 TABLET ORAL at 18:39

## 2020-01-01 RX ADMIN — MIDODRINE HYDROCHLORIDE 10 MG: 5 TABLET ORAL at 09:26

## 2020-01-01 RX ADMIN — BUMETANIDE 4 MG: 1 TABLET ORAL at 12:24

## 2020-01-01 RX ADMIN — MIDODRINE HYDROCHLORIDE 10 MG: 5 TABLET ORAL at 14:25

## 2020-01-01 RX ADMIN — LACTULOSE 10 G: 20 SOLUTION ORAL at 20:04

## 2020-01-01 RX ADMIN — THIAMINE HYDROCHLORIDE 500 MG: 100 INJECTION, SOLUTION INTRAMUSCULAR; INTRAVENOUS at 05:55

## 2020-01-01 RX ADMIN — LACTULOSE 10 G: 10 SOLUTION ORAL at 08:26

## 2020-01-01 RX ADMIN — METOCLOPRAMIDE 5 MG: 5 TABLET ORAL at 10:04

## 2020-01-01 RX ADMIN — Medication 5 ML: at 16:48

## 2020-01-01 RX ADMIN — SERTRALINE 25 MG: 25 TABLET, FILM COATED ORAL at 08:30

## 2020-01-01 RX ADMIN — LIDOCAINE HYDROCHLORIDE 6 ML: 10 INJECTION, SOLUTION EPIDURAL; INFILTRATION; INTRACAUDAL; PERINEURAL at 12:42

## 2020-01-01 RX ADMIN — FENTANYL CITRATE 50 MCG: 50 INJECTION, SOLUTION INTRAMUSCULAR; INTRAVENOUS at 12:23

## 2020-01-01 RX ADMIN — LACTULOSE 10 G: 10 SOLUTION ORAL at 19:40

## 2020-01-01 RX ADMIN — OXYCODONE 10 MG: 5 TABLET ORAL at 22:04

## 2020-01-01 RX ADMIN — PALONOSETRON HYDROCHLORIDE 0.25 MG: 0.25 INJECTION INTRAVENOUS at 11:34

## 2020-01-01 RX ADMIN — ALBUMIN HUMAN 50 G: 0.25 SOLUTION INTRAVENOUS at 15:44

## 2020-01-01 RX ADMIN — LACTULOSE 10 G: 10 SOLUTION ORAL at 20:21

## 2020-01-01 RX ADMIN — MORPHINE SULFATE 1 MG: 2 INJECTION, SOLUTION INTRAMUSCULAR; INTRAVENOUS at 14:22

## 2020-01-01 RX ADMIN — MIDODRINE HYDROCHLORIDE 10 MG: 5 TABLET ORAL at 21:43

## 2020-01-01 RX ADMIN — POTASSIUM CHLORIDE 40 MEQ: 10 CAPSULE, COATED, EXTENDED RELEASE ORAL at 08:16

## 2020-01-01 RX ADMIN — OXYCODONE 10 MG: 5 TABLET ORAL at 21:08

## 2020-01-01 RX ADMIN — LACTULOSE 10 G: 10 SOLUTION ORAL at 15:01

## 2020-01-01 RX ADMIN — LACTULOSE 10 G: 10 SOLUTION ORAL at 14:56

## 2020-01-01 RX ADMIN — SODIUM CHLORIDE 150 MG: 9 INJECTION, SOLUTION INTRAVENOUS at 11:36

## 2020-01-01 RX ADMIN — LACTULOSE 10 G: 10 SOLUTION ORAL at 08:21

## 2020-01-01 RX ADMIN — Medication 10 ML: at 08:38

## 2020-01-01 RX ADMIN — SODIUM CHLORIDE, PRESERVATIVE FREE 10 ML: 5 INJECTION INTRAVENOUS at 10:26

## 2020-01-01 RX ADMIN — TAZOBACTAM SODIUM AND PIPERACILLIN SODIUM 3.38 G: 375; 3 INJECTION, SOLUTION INTRAVENOUS at 14:18

## 2020-01-01 RX ADMIN — Medication 5 ML: at 11:45

## 2020-01-01 RX ADMIN — MIDODRINE HYDROCHLORIDE 10 MG: 5 TABLET ORAL at 05:21

## 2020-01-01 RX ADMIN — METOCLOPRAMIDE 5 MG: 5 TABLET ORAL at 12:45

## 2020-01-01 RX ADMIN — SODIUM CHLORIDE, PRESERVATIVE FREE 10 ML: 5 INJECTION INTRAVENOUS at 20:43

## 2020-01-01 RX ADMIN — METOCLOPRAMIDE 5 MG: 5 INJECTION, SOLUTION INTRAMUSCULAR; INTRAVENOUS at 15:35

## 2020-01-01 RX ADMIN — SODIUM CHLORIDE, PRESERVATIVE FREE 10 ML: 5 INJECTION INTRAVENOUS at 20:33

## 2020-01-01 RX ADMIN — SERTRALINE 25 MG: 25 TABLET, FILM COATED ORAL at 11:50

## 2020-01-01 RX ADMIN — SODIUM CHLORIDE, PRESERVATIVE FREE 10 ML: 5 INJECTION INTRAVENOUS at 08:50

## 2020-01-01 RX ADMIN — PANTOPRAZOLE SODIUM 40 MG: 40 TABLET, DELAYED RELEASE ORAL at 17:03

## 2020-01-01 RX ADMIN — OXYCODONE 10 MG: 5 TABLET ORAL at 07:53

## 2020-01-01 RX ADMIN — PANTOPRAZOLE SODIUM 40 MG: 40 TABLET, DELAYED RELEASE ORAL at 11:05

## 2020-01-01 RX ADMIN — Medication 10 ML: at 20:56

## 2020-01-01 RX ADMIN — MIDODRINE HYDROCHLORIDE 10 MG: 5 TABLET ORAL at 11:50

## 2020-01-01 RX ADMIN — Medication 5 ML: at 21:15

## 2020-01-01 RX ADMIN — HYDROCORTISONE 1 APPLICATION: 1 CREAM TOPICAL at 09:20

## 2020-01-01 RX ADMIN — MIDODRINE HYDROCHLORIDE 10 MG: 5 TABLET ORAL at 14:40

## 2020-01-01 RX ADMIN — INSULIN LISPRO 2 UNITS: 100 INJECTION, SOLUTION INTRAVENOUS; SUBCUTANEOUS at 07:45

## 2020-01-01 RX ADMIN — BUMETANIDE 2 MG: 1 TABLET ORAL at 15:01

## 2020-01-01 RX ADMIN — MIDODRINE HYDROCHLORIDE 10 MG: 5 TABLET ORAL at 16:38

## 2020-01-01 RX ADMIN — Medication 5 ML: at 12:12

## 2020-01-01 RX ADMIN — HYDROCORTISONE: 1 CREAM TOPICAL at 20:41

## 2020-01-01 RX ADMIN — METOCLOPRAMIDE 5 MG: 5 TABLET ORAL at 12:25

## 2020-01-01 RX ADMIN — OXYCODONE 10 MG: 5 TABLET ORAL at 08:51

## 2020-01-01 RX ADMIN — PANTOPRAZOLE SODIUM 40 MG: 40 TABLET, DELAYED RELEASE ORAL at 18:33

## 2020-01-01 RX ADMIN — SUCRALFATE 1 G: 1 TABLET ORAL at 09:08

## 2020-01-01 RX ADMIN — ACETAMINOPHEN 650 MG: 325 TABLET, FILM COATED ORAL at 08:05

## 2020-01-01 RX ADMIN — MIDODRINE HYDROCHLORIDE 10 MG: 5 TABLET ORAL at 12:53

## 2020-01-01 RX ADMIN — METOCLOPRAMIDE 5 MG: 5 TABLET ORAL at 12:30

## 2020-01-01 RX ADMIN — LACTULOSE 10 G: 10 SOLUTION ORAL at 07:45

## 2020-01-01 RX ADMIN — FENTANYL CITRATE 100 MCG: 50 INJECTION, SOLUTION INTRAMUSCULAR; INTRAVENOUS at 14:17

## 2020-01-01 RX ADMIN — POTASSIUM CHLORIDE 500 ML: 2 INJECTION, SOLUTION, CONCENTRATE INTRAVENOUS at 10:28

## 2020-01-01 RX ADMIN — LACTULOSE 10 G: 10 SOLUTION ORAL at 20:17

## 2020-01-01 RX ADMIN — POTASSIUM CHLORIDE 1000 ML: 2 INJECTION, SOLUTION, CONCENTRATE INTRAVENOUS at 10:40

## 2020-01-01 RX ADMIN — Medication 5 ML: at 09:33

## 2020-01-01 RX ADMIN — MORPHINE SULFATE 1 MG: 2 INJECTION, SOLUTION INTRAMUSCULAR; INTRAVENOUS at 13:40

## 2020-01-01 RX ADMIN — OXYCODONE 10 MG: 5 TABLET ORAL at 12:47

## 2020-01-01 RX ADMIN — TAZOBACTAM SODIUM AND PIPERACILLIN SODIUM 3.38 G: 375; 3 INJECTION, SOLUTION INTRAVENOUS at 22:21

## 2020-01-01 RX ADMIN — SERTRALINE 25 MG: 25 TABLET, FILM COATED ORAL at 08:52

## 2020-01-01 RX ADMIN — Medication 5 ML: at 21:43

## 2020-01-01 RX ADMIN — PANTOPRAZOLE SODIUM 40 MG: 40 TABLET, DELAYED RELEASE ORAL at 08:32

## 2020-01-01 RX ADMIN — LACTULOSE 10 G: 10 SOLUTION ORAL at 08:54

## 2020-01-01 RX ADMIN — PANTOPRAZOLE SODIUM 40 MG: 40 TABLET, DELAYED RELEASE ORAL at 07:16

## 2020-01-01 RX ADMIN — MICAFUNGIN SODIUM 100 MG: 100 INJECTION, POWDER, LYOPHILIZED, FOR SOLUTION INTRAVENOUS at 22:44

## 2020-01-01 RX ADMIN — ONDANSETRON 4 MG: 2 INJECTION INTRAMUSCULAR; INTRAVENOUS at 20:21

## 2020-01-01 RX ADMIN — MIRTAZAPINE 15 MG: 15 TABLET, FILM COATED ORAL at 20:34

## 2020-01-01 RX ADMIN — BARIUM SULFATE 20 ML: 400 PASTE ORAL at 11:10

## 2020-01-01 RX ADMIN — MIDODRINE HYDROCHLORIDE 10 MG: 5 TABLET ORAL at 17:02

## 2020-01-01 RX ADMIN — HYDROCORTISONE: 1 CREAM TOPICAL at 08:04

## 2020-01-01 RX ADMIN — OXYCODONE 10 MG: 5 TABLET ORAL at 02:00

## 2020-01-01 RX ADMIN — SERTRALINE 25 MG: 25 TABLET, FILM COATED ORAL at 08:26

## 2020-01-01 RX ADMIN — OXYCODONE 10 MG: 5 TABLET ORAL at 06:09

## 2020-01-01 RX ADMIN — SODIUM CHLORIDE 1000 ML: 9 INJECTION, SOLUTION INTRAVENOUS at 20:17

## 2020-01-01 RX ADMIN — MIDODRINE HYDROCHLORIDE 10 MG: 5 TABLET ORAL at 12:41

## 2020-01-01 RX ADMIN — HYDROCORTISONE: 1 CREAM TOPICAL at 20:25

## 2020-01-01 RX ADMIN — ALBUMIN (HUMAN) 250 ML: 12.5 SOLUTION INTRAVENOUS at 23:45

## 2020-01-01 RX ADMIN — Medication 5 ML: at 11:06

## 2020-01-01 RX ADMIN — MIDODRINE HYDROCHLORIDE 10 MG: 5 TABLET ORAL at 13:18

## 2020-01-01 RX ADMIN — POTASSIUM CHLORIDE 40 MEQ: 10 CAPSULE, COATED, EXTENDED RELEASE ORAL at 17:46

## 2020-01-01 RX ADMIN — LACTULOSE 10 G: 10 SOLUTION ORAL at 17:00

## 2020-01-01 RX ADMIN — PANTOPRAZOLE SODIUM 40 MG: 40 INJECTION, POWDER, FOR SOLUTION INTRAVENOUS at 20:22

## 2020-01-01 RX ADMIN — SODIUM CHLORIDE, PRESERVATIVE FREE 10 ML: 5 INJECTION INTRAVENOUS at 08:40

## 2020-01-01 RX ADMIN — MIDODRINE HYDROCHLORIDE 10 MG: 5 TABLET ORAL at 12:12

## 2020-01-01 RX ADMIN — SUCRALFATE 1 G: 1 TABLET ORAL at 16:35

## 2020-01-01 RX ADMIN — METOCLOPRAMIDE 5 MG: 5 TABLET ORAL at 18:13

## 2020-01-01 RX ADMIN — LIDOCAINE 1 PATCH: 50 PATCH CUTANEOUS at 23:03

## 2020-01-01 RX ADMIN — LIDOCAINE HYDROCHLORIDE 50 MG: 10 INJECTION, SOLUTION EPIDURAL; INFILTRATION; INTRACAUDAL; PERINEURAL at 14:32

## 2020-01-01 RX ADMIN — ACETAMINOPHEN 650 MG: 325 TABLET, FILM COATED ORAL at 17:11

## 2020-01-01 RX ADMIN — LACTULOSE 10 G: 10 SOLUTION ORAL at 17:12

## 2020-01-01 RX ADMIN — LIDOCAINE HYDROCHLORIDE 50 MG: 10 INJECTION, SOLUTION EPIDURAL; INFILTRATION; INTRACAUDAL; PERINEURAL at 11:25

## 2020-01-01 RX ADMIN — INSULIN LISPRO 2 UNITS: 100 INJECTION, SOLUTION INTRAVENOUS; SUBCUTANEOUS at 12:05

## 2020-01-01 RX ADMIN — OXYCODONE 10 MG: 5 TABLET ORAL at 14:56

## 2020-01-01 RX ADMIN — MIDODRINE HYDROCHLORIDE 10 MG: 5 TABLET ORAL at 21:01

## 2020-01-01 RX ADMIN — PANTOPRAZOLE SODIUM 40 MG: 40 TABLET, DELAYED RELEASE ORAL at 17:41

## 2020-01-01 RX ADMIN — MIDODRINE HYDROCHLORIDE 10 MG: 5 TABLET ORAL at 14:51

## 2020-01-01 RX ADMIN — SODIUM CHLORIDE, PRESERVATIVE FREE 10 ML: 5 INJECTION INTRAVENOUS at 12:35

## 2020-01-01 RX ADMIN — OXYCODONE 10 MG: 5 TABLET ORAL at 08:23

## 2020-01-01 RX ADMIN — BUMETANIDE 2 MG: 0.25 INJECTION INTRAMUSCULAR; INTRAVENOUS at 12:43

## 2020-01-01 RX ADMIN — LACTULOSE 10 G: 10 SOLUTION ORAL at 16:38

## 2020-01-01 RX ADMIN — OXYCODONE 10 MG: 5 TABLET ORAL at 14:03

## 2020-01-01 RX ADMIN — ASPIRIN 81 MG 81 MG: 81 TABLET ORAL at 08:52

## 2020-01-01 RX ADMIN — METOCLOPRAMIDE 5 MG: 5 TABLET ORAL at 17:47

## 2020-01-01 RX ADMIN — ACETAMINOPHEN 650 MG: 325 TABLET, FILM COATED ORAL at 19:16

## 2020-01-01 RX ADMIN — OXYCODONE 10 MG: 5 TABLET ORAL at 11:50

## 2020-01-01 RX ADMIN — LIDOCAINE HYDROCHLORIDE 20 ML: 10 INJECTION, SOLUTION INFILTRATION; PERINEURAL at 10:25

## 2020-01-01 RX ADMIN — HEPARIN SODIUM 1600 UNITS: 1000 INJECTION INTRAVENOUS; SUBCUTANEOUS at 09:56

## 2020-01-01 RX ADMIN — LACTULOSE 10 G: 10 SOLUTION ORAL at 21:54

## 2020-01-01 RX ADMIN — TAZOBACTAM SODIUM AND PIPERACILLIN SODIUM 4.5 G: 500; 4 INJECTION, SOLUTION INTRAVENOUS at 03:40

## 2020-01-01 RX ADMIN — Medication 5 ML: at 20:54

## 2020-01-01 RX ADMIN — LACTULOSE 10 G: 10 SOLUTION ORAL at 17:36

## 2020-01-01 RX ADMIN — OXYCODONE 10 MG: 5 TABLET ORAL at 17:18

## 2020-01-01 RX ADMIN — SODIUM CHLORIDE, PRESERVATIVE FREE 10 ML: 5 INJECTION INTRAVENOUS at 09:09

## 2020-01-01 RX ADMIN — OXYCODONE 10 MG: 5 TABLET ORAL at 20:25

## 2020-01-01 RX ADMIN — MIDODRINE HYDROCHLORIDE 10 MG: 5 TABLET ORAL at 14:55

## 2020-01-01 RX ADMIN — Medication 5 ML: at 20:27

## 2020-01-01 RX ADMIN — POTASSIUM CHLORIDE 40 MEQ: 10 CAPSULE, COATED, EXTENDED RELEASE ORAL at 11:05

## 2020-01-01 RX ADMIN — PANTOPRAZOLE SODIUM 40 MG: 40 TABLET, DELAYED RELEASE ORAL at 16:59

## 2020-01-01 RX ADMIN — MIDODRINE HYDROCHLORIDE 10 MG: 5 TABLET ORAL at 11:59

## 2020-01-01 RX ADMIN — OXYCODONE 10 MG: 5 TABLET ORAL at 18:47

## 2020-01-01 RX ADMIN — MIDODRINE HYDROCHLORIDE 10 MG: 5 TABLET ORAL at 14:34

## 2020-01-01 RX ADMIN — SODIUM CHLORIDE, PRESERVATIVE FREE 10 ML: 5 INJECTION INTRAVENOUS at 15:41

## 2020-01-01 RX ADMIN — METOCLOPRAMIDE 5 MG: 5 INJECTION, SOLUTION INTRAMUSCULAR; INTRAVENOUS at 04:17

## 2020-01-01 RX ADMIN — METOCLOPRAMIDE 5 MG: 5 TABLET ORAL at 17:41

## 2020-01-01 RX ADMIN — SUCRALFATE 1 G: 1 TABLET ORAL at 08:54

## 2020-01-01 RX ADMIN — MIDODRINE HYDROCHLORIDE 10 MG: 5 TABLET ORAL at 06:27

## 2020-01-01 RX ADMIN — PANTOPRAZOLE SODIUM 40 MG: 40 TABLET, DELAYED RELEASE ORAL at 10:00

## 2020-01-01 RX ADMIN — ASPIRIN 81 MG 81 MG: 81 TABLET ORAL at 08:30

## 2020-01-01 RX ADMIN — Medication 5 ML: at 17:47

## 2020-01-01 RX ADMIN — OXYCODONE 10 MG: 5 TABLET ORAL at 11:09

## 2020-01-01 RX ADMIN — Medication 5 ML: at 18:13

## 2020-01-01 RX ADMIN — PANTOPRAZOLE SODIUM 40 MG: 40 TABLET, DELAYED RELEASE ORAL at 05:59

## 2020-01-01 RX ADMIN — Medication 5 ML: at 08:07

## 2020-01-01 RX ADMIN — Medication 5 ML: at 17:50

## 2020-01-01 RX ADMIN — LIDOCAINE HYDROCHLORIDE 20 ML: 10 INJECTION, SOLUTION INFILTRATION; PERINEURAL at 14:13

## 2020-01-01 RX ADMIN — ASPIRIN 81 MG 81 MG: 81 TABLET ORAL at 08:04

## 2020-01-01 RX ADMIN — MIDODRINE HYDROCHLORIDE 10 MG: 5 TABLET ORAL at 16:51

## 2020-01-01 RX ADMIN — METOCLOPRAMIDE 5 MG: 5 TABLET ORAL at 06:31

## 2020-01-01 RX ADMIN — MIRTAZAPINE 15 MG: 15 TABLET, FILM COATED ORAL at 20:22

## 2020-01-01 RX ADMIN — ONDANSETRON 4 MG: 2 INJECTION INTRAMUSCULAR; INTRAVENOUS at 04:00

## 2020-01-01 RX ADMIN — Medication 5 ML: at 07:27

## 2020-01-01 RX ADMIN — Medication 5 ML: at 09:59

## 2020-01-01 RX ADMIN — MIDODRINE HYDROCHLORIDE 10 MG: 5 TABLET ORAL at 21:16

## 2020-01-01 RX ADMIN — ONDANSETRON 4 MG: 2 INJECTION INTRAMUSCULAR; INTRAVENOUS at 08:44

## 2020-01-01 RX ADMIN — Medication 5 ML: at 17:15

## 2020-01-01 RX ADMIN — OXYCODONE 10 MG: 5 TABLET ORAL at 19:02

## 2020-01-01 RX ADMIN — SERTRALINE 25 MG: 25 TABLET, FILM COATED ORAL at 10:00

## 2020-01-01 RX ADMIN — PANTOPRAZOLE SODIUM 40 MG: 40 TABLET, DELAYED RELEASE ORAL at 08:27

## 2020-01-01 RX ADMIN — METOCLOPRAMIDE 5 MG: 5 TABLET ORAL at 08:38

## 2020-01-01 RX ADMIN — MIDODRINE HYDROCHLORIDE 10 MG: 5 TABLET ORAL at 19:41

## 2020-01-01 RX ADMIN — TAZOBACTAM SODIUM AND PIPERACILLIN SODIUM 3.38 G: 375; 3 INJECTION, SOLUTION INTRAVENOUS at 08:39

## 2020-01-01 RX ADMIN — MIDODRINE HYDROCHLORIDE 10 MG: 5 TABLET ORAL at 14:36

## 2020-01-01 RX ADMIN — PANTOPRAZOLE SODIUM 40 MG: 40 TABLET, DELAYED RELEASE ORAL at 06:33

## 2020-01-01 RX ADMIN — PHYTONADIONE 5 MG: 10 INJECTION, EMULSION INTRAMUSCULAR; INTRAVENOUS; SUBCUTANEOUS at 17:00

## 2020-01-01 RX ADMIN — OXYCODONE 10 MG: 5 TABLET ORAL at 13:49

## 2020-01-01 RX ADMIN — SODIUM CHLORIDE, PRESERVATIVE FREE 10 ML: 5 INJECTION INTRAVENOUS at 08:16

## 2020-01-01 RX ADMIN — Medication 5 ML: at 12:13

## 2020-01-01 RX ADMIN — MIDODRINE HYDROCHLORIDE 10 MG: 5 TABLET ORAL at 17:36

## 2020-01-01 RX ADMIN — Medication 5 ML: at 17:13

## 2020-01-01 RX ADMIN — ALBUMIN HUMAN 25 G: 0.25 SOLUTION INTRAVENOUS at 09:10

## 2020-01-01 RX ADMIN — METOCLOPRAMIDE 5 MG: 5 TABLET ORAL at 08:53

## 2020-01-01 RX ADMIN — ASPIRIN 81 MG 81 MG: 81 TABLET ORAL at 08:26

## 2020-01-01 RX ADMIN — OXYCODONE 10 MG: 5 TABLET ORAL at 20:10

## 2020-01-01 RX ADMIN — LACTULOSE 10 G: 10 SOLUTION ORAL at 21:24

## 2020-01-01 RX ADMIN — SERTRALINE 25 MG: 25 TABLET, FILM COATED ORAL at 10:05

## 2020-01-01 RX ADMIN — METOCLOPRAMIDE 5 MG: 5 TABLET ORAL at 11:17

## 2020-01-01 RX ADMIN — OXYCODONE 10 MG: 5 TABLET ORAL at 01:02

## 2020-01-01 RX ADMIN — ASPIRIN 81 MG 81 MG: 81 TABLET ORAL at 07:46

## 2020-01-01 RX ADMIN — MIDODRINE HYDROCHLORIDE 10 MG: 5 TABLET ORAL at 20:17

## 2020-01-01 RX ADMIN — FENTANYL CITRATE 50 MCG: 50 INJECTION, SOLUTION INTRAMUSCULAR; INTRAVENOUS at 14:25

## 2020-01-01 RX ADMIN — PHYTONADIONE 10 MG: 10 INJECTION, EMULSION INTRAMUSCULAR; INTRAVENOUS; SUBCUTANEOUS at 17:45

## 2020-01-01 RX ADMIN — OXYCODONE 10 MG: 5 TABLET ORAL at 20:51

## 2020-01-01 RX ADMIN — METOCLOPRAMIDE 5 MG: 5 TABLET ORAL at 16:46

## 2020-01-01 RX ADMIN — POLYETHYLENE GLYCOL 3350, SODIUM SULFATE, SODIUM CHLORIDE, POTASSIUM CHLORIDE, ASCORBIC ACID, SODIUM ASCORBATE 1000 ML: KIT at 17:50

## 2020-01-01 RX ADMIN — OXYCODONE 10 MG: 5 TABLET ORAL at 00:22

## 2020-01-01 RX ADMIN — PANTOPRAZOLE SODIUM 40 MG: 40 TABLET, DELAYED RELEASE ORAL at 08:02

## 2020-01-01 RX ADMIN — MEROPENEM 500 MG: 500 INJECTION, POWDER, FOR SOLUTION INTRAVENOUS at 16:24

## 2020-01-01 RX ADMIN — MIDAZOLAM 2 MG: 1 INJECTION INTRAMUSCULAR; INTRAVENOUS at 12:23

## 2020-01-01 RX ADMIN — MIRTAZAPINE 15 MG: 15 TABLET, FILM COATED ORAL at 20:17

## 2020-01-01 RX ADMIN — MIDODRINE HYDROCHLORIDE 20 MG: 5 TABLET ORAL at 17:30

## 2020-01-01 RX ADMIN — LACTULOSE 10 G: 10 SOLUTION ORAL at 20:53

## 2020-01-01 RX ADMIN — ASPIRIN 81 MG 81 MG: 81 TABLET ORAL at 10:00

## 2020-01-01 RX ADMIN — METOCLOPRAMIDE 5 MG: 5 TABLET ORAL at 08:27

## 2020-01-01 RX ADMIN — TAZOBACTAM SODIUM AND PIPERACILLIN SODIUM 3.38 G: 375; 3 INJECTION, SOLUTION INTRAVENOUS at 01:45

## 2020-01-01 RX ADMIN — BUMETANIDE 4 MG: 1 TABLET ORAL at 08:23

## 2020-01-01 RX ADMIN — METOCLOPRAMIDE 5 MG: 5 TABLET ORAL at 12:53

## 2020-01-01 RX ADMIN — HYDROCORTISONE: 1 CREAM TOPICAL at 10:00

## 2020-01-01 RX ADMIN — Medication 5 ML: at 12:42

## 2020-01-01 RX ADMIN — OLANZAPINE 5 MG: 5 TABLET, ORALLY DISINTEGRATING ORAL at 21:41

## 2020-01-01 RX ADMIN — MIDODRINE HYDROCHLORIDE 20 MG: 5 TABLET ORAL at 08:21

## 2020-01-01 RX ADMIN — EPHEDRINE SULFATE 10 MG: 50 INJECTION INTRAVENOUS at 14:38

## 2020-01-01 RX ADMIN — SODIUM CHLORIDE 500 ML: 9 INJECTION, SOLUTION INTRAVENOUS at 15:00

## 2020-01-01 RX ADMIN — MIDODRINE HYDROCHLORIDE 10 MG: 5 TABLET ORAL at 08:02

## 2020-01-01 RX ADMIN — POTASSIUM CHLORIDE 40 MEQ: 10 CAPSULE, COATED, EXTENDED RELEASE ORAL at 13:18

## 2020-01-01 RX ADMIN — ALBUMIN HUMAN 12.5 G: 0.25 SOLUTION INTRAVENOUS at 13:24

## 2020-01-01 RX ADMIN — LACTULOSE 10 G: 10 SOLUTION ORAL at 20:27

## 2020-01-01 RX ADMIN — OXYCODONE 10 MG: 5 TABLET ORAL at 20:43

## 2020-01-01 RX ADMIN — PANTOPRAZOLE SODIUM 40 MG: 40 TABLET, DELAYED RELEASE ORAL at 17:14

## 2020-01-01 RX ADMIN — OXYCODONE 10 MG: 5 TABLET ORAL at 06:38

## 2020-01-01 RX ADMIN — OLANZAPINE 5 MG: 5 TABLET, ORALLY DISINTEGRATING ORAL at 20:25

## 2020-01-01 RX ADMIN — MIDODRINE HYDROCHLORIDE 10 MG: 5 TABLET ORAL at 14:11

## 2020-01-01 RX ADMIN — LACTULOSE 10 G: 10 SOLUTION ORAL at 08:29

## 2020-01-01 RX ADMIN — ACETAMINOPHEN 650 MG: 325 TABLET, FILM COATED ORAL at 21:07

## 2020-01-01 RX ADMIN — SODIUM CHLORIDE, PRESERVATIVE FREE 10 ML: 5 INJECTION INTRAVENOUS at 07:57

## 2020-01-01 RX ADMIN — FENTANYL CITRATE 50 MCG: 50 INJECTION, SOLUTION INTRAMUSCULAR; INTRAVENOUS at 14:28

## 2020-01-01 RX ADMIN — METOCLOPRAMIDE 5 MG: 5 TABLET ORAL at 08:51

## 2020-01-01 RX ADMIN — HYDROCORTISONE: 1 CREAM TOPICAL at 12:34

## 2020-01-01 RX ADMIN — DOCUSATE SODIUM 100 MG: 100 CAPSULE, LIQUID FILLED ORAL at 18:39

## 2020-01-01 RX ADMIN — OXYCODONE 10 MG: 5 TABLET ORAL at 12:05

## 2020-01-01 RX ADMIN — MIDODRINE HYDROCHLORIDE 20 MG: 5 TABLET ORAL at 12:34

## 2020-01-01 RX ADMIN — METOCLOPRAMIDE 5 MG: 5 TABLET ORAL at 12:41

## 2020-01-01 RX ADMIN — MORPHINE SULFATE 1 MG: 2 INJECTION, SOLUTION INTRAMUSCULAR; INTRAVENOUS at 11:50

## 2020-01-01 RX ADMIN — OXYCODONE 10 MG: 5 TABLET ORAL at 18:05

## 2020-01-01 RX ADMIN — Medication 5 ML: at 12:45

## 2020-01-01 RX ADMIN — LACTULOSE 10 G: 10 SOLUTION ORAL at 12:36

## 2020-01-01 RX ADMIN — SODIUM CHLORIDE, PRESERVATIVE FREE 10 ML: 5 INJECTION INTRAVENOUS at 20:28

## 2020-01-01 RX ADMIN — ACETAMINOPHEN 650 MG: 325 TABLET, FILM COATED ORAL at 16:42

## 2020-01-01 RX ADMIN — MIDODRINE HYDROCHLORIDE 10 MG: 5 TABLET ORAL at 15:01

## 2020-01-01 RX ADMIN — MORPHINE SULFATE 1 MG: 2 INJECTION, SOLUTION INTRAMUSCULAR; INTRAVENOUS at 11:44

## 2020-01-01 RX ADMIN — MAGNESIUM SULFATE 2 G: 2 INJECTION INTRAVENOUS at 10:10

## 2020-01-01 RX ADMIN — MORPHINE SULFATE 1 MG: 2 INJECTION, SOLUTION INTRAMUSCULAR; INTRAVENOUS at 18:05

## 2020-01-01 RX ADMIN — METOCLOPRAMIDE 5 MG: 5 TABLET ORAL at 17:50

## 2020-01-01 RX ADMIN — PANTOPRAZOLE SODIUM 40 MG: 40 INJECTION, POWDER, FOR SOLUTION INTRAVENOUS at 08:49

## 2020-01-01 RX ADMIN — PANTOPRAZOLE SODIUM 40 MG: 40 TABLET, DELAYED RELEASE ORAL at 17:37

## 2020-01-01 RX ADMIN — MINERAL OIL, PETROLATUM, PHENYLEPHRINE HCL: 14; 74.9; .25 OINTMENT RECTAL at 12:33

## 2020-01-01 RX ADMIN — OLANZAPINE 5 MG: 5 TABLET, ORALLY DISINTEGRATING ORAL at 20:40

## 2020-01-01 RX ADMIN — MIRTAZAPINE 15 MG: 15 TABLET, FILM COATED ORAL at 21:09

## 2020-01-01 RX ADMIN — CISPLATIN 104 MG: 1 INJECTION INTRAVENOUS at 10:15

## 2020-01-01 RX ADMIN — PHYTONADIONE 10 MG: 10 INJECTION, EMULSION INTRAMUSCULAR; INTRAVENOUS; SUBCUTANEOUS at 18:36

## 2020-01-01 RX ADMIN — ASPIRIN 81 MG 81 MG: 81 TABLET ORAL at 08:23

## 2020-01-01 RX ADMIN — SODIUM CHLORIDE, PRESERVATIVE FREE 10 ML: 5 INJECTION INTRAVENOUS at 08:25

## 2020-01-01 RX ADMIN — PANTOPRAZOLE SODIUM 40 MG: 40 TABLET, DELAYED RELEASE ORAL at 10:14

## 2020-01-01 RX ADMIN — OLANZAPINE 5 MG: 5 TABLET, ORALLY DISINTEGRATING ORAL at 20:49

## 2020-01-01 RX ADMIN — SERTRALINE 25 MG: 25 TABLET, FILM COATED ORAL at 08:22

## 2020-01-01 RX ADMIN — MIDODRINE HYDROCHLORIDE 10 MG: 5 TABLET ORAL at 05:18

## 2020-01-01 RX ADMIN — MORPHINE SULFATE 1 MG: 2 INJECTION, SOLUTION INTRAMUSCULAR; INTRAVENOUS at 13:05

## 2020-01-01 RX ADMIN — Medication 5 ML: at 17:30

## 2020-01-01 RX ADMIN — DIPHENHYDRAMINE HYDROCHLORIDE 25 MG: 50 INJECTION INTRAMUSCULAR; INTRAVENOUS at 08:52

## 2020-01-01 RX ADMIN — MIDODRINE HYDROCHLORIDE 20 MG: 5 TABLET ORAL at 12:41

## 2020-01-01 RX ADMIN — SERTRALINE 25 MG: 25 TABLET, FILM COATED ORAL at 09:08

## 2020-01-01 RX ADMIN — MIDODRINE HYDROCHLORIDE 10 MG: 5 TABLET ORAL at 05:27

## 2020-01-01 RX ADMIN — TAZOBACTAM SODIUM AND PIPERACILLIN SODIUM 3.38 G: 375; 3 INJECTION, SOLUTION INTRAVENOUS at 23:09

## 2020-01-01 RX ADMIN — METHOHEXITAL SODIUM 30 MG: 500 INJECTION, POWDER, LYOPHILIZED, FOR SOLUTION INTRAMUSCULAR; INTRAVENOUS; RECTAL at 14:14

## 2020-01-01 RX ADMIN — PANTOPRAZOLE SODIUM 40 MG: 40 TABLET, DELAYED RELEASE ORAL at 17:50

## 2020-01-01 RX ADMIN — OXYCODONE 10 MG: 5 TABLET ORAL at 12:30

## 2020-01-01 RX ADMIN — ASPIRIN 81 MG 81 MG: 81 TABLET ORAL at 08:24

## 2020-01-01 RX ADMIN — OXYCODONE 10 MG: 5 TABLET ORAL at 04:23

## 2020-01-01 RX ADMIN — MIDODRINE HYDROCHLORIDE 10 MG: 5 TABLET ORAL at 12:30

## 2020-01-01 RX ADMIN — OXYCODONE 10 MG: 5 TABLET ORAL at 18:25

## 2020-01-01 RX ADMIN — SODIUM CHLORIDE, PRESERVATIVE FREE 10 ML: 5 INJECTION INTRAVENOUS at 09:21

## 2020-01-01 RX ADMIN — ALBUMIN HUMAN 500 ML: 0.05 INJECTION, SOLUTION INTRAVENOUS at 06:30

## 2020-01-01 RX ADMIN — MIDODRINE HYDROCHLORIDE 10 MG: 5 TABLET ORAL at 14:02

## 2020-01-01 RX ADMIN — OXYCODONE 10 MG: 5 TABLET ORAL at 07:16

## 2020-01-01 RX ADMIN — PHENYLEPHRINE HYDROCHLORIDE 0.5 MCG/KG/MIN: 10 INJECTION INTRAVENOUS at 04:22

## 2020-01-01 RX ADMIN — OXYCODONE 10 MG: 5 TABLET ORAL at 10:48

## 2020-01-01 RX ADMIN — ALBUMIN HUMAN 50 G: 0.25 SOLUTION INTRAVENOUS at 10:24

## 2020-01-01 RX ADMIN — METOCLOPRAMIDE 5 MG: 5 TABLET ORAL at 12:46

## 2020-01-01 RX ADMIN — LACTULOSE 10 G: 10 SOLUTION ORAL at 21:07

## 2020-01-01 RX ADMIN — SUCRALFATE 1 G: 1 TABLET ORAL at 20:22

## 2020-01-01 RX ADMIN — MIDODRINE HYDROCHLORIDE 10 MG: 5 TABLET ORAL at 20:25

## 2020-01-01 RX ADMIN — DEXAMETHASONE SODIUM PHOSPHATE 12 MG: 4 INJECTION, SOLUTION INTRAMUSCULAR; INTRAVENOUS at 11:36

## 2020-01-01 RX ADMIN — PHENYLEPHRINE HYDROCHLORIDE 50 MCG: 10 INJECTION INTRAVENOUS at 11:39

## 2020-01-01 RX ADMIN — SERTRALINE 25 MG: 25 TABLET, FILM COATED ORAL at 08:58

## 2020-01-01 RX ADMIN — AMIODARONE HYDROCHLORIDE 300 MG: 50 INJECTION, SOLUTION INTRAVENOUS at 05:59

## 2020-01-01 RX ADMIN — HYDROCORTISONE: 1 CREAM TOPICAL at 14:54

## 2020-01-01 RX ADMIN — MAGNESIUM SULFATE 2 G: 2 INJECTION INTRAVENOUS at 08:16

## 2020-01-01 RX ADMIN — MORPHINE SULFATE 1 MG: 2 INJECTION, SOLUTION INTRAMUSCULAR; INTRAVENOUS at 16:36

## 2020-01-01 RX ADMIN — OXYCODONE 10 MG: 5 TABLET ORAL at 04:00

## 2020-01-01 RX ADMIN — ONDANSETRON 4 MG: 2 INJECTION INTRAMUSCULAR; INTRAVENOUS at 20:10

## 2020-01-01 RX ADMIN — OLANZAPINE 5 MG: 5 TABLET, ORALLY DISINTEGRATING ORAL at 21:24

## 2020-01-01 RX ADMIN — MIDODRINE HYDROCHLORIDE 10 MG: 5 TABLET ORAL at 15:06

## 2020-01-01 RX ADMIN — ASPIRIN 81 MG 81 MG: 81 TABLET ORAL at 09:53

## 2020-01-01 RX ADMIN — OXYCODONE 10 MG: 5 TABLET ORAL at 17:04

## 2020-01-01 RX ADMIN — MIDODRINE HYDROCHLORIDE 20 MG: 5 TABLET ORAL at 11:44

## 2020-01-01 RX ADMIN — OXYCODONE 10 MG: 5 TABLET ORAL at 19:22

## 2020-01-01 RX ADMIN — METOCLOPRAMIDE 5 MG: 5 INJECTION, SOLUTION INTRAMUSCULAR; INTRAVENOUS at 09:08

## 2020-01-01 RX ADMIN — OXYCODONE 10 MG: 5 TABLET ORAL at 23:28

## 2020-01-01 RX ADMIN — MORPHINE SULFATE 1 MG: 2 INJECTION, SOLUTION INTRAMUSCULAR; INTRAVENOUS at 07:23

## 2020-01-01 RX ADMIN — HYDROCORTISONE: 1 CREAM TOPICAL at 21:55

## 2020-01-01 RX ADMIN — OXYCODONE 10 MG: 5 TABLET ORAL at 17:49

## 2020-01-01 RX ADMIN — ALBUTEROL SULFATE 2.5 MG: 2.5 SOLUTION RESPIRATORY (INHALATION) at 14:05

## 2020-01-01 RX ADMIN — MEROPENEM 1 G: 1 INJECTION, POWDER, FOR SOLUTION INTRAVENOUS at 06:07

## 2020-01-01 RX ADMIN — ALBUMIN HUMAN 25 G: 0.25 SOLUTION INTRAVENOUS at 17:10

## 2020-01-01 RX ADMIN — HYDROCORTISONE SODIUM SUCCINATE 100 MG: 100 INJECTION, POWDER, FOR SOLUTION INTRAMUSCULAR; INTRAVENOUS at 17:10

## 2020-01-01 RX ADMIN — HEPATITIS A VACCINE 1440 UNITS: 1440 INJECTION, SUSPENSION INTRAMUSCULAR at 16:16

## 2020-01-01 RX ADMIN — TAZOBACTAM SODIUM AND PIPERACILLIN SODIUM 3.38 G: 375; 3 INJECTION, SOLUTION INTRAVENOUS at 20:53

## 2020-01-01 RX ADMIN — OLANZAPINE 5 MG: 5 TABLET, ORALLY DISINTEGRATING ORAL at 21:20

## 2020-01-01 RX ADMIN — SODIUM CHLORIDE, PRESERVATIVE FREE 10 ML: 5 INJECTION INTRAVENOUS at 08:55

## 2020-01-01 RX ADMIN — LACTULOSE 10 G: 10 SOLUTION ORAL at 20:56

## 2020-01-01 RX ADMIN — SODIUM CHLORIDE, PRESERVATIVE FREE 10 ML: 5 INJECTION INTRAVENOUS at 20:55

## 2020-01-01 RX ADMIN — ONDANSETRON 4 MG: 2 INJECTION INTRAMUSCULAR; INTRAVENOUS at 20:22

## 2020-01-01 RX ADMIN — OXYCODONE 10 MG: 5 TABLET ORAL at 10:27

## 2020-01-01 RX ADMIN — FENTANYL CITRATE 50 MCG: 50 INJECTION, SOLUTION INTRAMUSCULAR; INTRAVENOUS at 14:17

## 2020-01-01 RX ADMIN — MORPHINE SULFATE 1 MG: 2 INJECTION, SOLUTION INTRAMUSCULAR; INTRAVENOUS at 12:15

## 2020-01-01 RX ADMIN — SODIUM CHLORIDE 125 ML/HR: 9 INJECTION, SOLUTION INTRAVENOUS at 20:19

## 2020-01-01 RX ADMIN — Medication 5 ML: at 11:22

## 2020-01-01 RX ADMIN — MIDODRINE HYDROCHLORIDE 10 MG: 5 TABLET ORAL at 21:06

## 2020-01-01 RX ADMIN — MIDODRINE HYDROCHLORIDE 10 MG: 5 TABLET ORAL at 18:05

## 2020-01-01 RX ADMIN — METOCLOPRAMIDE 5 MG: 5 TABLET ORAL at 11:04

## 2020-01-01 RX ADMIN — PANTOPRAZOLE SODIUM 40 MG: 40 TABLET, DELAYED RELEASE ORAL at 16:38

## 2020-01-01 RX ADMIN — OLANZAPINE 5 MG: 5 TABLET, ORALLY DISINTEGRATING ORAL at 21:38

## 2020-01-01 RX ADMIN — Medication 5 ML: at 22:48

## 2020-01-01 RX ADMIN — MIRTAZAPINE 15 MG: 15 TABLET, FILM COATED ORAL at 20:43

## 2020-01-01 RX ADMIN — LACTULOSE 10 G: 10 SOLUTION ORAL at 20:00

## 2020-01-01 RX ADMIN — OLANZAPINE 5 MG: 5 TABLET, ORALLY DISINTEGRATING ORAL at 21:06

## 2020-01-01 RX ADMIN — SODIUM CHLORIDE, PRESERVATIVE FREE 10 ML: 5 INJECTION INTRAVENOUS at 08:52

## 2020-01-01 RX ADMIN — PANTOPRAZOLE SODIUM 40 MG: 40 INJECTION, POWDER, FOR SOLUTION INTRAVENOUS at 20:21

## 2020-01-01 RX ADMIN — OXYCODONE 10 MG: 5 TABLET ORAL at 21:06

## 2020-04-16 NOTE — PROGRESS NOTES
CONSULTATION NOTE    NAME:      Oliver Mallory  :                                                          1965  DATE OF CONSULTATION:                       20  REQUESTING PHYSICIAN:                   Dr. Jocy Richey  REASON FOR CONSULTATION:           Cancer Staging  Cancer of tonsil (CMS/HCC)  Staging form: Pharynx - HPV-Mediated Oropharynx, AJCC 8th Edition  - Clinical stage from 2020: Stage II (cT3, cN1, cM0, p16+) - Signed by Kellie Lock MD on 2020         BRIEF HISTORY:  Oliver Mallory  is a very pleasant 55 y.o. male  who presented with a 1 month history of a lump in the left neck.  A  CT scan reported a 4.5 cm left anterior level 2 lymph node as well as some fullness in the tonsil.  Needle biopsy of the left neck nodule revealed suspicious for squamous cell carcinoma.  He was examined by Dr. Henry on  and was found to have 6 cm nodule in the left neck and enlarged left tonsil with a small lump in the left base of tongue extending into the left hypopharynx.  PET scan of 3/30/2020 revealed a focus of hypermetabolic activity in the left tonsillar pillar with maximum SUV of 9 suggesting malignancy.  There were at least 3 lymph nodes identified in the left posterior cervical chain with maximum SUV of 8.35 concerning for localized metastatic disease.  The salivary glands were normal.  There was no additional hypermetabolic focus identified.  He underwent microlaryngoscopy and left radical tonsillectomy by Dr. Luigi Henry on 2020.  There was some prominent lymphoid tissue at the base of tongue but no lesions or ulcerations identified and nothing that required biopsy.  The left tonsil was enlarged and had a lump in the base area.  He performed a left radical tonsillectomy.  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  Dr. Henry did not perform a radical left neck dissection but recommended resection if he does not  get a complete response to chemoradiation.  The patient has seen Dr. Jocy Richey for chemotherapy and is here to discuss radiation.  He has left otalgia, difficulty and pain with swallowing.  His daughter is on the phone for this consult.      No Known Allergies    Social History     Tobacco Use   • Smoking status: Former Smoker     Types: Cigarettes   • Smokeless tobacco: Never Used   • Tobacco comment: maybe 3 times/week   Substance Use Topics   • Alcohol use: Not Currently     Alcohol/week: 0.0 standard drinks     Comment: rarely   • Drug use: Yes     Types: Marijuana         Past Medical History:   Diagnosis Date   • Arthritis    • COPD (chronic obstructive pulmonary disease) (CMS/HCC)    • Hypertension    • Pancreatitis        family history includes Heart disease in his father; Hypertension in his mother.     Past Surgical History:   Procedure Laterality Date   • ANKLE TENDON REPAIR     • CARDIAC CATHETERIZATION N/A 5/31/2018    Procedure: Left Heart Cath;  Surgeon: Ray Farley MD;  Location: [facility] INVASIVE LOCATION;  Service: Cardiovascular   • CHOLECYSTECTOMY     • HERNIA REPAIR     • KNEE SURGERY  1981   • TONSILLECTOMY          Review of Systems   Constitutional: Positive for fatigue and unexpected weight change.        100 pound weight loss over 4.5 months   HENT:   Positive for hearing loss, lump/mass, sore throat and trouble swallowing.         Left ear pain and odynophagia   Eyes:        Blurred vision   Gastrointestinal: Positive for abdominal pain and blood in stool.        Heartburn     Genitourinary: Positive for nocturia.         Impotence   Musculoskeletal: Positive for arthralgias and myalgias.   Skin: Positive for itching and rash.   Neurological:        Generalized weakness   Hematological: Bruises/bleeds easily.   Psychiatric/Behavioral: Positive for depression and sleep disturbance. The patient is nervous/anxious.         Memory loss   All other systems reviewed and are  "negative.          Objective   VITAL SIGNS:   Vitals:    04/16/20 1107   BP: 120/83   Pulse: 83   Resp: 16   Temp: 98.1 °F (36.7 °C)   Weight: (!) 147 kg (324 lb 6.4 oz)   Height: 182.9 cm (72\")   PainSc: 0-No pain        KPS       90%    Physical Exam   Constitutional: He is oriented to person, place, and time.   Obese male in no distress.   HENT:   Erythema and granulation tissue in the left tonsillar bed   Eyes: EOM are normal.   Neck:   Approximately 5 cm palpable left lymph node   Cardiovascular: Normal rate and regular rhythm.   Pulmonary/Chest: Effort normal and breath sounds normal.   Lymphadenopathy:     He has cervical adenopathy.   Neurological: He is alert and oriented to person, place, and time. No cranial nerve deficit.   Nursing note and vitals reviewed.           The following portions of the patient's history were reviewed and updated as appropriate: allergies, current medications, past family history, past medical history, past social history, past surgical history and problem list.    Assessment      IMPRESSION:  Oliver Mallory  is a 55 y.o. male  who was examined by Dr. Henry on April 2 and was found to have 6 cm nodule in the left neck and enlarged left tonsil with a small lump in the left base of tongue extending into the left hypopharynx.  PET scan of 3/30/2020 revealed a focus of hypermetabolic activity in the left tonsillar pillar with maximum SUV of 9 suggesting malignancy.  There were at least 3 lymph nodes identified in the left posterior cervical chain with maximum SUV of 8.35 concerning for localized metastatic disease.  The salivary glands were normal.  There was no additional hypermetabolic focus identified.  He underwent microlaryngoscopy and left radical tonsillectomy by Dr. Luigi Henry on 4/9/2020.  There was some prominent lymphoid tissue at the base of tongue but no lesions or ulcerations identified and nothing that required biopsy.  The left tonsil was enlarged and had a lump in " the base area.  He performed a left radical tonsillectomy.  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  Dr. Henry did not perform a radical left neck dissection but recommended resection if he does not get a complete response to chemoradiation.  The patient has seen Dr. Jocy Richey for chemotherapy and is here to discuss radiation.  He has left otalgia, difficulty and pain with swallowing.       RECOMMENDATIONS: I recommend radiotherapy of 70 [address] to the tumor bed and PET positive disease.  The pros and cons, risks and benefits of treatment to the head and neck region were discussed with   and his daughter who was on the phone.  Informed consent was obtained.  He understands the possibility of placement of PEG if he can't get enough nutrition orally. When he recently saw the dentist and needs to have a tooth pulled.  He is going to arrange for dental evaluation and extraction.  We will ask Yessi Calderon, our oncology dietitian to see him.  We will get a speech pathology consult. We prescribed lortab 5mg for pain control and asked him to take a stool softener.  He will return for treatment planning and we will start shortly after.  Dr. Richey has recommended chemotherapy concurrently.      Kellie Lock MD      Errors in dictation may reflect use of voice recognition software and not all errors in transcription may have been detected prior to signing.

## 2020-04-17 PROBLEM — C76.0 HEAD AND NECK CANCER (HCC): Status: ACTIVE | Noted: 2020-01-01

## 2020-04-17 PROBLEM — C09.9 CANCER OF TONSIL (HCC): Status: ACTIVE | Noted: 2020-01-01

## 2020-04-27 NOTE — PROGRESS NOTES
Erma Mora RN, sent out a chemo/radiation communication email saying that Dr. Campbell wanted to start patient same day with chemo/radiation.  I called Alice down in Radiation and let him know that patient is scheduled to start chemo on May 6th.  He said that he has made a note and will follow up with Tawnya or BLANCA when the patient's treatment plan is completed. AG

## 2020-04-27 NOTE — PROGRESS NOTES
ID: 55 y.o. year old male from [facility] [id number]    PCP: Raymundo Salgado MD    REFERRING PHYSICIAN: Luigi Henry MD    Reason for Consultation: Stage II HPV+ moderately differentiated squamous cell carcinoma of the tonsil with lymph node involvement    Dear Dr. Lock, Dr. Salgado and Dr. Henry    It is a pleasure to meet Mr. Mallory today.  He is a very pleasant 55-year-old gentleman who presents today for consultation due to recently diagnosed stage II squamous cell carcinoma of the tonsil.  He underwent a PET scan that failed to reveal distant disease.  Though there was a significantly enlarged left cervical node present.  He underwent biopsy with Dr. Henry that showed HPV positive squamous cell carcinoma  within the tonsil.  He had seen Dr. Lock for concurrent radiation.  He had also seen Dr. Richey who is planning to treat him with chemotherapy.  However the patient moved to [facility] to be with his daughter and has decided to receive all his treatment here at [facility].  Patient has some chronic medical issues including COPD.  He also has morbid obesity does not appear to be in the best of health for his age.  He is awaiting planning of his radiation and insurance approval.  He denies any obvious difficulty swallowing at this time.  No significant weight loss issues.      Past Medical History:   Diagnosis Date   • Arthritis    • COPD (chronic obstructive pulmonary disease) (CMS/HCC)    • Hypertension    • Pancreatitis        Past Surgical History:   Procedure Laterality Date   • ANKLE TENDON REPAIR     • CARDIAC CATHETERIZATION N/A 5/31/2018    Procedure: Left Heart Cath;  Surgeon: Ray Farley MD;  Location: [facility] CATH INVASIVE LOCATION;  Service: Cardiovascular   • CHOLECYSTECTOMY     • CYST REMOVAL      coccyx   • HERNIA MESH REMOVAL     • HERNIA REPAIR     • KNEE SURGERY  1981   • TONSILLECTOMY          Cancer of tonsil (CMS/HCC)    4/16/2020 Cancer Staged     Staging form: Pharynx  - HPV-Mediated Oropharynx, AJCC 8th Edition  - Clinical stage from 4/16/2020: Stage II (cT3, cN1, cM0, p16+) - Signed by Kellie Lock MD on 4/17/2020 4/17/2020 Initial Diagnosis     Cancer of tonsil (CMS/HCC)      5/6/2020 -  Chemotherapy     OP HEAD & NECK CISplatin (weekly) + XRT           Social History     Socioeconomic History   • Marital status:      Spouse name: Not on file   • Number of children: Not on file   • Years of education: Not on file   • Highest education level: Not on file   Tobacco Use   • Smoking status: Former Smoker     Types: Cigarettes   • Smokeless tobacco: Never Used   • Tobacco comment: maybe 3 times/week   Substance and Sexual Activity   • Alcohol use: Not Currently     Alcohol/week: 0.0 standard drinks     Comment: rarely   • Drug use: Yes     Types: Marijuana   • Sexual activity: Defer       Family History   Problem Relation Age of Onset   • Hypertension Mother    • Heart disease Father        Review of Systems:    16 point review of systems was performed and reviewed and scanned into the EMR    Review of Systems - Oncology      Current Outpatient Medications:   •  aspirin 81 MG chewable tablet, Chew 81 mg Daily., Disp: , Rfl:   •  BREO ELLIPTA 100-25 MCG/INH inhaler, Inhale 1 puff Daily., Disp: , Rfl:   •  dexamethasone (DECADRON) 4 MG tablet, Take 2 tablets by mouth Daily on days 2, 3 & 4.  Take with food., Disp: 6 tablet, Rfl: 5  •  gabapentin (NEURONTIN) 100 MG capsule, TAKE TWO CAPSULES BY MOUTH THREE TIMES DAILY MAY CAUSE DROWSINESS, Disp: , Rfl:   •  HYDROcodone-acetaminophen (NORCO) 5-325 MG per tablet, Take 1 tablet by mouth Every 6 (Six) Hours As Needed for Moderate Pain ., Disp: 120 tablet, Rfl: 0  •  lisinopril-hydrochlorothiazide (PRINZIDE,ZESTORETIC) 20-25 MG per tablet, Take 1 tablet by mouth Daily., Disp: , Rfl:   •  metoprolol succinate XL (TOPROL-XL) 25 MG 24 hr tablet, Take 25 mg by mouth Daily., Disp: , Rfl:   •  ondansetron (ZOFRAN) 8 MG tablet,  Take 1 tablet by mouth 3 (Three) Times a Day As Needed for Nausea or Vomiting., Disp: 30 tablet, Rfl: 5  •  pantoprazole (PROTONIX) 40 MG EC tablet, Take 40 mg by mouth Daily., Disp: , Rfl:   •  tiZANidine (ZANAFLEX) 4 MG tablet, Take 6 mg by mouth 2 (Two) Times a Day., Disp: , Rfl:     Pain Medications             aspirin 81 MG chewable tablet Chew 81 mg Daily.    dexamethasone (DECADRON) 4 MG tablet Take 2 tablets by mouth Daily on days 2, 3 & 4.  Take with food.    gabapentin (NEURONTIN) 100 MG capsule TAKE TWO CAPSULES BY MOUTH THREE TIMES DAILY MAY CAUSE DROWSINESS    HYDROcodone-acetaminophen (NORCO) 5-325 MG per tablet Take 1 tablet by mouth Every 6 (Six) Hours As Needed for Moderate Pain .    tiZANidine (ZANAFLEX) 4 MG tablet Take 6 mg by mouth 2 (Two) Times a Day.           No Known Allergies    ECOG SCORE: 1    Objective     Vitals:    04/27/20 1353   BP: 137/76   Pulse: 84   Resp: 16   Temp: 97.6 °F (36.4 °C)   SpO2: 95%     Body mass index is 44.34 kg/m².  Body surface area is 2.62 meters squared.        04/27/20  1353   Weight: (!) 148 kg (326 lb 14.4 oz)     Pain Score    04/27/20 1353   PainSc:   8          Physical Exam    General: well appearing, in no acute distress  HEENT: sclera anicteric, oropharynx clear, neck is supple  Lymphatics: no cervical, supraclavicular, or axillary adenopathy  Cardiovascular: regular rate and rhythm, no murmurs, rubs or gallops  Lungs: clear to auscultation bilaterally  Abdomen: soft, nontender, nondistended.  No palpable organomegaly  Extremities: no lower extremity edema  Skin: no rashes, lesions, bruising, or petechiae  Msk:  Shows no weakness of the large muscle groups  Psych: Mood is stable        Lab Results   Component Value Date    GLUCOSE 134 (H) 05/31/2018    BUN 19 05/31/2018    CREATININE 1.00 05/31/2018     05/31/2018    K 3.8 05/31/2018     05/31/2018    CO2 29.0 05/31/2018    CALCIUM 9.0 05/31/2018    PROTEINTOT 7.3 05/31/2018    ALBUMIN 4.00  05/31/2018    BILITOT 0.8 05/31/2018    ALKPHOS 150 (H) 05/31/2018    AST 48 (H) 05/31/2018    ALT 62 (H) 05/31/2018       Lab Results   Component Value Date    HGB 13.9 05/31/2018    HCT 43.2 05/31/2018    MCV 89.4 05/31/2018     05/31/2018    WBC 8.10 05/31/2018    NEUTROABS 5.50 05/31/2018    LYMPHSABS 1.58 05/31/2018    MONOSABS 0.63 05/31/2018    EOSABS 0.36 (H) 05/31/2018    BASOSABS 0.03 05/31/2018       Nm Pet Skull Base To Mid Thigh    Result Date: 3/30/2020  Malignancy in the left tonsillar region with adenopathy seen within the left neck and cervical chain. There is no additional hypermetabolic focus identified.  D:  03/30/2020 E:  03/30/2020    This report was finalized on 3/30/2020 2:05 PM by Dr. Janeth Atkins MD.            Assessment/Plan      1.  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  I recommended concurrent chemotherapy with cisplatin along with radiation.  I do not feel that he will be able to tolerate the  dose of cisplatin which is given every 3 weeks and would be considered the standard of care which is at 100 mg/m².  I think at 40 mg/m² weekly, he will likely tolerated much better.  Especially with his large body surface area, the dose of cisplatin would be  high which will result in considerable toxicity.  We discussed the side effects of the treatment itself.  We discussed that he may require a PEG placement if he has difficulty eating especially towards the end of treatment.  We will place a PICC line to help assist with the chemotherapy itself.  His prognosis is good considering he has HPV positivity.  This is still a curable malignancy and we will see how he does going forward.  He will likely require fluids periodically.  I will plan to start him on treatment as soon as we get an okay from radiation regarding a date and time.  I spoke to him and his daughter who was on the phone regarding the plan and answered all the questions that they had for  me.    I spent a total of 60 minutes in direct patient care, greater than 45 minutes (greater than 50%) were spent in coordination of care, and counseling the patient regarding squamous cell carcinoma of the left tonsil. Answered any questions patient had with medication and plan.          Thank you for allowing me to participate in the care of this patient.    Yours sincerely,    Ramon Isbell MD  [facility]  Hematology and Oncology    Return on: 05/13/20  Return in (Approximately): 3 weeks, Schedule with next infusion    Orders Placed This Encounter   Procedures   • Comprehensive metabolic panel     Standing Status:   Future     Standing Expiration Date:   5/6/2021   • Magnesium     Standing Status:   Future     Standing Expiration Date:   5/6/2021   • Basic metabolic panel     Standing Status:   Future     Standing Expiration Date:   5/13/2021   • Magnesium     Standing Status:   Future     Standing Expiration Date:   5/13/2021   • Comprehensive Metabolic Panel     Standing Status:   Future     Standing Expiration Date:   4/27/2021   • CBC and Differential     Standing Status:   Future     Standing Expiration Date:   5/6/2021     Order Specific Question:   Manual Differential     Answer:   No   • CBC and Differential     Standing Status:   Future     Standing Expiration Date:   5/13/2021     Order Specific Question:   Manual Differential     Answer:   No   • CBC & Differential     Standing Status:   Future     Standing Expiration Date:   4/27/2021     Order Specific Question:   Manual Differential     Answer:   No

## 2020-05-01 NOTE — TELEPHONE ENCOUNTER
Returned call to daughter discussing with her process of Radiation/ Chemotherapy set up. Informing patient's daughter that insurance needs to approve treatment plan and then we will be able to set up treatment.

## 2020-05-01 NOTE — TELEPHONE ENCOUNTER
Nataliia, patient's daughter, calling.    Patient is starting chemo 5/6.  When is he supposed to start radiation? They have not heard from anyone.    [phone]

## 2020-05-01 NOTE — PROGRESS NOTES
CHEMOTHERAPY PREPARATION    Oliver Mallory  [id number]  1965    Chief Complaint: chemo education     History of present illness:  Oliver Mallory is a 55 y.o. year old male who is here today for chemotherapy preparation and needs assessment. 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     Oncology History:       Cancer of tonsil (CMS/[facility])    4/16/2020 Cancer Staged     Staging form: Pharynx - HPV-Mediated Oropharynx, AJCC 8th Edition  - Clinical stage from 4/16/2020: Stage II (cT3, cN1, cM0, p16+) - Signed by Kellie Lock MD on 4/17/2020 4/17/2020 Initial Diagnosis     Cancer of tonsil (CMS/[facility])      5/6/2020 -  Chemotherapy     OP HEAD & NECK CISplatin (weekly) + XRT         Past Medical History:   Diagnosis Date   • Arthritis    • COPD (chronic obstructive pulmonary disease) (CMS/[facility])    • Hypertension    • Pancreatitis        Past Surgical History:   Procedure Laterality Date   • ANKLE TENDON REPAIR     • CARDIAC CATHETERIZATION N/A 5/31/2018    Procedure: Left Heart Cath;  Surgeon: Ray Farley MD;  Location: [facility] CATH INVASIVE LOCATION;  Service: Cardiovascular   • CHOLECYSTECTOMY     • CYST REMOVAL      coccyx   • HERNIA MESH REMOVAL     • HERNIA REPAIR     • KNEE SURGERY  1981   • TONSILLECTOMY         MEDICATIONS: The current medication list was reviewed and reconciled.     Allergies:  has No Known Allergies.    Family History   Problem Relation Age of Onset   • Hypertension Mother    • Heart disease Father          Review of Systems   Constitutional: Positive for appetite change and fatigue.   Eyes: Negative.    Respiratory: Positive for cough and shortness of breath. Negative for choking, chest tightness and wheezing.    Cardiovascular: Negative.    Gastrointestinal: Positive for nausea. Negative for abdominal distention,  "abdominal pain, constipation and diarrhea.   Endocrine: Negative.    Genitourinary: Negative.    Musculoskeletal: Positive for neck pain.   Skin: Negative.    Allergic/Immunologic: Negative.    Neurological: Negative.    Hematological: Negative.    Psychiatric/Behavioral: Positive for decreased concentration and sleep disturbance. The patient is nervous/anxious.        Physical Exam  Vital Signs: /78   Pulse 78   Temp 97.9 °F (36.6 °C) (Temporal)   Resp 16   Ht 182.9 cm (72.01\")   Wt (!) 145 kg (319 lb 4.8 oz)   SpO2 95%   BMI 43.30 kg/m²   Vitals:    05/01/20 1007   PainSc:   4   PainLoc: Neck           General Appearance:  alert, cooperative, no apparent distress, appears stated age and obese   Neurologic/Psychiatric: A&O x 3, gait steady, appropriate affect   HEENT:  Normocephalic, without obvious abnormality, mucous membranes moist   Lungs:   Clear to auscultation bilaterally; respirations regular, even, and unlabored bilaterally   Heart:  Regular rate and rhythm, no murmurs appreciated   Extremities: Normal, atraumatic; no clubbing, cyanosis, or edema    Skin: No rashes, lesions, or abnormal coloration noted     ECOG Performance Status: (1) Restricted in Physically Strenuous Activity, Ambulatory & Able to Do Work of Light Nature          NEEDS ASSESSMENTS    Genetics  The patient's new diagnosis and family history have been reviewed for genetic counseling needs. A genetic referral is not recommended.       Psychosocial  The patient has completed a PHQ-9 Depression Screening and the Distress Thermometer (DT) today.   PHQ-9 Total Score: 13. PHQ-9 results show 10-14 (Moderate Depression). The patient scored their distress today as 7 on a scale of 0-10 with 0 being no distress and 10 being extreme distress.   Problems marked by the patient as being an issue for them within the last week include emotional problems and physical problems.   Results were reviewed along with psychosocial resources offered " "by our cancer center. Our oncology social worker will be flagged for a DT score of 4 or above, and a same day call will be made for a score of 9 or 10. A mental health referral is recommended at this time. The patient is accepting of a referral to [facility].   Copies of patient's questionnaires will be scanned into EMR for details and further reference.    Barriers to care  A barriers form was also completed by the patient today. We discussed services offered by our facility to help him have adequate access to care. The patient was given the name and card for our Oncology Social Worker, Mishel Cobb. Based upon barriers assessment today, the patient will require a follow-up call from the  to further discuss needs.   A copy of the barriers form will also be scanned into EMR for details and further reference.     VAD Assessment  The patient and I discussed planned intervenous chemotherapy as well as other IV treatments that are often needed throughout the course of treatment. These may include, but are not limited to blood transfusions, antibiotics, and IV hydration. The vasculature does not appear to be adequate for multiple peripheral IVs throughout their treatment course. Discussed risks and benefits of VADs. The patient would like to pursue PICC line. PICC line to be inserted today.     Advance Care Planning   ACP discussion was held with the patient during this visit. Patient does not have an advance directive, information provided.  The patient and I discussed advanced care planning, \"Conversations that Matter\".   This service was offered, free of charge, for development of advance directives with a certified ACP facilitator.  The patient does not have an up-to-date advanced directive.  The patient is interested in an appointment with one of our facilitators to create or update their advanced directives.         Palliative Care  The patient and I discussed palliative care services. " Palliative care is not the same as Hospice care. This is specialized medical care for people living with serious illness with the goal of improving quality of life for the patient and their family. [facility] has partnered with [facility] to offer our patients outpatient palliative care early along with their treatment to assist in coordination of care, symptom management, pain management, and medical decision making.  Oncology criteria for palliative care referral is not met at this time. The patient is not interested in a palliative care consultation.     Additional Referral needs  none      CHEMOTHERAPY EDUCATION    Booklets Given: Chemotherapy and You [x]  Eating Hints [x]    Sexuality/Fertility Books []      Chemotherapy/Biotherapy Education Sheets: (list all that apply)  nausea management, acid reflux management, diarrhea management, Cancer resourse contacts information, skin and mouth care and vaccination information                                                                                                                                                                 Chemotherapy Regimen:   Treatment Plans     Name Type Plan dates Plan Provider         Active    OP HEAD & NECK CISplatin (weekly) + XRT ONCOLOGY TREATMENT  5/5/2020 - Present Ramon Isbell MD                    TOPICS EDUCATION PROVIDED COMMENTS   ANEMIA:  role of RBC, cause, s/s, ways to manage, role of transfusion [x]    THROMBOCYTOPENIA:  role of platelet, cause, s/s, ways to prevent bleeding, things to avoid, when to seek help [x]    NEUTROPENIA:  role of WBC, cause, infection precautions, s/s of infection, when to call MD [x]    NUTRITION & APPETITE CHANGES:  importance of maintaining healthy diet & weight, ways to manage to improve intake, dietary consult, exercise regimen [x]    DIARRHEA:  causes, s/s of dehydration, ways to manage, dietary changes, when to call MD [x]    CONSTIPATION:  causes, ways to manage,  dietary changes, when to call MD [x]    NAUSEA & VOMITING:  cause, use of antiemetics, dietary changes, when to call MD [x]    MOUTH SORES:  causes, oral care, ways to manage [x]    ALOPECIA:  cause, ways to manage, resources [x]    INFERTILITY & SEXUALITY:  causes, fertility preservation options, sexuality changes, ways to manage, importance of birth control [x]    NERVOUS SYSTEM CHANGES:  causes, s/s, neuropathies, cognitive changes, ways to manage [x]    PAIN:  causes, ways to manage [x]    SKIN & NAIL CHANGES:  cause, s/s, ways to manage [x]    ORGAN TOXICITIES:  cause, s/s, need for diagnostic tests, labs, when to notify MD [x]    SURVIVORSHIP:  distress, distress assessment, secondary malignancies, early/late effects, follow-up, social issues, social support [x]    HOME CARE:  use of spill kits, storing of PO chemo, how to manage bodily fluids [x]    MISCELLANEOUS:  drug interactions, administration, vesicant, et [x]        Assessment and Plan:    Oliver was seen today for chemotherapy.    Diagnoses and all orders for this visit:    Cancer of tonsil (CMS/[facility])  -     Ambulatory Referral to Behavioral Health  -     Ambulatory Referral to Social Work        This was a 45 minute face-to-face visit with 40 minutes spent in  counseling and coordination of care as documented above.   The patient and I have reviewed their new cancer diagnosis and scheduled treatment plan. Needs assessment was completed including genetics, psychosocial needs, barriers to care, VAD evaluation, advanced care planning, and palliative care services. Referrals have been ordered as appropriate based upon our evaluation and patient desires.     Chemotherapy teaching was also completed today as documented above. Adequate time was given to answer all questions to his satisfaction. Patient and family are aware of their care team members and contact information if they have questions or problems throughout the treatment course. Needs assessments  and education has been completed. The patient is adequately prepared to begin treatment as scheduled.     Pain assessment was performed today as a part of patient’s care. For patients with pain related to surgery, malignancy or cancer treatment, the plan is as noted in the assessment/plan.  For patients with pain not related to these issues, they are to seek any further needed care from a more appropriate provider, such as PCP.    Reviewed with patient education regarding Dexamethasone and Zofran prescriptions sent to pharmacy.     I advised the patient that he can take Tylenol or Ibuprofen as needed for aches/pains related to cancer/treatment. I also advised patient he could use Senakot or Miralax as needed for constipation or Imodium as needed for diarrhea.       I reviewed with the patient the care team members. I also reviewed the option of the urgent care clinic through our oncology office for evaluation and management of symptoms related to treatment.        Jewell Rose, [facility]  05/01/2020

## 2020-05-05 NOTE — PROGRESS NOTES
Oncology Nutrition Screening    Patient Name:  Oliver Mallory  YOB: 1965  MRN: [id number]  Date:  05/05/20  Physician:  Dr. Kellie Lock / Dr. Campbell    Type of Cancer Treatment:   Surgery: Microlaryngoscopy and left radical tonsillectomy by Dr. Luigi Henry on 4/9/2020.   Chemotherapy:  Cisplatin / Weekly/ 7 cycles  Radiation: 70 Gy to the tumor bed    Patient Active Problem List   Diagnosis   • Abnormal stress test   • Cancer of tonsil (CMS/HCC)   Stage II    Current Outpatient Medications   Medication Sig Dispense Refill   • ALPRAZolam (XANAX) 0.5 MG tablet Take 1 tablet by mouth Daily (Monday-Friday). 10 tablet 0   • aspirin 81 MG chewable tablet Chew 81 mg Daily.     • BREO ELLIPTA 100-25 MCG/INH inhaler Inhale 1 puff Daily.     • dexamethasone (DECADRON) 4 MG tablet Take 2 tablets by mouth Daily on days 2, 3 & 4.  Take with food. 6 tablet 5   • gabapentin (NEURONTIN) 100 MG capsule TAKE TWO CAPSULES BY MOUTH THREE TIMES DAILY MAY CAUSE DROWSINESS     • HYDROcodone-acetaminophen (NORCO) 5-325 MG per tablet Take 1 tablet by mouth Every 6 (Six) Hours As Needed for Moderate Pain . 120 tablet 0   • lisinopril-hydrochlorothiazide (PRINZIDE,ZESTORETIC) 20-25 MG per tablet Take 1 tablet by mouth Daily.     • metoprolol succinate XL (TOPROL-XL) 25 MG 24 hr tablet Take 25 mg by mouth Daily.     • ondansetron (ZOFRAN) 8 MG tablet Take 1 tablet by mouth 3 (Three) Times a Day As Needed for Nausea or Vomiting. 30 tablet 5   • pantoprazole (PROTONIX) 40 MG EC tablet Take 40 mg by mouth Daily.     • tiZANidine (ZANAFLEX) 4 MG tablet Take 6 mg by mouth 2 (Two) Times a Day.       No current facility-administered medications for this visit.        Glycemic Risk:   NA    Weight:   Height: 72 inches Weight: 330.6 lbs.  Usual Body Weight: 320 lbs.   BMI: 44.8  Extremely Obese  Weight loss of 60-70 lbs over the past year which was intentional with decreased oral intake and working out at the [facility]    Oral Food  Intake:  Regular Diet - No Restrictions    Hydration Status:   How many 8 ounce glass of water of fluid do you drink per day?  To be assessed    Enteral Feeding:   NA    Nutrition Symptoms:   Fatigue  Anxiety    Activity:   Not my normal self, but able to be up and about with fairly normal activities     reports that he has quit smoking. His smoking use included cigarettes. He has never used smokeless tobacco. He reports that he drank alcohol. He reports that he has current or past drug history. Drug: Marijuana.    Evaluation of Nutritional Risk:   Patient is not identified to be at nutritional risk for malnutrition but is potential risk with H&N cancer diagnosis and chemoradiation treatment plan.    Initial consultation with patient during status checks.  Patient states that he remains with GI issues that are interfering with oral food intake / after discussion, he thinks that his anxiety regarding the diagnosis and treatment are the root cause.  He states that he is carrying approximately 50 lbs excess weight that is edema.    With treatment, he is staying in [place] with his daughter.  Eating is sporadic with sometimes eating one meal per day and other times two meals.  Discussed the importance of nutrition with diagnosis and treatment plan focusing on the importance of nutrient dense choices, higher protein intake with distribution of oral intake through 5-6 meals per day, adequate fluid intake.  Discussed possible side effects of chemotherapy and radiation, including swallowing difficulties necessitating the modification of consistencies and textures, taste and appetite changes, dry mouth.  Patient is aware that a possible indication for placement of a feeding tube may be warranted if he becomes unable to meet nutritional and hydration needs with oral intake.    Will follow patient closely with treatment progression.      Electronically signed by:  Yessi Calderon RD  08:38

## 2020-05-08 NOTE — TELEPHONE ENCOUNTER
SW called pt to follow up on his recent distress screening.  SW left a message requesting a return phone call.  SW available for ongoing support and resource needs.

## 2020-05-08 NOTE — TELEPHONE ENCOUNTER
Daughter called with patient on speaker phone stating he was having a headache for 3 days.  I discussed with Dr. Lock.  Patient has severe anxiety.  I questioned patient about his pain medicine and he stated he was taking it twice a day.  Patient's meds are scheduled every 6 hours.  He did not know that.  We encouraged patient to take pain meds every 6 hours with a stool softener.  I encouraged daughter and patient to call chemo and ask if a HA is a common side affect to chemo.  I re-iterated I was NOT saying it was a side effect, but to make them aware in case it was.  We also referred patient to palliative care for pain management.  Daughter and patient verbalized understanding.

## 2020-05-13 NOTE — PROGRESS NOTES
PROBLEM LIST:     Cancer of tonsil (CMS/[facility])    4/16/2020 Cancer Staged     Staging form: Pharynx - HPV-Mediated Oropharynx, AJCC 8th Edition  - Clinical stage from 4/16/2020: Stage II (cT3, cN1, cM0, p16+) - Signed by Kellie Lock MD on 4/17/2020 4/17/2020 Initial Diagnosis     Cancer of tonsil (CMS/[facility])      5/6/2020 -  Chemotherapy     OP HEAD & NECK CISplatin (weekly) + XRT         REASON FOR VISIT: Head and Neck cancer    HISTORY OF PRESENT ILLNESS:   55 y.o.  male presents today for follow-up of his cancer of the tonsil.  He is undergoing definitive therapy with chemo RT.  Tolerating cisplatin reasonably well.  He did have a fall and he may have bruised his right rib.  Having some difficulty with pain in that area.    Past medical history, social history and family history was reviewed and unchanged from prior visit.    Review of Systems:    Review of Systems   Cardiovascular: Positive for chest pain.      A comprehensive 14 point review of systems was performed and was negative except as mentioned.      Medications:        Current Outpatient Medications:   •  ALPRAZolam (XANAX) 0.5 MG tablet, Take 1 tablet by mouth Daily (Monday-Friday)., Disp: 10 tablet, Rfl: 0  •  aspirin 81 MG chewable tablet, Chew 81 mg Daily., Disp: , Rfl:   •  BREO ELLIPTA 100-25 MCG/INH inhaler, Inhale 1 puff Daily., Disp: , Rfl:   •  dexamethasone (DECADRON) 4 MG tablet, Take 2 tablets by mouth Daily on days 2, 3 & 4.  Take with food., Disp: 6 tablet, Rfl: 5  •  gabapentin (NEURONTIN) 100 MG capsule, TAKE TWO CAPSULES BY MOUTH THREE TIMES DAILY MAY CAUSE DROWSINESS, Disp: , Rfl:   •  HYDROcodone-acetaminophen (NORCO) 5-325 MG per tablet, Take 1 tablet by mouth Every 6 (Six) Hours As Needed for Moderate Pain ., Disp: 120 tablet, Rfl: 0  •  lisinopril-hydrochlorothiazide (PRINZIDE,ZESTORETIC) 20-25 MG per tablet, Take 1 tablet by mouth Daily., Disp: , Rfl:   •  metoprolol succinate XL (TOPROL-XL) 25 MG 24 hr tablet, Take 25 mg  by mouth Daily., Disp: , Rfl:   •  ondansetron (ZOFRAN) 8 MG tablet, Take 1 tablet by mouth 3 (Three) Times a Day As Needed for Nausea or Vomiting., Disp: 30 tablet, Rfl: 5  •  pantoprazole (PROTONIX) 40 MG EC tablet, Take 40 mg by mouth Daily., Disp: , Rfl:   •  tiZANidine (ZANAFLEX) 4 MG tablet, Take 6 mg by mouth 2 (Two) Times a Day., Disp: , Rfl:     Pain Medications             aspirin 81 MG chewable tablet Chew 81 mg Daily.    dexamethasone (DECADRON) 4 MG tablet Take 2 tablets by mouth Daily on days 2, 3 & 4.  Take with food.    gabapentin (NEURONTIN) 100 MG capsule TAKE TWO CAPSULES BY MOUTH THREE TIMES DAILY MAY CAUSE DROWSINESS    HYDROcodone-acetaminophen (NORCO) 5-325 MG per tablet Take 1 tablet by mouth Every 6 (Six) Hours As Needed for Moderate Pain .    tiZANidine (ZANAFLEX) 4 MG tablet Take 6 mg by mouth 2 (Two) Times a Day.             ALLERGIES:  No Known Allergies      Physical Exam    VITAL SIGNS:  /69   Pulse 79   Temp 98 °F (36.7 °C) (Skin)   Resp 20   Wt (!) 147 kg (323 lb 1.6 oz)   SpO2 96%   BMI 43.82 kg/m²     Wt Readings from Last 3 Encounters:   05/13/20 (!) 147 kg (323 lb 1.6 oz)   05/12/20 (!) 148 kg (326 lb 14.4 oz)   05/06/20 (!) 147 kg (325 lb)       Body mass index is 43.82 kg/m². Body surface area is 2.62 meters squared.    Pain Score    05/13/20 0824   PainSc: 10-Worst pain ever   PainLoc: Rib Cage  Comment: RIGHT         Performance Status: 1    General: well appearing, in no acute distress, morbid obesity  HEENT: sclera anicteric, oropharynx clear, neck is supple  Lymphatics: no cervical, supraclavicular, or axillary adenopathy  Cardiovascular: regular rate and rhythm, no murmurs, rubs or gallops  Lungs: clear to auscultation bilaterally  Abdomen: soft, nontender, nondistended.  No palpable organomegaly  Extremities: no lower extremity edema  Skin: no rashes, lesions, bruising, or petechiae  Msk:  Shows no weakness of the large muscle groups  Psych: Mood is  stable        RECENT LABS:    Lab Results   Component Value Date    HGB 12.8 (L) 05/06/2020    HCT 41.4 05/06/2020    MCV 94.6 05/06/2020     (L) 05/06/2020    WBC 8.60 05/06/2020    NEUTROABS 6.10 05/06/2020    LYMPHSABS 2.00 05/06/2020    MONOSABS 0.50 05/06/2020    EOSABS 0.36 (H) 05/31/2018    BASOSABS 0.03 05/31/2018       Lab Results   Component Value Date    GLUCOSE 131 (H) 05/06/2020    BUN 17 05/06/2020    CREATININE 0.90 05/06/2020     05/06/2020    K 3.8 05/06/2020     05/06/2020    CO2 25.0 05/06/2020    CALCIUM 8.7 05/06/2020    PROTEINTOT 6.7 05/06/2020    ALBUMIN 2.90 (L) 05/06/2020    BILITOT 2.0 (H) 05/06/2020    ALKPHOS 230 (H) 05/06/2020    AST 62 (H) 05/06/2020    ALT 39 05/06/2020       Nm Pet Skull Base To Mid Thigh    Result Date: 3/30/2020  Malignancy in the left tonsillar region with adenopathy seen within the left neck and cervical chain. There is no additional hypermetabolic focus identified.  D:  03/30/2020 E:  03/30/2020    This report was finalized on 3/30/2020 2:05 PM by Dr. Janeth Atkins MD.            Assessment/Plan    1.  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  Continue chemo RT.  He is getting cisplatin weekly.  Seems to be tolerating it reasonably well.    2.  Morbid obesity.  This is a major concern with his comorbidities and also chemotherapy dosing.    3.  Fall with contusion of the right ribs.  Will get x-rays to make sure nothing is broken.    4.  COPD.  His breathing seems relatively stable.          I spent a total of 25 minutes in direct patient care, greater than 20 minutes (greater than 50%) were spent in coordination of care, and counseling the patient regarding   cancer of the tonsil. Answered any questions patient had with medication and plan.      Ramon Isbell MD  [facility] Hematology and Oncology    Return on: 06/03/20  Return in (Approximately): Schedule with  next infusion, 3 weeks    Orders Placed This Encounter   Procedures   • XR Chest 2 View   • Basic metabolic panel   • Magnesium   • Basic metabolic panel   • Magnesium   • CBC and Differential   • CBC and Differential       5/13/2020

## 2020-05-13 NOTE — PROGRESS NOTES
SW met with pt during his infusion to provide support and resources.  Pt is asking for assistance with travel expenses.  His daughter has been bringing him to treatment from [place], where he has been staying with her and family.  SW provided a gas card to pt.  SW will also check to see if pt is eligible for Medicaid transportation.  SW provided contact information for future needs or concerns.  SW available for ongoing support and resource needs.

## 2020-05-14 NOTE — PROGRESS NOTES
RADIATION ONCOLOGY PROGRESS NOTE  05/14/20    Pt sent to clinic.  He is asking about results from recent chest xray after recent fall.    Informed him report states questionable hairline fx right 6th rib.   Instructed he can still get radiation treatment.    Pt verbalized understanding.

## 2020-05-18 NOTE — PROGRESS NOTES
I spoke with Mr. Mallory when he arrived for his radiation treatment appointment today. He asked about when someone would call him to schedule his palliative medicine appointment. Checking in [facility], I saw he has a phone visit with Dr. Reddy scheduled for 5.19.2020 at 10:45am. Relayed info to Mr. Mallory and to CONI Nuñez RN regarding appointment time.

## 2020-05-19 PROBLEM — R07.0 THROAT PAIN IN ADULT: Status: ACTIVE | Noted: 2020-01-01

## 2020-05-19 PROBLEM — Z02.89 PAIN MEDICATION AGREEMENT SIGNED: Status: ACTIVE | Noted: 2020-01-01

## 2020-05-19 PROBLEM — S22.31XA CLOSED FRACTURE OF ONE RIB OF RIGHT SIDE: Status: ACTIVE | Noted: 2020-01-01

## 2020-05-19 PROBLEM — K59.03 CONSTIPATION DUE TO OPIOID THERAPY: Status: ACTIVE | Noted: 2020-01-01

## 2020-05-19 PROBLEM — G89.3 PAIN, CANCER: Status: ACTIVE | Noted: 2020-01-01

## 2020-05-19 PROBLEM — T40.2X5A CONSTIPATION DUE TO OPIOID THERAPY: Status: ACTIVE | Noted: 2020-01-01

## 2020-05-19 PROBLEM — R39.11 URINARY HESITANCY: Status: ACTIVE | Noted: 2020-01-01

## 2020-05-19 PROBLEM — R07.81 RIB PAIN ON RIGHT SIDE: Status: ACTIVE | Noted: 2020-01-01

## 2020-05-19 NOTE — TELEPHONE ENCOUNTER
Received message to call pt regarding Xanax prescription. Returned call to dtr and then spoke with pt. Pt stated he did not receive script for xanax to complete radiation trmts and was unsure if Dr. akhtar or Dr. Gee would be prescribing.  Will route to MD to decide and call pt back in AM. Pt v/u.

## 2020-05-19 NOTE — PROGRESS NOTES
"Pt presented to clinic today for new pt apt. Pt ambulatory, vss, a&ox3, and appropriate. Pt oriented to palliative care, prescribing practices, med counts, contact information and medication disposal agreement.  Pt c/o of pain in chest from broken ribs and neck area where cancer is present. Taking Norco as directed and not touching pain.  Co abd pain, distention, and nausea when drinking and eating. Stated \"it's like an alien coming out in my abd.\"  Constipation at times but report diarrhea with infusion treatments. Educated on no more than 2 days w/o  BM.     Med Counts  Medication Filled # Filled Count Used  # days RADHAMES   [place] 5 4/16/20 120 0  33    Xanax 0.5 5/5/20 10 1.5 8.5 14 0.6                                                                        "

## 2020-05-19 NOTE — PROGRESS NOTES
[facility]    Surgery: Microlaryngoscopy and left radical tonsillectomy by Dr. Luigi Henry on 4/9/2020.   Chemotherapy:  Cisplatin / Has completed 2/7 cycles  Radiation: 70 Gy to the tumor bed    Weight 315.7 lbs / weight loss of 15 lbs since the start of treatment    Follow up with patient during status check visit.  Review of oral food intake indicates that intake is insufficient to meet nutritional needs and the choice of foods that he is eating are not the most nutrient dense foods (hot dogs, charles sausage, chips, etc).  He states that he spends most of his day lying in bed because he is so fatigued.    Swallowing difficulties have worsened, patient stating that it is painful to swallow most any food.  Prescription for MMW ordered; reviewed appropriate types of nutrient rich foods that are of soft, moist consistencies that would be better choices for him to eat. Patient states that he will try some of the choices discussed.    Patient did not keep his SLP appointment for swallowing evaluation.  Discussed the importance of their involvement with the treatment plan including evaluation, patient education for safe swallowing and strengthening exercises for prevention of post radiation issues.  Patient stated that he understood the value of the keeping the appointment and would call them back.

## 2020-05-19 NOTE — PROGRESS NOTES
"    Subjective   Oliver Mallory is a 55 y.o. male.     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  Comorbid COPD, CAD, HTN, and morbid obesity.  Transferred cancer care to [facility] as he has relocated from [place] to [place] to be with his daughter.    Treatment plan:  Definitive chemoradiation with weekly Cisplatin    Pain History:   Noted oxycodone from orthopedist from 9/2019-12/2019 for left knee replacement, then started Gabapentin from primary care for left ankle injury as well, he has since stopped \"not needed.\".  Restarted opioid therapy during cancer diagnosis with ENT then RadOnc.  Premedication with Xanax for radiation treatments, as well.    Pain of left throat, knife pain with swallowing, started after radiation started.  Caused by solids and liquids, \"except [place] sausage.\"  Does have frequent sporadic \"bone ache\" of left supraclavicular area.  Does not impact his nutritional intake, positioning, nor sleep.    Fell and broke R rib and has sharp pains with movement for the past week.    Medication management:  Norco 5/325mg TID (every 6hrs while awake)    ANALGESIA:  Not effective for rib pain, but effective for throat pain  ADVERSE EFFECTS:  Constipation, last BM 2 days ago  ACTIVITY: Independent of ADLs and IADLs but relies on daughter for transportation (no car)  AFFECT:  Claustrophobia with radiation, situational anxiety  ABERRANT BEHAVIORS:  UDT with prescribed medications.    Symptoms: Lower abdominal cramping, last BM 2 days ago.  Urinary hesitancy and dribbling.    Function: No impairment.    Support/Strengths:  Son Andres and daughter Nataliia.  Worked as  until left lower leg/ankle injury last year and L " TKR.  Was to have R TKR, but then found cancer.    Distress/Difficulties: SW provided gas card for financial assistance for travel to frequent treatment appts.  Has gained around 100# since knee issues.  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  Used to being quite physically active until the past 2 years.    Substance Use History: Quit smoking cigarettes, was never smoking daily.  Smokes marijuana most days of the week, at most 1 joint daily, for past 30 years.  Rare alcohol intake.     ACP/Goals: curative intent treatment    The following portions of the patient's history were reviewed and updated as appropriate: allergies, current medications, past family history, past medical history, past social history, past surgical history and problem list.    Review of Systems  Otherwise negative except as below and as already detailed in HPI.    ESAS:  Flowsheet reviewed.  See Attached.    Medication Counts:  Reviewed.  See RN note. Did not bring medication to appointment    COLIN:  Reviewed.  See scanned form in Media. No concerns.  Consistent with history.  Prescribers identified as members of care team.     CONTROLLED SUBSTANCE TRACKING 5/19/2020   Last Colin 5/18/2020   Report Number [id number]   Last UDS 5/19/2020   ORT Initial Risk Score 1   Pill count Expected   Diversion Concern No   Disposal Education and Agreement [id number]       UDS:  THC, Screen, Urine   Date Value Ref Range Status   05/19/2020 Positive (A) Negative Final     Phencyclidine (PCP), Urine   Date Value Ref Range Status   05/19/2020 Negative Negative Final     Cocaine Screen, Urine   Date Value Ref Range Status   05/19/2020 Negative Negative Final     Methamphetamine, Ur   Date Value Ref Range Status   05/19/2020 Negative Negative Final     Opiate Screen   Date Value Ref Range Status   05/19/2020 Positive (A) Negative Final     Amphetamine Screen, Urine   Date Value Ref Range Status   05/19/2020 Negative Negative Final     Benzodiazepine  Screen, Urine   Date Value Ref Range Status   05/19/2020 Positive (A) Negative Final     Tricyclic Antidepressants Screen   Date Value Ref Range Status   05/19/2020 Negative Negative Final     Methadone Screen, Urine   Date Value Ref Range Status   05/19/2020 Negative Negative Final     Barbiturates Screen, Urine   Date Value Ref Range Status   05/19/2020 Negative Negative Final     Oxycodone Screen, Urine   Date Value Ref Range Status   05/19/2020 Negative Negative Final     Propoxyphene Screen   Date Value Ref Range Status   05/19/2020 Negative Negative Final     Buprenorphine, Screen, Urine   Date Value Ref Range Status   05/19/2020 Negative Negative Final     Palliative Performance Scale  Palliative Performance Scale Score: 70%    [place] Symptom Assessment System Revised  Pain Score: 9   ESAS Tiredness Score: 8  ESAS Nausea Score: 6  ESAS Depression Score: 7  ESAS Anxiety Score: 9  ESAS Drowsiness Score: 8  ESAS Lack of Appetite Score: 5  ESAS Wellbeing Score: Best wellbeing  ESAS Dyspnea Score: 9  ESAS Source of Information: patient    TREV-7:  Flowsheet reviewed.  See Attached.  Over the last two weeks, how often have you been bothered by the following problems?  Feeling nervous, anxious or on edge: Nearly every day  Not being able to stop or control worrying: Nearly every day  Worrying too much about different things: Nearly every day  Trouble Relaxing: More than half the days  Being so restless that it is hard to sit still: More than half the days  Becoming easily annoyed or irritable: More than half the days  Feeling afraid as if something awful might happen: Nearly every day  TREV 7 Total Score: 18    PHQ-9:  Flowsheet reviewed.  See Attached.  PHQ-2/PHQ-9 Depression Screening 5/19/2020   Little interest or pleasure in doing things 3   Feeling down, depressed, or hopeless 3   Trouble falling or staying asleep, or sleeping too much 3   Feeling tired or having little energy 3   Poor appetite or overeating 2    Feeling bad about yourself - or that you are a failure or have let yourself or your family down 3   Trouble concentrating on things, such as reading the newspaper or watching television 3   Moving or speaking so slowly that other people could have noticed. Or the opposite - being so fidgety or restless that you have been moving around a lot more than usual 2   Thoughts that you would be better off dead, or of hurting yourself in some way 0   Total Score 22   If you checked off any problems, how difficult have these problems made it for you to do your work, take care of things at home, or get along with other people? -        ECOG: (1) Restricted in physically strenuous activity, ambulatory and able to do work of light nature    Objective   Physical Exam   Constitutional: He is oriented to person, place, and time. No distress.   HENT:   Redness and tiny splotches of erosive ulcers of Oropharynx without leukoplakia   Eyes: EOM are normal. No scleral icterus.   Neck:   Left cervical enlarged mass, about ping pong ball size, nontender.  No clavicle bone pain.  No pain with movement of neck nor R shoulder   Cardiovascular: Normal rate, regular rhythm and normal heart sounds. Exam reveals no gallop and no friction rub.   No murmur heard.  Pulmonary/Chest: Effort normal and breath sounds normal. No stridor. No respiratory distress. He has no wheezes. He has no rales.   Abdominal: He exhibits distension. Bowel sounds are decreased. There is no tenderness. No hernia.   Obese,    Musculoskeletal: He exhibits edema.   Left knee surgical scar noted   Neurological: He is alert and oriented to person, place, and time. Coordination normal.   Skin: Skin is warm and dry. He is not diaphoretic.   Psychiatric: His speech is normal and behavior is normal. Judgment and thought content normal. His mood appears anxious. Cognition and memory are normal.   Nursing note and vitals reviewed.        Universal precautions:    ORT risk scores  (ORT-OUD and/or COMM):  Low ORT score  OME:  20mg  Naloxone prescribed:  Yes (young children in home.  Pt counseled)    Assessment/Plan   Oliver was seen today for appointment and pain.    Diagnoses and all orders for this visit:    Therapeutic drug monitoring  -     Opiates Confirmation, Urine - Urine, Clean Catch  -     BENZODIAZEPINE CONFIRMATION,URINE - Urine, Clean Catch    Pain medication agreement signed    Closed fracture of one rib of right side, initial encounter    Head and neck cancer (CMS/HCC)  -     HYDROcodone-acetaminophen (NORCO) 5-325 MG per tablet; Take 1 tablet by mouth Every 6 (Six) Hours As Needed for Moderate Pain  for up to 30 days.    Throat pain in adult    Pain, cancer    Rib pain on right side    Cancer of tonsil (CMS/HCC)    Urinary hesitancy    Constipation due to opioid therapy    Other orders  -     naloxone (NARCAN) 4 MG/0.1ML nasal spray; Use 1 spray into the nostril(s) as directed by provider As Needed for opioid reversal for up to 1 dose.  -     sennosides-docusate (senna-docusate sodium) 8.6-50 MG per tablet; Take 1 tablet by mouth Daily for 30 days. To prevent constipation  -     tamsulosin (FLOMAX) 0.4 MG capsule 24 hr capsule; Take 1 capsule by mouth Every Night for 30 days.  -     Lidocaine Viscous HCl (XYLOCAINE) 2 % solution; Take 5 mL by mouth As Needed for Mild Pain  for up to 20 doses.                       Total face to face time spent:  40 min.  Greater than 50% time spent in counseling and discussion re:    1)  Use of APAP OTC with recommendation of no more than 3gm APAP per day, including the Norco.      2)  Counseled to take laxative for side effect of opioid induced constipation    3)  Discussed time limited opioid therapy for pain, as he experienced with knee surgery.  Noted patient motivated to take as little and for as short time as possible.    4)  Discussed consideration of restarting Gabapentin if neuropathic descriptors become obvious.    5)  Counseled to use  OTC lidocaine patch for rib pain from fracture, and to expect pain improvement with time.  Counseled deep breathing to prevent splinting and pneumonia.    6)  Start Flomax for urinary symptoms.  Counseled to f/u with primary care for prostate exam, as he is overdue (10 years)    Patient was counseled on narcotic safety and patient responsibility of medication against theft or stolen medications.  Controlled medication agreement reviewed, signed, and in chart.  1)  No early refills requests will be honored for lost or stolen medication.    2)  Patient is expected to comply with requests for random medication counts and urine drug monitoring while receiving opioid therapy.    Patient was counseled to take medications only as prescribed or instructed by prescribing physician.    1)  Patient was counseled regarding risk of overdose and polypharmacy.    2)  Patient was advised about higher risk of accidental overdose with benzodiazepine use.  3)  Patient was advised about emergent use of Naloxone.  4)  Patient was given instruction on safe disposal of medications.    Patient was advised of opioid side effects, including, but not limited to:   -  Physical dependence and opioid tolerance, related to duration of opioid therapy   -  Opioid induced hyperalgesia, related to duration of opioid therapy   -  Opioid use disorder (drug abuse and addiction)   -  hypogonadism and fatigue,    -  Sleep apnea,   -  osteoporosis,    -  depression,    -  Increased risk of pneumonia   - Constipation   - Altered sensorium - confusion and/or sedation   - Nausea   - Pruritis, itching   - Hyperhidrosis, excessive sweating      Care coordination:   Follow up in 2 weeks

## 2020-05-22 NOTE — TELEPHONE ENCOUNTER
SW provided a gas card to pt to help with his transportation expenses.  SW available for ongoing support and resource needs.

## 2020-05-22 NOTE — PROGRESS NOTES
Patient showed up at charge nurse desk requesting PICC dressing change for bloody dressing.  Patient added on and dressing changed.

## 2020-05-26 NOTE — PROGRESS NOTES
[facility]     Surgery: Microlaryngoscopy and left radical tonsillectomy by Dr. Luigi Henry on 4/9/2020.   Chemotherapy:  Cisplatin / Has completed 3/7 cycles  Radiation: 70 Gy to the tumor bed     Weight 309.3 lbs / weight loss of 20 lbs since the start of treatment     Patient states that the post chemo diarrhea is much improved over the past week with use of Imodium.    He states that he is eating, but oral intake is sporadic. He has not eaten anything today, but on his way to get a hamburger and [employer] coffee.  He states that it is extremely difficult to swallow, but he is still managing to swallow most consistencies and textures.  Requests prescription for MMW as he did not get it last week; ordered today.     He is consuming 2 servings of either Boost or Ensure supplements per day to supplement oral intake; provided coupons and also suggestions for cheaper, but nutritive valued supplements.    Patient states that his daughter would like my contact information as she is unsure of what to prepare for him; information provided.    Will continue to follow.

## 2020-05-28 NOTE — MBS/VFSS/FEES
Outpatient Speech Language Pathology   Adult Swallow Initial Evaluation   [place]   Modified Barium Swallow Study (MBS)     Patient Name: Oliver Mallory  : 1965  MRN: [id number]  Today's Date: 2020         Visit Date: 2020   Patient Active Problem List   Diagnosis   • Abnormal stress test   • Cancer of tonsil (CMS/HCC)   • Pain medication agreement signed   • Pain, cancer   • Throat pain in adult   • Closed fracture of one rib of right side   • Rib pain on right side   • Urinary hesitancy   • Constipation due to opioid therapy        Past Medical History:   Diagnosis Date   • Arthritis    • COPD (chronic obstructive pulmonary disease) (CMS/HCC)    • Fall 2020   • Hypertension    • Pancreatitis         Past Surgical History:   Procedure Laterality Date   • ANKLE TENDON REPAIR     • CARDIAC CATHETERIZATION N/A 2018    Procedure: Left Heart Cath;  Surgeon: Ray Farley MD;  Location:  [facility];  Service: Cardiovascular   • CHOLECYSTECTOMY     • CYST REMOVAL      coccyx   • HERNIA MESH REMOVAL     • HERNIA REPAIR     • KNEE SURGERY     • TONSILLECTOMY           Visit Dx:     ICD-10-CM ICD-9-CM   1. Pharyngeal dysphagia R13.13 787.23   2. Cancer of tonsil (CMS/HCC) C09.9 146.0           SLP Adult Swallow Evaluation     Row Name 20 1100       Rehab Evaluation    Document Type  evaluation  -AC    Subjective Information  no complaints  -AC    Patient Observations  alert;cooperative  -AC    Patient Effort  good  -AC       General Information    Patient Profile Reviewed  yes  -AC    Pertinent History Of Current Problem  54yo referred for modified barium swallow study by Dr. Lock. Patient was diagnosed with stage II tonsil SCC, HPV mediated. Patient is status-post left radical tonsilectomy 2020 and has started chemoradiotherapy. Patient reported odynophagia since radiation started and has had some weight loss. Further signifcant medical history significant for  COPD.   -AC    Current Method of Nutrition  regular textures;thin liquids  -AC    Precautions/Limitations, Vision  WFL;for purposes of eval  -AC    Precautions/Limitations, Hearing  WFL;for purposes of eval  -AC    Prior Level of Function-Swallowing  no diet consistency restrictions  -AC    Plans/Goals Discussed with  patient;agreed upon  -AC    Barriers to Rehab  none identified  -AC       Pain Assessment    Additional Documentation  Pain Scale: Numbers Pre/Post-Treatment (Group)  -AC       Pain Scale: Numbers Pre/Post-Treatment    Pain Scale: Numbers, Pretreatment  0/10 - no pain  -AC    Pain Scale: Numbers, Post-Treatment  0/10 - no pain  -AC       General Eating/Swallowing Observations    Respiratory Support Currently in Use  room air  -AC    Eating/Swallowing Skills  self-fed;appropriate self-feeding skills observed  -AC    Positioning During Eating  upright 90 degree;upright in chair  -AC       MBS/VFSS    Utensils Used  spoon;cup;straw  -AC    Consistencies Trialed  thin liquids;pudding thick;regular textures  -AC       MBS/VFSS Interpretation    Oral Prep Phase  WFL  -AC    Oral Transit Phase  WFL  -AC    Oral Residue  WFL  -AC       Initiation of Pharyngeal Swallow    Initiation of Pharyngeal Swallow  bolus in valleculae  -AC    Pharyngeal Phase  impaired pharyngeal phase of swallowing  -AC    Penetration After the Swallow  thin liquids;secondary to residue;in pyriform sinuses  -AC    Aspiration After the Swallow  thin liquids;secondary to residue;in pyriform sinuses  -AC    Response to Aspiration  throat clear  -AC    Rosenbek's Scale  thin:;7--->level 7;pudding/puree:;regular textures:;1--->level 1  -AC    Pharyngeal Residue  all consistencies tested;diffuse within pharynx;secondary to reduced base of tongue retraction;secondary to reduced posterior pharyngeal wall stripping;other (see comments) mild-moderate amount; ? affected by edematous epiglottis  -AC    Response to Residue  cleared residue with cued  swallow;other (see comments) partial clearance w/ second dry swallow  -AC    Attempted Compensatory Maneuvers  bolus size;bolus presentation style;chin tuck;multiple swallows;effortful (hard swallow)  -AC    Pharyngeal Phase, Comment  Patient presented with mild-moderate pharyngeal dysphagia. There was mild-moderate diffuse pharyngeal residue across consistencies, as well as retrograde flow of material to pyriform sinuses, which further increased pharyngeal residue. Retrograde flow appeared to be related to presence of osteophyte at C5-C6. Patient did not spontaneously clear pharyngeal residue, which resulted in penetration and eventual aspiration of thin liquid after the swallow as residue spilled from pyiforms. One delayed throat clear response, but no coughing. Use of cued dry second swallow helped to clear pharyngeal residue and prevent penetration/aspiration of thin liquid. No penetration/aspiration appreciated w/ pudding or solid throughout study. Patient is at risk for developing further swallowing difficulty as he completes chemoradiotherapy.  -AC       Clinical Impression    SLP Swallowing Diagnosis  functional oral phase;mild-moderate;pharyngeal dysfunction  -AC    Functional Impact  risk of aspiration/pneumonia;risk of malnutrition;risk of dehydration  -AC    Rehab Potential/Prognosis, Swallowing  good, to achieve stated therapy goals  -    Swallow Criteria for Skilled Therapeutic Interventions Met  demonstrates skilled criteria  -AC       Recommendations    SLP Diet Recommendation  regular textures;thin liquids  -AC    Recommended Precautions and Strategies  upright posture during/after eating;small bites of food and sips of liquid;multiple swallows per bite of food;multiple swallows per sip of liquid;other (see comments) volitional cough at end of meal as precaution, oral care  -AC    SLP Rec. for Method of Medication Administration  meds whole;meds crushed;with pudding or applesauce;as tolerated  -AC     Monitor for Signs of Aspiration  notify SLP if any concerns  -AC      User Key  (r) = Recorded By, (t) = Taken By, (c) = Cosigned By    Initials Name Provider Type    Niecy Florence MS CCC-SLP Speech and Language Pathologist          OP SLP Education     Row Name 05/28/20 1330       Education    Barriers to Learning  No barriers identified  -    Education Provided  Described results of evaluation;Patient expressed understanding of evaluation;Patient participated in establishing goals and treatment plan  -AC    Assessed  Learning needs;Learning motivation;Learning preferences;Learning readiness  -    Learning Motivation  Strong  -    Learning Method  Explanation;Written materials  -    Teaching Response  Verbalized understanding  -AC      User Key  (r) = Recorded By, (t) = Taken By, (c) = Cosigned By    Initials Name Effective Dates    Niecy Florence MS CCC-SLP 07/27/17 -           OP SLP Assessment/Plan - 05/28/20 1329        SLP Assessment    Functional Problems  Swallowing   -AC    Impact on Function: Swallowing  Risk of malnourishment;Risk of dehydration;Risk of pneumonia;Impact on social aspects of eating;Risk of aspiration   -AC    Clinical Impression: Swallowing  Mild-Moderate:;pharyngeal phase dysphagia   -AC    Prognosis  Good (comment)   -AC    Patient/caregiver participated in establishment of treatment plan and goals  Yes   -AC    Patient would benefit from skilled therapy intervention  Yes   -AC       SLP Plan    Plan Comments  Spoke to patient about receiving outpatient SLP services for dysphagia, especially while undergoing chemoradiotherapy.  Patient reported that he has not yet scheduled appointment.  Notified 1800 clinic.    -AC      User Key  (r) = Recorded By, (t) = Taken By, (c) = Cosigned By    Initials Name Provider Type    Niecy Florence MS CCC-SLP Speech and Language Pathologist             Time Calculation:   SLP Start Time: 1100    Therapy Charges for Today     Code  Description Service Date Service Provider Modifiers Qty    [id number] HC ST MOTION FLUORO EVAL SWALLOW 4 5/28/2020 Niecy Wasserman, MS CCC-SLP GN 1                   Niecy Wasserman MS CCC-SLP  5/28/2020

## 2020-06-02 PROBLEM — R45.851 SUICIDAL IDEATION: Status: ACTIVE | Noted: 2020-01-01

## 2020-06-02 PROBLEM — R05.9 COUGH: Status: ACTIVE | Noted: 2020-01-01

## 2020-06-02 NOTE — PROGRESS NOTES
"    Subjective   Oliver Mallory is a 55 y.o. male.     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  Comorbid COPD, CAD, HTN, and morbid obesity.  Transferred cancer care to [facility] as he has relocated from [place] to [place] to be with his daughter.     Treatment plan:  Definitive chemoradiation with weekly Cisplatin     Pain History:   Noted oxycodone from orthopedist from 9/2019-12/2019 for left knee replacement, then started Gabapentin from primary care for left ankle injury as well, he has since stopped \"not needed.\".  Restarted opioid therapy during cancer diagnosis with ENT then RadOnc.  Premedication with Xanax for radiation treatments, as well.     Pain of left throat, knife pain with swallowing, started after radiation started.  Caused by solids and liquids, \"except Merly sausage.\"  Does have frequent sporadic \"bone ache\" of left supraclavicular area.  Does not impact his nutritional intake, positioning, nor sleep.     Fell and broke R rib and has sharp pains with movement at beginning of May    Support/Strengths:  Son Andres and daughter Nataliia.  Worked as  until left lower leg/ankle injury last year and L TKR.  Was to have R TKR, but then found cancer.     Distress/Difficulties:  provided gas card for financial assistance for travel to frequent treatment appts.  Has gained around 100# since knee issues.  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  Used to being quite physically active until the past 2 years.     Substance Use History: Quit smoking cigarettes, was never smoking daily.  Smokes marijuana most days of the week, at most 1 joint daily, for past 30 years.  Rare alcohol intake. " "     ACP/Goals: curative intent treatment    Interim history:  2 week follow up.  Refilled Lortab at initial appt and PRN Xanax premedication.  On initial eval, acute rib fracture was main pain c/o over the No missed appts.  SLP eval with JOSÉ LUIS.  Pharyngeal dysphagia related to osteophyte C5-6. Risk of aspiration    Pt fell again, now using transport chair.  Reports legs \"give out\" due to weakness.  Daughter reports helping him with \"6 inch stair.\"  No injury sustained    Acute pain of rib with previous fall has subsided.      New pain of left tonsillar mass, now tender to touch externally (was not before) and unmanaged pain with swallowing, still knife like.      Medication management:  Norco 5/325mg - no relief with 1 tab.  Has tried 2 tabs at one dose (without discussing with provider) and noted some improvement but \"less than 25%\" improvement.      ANALGESIA:  Not managed  ADVERSE EFFECTS:  Constipation managed with Miralax.  No sedation  ACTIVITY:  Assistance with mobility.  Using transport chair, requires max assistance of daughter and her boyfriend at her home  AFFECT:  Severe depression  ABERRANT BEHAVIORS:  Reports overuse without calling healthcare team.    Symptoms: Increase SOA and cough, hemoptysis 2 days ago, has discussed with Vin    Function: decreased from 2 weeks ago    Support/Strengths:  Lives with daughter and her BF.  She is using FMLA to help with medical transportation.    Distress/Difficulties: Severe depression.  Reports feeling down with weight gain and sedentary state prior to knee surgery and lung disease.  Then, felt better re: mood after surgery and increase in mobility.  Reports he was \"addicted\" to strenuous exercise, going to [employer] gym for hours lifting weights until just 2 years ago.   Noted he marked suicidal ideation on PHQ-9 today.  Discussed this with his permission in front of his daughter.  He admits to buying a pistol over past few weeks.  Notes for security but has had " fleeting thoughts of suicide.  No other weapons in home.  He agrees to let daughter manage his medications as well as his firearm.  She reports she can lock it up and will keep bullets separate.      The following portions of the patient's history were reviewed and updated as appropriate: allergies, current medications, past family history, past medical history, past social history, past surgical history and problem list.    Review of Systems  Otherwise negative except as below and as already detailed in HPI.    ESAS:  Flowsheet reviewed.  See Attached.    Medication Counts:  Reviewed.  See RN note. Brought medication.  No overuse or misuse evident., Pt gives verbal history of overuse    COLIN:  Reviewed.  See scanned form in Media. No concerns.  Consistent with history.  Prescribers identified as members of care team.     CONTROLLED SUBSTANCE TRACKING 5/19/2020 6/2/2020   Last Colin 5/18/2020 6/1/2020   Report Number [id number]   Last UDS 5/19/2020 5/19/2020   Last Controlled Substance Agreement - 5/19/2020   ORT Initial Risk Score 1 1   Prior UDT result - Expected   Pill count Expected Expected   Diversion Concern No No   Disposal Education and Agreement [phone] [id number]   Naloxone - Yes       UDS:  THC, Screen, Urine   Date Value Ref Range Status   05/19/2020 Positive (A) Negative Final     Phencyclidine (PCP), Urine   Date Value Ref Range Status   05/19/2020 Negative Negative Final     Cocaine Screen, Urine   Date Value Ref Range Status   05/19/2020 Negative Negative Final     Methamphetamine, Ur   Date Value Ref Range Status   05/19/2020 Negative Negative Final     Opiate Screen   Date Value Ref Range Status   05/19/2020 Positive (A) Negative Final     Amphetamine Screen, Urine   Date Value Ref Range Status   05/19/2020 Negative Negative Final     Benzodiazepine Screen, Urine   Date Value Ref Range Status   05/19/2020 Positive (A) Negative Final     Tricyclic Antidepressants Screen   Date Value Ref Range  Status   05/19/2020 Negative Negative Final     Methadone Screen, Urine   Date Value Ref Range Status   05/19/2020 Negative Negative Final     Barbiturates Screen, Urine   Date Value Ref Range Status   05/19/2020 Negative Negative Final     Oxycodone Screen, Urine   Date Value Ref Range Status   05/19/2020 Negative Negative Final     Propoxyphene Screen   Date Value Ref Range Status   05/19/2020 Negative Negative Final     Buprenorphine, Screen, Urine   Date Value Ref Range Status   05/19/2020 Negative Negative Final     Palliative Performance Scale  Palliative Performance Scale Score: 60%    [place] Symptom Assessment System Revised  Pain Score: 9   ESAS Tiredness Score: 8  ESAS Nausea Score: 4  ESAS Depression Score: 8  ESAS Anxiety Score: 8  ESAS Drowsiness Score: 8  ESAS Lack of Appetite Score: 5  ESAS Wellbeing Score: 5  ESAS Dyspnea Score: 7  ESAS Other Problem Score: 8  ESAS Source of Information: patient    TREV-7:  Flowsheet reviewed.  See Attached.  Over the last two weeks, how often have you been bothered by the following problems?  Feeling nervous, anxious or on edge: Nearly every day  Not being able to stop or control worrying: Nearly every day  Worrying too much about different things: Nearly every day  Trouble Relaxing: Nearly every day  Being so restless that it is hard to sit still: Nearly every day  Becoming easily annoyed or irritable: Nearly every day  Feeling afraid as if something awful might happen: Nearly every day  TREV 7 Total Score: 21    PHQ-9:  Flowsheet reviewed.  See Attached.  PHQ-2/PHQ-9 Depression Screening 6/2/2020   Little interest or pleasure in doing things 3   Feeling down, depressed, or hopeless 3   Trouble falling or staying asleep, or sleeping too much 3   Feeling tired or having little energy 3   Poor appetite or overeating 2   Feeling bad about yourself - or that you are a failure or have let yourself or your family down 3   Trouble concentrating on things, such as reading  the newspaper or watching television 3   Moving or speaking so slowly that other people could have noticed. Or the opposite - being so fidgety or restless that you have been moving around a lot more than usual 1   Thoughts that you would be better off dead, or of hurting yourself in some way 3   Total Score 24   If you checked off any problems, how difficult have these problems made it for you to do your work, take care of things at home, or get along with other people? Very difficult        ECOG: (3) Capable of limited self-care, confined to bed or chair > 50% of waking hours    Objective   Physical Exam   Constitutional: He is oriented to person, place, and time. He appears well-developed and well-nourished. No distress.   Eyes: Pupils are equal, round, and reactive to light. EOM are normal. No scleral icterus.   Neck:   Tender L neck mass and L occipital mass   Cardiovascular: Normal rate, regular rhythm and normal heart sounds. Exam reveals no gallop.   No murmur heard.  Pulmonary/Chest: Effort normal. No stridor. No respiratory distress. He has wheezes in the right middle field, the right lower field, the left middle field and the left lower field. He has no rhonchi.   Abdominal: Soft. Bowel sounds are normal. He exhibits no distension. There is no tenderness.   Musculoskeletal:   Hand  L 4/5, Biceps R 4/5, L4+/5, shoulders 5/5.  Quads 4/5 bilaterally   Lymphadenopathy:     He has cervical adenopathy.   Neurological: He is alert and oriented to person, place, and time.   Skin: Skin is warm and dry.   Psychiatric: He has a normal mood and affect. His speech is normal and behavior is normal. Judgment and thought content normal. Cognition and memory are normal.   Will avoid eye contact when discussing depression, but look in direction of daughter.   Nursing note and vitals reviewed.        Universal precautions:    ORT risk scores (ORT-OUD and/or COMM):  Low score  OME:  Increase to 90mg allowed  Naloxone  prescribed:  Yes, previously counsled    Assessment/Plan   Oliver was seen today for appointment and follow-up.    Diagnoses and all orders for this visit:    Cancer of tonsil (CMS/HCC)  -     oxyCODONE (ROXICODONE) 5 MG immediate release tablet; 1-2 tabs every 4 hours as needed for pain    Pain, cancer  -     oxyCODONE (ROXICODONE) 5 MG immediate release tablet; 1-2 tabs every 4 hours as needed for pain    Physical debility  -     Ambulatory Referral to Physical Therapy Evaluate and treat, Ortho; Strengthening; Type of Aid (rollator); Full weight bearing    Current severe episode of major depressive disorder without psychotic features, unspecified whether recurrent (CMS/HCC)  -     Ambulatory Referral to Behavioral Health    Suicidal ideation    Cough    Other orders  -     sertraline (ZOLOFT) 50 MG tablet; Take 0.5 tablets by mouth Daily for 7 days, THEN 1 tablet Daily for 14 days.  -     BREO ELLIPTA 100-25 MCG/INH inhaler; Inhale 1 puff Daily for 30 days.  -     albuterol sulfate  (90 Base) MCG/ACT inhaler; Inhale 2 puffs Every 4 (Four) Hours As Needed for Wheezing or Shortness of Air.  -     guaiFENesin (Mucinex) 600 MG 12 hr tablet; Take 2 tablets by mouth 2 (Two) Times a Day for 30 days.                 Total face to face time spent:  25 min.  Greater than 50% time spent in counseling and discussion re:  Depression, use of antidepressant, suicide safety plan with daughter and patient.  Agrees to referral to Behavioral Health and to starting Zoloft.  Pt promises to call and not follow through with any plans.  Reiterated 24/7 on call number.      Care coordination:   1.  Refer to Michelle Ellison  2.  Refer to Physical Therapy  3.  Start Zoloft titration  4.  Daughter will keep phone contact if any issues of unmanaged symptoms, will call next week to give interim update.   5.  Instructed to increase Norco 5/325mg to 2 tabs every 4 hours as needed for pain, no more than 10 tabs/day due to APAP.  Then will start  oxycodone 5mg 1-2 tabs every 4 hours PRN next week  6.  F/u in 2 weeks.

## 2020-06-02 NOTE — PROGRESS NOTES
Med Counts  Medication Filled # Filled Count Used  # days RADHMAES   Xanax 0.5mg 5/19 10 0 10 14 0.7   Norco 5mg 5/19 120 54 66 14 4.7

## 2020-06-03 NOTE — PROGRESS NOTES
PROBLEM LIST:     Cancer of tonsil (CMS/[facility])    4/16/2020 Cancer Staged     Staging form: Pharynx - HPV-Mediated Oropharynx, AJCC 8th Edition  - Clinical stage from 4/16/2020: Stage II (cT3, cN1, cM0, p16+) - Signed by Kellie Lock MD on 4/17/2020 4/17/2020 Initial Diagnosis     Cancer of tonsil (CMS/[facility])      5/6/2020 -  Chemotherapy     OP HEAD & NECK CISplatin (weekly) + XRT         REASON FOR VISIT: Head and Neck cancer    HISTORY OF PRESENT ILLNESS:   55 y.o.  male presents today for follow-up of his cancer of the tonsil.  He is undergoing definitive therapy with chemo RT. his last treatment was held due to thrombocytopenia.  Clinically otherwise doing well.  He feels like his neck mass is shrunk significantly.  He is having some trouble swallowing due to pain.  But otherwise doing well.    Past medical history, social history and family history was reviewed and unchanged from prior visit.    Review of Systems:    Review of Systems   Constitutional: Negative.    HENT:   Positive for sore throat.    Eyes: Negative.    Respiratory: Negative.    Cardiovascular: Negative.    Gastrointestinal: Negative.    Endocrine: Negative.    Genitourinary: Negative.     Musculoskeletal: Negative.    Skin: Negative.    Neurological: Negative.    Hematological: Negative.    Psychiatric/Behavioral: Negative.       A comprehensive 14 point review of systems was performed and was negative except as mentioned.      Medications:        Current Outpatient Medications:   •  albuterol sulfate  (90 Base) MCG/ACT inhaler, Inhale 2 puffs Every 4 (Four) Hours As Needed for Wheezing or Shortness of Air., Disp: 18 g, Rfl: 3  •  aspirin 81 MG chewable tablet, Chew 81 mg Daily., Disp: , Rfl:   •  BREO ELLIPTA 100-25 MCG/INH inhaler, Inhale 1 puff Daily for 30 days., Disp: 60 each, Rfl: 0  •  dexamethasone (DECADRON) 4 MG tablet, Take 2 tablets by mouth Daily on days 2, 3 & 4.  Take with food., Disp: 6 tablet, Rfl: 5  •   guaiFENesin (Mucinex) 600 MG 12 hr tablet, Take 2 tablets by mouth 2 (Two) Times a Day for 30 days., Disp: 120 tablet, Rfl: 0  •  HYDROcodone-acetaminophen (NORCO) 5-325 MG per tablet, Take 1 tablet by mouth Every 6 (Six) Hours As Needed for Moderate Pain  for up to 30 days., Disp: 120 tablet, Rfl: 0  •  lisinopril-hydrochlorothiazide (PRINZIDE,ZESTORETIC) 20-25 MG per tablet, Take 1 tablet by mouth Daily., Disp: , Rfl:   •  Magic mouthwash Radonc, Swish and swallow 10 mL 4 (Four) Times a Day Before Meals & at Bedtime., Disp: 480 mL, Rfl: 3  •  metoprolol succinate XL (TOPROL-XL) 25 MG 24 hr tablet, Take 25 mg by mouth Daily., Disp: , Rfl:   •  naloxone (NARCAN) 4 MG/0.1ML nasal spray, Use 1 spray into the nostril(s) as directed by provider As Needed for opioid reversal for up to 1 dose., Disp: 2 each, Rfl: 0  •  ondansetron (ZOFRAN) 8 MG tablet, Take 1 tablet by mouth 3 (Three) Times a Day As Needed for Nausea or Vomiting., Disp: 30 tablet, Rfl: 5  •  [START ON 6/9/2020] oxyCODONE (ROXICODONE) 5 MG immediate release tablet, 1-2 tabs every 4 hours as needed for pain, Disp: 100 tablet, Rfl: 0  •  pantoprazole (PROTONIX) 40 MG EC tablet, Take 40 mg by mouth Daily., Disp: , Rfl:   •  sennosides-docusate (senna-docusate sodium) 8.6-50 MG per tablet, Take 1 tablet by mouth Daily for 30 days. To prevent constipation, Disp: 30 tablet, Rfl: 0  •  sertraline (ZOLOFT) 50 MG tablet, Take 0.5 tablets by mouth Daily for 7 days, THEN 1 tablet Daily for 14 days., Disp: 18 tablet, Rfl: 0  •  tamsulosin (FLOMAX) 0.4 MG capsule 24 hr capsule, Take 1 capsule by mouth Every Night for 30 days., Disp: 30 capsule, Rfl: 0    Pain Medications             aspirin 81 MG chewable tablet Chew 81 mg Daily.    dexamethasone (DECADRON) 4 MG tablet Take 2 tablets by mouth Daily on days 2, 3 & 4.  Take with food.    HYDROcodone-acetaminophen (NORCO) 5-325 MG per tablet Take 1 tablet by mouth Every 6 (Six) Hours As Needed for Moderate Pain  for up to  "30 days.    Magic mouthwash Radonc Swish and swallow 10 mL 4 (Four) Times a Day Before Meals & at Bedtime.    oxyCODONE (ROXICODONE) 5 MG immediate release tablet Starting on 6/9/2020. 1-2 tabs every 4 hours as needed for pain    sertraline (ZOLOFT) 50 MG tablet Take 0.5 tablets by mouth Daily for 7 days, THEN 1 tablet Daily for 14 days.             ALLERGIES:  No Known Allergies      Physical Exam    VITAL SIGNS:  /77 Comment: LUE  Pulse 85   Temp 97.7 °F (36.5 °C) (Temporal)   Resp 20   Ht 182.9 cm (72\")   Wt (!) 141 kg (311 lb)   SpO2 98% Comment: RA  BMI 42.18 kg/m²     Wt Readings from Last 3 Encounters:   06/03/20 (!) 141 kg (311 lb)   06/02/20 (!) 142 kg (313 lb 12.8 oz)   05/27/20 (!) 141 kg (310 lb)       Body mass index is 42.18 kg/m². Body surface area is 2.57 meters squared.    Pain Score    06/03/20 0830   PainSc:   5   PainLoc: Throat         Performance Status: 1    General: well appearing, in no acute distress, morbid obesity  HEENT: sclera anicteric, oropharynx clear, neck is supple  Lymphatics: no cervical, supraclavicular, or axillary adenopathy  Cardiovascular: regular rate and rhythm, no murmurs, rubs or gallops  Lungs: clear to auscultation bilaterally  Abdomen: soft, nontender, nondistended.  No palpable organomegaly  Extremities: no lower extremity edema  Skin: no rashes, lesions, bruising, or petechiae  Msk:  Shows no weakness of the large muscle groups  Psych: Mood is stable        RECENT LABS:    Lab Results   Component Value Date    HGB 11.7 (L) 05/27/2020    HCT 37.5 05/27/2020    MCV 94.2 05/27/2020    PLT 70 (L) 05/27/2020    WBC 7.10 05/27/2020    NEUTROABS 5.40 05/27/2020    LYMPHSABS 1.30 05/27/2020    MONOSABS 0.40 05/27/2020    EOSABS 0.36 (H) 05/31/2018    BASOSABS 0.03 05/31/2018       Lab Results   Component Value Date    GLUCOSE 169 (H) 05/27/2020    BUN 38 (H) 05/27/2020    CREATININE 1.40 (H) 05/27/2020     05/27/2020    K 4.0 05/27/2020     " 05/27/2020    CO2 27.0 05/27/2020    CALCIUM 7.9 (L) 05/27/2020    PROTEINTOT 6.7 05/06/2020    ALBUMIN 2.90 (L) 05/06/2020    BILITOT 2.0 (H) 05/06/2020    ALKPHOS 230 (H) 05/06/2020    AST 62 (H) 05/06/2020    ALT 39 05/06/2020       Nm Pet Skull Base To Mid Thigh    Result Date: 3/30/2020  Malignancy in the left tonsillar region with adenopathy seen within the left neck and cervical chain. There is no additional hypermetabolic focus identified.  D:  03/30/2020 E:  03/30/2020    This report was finalized on 3/30/2020 2:05 PM by Dr. Janeth Atkins MD.            Assessment/Plan    1.  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  Continue chemo RT. we will see what his numbers look like today.  He likely has a component of liver issues causing thrombocytopenia.     2.  Morbid obesity.  This is a major concern with his comorbidities and also chemotherapy dosing.    3.  Fall with contusion of the right ribs.  Will get x-rays to make sure nothing is broken.    4.  COPD.  His breathing seems relatively stable.    5.  Transaminitis.  Likely a component of underlying liver dysfunction.          I spent a total of 25 minutes in direct patient care, greater than 20 minutes (greater than 50%) were spent in coordination of care, and counseling the patient regarding   cancer of the tonsil. Answered any questions patient had with medication and plan.      Ramon Isbell MD  Psychiatric Hematology and Oncology         Orders Placed This Encounter   Procedures   • Comprehensive metabolic panel   • Magnesium   • Basic metabolic panel   • Magnesium   • CBC and Differential   • CBC and Differential       6/3/2020

## 2020-06-03 NOTE — TELEPHONE ENCOUNTER
Called patient to ask if he had a different pharmacy. Patient stating he will get script for steroid from [facility] in [place] tomorrow. Informing patient that nurse will send refill into pharmacy.

## 2020-06-03 NOTE — PROGRESS NOTES
ONC Nutrition    Diagnosis:  Cancer of tonsil / Stage II  Surgery: Microlaryngoscopy and left radical tonsillectomy by Dr. Luigi Henry on 4/9/2020.   Chemotherapy:  Cisplatin / Completing Cycle #4 today  Radiation: 70 Gy to the tumor bed    Weight 313.8 lbs / 17 lbs weight loss since start of initial treatment (5% weight loss)    Follow up with patient during RAD ONC status checks yesterday. Patient's daughter accompanied him to the visit.    Patient states that diarrhea issue has resolved; swallowing issues are being well managed with the addition of MMW and its use prior to meals.  Patient is experiencing some taste changes; reinforced the use of the baking soda, salt and water mouth rinses and good oral hygiene as the first step to managing taste changes.  He continues to supplement with Ensure oral nutritional supplements; reviewed tips for flavor and nutrient enhancement.  Reviewed with the patient's daughter the indication for soft, moist foods for ease of swallowing as well as guidelines for maintaining good nutritional intake as patient progresses through treatment and post treatment.      Again reinforced the importance of SLP involvement; modified barium swallow indicated mild-moderate  pharyngeal dysphagia / recommendation for regular textures - thin liquids and scheduling an appointment with SLP for OP services.  Patient states that he has not scheduled an appointment yet.

## 2020-06-05 NOTE — THERAPY EVALUATION
Outpatient Speech Language Pathology   Adult Swallow Initial Evaluation  [facility]     Patient Name: Oliver Mallory  : 1965  MRN: [id number]  Today's Date: 2020         Visit Date: 2020   Patient Active Problem List   Diagnosis   • Abnormal stress test   • Cancer of tonsil (CMS/HCC)   • Pain medication agreement signed   • Pain, cancer   • Throat pain in adult   • Closed fracture of one rib of right side   • Rib pain on right side   • Urinary hesitancy   • Constipation due to opioid therapy   • Suicidal ideation   • Cough        Past Medical History:   Diagnosis Date   • Arthritis    • COPD (chronic obstructive pulmonary disease) (CMS/HCC)    • Fall 2020   • Hypertension    • Pancreatitis         Past Surgical History:   Procedure Laterality Date   • ANKLE TENDON REPAIR     • CARDIAC CATHETERIZATION N/A 2018    Procedure: Left Heart Cath;  Surgeon: Ray Farley MD;  Location: [facility] INVASIVE LOCATION;  Service: Cardiovascular   • CHOLECYSTECTOMY     • CYST REMOVAL      coccyx   • HERNIA MESH REMOVAL     • HERNIA REPAIR     • KNEE SURGERY     • TONSILLECTOMY           Visit Dx:     ICD-10-CM ICD-9-CM   1. Pharyngeal dysphagia R13.13 787.23           SLP Adult Swallow Evaluation     Row Name 20 1500       Rehab Evaluation    Document Type  evaluation  -HG    Subjective Information  no complaints  -HG    Patient Observations  alert;cooperative;agree to therapy  -HG    Patient/Family Observations  Pt does occasionally choke, nothing signficant per pt report  -HG    Patient Effort  excellent  -HG    Symptoms Noted During/After Treatment  none  -HG       General Information    Patient Profile Reviewed  yes  -HG    Pertinent History Of Current Problem  Pt is a 55 year old male with stage II tonsil SCC, HPV mediated. Patient is status-post left radical tonsilectomy 2020 and has started chemoradiotherapy. Patient reported odynophagia since radiation started and has had some  weight loss. Further signifcant medical history significant for COPD. MBS on 5/28/20 revealed mild to moderate pharyngeal dysfunction. Please see formal report for more details.   -HG    Current Method of Nutrition  regular textures;thin liquids;other (see comments) avoiding chips, bread  -HG    Precautions/Limitations, Vision  corrective lenses needed for reading  -HG    Precautions/Limitations, Hearing  WFL;for purposes of eval  -HG    Prior Level of Function-Communication  WFL  -HG    Prior Level of Function-Swallowing  no diet consistency restrictions  -HG    Plans/Goals Discussed with  patient;family  -HG    Barriers to Rehab  none identified  -HG       Oral Motor and Function    Dentition Assessment  natural, present and adequate  -HG    Secretion Management  WNL/WFL  -HG    Mucosal Quality  moist, healthy  -HG    Gag Response  WFL  -HG    Volitional Swallow  WFL  -HG    Volitional Cough  weak  -HG       Oral Musculature and Cranial Nerve Assessment    Oral Motor General Assessment  WFL  -HG       General Eating/Swallowing Observations    Respiratory Support Currently in Use  room air  -HG    Eating/Swallowing Skills  self-fed  -HG    Positioning During Eating  upright 90 degree;upright in chair  -HG    Utensils Used  spoon;cup;straw  -HG    Consistencies Trialed  pureed;thin liquids  -HG       Respiratory    Respiratory Status  room air  -HG       Clinical Swallow Eval    Oral Prep Phase  WFL  -HG    Oral Transit  WFL  -HG    Oral Residue  WFL  -HG    Pharyngeal Phase  no overt signs/symptoms of pharyngeal impairment  -HG    Esophageal Phase  unremarkable  -HG    Clinical Swallow Evaluation Summary  CSE this date. Pt tolerated water via cup and straw with no s/s of aspiration. Pt tolerated applesauce with no difficulties. Pt refused soraida cracker due to difficulty. Pt remains on a regular diet avoiding chips and bread at this time due to diffiuclty with swallowing.   -HG       Clinical Impression    SLP  Swallowing Diagnosis  suspected pharyngeal dysfunction;other (see comments) per MBS report and current dx  -HG    Functional Impact  risk of aspiration/pneumonia  -    Rehab Potential/Prognosis, Swallowing  good, to achieve stated therapy goals  -    Swallow Criteria for Skilled Therapeutic Interventions Met  demonstrates skilled criteria  -      User Key  (r) = Recorded By, (t) = Taken By, (c) = Cosigned By    Initials Name Provider Type     Aliyah Bruno MS CCC-SLP Speech and Language Pathologist                        OP SLP Education     Row Name 06/05/20 1500       Education    Barriers to Learning  No barriers identified  -    Education Provided  Described results of evaluation;Patient expressed understanding of evaluation;Family/caregivers expressed understanding of evaluation;Patient participated in establishing goals and treatment plan;Family/caregivers participated in establishing goals and treatment plan;Patient demonstrated recommended strategies;Family/caregivers demonstrated recommended strategies;Patient requires further education on strategies, risks;Family/caregivers require further education on strategies, risks  -    Assessed  Learning needs;Learning motivation;Learning preferences;Learning readiness  -    Learning Motivation  Strong  -    Learning Method  Explanation;Demonstration;Teach back;Written materials  -    Teaching Response  Verbalized understanding;Demonstrated understanding;Reinforcement needed  -    Education Comments  Provided handout of exercises and demonstrated with teachback.   -      User Key  (r) = Recorded By, (t) = Taken By, (c) = Cosigned By    Initials Name Effective Dates    Aliyah Garcia MS CCC-SLP 06/22/15 -           SLP OP Goals     Row Name 06/05/20 1500          Goal Type Needed    Goal Type Needed  Dysphagia  -        Subjective Comments    Subjective Comments  Pt alert, cooperative, accompanied with his daughter.  -         Dysphagia Goals    Patient will safely consume the recommended diet without complications such as aspiration pneumonia  regular diet and thin liquids  -HG     Status: Patient will safely consume the recommended diet without complications such as aspiration pneumonia  New  -HG     Status: Patient will improve oral skills to enhance safety and increase eating efficiency by increasing accuracy of back of tongue control  Discontinued  -HG     Patient will increase laryngeal elevation to reduce residue that might fall into airway by completing  Mendelsohn maneuver;super-supraglottic swallow;falsetto/pitch glide;with cues  -HG     Status: Patient will increase laryngeal elevation to reduce residue that might fall into airway by completing  New  -HG     Patient will increase closure of larynx to keep food from falling into the airway by completing  super-supraglottic swallow;with cues  -HG     Status: Patient will increase closure of larynx to keep food from falling into the airway by completing  New  -HG     Patient will increase strength of tongue base and posterior pharyngeal walls to reduce residue that might fall into airway by completing  effotful swallow;Jesica (tongue hold);with cues  -HG     Status: Patient will increase strength of tongue base and posterior pharyngeal walls to reduce residue that might fall into airway by completing  New  -HG     Patient will improve hyolaryngeal elevation via completing Rodriguez (head lift)  sustained lift (comment #/duration of lifts);repetitive lift (comment #/duration of lifts);with cues 30 secs/ 30 reps  -HG     Status: Patient will improve hyolaryngeal elevation via completing Rodriguez (head lift)  New  -HG     Patient will compensate for oral/pharyngeal deficits and reduce risks while eating by utilizing  compensatory strategies  slow rate;multiple swallows  -HG     Status: Patient will compensate for oral/pharyngeal deficits and reduce risks while eating by utilizing   compensatory strategies  New  -HG        SLP Time Calculation    SLP Goal Re-Cert Due Date  07/05/20  -       User Key  (r) = Recorded By, (t) = Taken By, (c) = Cosigned By    Initials Name Provider Type    JULISA Aliyah Bruno MS CCC-SLP Speech and Language Pathologist          OP SLP Assessment/Plan - 06/05/20 1500        SLP Assessment    Functional Problems  Swallowing   -HG    Impact on Function: Swallowing  Risk of aspiration;Risk of pneumonia   -HG    Clinical Impression: Swallowing  Mild-Moderate:;pharyngeal phase dysphagia   -HG    Functional Problems Comment  Pt avoiding chips and breads due to difficulty swallowing; occasional cough present.    -HG    Clinical Impression Comments  Functional oral phase, suspect pharyngeal dysfunction based on MBS on 5/28/20.   -HG    SLP Diagnosis  Mild to Moderate pharyngeal dysphagia   -HG    Prognosis  Excellent (comment)   -    Patient/caregiver participated in establishment of treatment plan and goals  Yes   -HG    Patient would benefit from skilled therapy intervention  Yes   -HG       SLP Plan    Frequency  1x/week   -HG    Duration  8 weeks   -HG    Planned CPT's?  SLP CLINICAL SWALLOW EVAL: 16444;SLP SWALLOW THERAPY: 45311   -    Expected Duration Therapy Session - minutes  45-60 minutes   -HG    Plan Comments  Initiate Dysphagia tx.    -      User Key  (r) = Recorded By, (t) = Taken By, (c) = Cosigned By    Initials Name Provider Type    Aliyah Garcia MS CCC-SLP Speech and Language Pathologist                    Time Calculation:   SLP Start Time: 1500    Therapy Charges for Today     Code Description Service Date Service Provider Modifiers Qty    [id number] HC ST EVAL ORAL PHARYNG SWALLOW 3 6/5/2020 Aliyah Bruno MS CCC-SLP GN 1                   Aliyah Bruno MS CCC-EBENEZER  6/5/2020

## 2020-06-10 NOTE — TELEPHONE ENCOUNTER
Patient getting fluids today. Doppler order placed. Schedule for 12:15 arrival time for 12:30 appointment. Patient will go to RAD appt after infusion. Dinah APONTE infusion advised patient to head to registration after RAD appointment. Verbalized understanding.

## 2020-06-10 NOTE — PROGRESS NOTES
"  Subjective     HISTORY OF PRESENT ILLNESS:     History of Present Illness  ***    Past Medical History, Past Surgical History, Social History, Family History have been reviewed and are without significant changes except as mentioned.    Review of Systems   A comprehensive 14 point review of systems was performed and was negative except as mentioned.    Medications:  The current medication list was reviewed in the EMR    ALLERGIES:  No Known Allergies    Objective      Vitals:    06/10/20 0807   BP: 125/65   Pulse: 93   Resp: 20   Temp: 98.4 °F (36.9 °C)   TempSrc: Temporal   Weight: (!) 137 kg (303 lb)   Height: 182.9 cm (72.01\")     No flowsheet data found.    Physical Exam  ***    RECENT LABS:  Hematology WBC   Date Value Ref Range Status   06/10/2020 3.70 3.40 - 10.80 10*3/mm3 Final     RBC   Date Value Ref Range Status   06/10/2020 3.35 (L) 4.14 - 5.80 10*6/mm3 Final     Hemoglobin   Date Value Ref Range Status   06/10/2020 10.2 (L) 13.0 - 17.7 g/dL Final     Hematocrit   Date Value Ref Range Status   06/10/2020 32.0 (L) 37.5 - 51.0 % Final     Platelets   Date Value Ref Range Status   06/10/2020 84 (L) 140 - 450 10*3/mm3 Final              Assessment/Plan   ***                  6/10/2020      CC:          "

## 2020-06-10 NOTE — TELEPHONE ENCOUNTER
----- Message from Kaylah Burk RN sent at 6/10/2020  8:29 AM EDT -----  Regarding: dr mathew, swollen arm  Pt's right arm swollen. His picc is in his right arm, pt is chair 20. Thanks, kaylah [phone]

## 2020-06-10 NOTE — TELEPHONE ENCOUNTER
----- Message from Kaylah Burk RN sent at 6/10/2020  8:29 AM EDT -----  Regarding: dr mathew, swollen arm  Pt's right arm swollen. His picc is in his right arm, pt is chair 20. Thanks, kaylah [phone]

## 2020-06-10 NOTE — PROGRESS NOTES
Upon admission to OP infusion patients right arm w/ PICC line was red and swollen, forearm measuring one inch larger larger. FARHAN Hilario made aware and at bedside. PIV started in opposite arm for treatment.     Serum Creatinine 1.8. Per Dr. Monae treatment to be held. Administer 1L NS.

## 2020-06-11 NOTE — PROGRESS NOTES
"  Subjective   Oliver Mallory is a 55 y.o. male who is here today in office face to face for initial appointment. Patient was referred by: [facility], Med Onc, for depression and anxiety. Patient was diagnosed with tonsil cancer and is receiving radiation and chemotherapy. He lives with his daughter, her boyfriend and two granddaughters in [place], [place].      Chief Complaint:  Depression, anxiety , poor sleep    History of Present Illness Patient presents with his daughter for session. He is in wheelchair because of fatigue from treatment \"I just can't walk all these halls\". The patient reports depressive symptoms including depressed mood, insomnia, decreased appetite, anhedonia, irritability, agitation, sensitive to others comments, feelings of hopelessness, feelings of helplessness, feelings of worthlessness, low energy, poor motivation, decreased interest in self, difficulty concentrating and psychomotor agitation, present on most days for the past 3 month(s)  and have caused impairment in important areas of functioning. Depression rated 7/10 with 10 being the worst. The patient reports the following symptoms of anxiety on daily basis: worry, on edge, difficulty concentrating, mind goes blank, irritability, muscle tension and sleep disturbance. The symptoms have been present for at least 4 month(s) and have caused impairment in important areas of functioning. Patient reports history of claustrophobia and had to use xanax before radiation when they \"put that mask on your face\", it helped a lot he states and didn't need xanax after about 4 treatments. He has about 10 more radiation tx to go he reports and chemotherapy. Denies SI/HI or AVH. Denies alcohol or illicit drug use except cannabis. Patient reports getting along with others. Is feeling depressed about diagnosis and treatment. He understands it is curable but the treatment is difficult. Denies SI. Denies PTSD, OCD or gretchen ever.     The following " "portions of the patient's history were reviewed and updated as appropriate: allergies, current medications, past family history, past medical history, past social history, past surgical history and problem list.      Past Psych History: Denies inpatient psychiatric admissions denies outpatient therapy.  Denies suicidal ideations or attempts in the past.  Was placed on sertraline 50 mg a week ago by Dr. Reddy he reports for depressive symptoms and anxiety.  He was placed on Xanax temporarily while he got used to radiation treatments with a facial mask.    Substance Abuse:   Marijuana since age 16yo  Denies other     ABUSE HX: denies   LEGAL HX:  Denies     COLIN REVIEWED: reviewed in chart under media   UDS: reviewed most recent in chart , THC + admits to smoking but hurts back of throat, \"it helps calm me and hopefully appetite\".     Family Psychiatric History:  family history includes Heart disease in his father; Hypertension in his mother.      Social History: raised in [place], [place] by his parents. Had 4 sisters, two . Completed 12th grade. Worked on horse farms until . He reports being  twice and , has 2 daughters ages 30 and 34yo and two sons ages 26 and 35yo. Has grandchildren. Lives currently with a daughter her two daughters and daughter's boyfriend in house in [place], [place].       Medical/Surgical History:  Past Medical History:   Diagnosis Date   • Arthritis    • COPD (chronic obstructive pulmonary disease) (CMS/[facility])    • Fall 2020   • Hypertension    • Pancreatitis      Past Surgical History:   Procedure Laterality Date   • ANKLE TENDON REPAIR     • CARDIAC CATHETERIZATION N/A 2018    Procedure: Left Heart Cath;  Surgeon: Ray Farley MD;  Location: [facility] INVASIVE LOCATION;  Service: Cardiovascular   • CHOLECYSTECTOMY     • CYST REMOVAL      coccyx   • HERNIA MESH REMOVAL     • HERNIA REPAIR     • KNEE SURGERY     • TONSILLECTOMY         No " Known Allergies    Current Medications:   Current Outpatient Medications   Medication Sig Dispense Refill   • albuterol sulfate  (90 Base) MCG/ACT inhaler Inhale 2 puffs Every 4 (Four) Hours As Needed for Wheezing or Shortness of Air. 18 g 3   • aspirin 81 MG chewable tablet Chew 81 mg Daily.     • BREO ELLIPTA 100-25 MCG/INH inhaler Inhale 1 puff Daily for 30 days. 60 each 0   • dexamethasone (DECADRON) 4 MG tablet Take 2 tablets by mouth Daily on days 2, 3 & 4.  Take with food. 6 tablet 5   • guaiFENesin (Mucinex) 600 MG 12 hr tablet Take 2 tablets by mouth 2 (Two) Times a Day for 30 days. 120 tablet 0   • HYDROcodone-acetaminophen (NORCO) 5-325 MG per tablet Take 1 tablet by mouth Every 6 (Six) Hours As Needed for Moderate Pain  for up to 30 days. 120 tablet 0   • lisinopril-hydrochlorothiazide (PRINZIDE,ZESTORETIC) 20-25 MG per tablet Take 1 tablet by mouth Daily.     • Magic mouthwash Radonc Swish and swallow 10 mL 4 (Four) Times a Day Before Meals & at Bedtime. 480 mL 3   • metoprolol succinate XL (TOPROL-XL) 25 MG 24 hr tablet Take 25 mg by mouth Daily.     • mirtazapine (REMERON) 15 MG tablet Take 1-2 tablets by mouth every night 30 minutes before bedtime. 60 tablet 2   • naloxone (NARCAN) 4 MG/0.1ML nasal spray Use 1 spray into the nostril(s) as directed by provider As Needed for opioid reversal for up to 1 dose. 2 each 0   • ondansetron (ZOFRAN) 8 MG tablet Take 1 tablet by mouth 3 (Three) Times a Day As Needed for Nausea or Vomiting. 30 tablet 5   • oxyCODONE (ROXICODONE) 5 MG immediate release tablet 1-2 tabs every 4 hours as needed for pain 100 tablet 0   • pantoprazole (PROTONIX) 40 MG EC tablet Take 40 mg by mouth Daily.     • sennosides-docusate (senna-docusate sodium) 8.6-50 MG per tablet Take 1 tablet by mouth Daily for 30 days. To prevent constipation 30 tablet 0   • sertraline (ZOLOFT) 50 MG tablet Take 0.5 tablets by mouth Daily for 7 days, THEN 1 tablet Daily for 14 days. 18 tablet 0   •  tamsulosin (FLOMAX) 0.4 MG capsule 24 hr capsule Take 1 capsule by mouth Every Night for 30 days. 30 capsule 0     No current facility-administered medications for this visit.        Lab Results: Most recent reviewed in chart        Review of Systems Constitutional: Positive for decreased appetite, weight loss, fatigue  HENT: Negative for hearing loss, voice change.    Positive for sore throat and difficulty swallowing  Eyes: Negative for photophobia and visual disturbance.   Respiratory: Negative for cough, chest tightness and shortness of breath.    Cardiovascular: Negative for chest pain and palpitations.   Gastrointestinal: Negative for abdominal pain, constipation, nausea and vomiting.   Endocrine: Negative for cold intolerance and heat intolerance.   Genitourinary: Negative for dysuria and frequency.   Musculoskeletal: Negative for arthralgia, back pain, joint swelling and neck stiffness.   Skin: Negative for color change and wound.   Allergic/Immunologic: Negative for environmental allergies and immunocompromised state.   Neurological: Negative for dizziness, tremors, seizures, syncope, weakness, light-headedness and headaches.   Hematological: Negative for adenopathy. Does not bruise/bleed easily.    Objective   Physical Exam  There were no vitals taken for this visit.    TREV-7:    Over the last two weeks, how often have you been bothered by the following problems?  Feeling nervous, anxious or on edge: More than half the days  Not being able to stop or control worrying: Several days  Worrying too much about different things: Nearly every day  Trouble Relaxing: More than half the days  Being so restless that it is hard to sit still: Not at all  Becoming easily annoyed or irritable: Nearly every day  Feeling afraid as if something awful might happen: Nearly every day  TREV 7 Total Score: 14  If you checked any problems, how difficult have these problems made it for you to do your work, take care of things at  home, or get along with other people: Somewhat difficult  0-4: Minimal anxiety  5-9: Mild anxiety  10-14: Moderate anxiety  15-21: Severe anxiety    PHQ-9:  PHQ-2/PHQ-9 Depression Screening 6/11/2020   Little interest or pleasure in doing things 3   Feeling down, depressed, or hopeless 3   Trouble falling or staying asleep, or sleeping too much 3   Feeling tired or having little energy 3   Poor appetite or overeating 3   Feeling bad about yourself - or that you are a failure or have let yourself or your family down 3   Trouble concentrating on things, such as reading the newspaper or watching television 3   Moving or speaking so slowly that other people could have noticed. Or the opposite - being so fidgety or restless that you have been moving around a lot more than usual 3   Thoughts that you would be better off dead, or of hurting yourself in some way 0   Total Score 24   If you checked off any problems, how difficult have these problems made it for you to do your work, take care of things at home, or get along with other people? Very difficult      5-9: Minimal symptoms  10-14: Major depression mild  15-19: Major depression moderate  Greater then 20: Major depression severe    Mental Status Exam:   Appearance: tired, in w/c, weak,   Hygiene:   fair  Cooperation:  at first was irritable, became more cooperative as session went on  Eye Contact:  Fair  Psychomotor Behavior:  Slow  Mood:  depressed  Affect:  Restricted  Hopelessness: 5  Speech:  Normal  Thought Process:  Linear  Thought Content:  Normal  Suicidal:  None  Homicidal:  None  Hallucinations:  None  Delusion:  None  Memory:  Intact  Orientation:  Person, Place, Time and Situation  Reliability:  fair  Insight:  Fair  Judgement:  Fair  Impulse Control:  Fair  Physical/Medical Issues:  Yes tonsil cancer in active treatment      Short-term goals: Patient will be compliant with clinic appointments.  Patient will be engaged in therapy, medication compliant  with minimal side effects. Patient  will report decrease of symptoms and frequency.    Long-term goals: Patient will have minimal symptoms of mental health disorder with continued treatment. Patient will be compliant with treatment and appointments.       Problem list: depression, sleep disturbance, anxiety worry  Strengths: patient appears motivated for treatment          Assessment/Plan   Diagnoses and all orders for this visit:    Severe episode of recurrent major depressive disorder, without psychotic features (CMS/HCC)    Sleep disturbance    Anxiety disorder, unspecified type    Other orders  -     mirtazapine (REMERON) 15 MG tablet; Take 1-2 tablets by mouth every night 30 minutes before bedtime.        A psychological evaluation was conducted in order to assess past and current level of functioning. Areas assessed included, but were not limited to: perception of social support, perception of ability to face and deal with challenges in life (positive functioning), anxiety symptoms, depressive symptoms, perspective on beliefs/belief system, coping skills for stress, intelligence level,  Therapeutic rapport was established. Interventions conducted today were geared towards incorporating medication management along with support for continued therapy. Education was also provided as to the med management with this provider and what to expect in subsequent sessions.       Assisted patient in identifying risk factors which would indicate the need for higher level of care including thoughts to harm self or others and/or self-harming behavior and encouraged patient to contact this office, call 911, or present to the nearest emergency room should any of these events occur. Discussed crisis intervention services and means to access.  Patient adamantly and convincingly denies current suicidal or homicidal ideation or perceptual disturbance.    Discussed diagnoses and recommendations for treatment:    PROVIDE: Cognitive  Behavioral Therapy and Solution Focused Therapy to improve functioning, maintain stability, and avoid decompensation and the need for higher level of care.  Assisted patient in processing above session content; acknowledged and normalized patient’s thoughts, feelings, and concerns.  Applied  positive coping skills and behavior management in session.  Allowed patient to freely discuss issues without interruption or judgment. Provided safe, confidential environment to facilitate the development of positive therapeutic relationship and encourage open, honest communication.  MEDICATION MANAGEMENT RECOMMENDATIONS:  Cont sertraline 50mg for next 3 weeks and evaluate effects on mood  START mirtazapine 15mg take 1-2 30 minutes before bedtime for sleep, appetite     We discussed risks, benefits,goals and side effects of the above medication and the patient was agreeable with the plan.Patient was educated on the importance of compliance with treatment and follow-up appointments.To call for questions or concerns and return early if necessary. Crisis plan reviewed including going to the Emergency department.       Treatment Plan: stabilize mood,  patient will stay out of the hospital and be at optimal level of functioning, take all medication as prescribed. Patient verbalized  understanding and agreement to plan.      Return in about 3 weeks (around 7/2/2020).

## 2020-06-12 NOTE — TELEPHONE ENCOUNTER
Trying to get in touch with the patient regarding PICC line/swollen arm. Left a messages on 6/11 and 6/12 to let us know if his arm is still swollen or if other symptoms have began. Unable to reach patient regarding the doppler patient was to have done on 6/10.

## 2020-06-15 NOTE — TELEPHONE ENCOUNTER
----- Message from Rashaad Leon sent at 6/15/2020 10:25 AM EDT -----  Rupa was checking on it because pt did not complete it - check with her, not sure if she called pt???    ----- Message -----  From: Erma Mora RN  Sent: 6/15/2020  10:07 AM EDT  To: Rashaad Neil,    Could you follow up patient having duplex order. I don't see where it was completed.    Rafy

## 2020-06-16 NOTE — CODE DOCUMENTATION
Discussed U/S Doppler of PICC r/t swollen arm last week. Pt states that he had not been called by anyone regarding the procedure, but that his phone does not work. Pt has an infusion tomorrow 6/17 and was encouraged with a written reminder to discuss this procedure with RN at infusion. Arm currently mildly swollen, appropriate color and PICC works well.

## 2020-06-17 NOTE — PROGRESS NOTES
ONC Nutrition     Diagnosis:  Cancer of tonsil / Stage II  Surgery: Microlaryngoscopy and left radical tonsillectomy by Dr. Luigi Henry on 4/9/2020.   Chemotherapy:  Cisplatin / Completing Cycle #5 held related to elevation of creatinine / next planned treatment 6/17/20  Radiation: 70 Gy to the tumor bed / 6 remaining treatments     Weight 300.8 lbs / 30 lbs weight loss since start of initial treatment (9% weight loss)     Follow up with patient during RAD ONC status checks yesterday. Patient's daughter accompanied him to the visit,    Despite presence of morbid obesity, patient is identified with disease related malnutrition characterized by 9% weight loss past (starvation) and inadequate oral intake over the past 5 weeks.    Patient states that swallowing is not really an issue, as he can swallow with little difficulty, but intake is affected by the issue of gastric reflux.  He states that he is taking Protonix as prescribed.  Emphasized the importance of eating small frequent meals and snacks, inclined position after eating, elevation of the HOB when sleeping and identification of the foods that trigger gastric reflux. Also suggested the addition of Mylanta or similar OTC anti-acid for relief when he is experiencing heightened difficulty.  Provided patient with written patient education identifying the foods that would be triggers for the gastric reflux.    Will continue to follow.

## 2020-06-17 NOTE — PROGRESS NOTES
Did not treat today due to cr 3.3, low potassium and calcium. Dr. Campbell ordered pre hydration fluids as 1L along with potassium and calcium replacements separately. Potassium sent in to [facility] pharmacy, patient made aware and agrees.

## 2020-06-23 PROBLEM — Z74.8 DEPENDENT FOR TRANSPORTATION: Status: ACTIVE | Noted: 2020-01-01

## 2020-06-23 PROBLEM — Z63.79 STRESSFUL LIFE EVENT AFFECTING FAMILY: Status: ACTIVE | Noted: 2020-01-01

## 2020-06-23 PROBLEM — L85.3 XEROSIS OF SKIN: Status: ACTIVE | Noted: 2020-01-01

## 2020-06-23 NOTE — PROGRESS NOTES
"    Subjective   Oliver Mallory is a 55 y.o. male.     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  Comorbid COPD, CAD, HTN, and morbid obesity.  Transferred cancer care to [facility] as he has relocated from [place] to [place] to be with his daughter.     Treatment plan:  Definitive chemoradiation with weekly Cisplatin     Pain History:   Noted oxycodone from orthopedist from 9/2019-12/2019 for left knee replacement, then started Gabapentin from primary care for left ankle injury as well, he has since stopped \"not needed.\".  Restarted opioid therapy during cancer diagnosis with ENT then RadOnc.  Premedication with Xanax for radiation treatments, as well.     Pain of left throat, knife pain with swallowing, started after radiation started.  Caused by solids and liquids, \"except Merly sausage.\"  Does have frequent sporadic \"bone ache\" of left supraclavicular area.  Does not impact his nutritional intake, positioning, nor sleep.     Fell and broke R rib and has sharp pains with movement at beginning of May     Support/Strengths:  Son Andres and daughter Nataliia.  Worked as  until left lower leg/ankle injury last year and L TKR.  Was to have R TKR, but then found cancer.     Distress/Difficulties:  provided gas card for financial assistance for travel to frequent treatment appts.  Has gained around 100# since knee issues.  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  Used to being quite physically active until the past 2 years.     Substance Use History: Quit smoking cigarettes, was never smoking daily.  Smokes marijuana most days of the week, at most 1 joint daily, for past 30 years.  Rare alcohol intake. " "     ACP/Goals: curative intent treatment     Interim history:  3 week follow up.      He has seen Michelle Ellison re: depression, started on Remeron and continued Zoloft 50mg daily with plan for f/u beginning of July.    Pain: still with left sharp tonsillar pain with swallowing.    Medication management:  Oxycodone 5mg - takes 2 at a time four times a day, which manages pain (instructions were 1-2 tabs q4h PRN).  Last dose taken yesterday, average 7-8 tabs/day    ANALGESIA:  manageable  ADVERSE EFFECTS:  Constipation, so sedation  ACTIVITY:  Ambulatory but dependent BLE edema, so tries to sit more  AFFECT:  Depression and anxiety persist  ABERRANT BEHAVIORS:  None.  UDT with prescribed oxycodone.    Symptoms: 10lb weight loss, dependent edema, skin breakdown of left foot noted.    Function: Ambulatory with assistance, uses transport chair in cancer center to avoid worsening BLE edema    Support/Strengths:  Daughter Nataliia with him today.  Still lives with her and her boyfriend and her 2 kids.  Looking for apt for himself.  No financial barriers to moving out.  Medications are covered by insurance 100%.    Distress/Difficulties: Situational depression, as his 13yr old grandson is now in foster care in another county.  He raised this child for several years, but is no physically unable to.  He expected the parents to assume care, but they did not, so now child is in foster care.  Expresses guilt and disappointment.  Sadness as he has 10 grandchildren and only 2 (Nataliia's) are in custody of of his adult children.  Pt has 2 adult sons and 2 adult daughters.  Only Nataliia is involved in his life and care.     Substance Use History: smokes marijuana for anxiety and for appetite    ACP/Goals: \"I don't know what's going on.\"  Wants to complete curative treatment plan.  Concerned as infusion 6/17/20 not given due to low potassium.    The following portions of the patient's history were reviewed and updated as appropriate: " allergies, current medications, past family history, past medical history, past social history, past surgical history and problem list.    Review of Systems  Otherwise negative except as below and as already detailed in HPI.    ESAS:  Flowsheet reviewed.  See Attached.    Medication Counts:  Reviewed.  See RN note. last dose oxycodone yesterday per pt    COLIN:  Reviewed.  See scanned form in Media. No concerns.  Consistent with history.  Prescribers identified as members of care team.     CONTROLLED SUBSTANCE TRACKING 5/19/2020 6/2/2020 6/23/2020   Last Colin 5/18/2020 6/1/2020 6/22/2020   Report Number [id number]   Last UDS 5/19/2020 5/19/2020 5/19/2020   Last Controlled Substance Agreement - 5/19/2020 5/19/2020   ORT Initial Risk Score 1 1 1   Prior UDT result - Expected Expected   Pill count Expected Expected Expected   Diversion Concern No No No   Disposal Education and Agreement [id number]   Naloxone - Yes Yes       UDS:  THC, Screen, Urine   Date Value Ref Range Status   06/23/2020 Positive (A) Negative Final     Phencyclidine (PCP), Urine   Date Value Ref Range Status   06/23/2020 Negative Negative Final     Cocaine Screen, Urine   Date Value Ref Range Status   06/23/2020 Negative Negative Final     Methamphetamine, Ur   Date Value Ref Range Status   06/23/2020 Negative Negative Final     Opiate Screen   Date Value Ref Range Status   06/23/2020 Negative Negative Final     Amphetamine Screen, Urine   Date Value Ref Range Status   06/23/2020 Negative Negative Final     Benzodiazepine Screen, Urine   Date Value Ref Range Status   06/23/2020 Negative Negative Final     Tricyclic Antidepressants Screen   Date Value Ref Range Status   06/23/2020 Negative Negative Final     Methadone Screen, Urine   Date Value Ref Range Status   06/23/2020 Negative Negative Final     Barbiturates Screen, Urine   Date Value Ref Range Status   06/23/2020 Negative Negative Final     Oxycodone Screen, Urine    Date Value Ref Range Status   06/23/2020 Positive (A) Negative Final     Propoxyphene Screen   Date Value Ref Range Status   06/23/2020 Negative Negative Final     Buprenorphine, Screen, Urine   Date Value Ref Range Status   06/23/2020 Negative Negative Final     Palliative Performance Scale  Palliative Performance Scale Score: 60%    [place] Symptom Assessment System Revised  Pain Score: 8   ESAS Tiredness Score: 7  ESAS Nausea Score: 3  ESAS Depression Score: 8  ESAS Anxiety Score: 8  ESAS Drowsiness Score: 2  ESAS Lack of Appetite Score: 5  ESAS Wellbeing Score: 5  ESAS Dyspnea Score: 3  ESAS Source of Information: patient    TREV-7:  Flowsheet reviewed.  See Attached.  Over the last two weeks, how often have you been bothered by the following problems?  Feeling nervous, anxious or on edge: Nearly every day  Not being able to stop or control worrying: Nearly every day  Worrying too much about different things: Nearly every day  Trouble Relaxing: More than half the days  Being so restless that it is hard to sit still: Several days  Becoming easily annoyed or irritable: More than half the days  Feeling afraid as if something awful might happen: Nearly every day  TREV 7 Total Score: 17    PHQ-9:  Flowsheet reviewed.  See Attached.  PHQ-2/PHQ-9 Depression Screening 6/23/2020   Little interest or pleasure in doing things 3   Feeling down, depressed, or hopeless 3   Trouble falling or staying asleep, or sleeping too much 2   Feeling tired or having little energy 3   Poor appetite or overeating 2   Feeling bad about yourself - or that you are a failure or have let yourself or your family down 2   Trouble concentrating on things, such as reading the newspaper or watching television 1   Moving or speaking so slowly that other people could have noticed. Or the opposite - being so fidgety or restless that you have been moving around a lot more than usual 1   Thoughts that you would be better off dead, or of hurting  yourself in some way 3   Total Score 20   If you checked off any problems, how difficult have these problems made it for you to do your work, take care of things at home, or get along with other people? -        ECOG: (2) Ambulatory and capable of self care, unable to carry out work activity, up and about > 50% or waking hours    Objective   Physical Exam   Constitutional: He is oriented to person, place, and time. He appears well-developed. No distress.   Eyes: Pupils are equal, round, and reactive to light. EOM are normal. No scleral icterus.   Neck:   Left cervical mass tenderness with moderate palpation   Cardiovascular: Normal rate, regular rhythm and normal heart sounds. Exam reveals no gallop and no friction rub.   No murmur heard.  Pulmonary/Chest: Effort normal and breath sounds normal. No stridor. No respiratory distress. He has no rales.   Abdominal: Soft. Bowel sounds are normal. He exhibits distension. There is no tenderness.   Musculoskeletal: He exhibits edema. He exhibits no tenderness.   Neurological: He is alert and oriented to person, place, and time. Coordination normal.   Skin: Skin is dry. He is not diaphoretic.   Xerosis with left pedal area with skin breakdown, no redness   Psychiatric: His speech is normal. Judgment and thought content normal. His mood appears anxious. His affect is angry. He is agitated. Cognition and memory are normal. He exhibits a depressed mood.   Tearful, with shaking and wringing of hands and will not maintain eye contact when daughter tells narrative of patient's other children and grandchildren. He is inattentive.   Nursing note and vitals reviewed.        Universal precautions:    ORT risk scores (ORT-OUD and/or COMM):  Low screening score  OME:  60mg allowed  Naloxone prescribed:  Yes, previously counseled    Assessment/Plan   Oliver was seen today for appointment and follow-up.    Diagnoses and all orders for this visit:    Pain, cancer  -     oxyCODONE  (ROXICODONE) 10 MG tablet; Take 1 tablet by mouth Every 6 (Six) Hours As Needed for Severe Pain  for up to 15 days.    Cancer of tonsil (CMS/HCC)  -     Ambulatory Referral to Physical Therapy Evaluate and treat; Strengthening; Full weight bearing    Constipation due to opioid therapy    Stressful life event affecting family  -     Ambulatory Referral to ONC Social Work    Dependent for transportation  -     Ambulatory Referral to ONC Social Work    Throat pain in adult    Xerosis of skin    Other orders  -     sennosides-docusate (senna-docusate sodium) 8.6-50 MG per tablet; Take 2 tablets by mouth 2 (two) times a day for 30 days.               Total face to face time spent:  30 minutes.  Greater than 50% time spent in counseling and discussion re:  Pt's situational anxiety and depression.  Refocused on matters he has control over.    Care coordination:   1.  Refer to PT for cancer rehabilitation program  2.  Pt to f/u with Michelle Ellison with next appt  3.  Standard cane DME  4.  Onc SW for ongoing transportation and possible housing assistance/guidance    F/u 2 weeks

## 2020-06-24 NOTE — PROGRESS NOTES
LISA met with pt and his daughter, Nataliia, to provide assistance with community resources.  Pt has his last radiation treatment today.  He has been living with his daughter and her family during his treatments.  He would like to move out on his own.  LISA informed them that most of my resources are connected with oncology, but that he can contact his community  at the Medicaid office to ask about direction in finding income based housing in [place].  After pt decides where he will move to, transportation resources can be provided.  Nataliia has been on FMLA during pt treatment course.  LISA provided support and encouraged them to call with any future needs or concerns.  SW available for ongoing support and resource needs.

## 2020-06-24 NOTE — PROGRESS NOTES
Patient not treated today due to abnormal labs.  A liter of normal saline was given and picc line to pulled today.

## 2020-06-24 NOTE — PROGRESS NOTES
PROBLEM LIST:     Cancer of tonsil (CMS/[facility])    4/16/2020 Cancer Staged     Staging form: Pharynx - HPV-Mediated Oropharynx, AJCC 8th Edition  - Clinical stage from 4/16/2020: Stage II (cT3, cN1, cM0, p16+) - Signed by Kellie Lock MD on 4/17/2020 4/17/2020 Initial Diagnosis     Cancer of tonsil (CMS/[facility])      5/6/2020 -  Chemotherapy     OP HEAD & NECK CISplatin (weekly) + XRT      6/17/2020 -  Chemotherapy     OP SUPPORTIVE ELECTROLYTE REPLACEMENT         REASON FOR VISIT: Head and Neck cancer    HISTORY OF PRESENT ILLNESS:   55 y.o.  male presents today for follow-up of his cancer of the tonsil.  He is undergoing treatment with concurrent chemotherapy with radiation.  His chemotherapy has been held for the past 3 weeks, initially due to thrombocytopenia and recently for elevated creatinine.  He is tolerating radiation fairly well.  He is having significant pain but is able to eat and drink.  He is following with palliative for help managing pain.  He reports BLE swelling over the past couple weeks.  His weight is down 30 pounds since starting treatment 6 weeks ago.      Past medical history, social history and family history was reviewed and unchanged from prior visit.    Review of Systems:    Review of Systems   Constitutional: Negative.    Eyes: Negative.    Respiratory: Negative.    Cardiovascular: Negative.    Gastrointestinal: Negative.    Endocrine: Negative.    Genitourinary: Negative.     Musculoskeletal: Negative.    Skin: Negative.    Neurological: Negative.    Hematological: Negative.    Psychiatric/Behavioral: Negative.       A comprehensive 14 point review of systems was performed and was negative except as mentioned.      Medications:        Current Outpatient Medications:   •  albuterol sulfate  (90 Base) MCG/ACT inhaler, Inhale 2 puffs Every 4 (Four) Hours As Needed for Wheezing or Shortness of Air., Disp: 18 g, Rfl: 3  •  aspirin 81 MG chewable tablet, Chew 81 mg Daily., Disp: ,  Rfl:   •  BREO ELLIPTA 100-25 MCG/INH inhaler, Inhale 1 puff Daily for 30 days., Disp: 60 each, Rfl: 0  •  dexamethasone (DECADRON) 4 MG tablet, Take 2 tablets by mouth Daily on days 2, 3 & 4.  Take with food., Disp: 6 tablet, Rfl: 5  •  docusate sodium (COLACE) 100 MG capsule, Take 100 mg by mouth Daily., Disp: , Rfl:   •  guaiFENesin (Mucinex) 600 MG 12 hr tablet, Take 2 tablets by mouth 2 (Two) Times a Day for 30 days., Disp: 120 tablet, Rfl: 0  •  lisinopril-hydrochlorothiazide (PRINZIDE,ZESTORETIC) 20-25 MG per tablet, Take 1 tablet by mouth Daily., Disp: , Rfl:   •  Magic mouthwash Radonc, Swish and swallow 10 mL 4 (Four) Times a Day Before Meals & at Bedtime., Disp: 480 mL, Rfl: 3  •  metoprolol succinate XL (TOPROL-XL) 25 MG 24 hr tablet, Take 25 mg by mouth Daily., Disp: , Rfl:   •  mirtazapine (REMERON) 15 MG tablet, Take 1-2 tablets by mouth every night 30 minutes before bedtime., Disp: 60 tablet, Rfl: 2  •  naloxone (NARCAN) 4 MG/0.1ML nasal spray, Use 1 spray into the nostril(s) as directed by provider As Needed for opioid reversal for up to 1 dose., Disp: 2 each, Rfl: 0  •  ondansetron (ZOFRAN) 8 MG tablet, Take 1 tablet by mouth 3 (Three) Times a Day As Needed for Nausea or Vomiting., Disp: 30 tablet, Rfl: 5  •  oxyCODONE (ROXICODONE) 10 MG tablet, Take 1 tablet by mouth Every 6 (Six) Hours As Needed for Severe Pain  for up to 15 days., Disp: 60 tablet, Rfl: 0  •  pantoprazole (PROTONIX) 40 MG EC tablet, Take 40 mg by mouth Daily., Disp: , Rfl:   •  potassium chloride (K-DUR,KLOR-CON) 20 MEQ CR tablet, Take 1 tablet by mouth 2 (Two) Times a Day., Disp: 60 tablet, Rfl: 0  •  sennosides-docusate (senna-docusate sodium) 8.6-50 MG per tablet, Take 2 tablets by mouth 2 (two) times a day for 30 days., Disp: 120 tablet, Rfl: 0  No current facility-administered medications for this visit.     Pain Medications             aspirin 81 MG chewable tablet Chew 81 mg Daily.    dexamethasone (DECADRON) 4 MG tablet  "Take 2 tablets by mouth Daily on days 2, 3 & 4.  Take with food.    Magic mouthwash Radonc Swish and swallow 10 mL 4 (Four) Times a Day Before Meals & at Bedtime.    mirtazapine (REMERON) 15 MG tablet Take 1-2 tablets by mouth every night 30 minutes before bedtime.    oxyCODONE (ROXICODONE) 10 MG tablet Take 1 tablet by mouth Every 6 (Six) Hours As Needed for Severe Pain  for up to 15 days.    sertraline (ZOLOFT) 50 MG tablet () Take 0.5 tablets by mouth Daily for 7 days, THEN 1 tablet Daily for 14 days.             ALLERGIES:  No Known Allergies      Physical Exam    VITAL SIGNS:  /68   Pulse 101   Temp 97.4 °F (36.3 °C)   Resp 20   Ht 182.9 cm (72\")   Wt 134 kg (295 lb)   SpO2 92%   BMI 40.01 kg/m²     Wt Readings from Last 3 Encounters:   20 134 kg (295 lb)   20 134 kg (295 lb 11.2 oz)   20 134 kg (295 lb)       Body mass index is 40.01 kg/m². Body surface area is 2.51 meters squared.    Pain Score    20 0905   PainSc: 0-No pain  Comment: No new pain.  Took pain meds @ 0815 this AM         Performance Status: 1    General: well appearing, in no acute distress, morbid obesity  HEENT: sclera anicteric, oropharynx clear, neck is supple  Lymphatics: no cervical, supraclavicular, or axillary adenopathy  Cardiovascular: regular rate and rhythm, no murmurs, rubs or gallops  Lungs: clear to auscultation bilaterally  Abdomen: soft, nontender, nondistended.  No palpable organomegaly  Extremities: 1-2 + pitting edema BLE to middle of shins  Skin: no rashes, lesions, bruising, or petechiae  Msk:  Shows no weakness of the large muscle groups  Psych: Mood is stable    RECENT LABS:    Lab Results   Component Value Date    HGB 10.7 (L) 2020    HCT 34.6 (L) 2020    MCV 96.3 2020    PLT 64 (L) 2020    WBC 5.40 2020    NEUTROABS 4.00 2020    LYMPHSABS 1.00 2020    MONOSABS 0.30 2020    EOSABS 0.36 (H) 2018    BASOSABS 0.03 2018 "       Lab Results   Component Value Date    GLUCOSE 115 (H) 06/24/2020    BUN 75 (H) 06/24/2020    CREATININE 3.85 (H) 06/24/2020     06/24/2020    K 3.6 06/24/2020    CL 98 06/24/2020    CO2 27.0 06/24/2020    CALCIUM 8.0 (L) 06/24/2020    PROTEINTOT 6.1 06/17/2020    ALBUMIN 2.80 (L) 06/17/2020    BILITOT 1.8 (H) 06/17/2020    ALKPHOS 339 (H) 06/17/2020    AST 63 (H) 06/17/2020    ALT 52 (H) 06/17/2020       Nm Pet Skull Base To Mid Thigh    Result Date: 3/30/2020  Malignancy in the left tonsillar region with adenopathy seen within the left neck and cervical chain. There is no additional hypermetabolic focus identified.  D:  03/30/2020 E:  03/30/2020    This report was finalized on 3/30/2020 2:05 PM by Dr. Janeth Atkins MD.        Assessment/Plan    1.  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  Continue chemo RT.  Today is last day of radiation.  Chemotherapy has been held for the past 3 weeks, initially due to thrombocytopenia and then elevated creatinine.  He likely has a component of liver issues causing thrombocytopenia.  Will check labs today prior to treatment.  We will pull PICC line today after infusion.  We will follow up in 1 month with CT scan neck prior to appointment.      2.  Morbid obesity.  This is a major concern with his comorbidities and also chemotherapy dosing.    3.  Fall with contusion of the right ribs.  X-ray showed questionable hairline fracture of the lateral right sixth rib.  Improving.      4.  COPD.  His breathing seems relatively stable.    5.  Transaminitis.  Likely a component of underlying liver dysfunction.    6.  Elevated creatinine.  Worsening over the past couple weeks and chemotherapy has been held.  Patient given a liter of fluids today.  Discussed with Dr. Campbell and will refer patient to nephrology.      I spent a total of 25 minutes in direct patient care, greater than 20 minutes (greater than 50%)  were spent in coordination of care, and counseling the patient regarding   cancer of the tonsil. Answered any questions patient had with medication and plan.      FARHAN Flores  [facility]    Return in (Approximately): 1 month    Orders Placed This Encounter   Procedures   • CT Soft Tissue Neck Without Contrast       6/24/2020

## 2020-06-24 NOTE — TELEPHONE ENCOUNTER
Appointment provided to patient in office for next visit along with Behavioral Health. AVS was printed for patient. Patient would like daughter, Nataliia, to be used for contact.

## 2020-06-26 NOTE — TELEPHONE ENCOUNTER
Called patient to advise that we have placed a lab order, advised to have this drawn on Monday to check Kidney function. Spoke with patients daughter Nataliia, she will take him to a  location to have drawn. Verbalized understanding.

## 2020-07-07 PROBLEM — R63.4 UNINTENTIONAL WEIGHT LOSS: Status: ACTIVE | Noted: 2020-01-01

## 2020-07-07 PROBLEM — I95.9 HYPOTENSION: Status: ACTIVE | Noted: 2020-01-01

## 2020-07-07 PROBLEM — E86.0 DEHYDRATION: Status: ACTIVE | Noted: 2020-01-01

## 2020-07-07 PROBLEM — Z86.79 HISTORY OF ESSENTIAL HYPERTENSION: Status: ACTIVE | Noted: 2020-01-01

## 2020-07-07 PROBLEM — T45.1X5A CHEMOTHERAPY-INDUCED THROMBOCYTOPENIA: Status: ACTIVE | Noted: 2020-01-01

## 2020-07-07 PROBLEM — R79.89 ELEVATED LFTS: Status: ACTIVE | Noted: 2020-01-01

## 2020-07-07 PROBLEM — E80.6 HYPERBILIRUBINEMIA: Status: ACTIVE | Noted: 2020-01-01

## 2020-07-07 PROBLEM — D69.59 CHEMOTHERAPY-INDUCED THROMBOCYTOPENIA: Status: ACTIVE | Noted: 2020-01-01

## 2020-07-07 PROBLEM — N17.9 ACUTE RENAL FAILURE (ARF) (HCC): Status: ACTIVE | Noted: 2020-01-01

## 2020-07-07 PROBLEM — R11.2 NAUSEA & VOMITING: Status: ACTIVE | Noted: 2020-01-01

## 2020-07-07 NOTE — PROGRESS NOTES
Subjective   Oliver Mallory is a 55 y.o. male.     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  Comorbid COPD, CAD, HTN, and morbid obesity.  Transferred cancer care to [facility] as he has relocated from [place] to [place] to be with his daughter.     Treatment plan:  Definitive chemoradiation with weekly Cisplatin    Interim history:  Pt with 1 week nausea and vomiting.  Straining with BM, last satisfactory one several days ago.  BRBPR, thinks from hemorroid.  No abdominal pain.  Orthostatic lightheadedness.    Pain: stable left supraclavicular ache    Medication management:  Oxycodone 10mg four times daily, out (expected)    ANALGESIA:  satisfactory  ADVERSE EFFECTS:  constipation  ACTIVITY:  Using transport chair, stable  AFFECT:  stable  ABERRANT BEHAVIORS:  none    The following portions of the patient's history were reviewed and updated as appropriate: allergies, current medications, past family history, past medical history, past social history, past surgical history and problem list.    Review of Systems  Otherwise negative except as below and as already detailed in HPI.    ESAS:  Flowsheet reviewed.  See Attached.    Medication Counts:  Reviewed.  See RN note. Did not bring medication to appointment, out of oxycodone (expected)    COLIN:  Reviewed.  See scanned form in Media. No concerns.  Consistent with history.  Prescribers identified as members of care team.     CONTROLLED SUBSTANCE TRACKING 5/19/2020 6/2/2020 6/23/2020   Last Colin 5/18/2020 6/1/2020 6/22/2020   Report Number [id number]   Last UDS 5/19/2020 5/19/2020 5/19/2020   Last Controlled Substance Agreement - 5/19/2020  5/19/2020   ORT Initial Risk Score 1 1 1   Prior UDT result - Expected Expected   Pill count Expected Expected Expected   Diversion Concern No No No   Disposal Education and Agreement [id number]   Naloxone - Yes Yes       UDS:  THC, Screen, Urine   Date Value Ref Range Status   06/23/2020 Positive (A) Negative Final     Phencyclidine (PCP), Urine   Date Value Ref Range Status   06/23/2020 Negative Negative Final     Cocaine Screen, Urine   Date Value Ref Range Status   06/23/2020 Negative Negative Final     Methamphetamine, Ur   Date Value Ref Range Status   06/23/2020 Negative Negative Final     Opiate Screen   Date Value Ref Range Status   06/23/2020 Negative Negative Final     Amphetamine Screen, Urine   Date Value Ref Range Status   06/23/2020 Negative Negative Final     Benzodiazepine Screen, Urine   Date Value Ref Range Status   06/23/2020 Negative Negative Final     Tricyclic Antidepressants Screen   Date Value Ref Range Status   06/23/2020 Negative Negative Final     Methadone Screen, Urine   Date Value Ref Range Status   06/23/2020 Negative Negative Final     Barbiturates Screen, Urine   Date Value Ref Range Status   06/23/2020 Negative Negative Final     Oxycodone Screen, Urine   Date Value Ref Range Status   06/23/2020 Positive (A) Negative Final     Propoxyphene Screen   Date Value Ref Range Status   06/23/2020 Negative Negative Final     Buprenorphine, Screen, Urine   Date Value Ref Range Status   06/23/2020 Negative Negative Final     Palliative Performance Scale  Palliative Performance Scale Score: 50%    [place] Symptom Assessment System Revised  Pain Score: 8   ESAS Tiredness Score: Worst possible tiredness  ESAS Nausea Score: Worst possible nausea  ESAS Depression Score: 8  ESAS Anxiety Score: 7  ESAS Drowsiness Score: 8  ESAS Lack of Appetite Score: 4  ESAS Wellbeing Score: 3  ESAS Dyspnea Score: 8  ESAS Other Problem Score: 5  ESAS Source of Information: patient    TREV-7:  Flowsheet  reviewed.  See Attached.       PHQ-9:  Flowsheet reviewed.  See Attached.  PHQ-2/PHQ-9 Depression Screening 7/7/2020   Little interest or pleasure in doing things 3   Feeling down, depressed, or hopeless 3   Trouble falling or staying asleep, or sleeping too much 2   Feeling tired or having little energy 3   Poor appetite or overeating 3   Feeling bad about yourself - or that you are a failure or have let yourself or your family down 3   Trouble concentrating on things, such as reading the newspaper or watching television 2   Moving or speaking so slowly that other people could have noticed. Or the opposite - being so fidgety or restless that you have been moving around a lot more than usual 2   Thoughts that you would be better off dead, or of hurting yourself in some way 2   Total Score 23   If you checked off any problems, how difficult have these problems made it for you to do your work, take care of things at home, or get along with other people? Very difficult        ECOG: (2) Ambulatory and capable of self care, unable to carry out work activity, up and about > 50% or waking hours    Objective   Physical Exam   Constitutional: He is oriented to person, place, and time. He appears distressed.   Eyes: Pupils are equal, round, and reactive to light. EOM are normal. No scleral icterus.   Cardiovascular: Regular rhythm and normal heart sounds. Tachycardia present. Exam reveals no gallop and no friction rub.   No murmur heard.  Pulmonary/Chest: Effort normal and breath sounds normal. No stridor. No respiratory distress. He has no wheezes. He has no rales.   Abdominal: Soft. He exhibits distension. Bowel sounds are decreased. There is no tenderness.   Musculoskeletal: He exhibits no edema, tenderness or deformity.   Neurological: He is alert and oriented to person, place, and time.   Skin: He is not diaphoretic.   Decreased turgor, ashen   Psychiatric: He has a normal mood and affect. His behavior is normal.  Judgment and thought content normal.   Nursing note and vitals reviewed.      Assessment/Plan   Diagnoses and all orders for this visit:    Constipation due to opioid therapy  -     Discontinue: oxyCODONE (ROXICODONE) 10 MG tablet; Take 1 tablet by mouth Every 6 (Six) Hours As Needed for Severe Pain  for up to 15 days.  -     oxyCODONE (ROXICODONE) 10 MG tablet; Take 1 tablet by mouth Every 6 (Six) Hours As Needed for Severe Pain  for up to 15 days.    Nausea and vomiting, intractability of vomiting not specified, unspecified vomiting type    Dehydration    Pain, cancer  -     Discontinue: oxyCODONE (ROXICODONE) 10 MG tablet; Take 1 tablet by mouth Every 6 (Six) Hours As Needed for Severe Pain  for up to 15 days.  -     oxyCODONE (ROXICODONE) 10 MG tablet; Take 1 tablet by mouth Every 6 (Six) Hours As Needed for Severe Pain  for up to 15 days.    Other orders  -     OLANZapine zydis (zyPREXA) 5 MG disintegrating tablet; Place 1 tablet on the tongue Every Night for 20 days For severe nausea  -     Naloxegol Oxalate (MOVANTIK) 12.5 MG tablet; Take 1 tablet by mouth Every Morning for 30 days. For constipation                 Total face to face time spent:  20 min.  Greater than 50% time spent in counseling and discussion re:  - overnight hospitalization vs  - urgent care IVF tomorrow and labs    Daughter present with patient today.  We reviewed pt's labs 6/24/20 and that symptoms have persisted.  Likely he has worsening AUDREY and needs IVFs.    Care coordination:   1.  Arranged for direct admission to hospital given CKD, hypotension, intractable n/v.  2.  No change to chronic analgesic regimen planned.    3.  Will change anti-emetic regimen to Olanzapine ODT  4.  Reiterated importance of scheduled laxative with patient.  Add Movantik given persistent constipation despite Senna.    Follow up 2 weeks

## 2020-07-07 NOTE — PROGRESS NOTES
Transported pt via WC to room S501 and assisted into bed. Met by floor staff.  Pt in stable condition. Reported to nursing station and notified nurse of pt arriving and gave brief report on pt condition and family contact.  Care Handed off.

## 2020-07-08 PROBLEM — K92.0 GASTROINTESTINAL HEMORRHAGE WITH HEMATEMESIS: Status: ACTIVE | Noted: 2020-01-01

## 2020-07-08 PROBLEM — K74.60 CIRRHOSIS OF LIVER WITH ASCITES (HCC): Status: ACTIVE | Noted: 2020-01-01

## 2020-07-08 PROBLEM — G89.3 PAIN, CANCER: Chronic | Status: ACTIVE | Noted: 2020-01-01

## 2020-07-08 PROBLEM — R18.8 CIRRHOSIS OF LIVER WITH ASCITES (HCC): Status: ACTIVE | Noted: 2020-01-01

## 2020-07-08 PROBLEM — T45.1X5A CHEMOTHERAPY-INDUCED THROMBOCYTOPENIA: Chronic | Status: ACTIVE | Noted: 2020-01-01

## 2020-07-08 PROBLEM — C09.9 CANCER OF TONSIL (HCC): Chronic | Status: ACTIVE | Noted: 2020-01-01

## 2020-07-08 PROBLEM — D69.59 CHEMOTHERAPY-INDUCED THROMBOCYTOPENIA: Chronic | Status: ACTIVE | Noted: 2020-01-01

## 2020-07-09 PROBLEM — D62 ACUTE BLOOD LOSS ANEMIA: Status: ACTIVE | Noted: 2020-01-01

## 2020-07-09 PROBLEM — K62.5 RECTAL BLEEDING: Status: ACTIVE | Noted: 2020-01-01

## 2020-07-09 NOTE — ANESTHESIA POSTPROCEDURE EVALUATION
Patient: Oliver Mallory    Procedure Summary     Date:  07/09/20 Room / Location:   [facility]    Anesthesia Start:  1121 Anesthesia Stop:  1141    Procedure:  ESOPHAGOGASTRODUODENOSCOPY (N/A ) Diagnosis:       Gastrointestinal hemorrhage with hematemesis      (Gastrointestinal hemorrhage with hematemesis [K92.0])    Surgeon:  Brunner, Mark I, MD Provider:  Florentino Garcia MD    Anesthesia Type:  general ASA Status:  4          Anesthesia Type: general    Vitals  Vitals Value Taken Time   BP 98/55 7/9/2020 11:38 AM   Temp 97.2 °F (36.2 °C) 7/9/2020 11:38 AM   Pulse 97 7/9/2020 11:38 AM   Resp 20 7/9/2020 11:38 AM   SpO2 100 % 7/9/2020 11:38 AM           Post Anesthesia Care and Evaluation    Patient location during evaluation: PACU  Patient participation: complete - patient participated  Level of consciousness: awake and alert  Pain score: 0  Pain management: adequate  Airway patency: patent  Anesthetic complications: No anesthetic complications  PONV Status: none  Cardiovascular status: hemodynamically stable and acceptable  Respiratory status: nonlabored ventilation, acceptable and nasal cannula  Hydration status: acceptable

## 2020-07-10 NOTE — ANESTHESIA POSTPROCEDURE EVALUATION
Patient: Oliver Mallory    Procedure Summary     Date:  07/10/20 Room / Location:   [facility]    Anesthesia Start:  1429 Anesthesia Stop:      Procedure:  COLONOSCOPY (N/A ) Diagnosis:       Acute blood loss anemia      Rectal bleeding      (Acute blood loss anemia [D62])      (Rectal bleeding [K62.5])    Surgeon:  Brunner, Mark I, MD Provider:  Owen Sarmiento MD    Anesthesia Type:  Not recorded ASA Status:  4          Anesthesia Type:General    Vitals  BP 95/55 (70)  RR 14  Temp 98 F  HR 68  O2 Sat 100%          Post Anesthesia Care and Evaluation    Patient location during evaluation: PACU  Patient participation: complete - patient participated  Level of consciousness: awake and alert  Pain score: 0  Pain management: adequate  Airway patency: patent  Anesthetic complications: No anesthetic complications  PONV Status: none  Cardiovascular status: hemodynamically stable and acceptable  Respiratory status: nonlabored ventilation, acceptable and nasal cannula  Hydration status: acceptable

## 2020-07-10 NOTE — ANESTHESIA PREPROCEDURE EVALUATION
Anesthesia Evaluation     Patient summary reviewed and Nursing notes reviewed                Airway   Mallampati: II  TM distance: >3 FB  Neck ROM: limited  No difficulty expected  Dental      Pulmonary    (+) COPD (MDI ) moderate,   Cardiovascular     ECG reviewed    (+) hypertension,     ROS comment: ECH NST TW abn   ECHO 7/9/20  EF = 70%.valves normal.  LHC 5/18: normal EF, normal coronaries    Neuro/Psych  GI/Hepatic/Renal/Endo    (+) morbid obesity, GI bleeding , liver disease (cirrhosis), renal disease (creat 6.3) CRI and ESRD,     Musculoskeletal     (+) neck pain,   Abdominal    Substance History      OB/GYN          Other   arthritis,    history of cancer    ROS/Med Hx Other: Rectal bleeding  Oropharyngeal  Ca   HCT 27    Plts 63K creat 4.2   K 3.2       Phys Exam Other: Ca tonsils chemo rads                Anesthesia Plan    ASA 4

## 2020-07-13 NOTE — TELEPHONE ENCOUNTER
Received a message on Friday stating that pt was inquiring about cane that had been ordered.  Returned call back today to Daughter as pt is still in the hospital.  Nataliia stated that the pharmacy/DME store is stating they do not have order.  Fax was sent with confirmation to DME store.  Instructed inpt case management should set pt up with all equipment needed at discharge and if they did not to contact on day of discharge and would resend to a new pharmacy.  Dtr v/u.

## 2020-07-24 NOTE — THERAPY DISCHARGE NOTE
Speech Language Pathology Discharge Summary         Patient Name: Oliver Mallory  : 1965  MRN: [id number]    Today's Date: 2020      SLP OP Goals     Row Name 20 1400          Goal Type Needed    Goal Type Needed  Dysphagia  -HG        Subjective Comments    Subjective Comments  Pt seen for eval and one treatment session. Pt now admitted into acute care and will be followed for swallowing as needed.   -HG        Dysphagia Goals    Patient will safely consume the recommended diet without complications such as aspiration pneumonia  regular diet and thin liquids  -HG     Status: Patient will safely consume the recommended diet without complications such as aspiration pneumonia  New;Progressing as expected  -HG     Comments: Patient will safely consume the recommended diet without complications such as aspiration pneumonia  20: Pt reports eating regular solids, avoiding chips and breads ongoing.  Pt tolerating thin liquids via cup and straw with intermittent coughing reported.   -HG     Status: Patient will improve oral skills to enhance safety and increase eating efficiency by increasing accuracy of back of tongue control  Discontinued  -HG     Patient will increase laryngeal elevation to reduce residue that might fall into airway by completing  Mendelsohn maneuver;super-supraglottic swallow;falsetto/pitch glide;with cues  -HG     Status: Patient will increase laryngeal elevation to reduce residue that might fall into airway by completing  Progressing as expected  -HG     Comments: Patient will increase laryngeal elevation to reduce residue that might fall into airway by completing  20: Pt reports the Mendelsohn isn't as painful as it once was. Falsetto was accurate.   -HG     Patient will increase closure of larynx to keep food from falling into the airway by completing  super-supraglottic swallow;with cues  -HG     Status: Patient will increase closure of larynx to keep food from falling into  "the airway by completing  Progressing as expected  -HG     Comments: Patient will increase closure of larynx to keep food from falling into the airway by completing  6/23/20: Pt completed with accuracy given a model.   -HG     Patient will increase strength of tongue base and posterior pharyngeal walls to reduce residue that might fall into airway by completing  effotful swallow;Jesica (tongue hold);with cues  -HG     Status: Patient will increase strength of tongue base and posterior pharyngeal walls to reduce residue that might fall into airway by completing  Progressing as expected  -HG     Comments: Patient will increase strength of tongue base and posterior pharyngeal walls to reduce residue that might fall into airway by completing  6/23/20: Pt completed with accuracy.   -HG     Patient will improve hyolaryngeal elevation via completing Rodriguez (head lift)  sustained lift (comment #/duration of lifts);repetitive lift (comment #/duration of lifts);with cues 30 secs/ 30 reps  -HG     Status: Patient will improve hyolaryngeal elevation via completing Rodriguez (head lift)  New;Progressing as expected  -HG     Comments: Patient will improve hyolaryngeal elevation via completing Rodriguez (head lift)  6/23/20: Pt shown modification of using  CTAR and completed with accuracy.   -HG     Patient will compensate for oral/pharyngeal deficits and reduce risks while eating by utilizing  compensatory strategies  slow rate;multiple swallows  -HG     Status: Patient will compensate for oral/pharyngeal deficits and reduce risks while eating by utilizing  compensatory strategies  Progressing as expected  -HG     Comments: Patient will compensate for oral/pharyngeal deficits and reduce risks while eating by utilizing  compensatory strategies  6/23/20: Pt reports eating at a slow rate, \"I can't help but eat slow.\" Pt is swallowing multiple times with liquids and solids.   -HG        SLP Time Calculation    SLP Goal Re-Cert Due Date  " 07/05/20  -       User Key  (r) = Recorded By, (t) = Taken By, (c) = Cosigned By    Initials Name Provider Type    Aliyah Garcia MS CCC-SLP Speech and Language Pathologist                 Time Calculation:                    Aliyah Bruno MS CCC-SLP  7/24/2020

## 2020-07-28 NOTE — TELEPHONE ENCOUNTER
Pt's daughter called stating she was with her dad in the hospital and he pain was not controlled.  Stated they were giving it to him every six hours and he had notified the nurses it wasn't working. Reviewed chart and Palliative care team signed off on 7/14 when symtpoms were under control. Instructed daughter to ask nurse for a new palliative consult so inpt team could come back on board and adjust medications.

## 2020-08-01 NOTE — PROGRESS NOTES
Meropenem therapy for Oliver Mallory    Estimated Creatinine Clearance: 24.3 mL/min (A) (by C-G formula based on SCr of 4.79 mg/dL (H)).  133 kg (293 lb 12.8 oz)    Plan: Change Zosyn to Meropenem 1 Gm IV Q12H (extended infusion protocol)  Diagnosis: BCID: Enterobacteriaceae  Consulting Provider: Dr. Mcfraland      Blood Culture ID, PCR - Blood, Blood, PICC Line   Order: [id number] - Reflex for Order [id number]   Status:  Final result   Visible to patient:  No (Not Released) Next appt:  None   Specimen Information: Blood, PICC Line        Component  Ref Range & Units    BCID, PCR  No organism detected by BCID PCR. Enterobacteriaceae Group. Identification by BCID PCR.             Pharmacy will continue to monitor patient's renal function for further potential dose ajdustements.    Raymundo Johnson [facility]  8/1/2020  12:59 AM

## 2020-08-01 NOTE — PLAN OF CARE
Pt has been hypotensive through the night. New transparent dressing applied to triple lumen IJ and dialysis port by [place]. Pressure dressing applied. Pt pulled out central line. Pressure and tali patch applied. peripheral IV access obtained. Pt sent to CT scan due to bacteremia and hypotension. Pt complains of  moderate pain in left leg and with dressing changes. PRN pain meds given. Pt has been afebrile through shift. Will continue to monitor.   Problem: Fall Risk (Adult)  Goal: Absence of Fall  Outcome: Ongoing (interventions implemented as appropriate)     Problem: Skin Injury Risk (Adult)  Goal: Skin Health and Integrity  Outcome: Ongoing (interventions implemented as appropriate)     Problem: Patient Care Overview  Goal: Plan of Care Review  Outcome: Ongoing (interventions implemented as appropriate)     Problem: Patient Care Overview  Goal: Individualization and Mutuality  Outcome: Ongoing (interventions implemented as appropriate)     Problem: Patient Care Overview  Goal: Discharge Needs Assessment  Outcome: Ongoing (interventions implemented as appropriate)     Problem: Patient Care Overview  Goal: Interprofessional Rounds/Family Conf  Outcome: Ongoing (interventions implemented as appropriate)     Problem: Pain, Chronic (Adult)  Goal: Acceptable Pain/Comfort Level and Functional Ability  Outcome: Ongoing (interventions implemented as appropriate)     Problem: Renal Failure/Kidney Injury, Acute (Adult)  Goal: Signs and Symptoms of Listed Potential Problems Will be Absent, Minimized or Managed (Renal Failure/Kidney Injury, Acute)  Outcome: Ongoing (interventions implemented as appropriate)     Problem: Palliative Care (Adult)  Goal: Maximized Comfort  Outcome: Ongoing (interventions implemented as appropriate)

## 2020-08-01 NOTE — PROGRESS NOTES
"   LOS: 25 days      Reason For Visit:  F/U AUDREY  Subjective    No acute events overnight. No new complaints.   Review of Systems:   Patient denies shortness of breath, chest pain, dysuria, hematuria, nausea, vomiting.        Objective       aspirin 81 mg Oral Daily   bumetanide 4 mg Oral Daily   hydrocortisone  Topical Q12H   lactulose 10 g Oral TID   magic mouthwash 5 mL Swish & Spit 4x Daily   meropenem 1 g Intravenous Q12H   metoclopramide 5 mg Oral TID AC   midodrine 10 mg Oral 3 times per day on Sun Tue Thu Sat   midodrine 20 mg Oral 3 times per day on Mon Wed Fri   mirtazapine 15 mg Oral Nightly   Sally 2 patch Topical Once   pantoprazole 40 mg Oral BID AC   sertraline 25 mg Oral Daily   sodium chloride 10 mL Intravenous Q12H            Vital Signs:  Blood pressure 95/48, pulse 108, temperature 98.3 °F (36.8 °C), temperature source Oral, resp. rate 16, height 182.9 cm (72\"), weight 125 kg (276 lb 6.4 oz), SpO2 95 %.    Flowsheet Rows      First Filed Value   Admission Height  185.4 cm (73\") Documented at 07/07/2020 1640   Admission Weight  127 kg (280 lb) Documented at 07/07/2020 1640          07/31 0701 - 08/01 0700  In: 760 [P.O.:760]  Out: 3230     Physical Exam:    General Appearance: No obvious distress morbidly obese  male alert oriented  Eyes: PER, conjunctivae and sclerae normal, no icterus  Lungs: Clear on auscultation.  Chest wall ecchymosis noted from the site of the tunnel catheter.  Heart/CV: regular rhythm, bilateral lower extremity edema.  Abdomen:  Distended WITH ASCITES., soft, non-tender, no masses,  bowel sounds present  Skin: No rash, Warm and dry ecchymosis chest wall.  Neurologically grossly intact.    Radiology:        Labs:  Results from last 7 days   Lab Units 08/01/20  0355 07/31/20  0456 07/30/20  1224 07/30/20  0407   WBC 10*3/mm3 5.56 4.18  --  4.68   HEMOGLOBIN g/dL 8.1* 8.3* 7.6* 8.1*   HEMATOCRIT % 25.7* 25.4* 24.6* 24.5*   PLATELETS 10*3/mm3 34* 44*  --  58* "     Results from last 7 days   Lab Units 08/01/20  0355 07/31/20  0456 07/30/20  0407 07/29/20  0356  07/27/20  0429   SODIUM mmol/L 138 136 138 141   < > 143   POTASSIUM mmol/L 4.0 3.9 3.9 3.2*   < > 3.5   CHLORIDE mmol/L 104 101 102 105   < > 106   CO2 mmol/L 23.0 23.0 25.0 22.0   < > 25.0   BUN mg/dL 24* 28* 21* 29*   < > 40*   CREATININE mg/dL 4.69* 4.79* 4.00* 5.08*   < > 4.90*   CALCIUM mg/dL 8.6 8.4* 8.6 8.9   < > 9.0   PHOSPHORUS mg/dL  --   --   --  2.4*  --   --    MAGNESIUM mg/dL  --   --   --  1.7  --  1.8   ALBUMIN g/dL 2.80*  --  3.40* 3.50  --   --     < > = values in this interval not displayed.     Results from last 7 days   Lab Units 08/01/20  0355   GLUCOSE mg/dL 139*       Results from last 7 days   Lab Units 08/01/20  0355   ALK PHOS U/L 148*   BILIRUBIN mg/dL 2.7*   ALT (SGPT) U/L 37   AST (SGOT) U/L 70*                 Estimated Creatinine Clearance: 24 mL/min (A) (by C-G formula based on SCr of 4.69 mg/dL (H)).      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  Non oliguric AUDREY: Thought to be ATN gradual worsening of renal function in the setting of chronic liver disease w cirrhosis. No response to albumin and midodrine.  Started on HD on 7/24/20. For solute clearance and optimization of volume status  Hypokalemia: Management with HD   2.  Anemia: Transfuse PRN no LISA to be given due to cancer  3.  Dependent edema: Optimization with HD patient has high fluid intake.  4 decompensated liver disease:  5.  Intradialytic hypotension: midodrine 20 mg TID  6. Bacteremia - Enterobacteriacea         Recommendations:   HD per schedule. Next HD Monday. Will need TDC to be removed. Antibx  per ID.   Albumin infusion.   Fluid restriction   Midodrine 20mg TID on HD days.   Continue bumex as he still has UOP.  Transfuse at hb<7    Rickey Hays MD  08/01/20  09:48

## 2020-08-01 NOTE — PROGRESS NOTES
[facility] Medicine Services  PROGRESS NOTE    Patient Name: Oliver Mallory  : 1965  MRN: [id number]    Date of Admission: 2020  Primary Care Physician: Raymundo Salgado MD    Subjective   Subjective     CC:  GI bleed, AUDREY, cirrhosis, bacteremia     HPI:  Positive blood cultures overnight and merrem started. R IJ also pulled overnight. States he is in pain and hasn't had pain medication due to low BP. Reviewed bacteremia, cirrhosis, AUDREY/dialysis.     Review of Systems  Gen- No fevers, chills  CV- No chest pain, palpitations  Resp- No cough, dyspnea  GI- No N/V/D, abd pain     Objective   Objective     Vital Signs:   Temp:  [97.6 °F (36.4 °C)-98.9 °F (37.2 °C)] 98.3 °F (36.8 °C)  Heart Rate:  [] 89  Resp:  [16-20] 16  BP: ()/(48-80) 100/54        Physical Exam:  Constitutional: No acute distress, awake, alert; chronically ill appearing   HENT: NCAT, mucous membranes moist  Respiratory: Clear to auscultation bilaterally, respiratory effort normal   Cardiovascular: RRR, no murmurs, rubs, or gallops, palpable pedal pulses bilaterally  Gastrointestinal: Positive bowel sounds, soft, distention   Musculoskeletal: +1 bilateral ankle edema; R tunneled line; bruising right neck/chest   Psychiatric: Appropriate affect, cooperative  Neurologic: Oriented x 3, strength symmetric in all extremities, Cranial Nerves grossly intact to confrontation, speech clear  Skin: No rashes    Results Reviewed:  Results from last 7 days   Lab Units 20  0355 20  0456 20  1224 20  0407   WBC 10*3/mm3 5.56  --  4.18  --  4.68   HEMOGLOBIN g/dL 8.1*  --  8.3* 7.6* 8.1*   HEMATOCRIT % 25.7*  --  25.4* 24.6* 24.5*   PLATELETS 10*3/mm3 34*  --  44*  --  58*   INR  2.38* 2.30* 2.16*  --  2.20*     Results from last 7 days   Lab Units 20  03520  0456 20  0407 20  0356   SODIUM mmol/L 138 136 138 141   POTASSIUM mmol/L 4.0 3.9 3.9 3.2*    CHLORIDE mmol/L 104 101 102 105   CO2 mmol/L 23.0 23.0 25.0 22.0   BUN mg/dL 24* 28* 21* 29*   CREATININE mg/dL 4.69* 4.79* 4.00* 5.08*   GLUCOSE mg/dL 139* 132* 125* 139*   CALCIUM mg/dL 8.6 8.4* 8.6 8.9   ALT (SGPT) U/L 37  --  46* 48*   AST (SGOT) U/L 70*  --  98* 110*     Estimated Creatinine Clearance: 24 mL/min (A) (by C-G formula based on SCr of 4.69 mg/dL (H)).    Microbiology Results Abnormal     Procedure Component Value - Date/Time    Body Fluid Culture - Body Fluid, Peritoneum [[id number]] Collected:  07/31/20 1422    Lab Status:  Preliminary result Specimen:  Body Fluid from Peritoneum Updated:  08/01/20 0924     Body Fluid Culture No growth     Gram Stain No organisms seen      Occasional WBCs per low power field    Blood Culture - Blood, Arm, Left [[id number]]  (Abnormal) Collected:  07/31/20 0832    Lab Status:  Preliminary result Specimen:  Blood from Arm, Left Updated:  08/01/20 0630     Blood Culture Abnormal Stain     Gram Stain Anaerobic Bottle Gram negative bacilli      Aerobic Bottle Gram negative bacilli    Blood Culture ID, PCR - Blood, Blood, PICC Line [[id number]]  (Abnormal) Collected:  07/31/20 0830    Lab Status:  Final result Specimen:  Blood, PICC Line Updated:  08/01/20 0022     BCID, PCR Enterobacteriaceae Group. Identification by BCID PCR.    Blood Culture - Blood, Blood, PICC Line [[id number]]  (Abnormal) Collected:  07/31/20 0830    Lab Status:  Preliminary result Specimen:  Blood, PICC Line Updated:  07/31/20 2231     Blood Culture Abnormal Stain     Gram Stain Anaerobic Bottle Gram negative bacilli      Aerobic Bottle Gram negative bacilli    Body Fluid Culture - Body Fluid, Peritoneum [[id number]] Collected:  07/15/20 1038    Lab Status:  Final result Specimen:  Body Fluid from Peritoneum Updated:  07/18/20 0710     Body Fluid Culture No growth at 3 days     Gram Stain Few (2+) WBCs seen      No organisms seen    Blood Culture - Blood, Arm, Right [[id number]] Collected:  07/08/20  1748    Lab Status:  Final result Specimen:  Blood from Arm, Right Updated:  07/13/20 1800     Blood Culture No growth at 5 days    Blood Culture - Blood, Hand, Left [[id number]] Collected:  07/08/20 1744    Lab Status:  Final result Specimen:  Blood from Hand, Left Updated:  07/13/20 1800     Blood Culture No growth at 5 days    Body Fluid Culture - Body Fluid, Peritoneum [[id number]] Collected:  07/10/20 0916    Lab Status:  Final result Specimen:  Body Fluid from Peritoneum Updated:  07/13/20 0700     Body Fluid Culture No growth at 3 days     Gram Stain Moderate (3+) WBCs seen      No organisms seen    COVID PRE-OP / PRE-PROCEDURE SCREENING ORDER (NO ISOLATION) - Swab, Nasopharynx [[id number]] Collected:  07/08/20 2009    Lab Status:  Final result Specimen:  Swab from Nasopharynx Updated:  07/08/20 2119    Narrative:       The following orders were created for panel order COVID PRE-OP / PRE-PROCEDURE SCREENING ORDER (NO ISOLATION) - Swab, Nasopharynx.  Procedure                               Abnormality         Status                     ---------                               -----------         ------                     COVID-19,CEPHEID,CLAUDIA IN-...[[id number]]  Normal              Final result                 Please view results for these tests on the individual orders.    COVID-19,CEPHEID,CLAUDIA IN-HOUSE(OR EMERGENT/ADD-ON),NP SWAB IN TRANSPORT MEDIA 3-4 HR TAT - Swab, Nasopharynx [[id number]]  (Normal) Collected:  07/08/20 2009    Lab Status:  Final result Specimen:  Swab from Nasopharynx Updated:  07/08/20 2119     COVID19 Not Detected    Narrative:       Fact sheet for providers: https://www.fda.gov/media/273011/download     Fact sheet for patients: https://www.fda.gov/media/081319/download          Imaging Results (Last 24 Hours)     Procedure Component Value Units Date/Time    CT Abdomen Pelvis Without Contrast [[id number]] Collected:  08/01/20 0002     Updated:  08/01/20 0005    Narrative:       CT Abdomen  Pelvis WO    INDICATION:   Bacteremia and hypotension. Status post paracentesis today.    TECHNIQUE:   CT of the abdomen and pelvis without IV contrast. Coronal and sagittal reconstructions were obtained.  Radiation dose reduction techniques included automated exposure control or exposure modulation based on body size. Count of known CT and cardiac nuc  med studies performed in previous 12 months: 6.     COMPARISON:   7/7/2020    FINDINGS:  Abdomen: Normal caliber aorta. The spleen is enlarged. The liver is cirrhotic and there is a small volume of ascites in the abdomen and pelvis. Negative adrenal glands. Atrophic pancreas and surgical absence of the gallbladder. No focal hepatic mass  although study sensitivity is limited. Nonobstructed kidneys. No adenopathy.    Pelvis: Negative bladder and prostate gland. Diverticulosis. Appendix not clearly demonstrated but no distinct evidence of acute appendicitis. Fat-containing left inguinal hernia. No suspicious bone lesion. Chronic antegrade listhesis of L4 on L5 grade  1/2.      Impression:         1. Cirrhosis with splenomegaly and ascites.  2. Surgical absence of the gallbladder.  3. Diverticulosis.          Signer Name: Clark Smyth MD   Signed: 8/1/2020 12:02 AM   Workstation Name: [id number]    [facility]    CT Chest Without Contrast [[id number]] Collected:  07/31/20 2359     Updated:  08/01/20 0001    Narrative:       CT Chest WO    INDICATION:   Bacteremia. Hypertension. Status post paracentesis today.    TECHNIQUE:   CT of the thorax without IV contrast. Coronal and sagittal reconstructions were obtained.  Radiation dose reduction techniques included automated exposure control or exposure modulation based on body size. Count of known CT and cardiac nuc med studies  performed in previous 12 months: 6.     COMPARISON:   7/7/2020    FINDINGS:  Lungs are essentially clear. Old healed granulomatous disease no pleural effusion. No pericardial  effusion. There is an enlarged anterior pericardial lymph node measuring up to 15 mm. Included upper abdomen demonstrates cirrhosis and ascites with  probable splenomegaly. The abdomen CT has been dictated separately. No suspicious bone lesion.      Impression:         1. The lungs are clear. Mildly enlarged anterior pericardial lymph node, indeterminate.  2. Cirrhosis with ascites.    Signer Name: Clark Smyth MD   Signed: 7/31/2020 11:59 PM   Workstation Name: [facility]    [facility] [place]    CT Guided Paracentesis [[id number]] Collected:  07/31/20 2149     Updated:  07/31/20 2154    Narrative:       PROCEDURE: CT-guided paracentesis     Procedural Personnel  Attending physician(s): ABELARDO Gore M.D.  Fellow physician(s): None  Resident physician(s): None  Advanced practice provider(s): None     Pre-procedure diagnosis: Liver cirrhosis; recurrent large volume  symptomatic ascites?  Post-procedure diagnosis: Same  Indication: Diagnostic/therapeutic  Additional clinical history: None     Complications: No immediate complications.       Impression:          CT-[place] [address] mL of serous fluid.  Portion sent for requested laboratory analysis     Plan:      Resume care by clinical team.  _______________________________________________________________     PROCEDURE SUMMARY:  - Limited CT  - CT-guided paracentesis  - Additional procedure(s): None     PROCEDURE DETAILS:     Pre-procedure  Consent: Informed consent for the procedure including risks, benefits  and alternatives was obtained and time-out was performed prior to the  procedure.  Preparation: The site was prepared and draped using maximal sterile  barrier technique including cutaneous antisepsis.     Anesthesia/sedation  Level of anesthesia/sedation: No sedation     Initial abdominal CT  Initial abdominal CT was performed.  Findings: Large volume ascites. A safe window for paracentesis was  identified.      Paracentesis  Local anesthesia was administered. The peritoneal cavity was accessed  and needle position confirmed by CT. Ascites was drained. The catheter  was then removed, and a sterile bandage was applied.  Paracentesis access technique: Trocar  Catheter placed: 8.5 Croatian  Post-drainage CT: Near resolution of ascites.     Radiation Dose  CT dose length product (mGy-cm): 865      Additional Details  Additional description of procedure: None  Equipment details: None  Specimens removed: Abdominal fluid  Estimated blood loss (mL): Less than 10  Standardized report: Paracentesis     Attestation  I was present and scrubbed for the entire procedure. Imaging reviewed.  Agree with final report as written.     This report was finalized on 7/31/2020 9:51 PM by Rojas Gore.       XR Chest 1 View [[id number]] Collected:  07/31/20 2035     Updated:  07/31/20 2037    Narrative:       CR Chest 1 Vw    INDICATION:   IJ placement     COMPARISON:    Chest x-ray 5/13/2020.    FINDINGS:  Single portable AP view(s) of the chest.    There are 2 central venous catheters from a right IJ approach. Small caliber central venous catheter terminates distal SVC. The large caliber central venous catheter terminates distal SVC right atrial junction level. There is no pneumothorax. Both are  new.    Cardiac silhouette is normal in size. There is borderline central vascular congestion.. Please correlate for evidence for mild volume overload. There is no pleural effusion. There is no acute infiltrate.       Impression:         1. No pneumothorax.  2. Right IJ approach central venous catheter terminates distal SVC level. Second right IJ approach large caliber introducer type catheter terminates distal SVC/right atrial junction level.  3. Suspect borderline vascular congestion    Signer Name: Caitlin Talavera MD   Signed: 7/31/2020 8:35 PM   Workstation Name: [facility]    [facility] [place]          Results for orders placed during  the hospital encounter of 07/07/20   Adult Transthoracic Echo Complete W/ Cont if Necessary Per Protocol    Narrative · Estimated EF = 70%.  · The cardiac valves are anatomically and functionally normal.          I have reviewed the medications:  Scheduled Meds:  aspirin 81 mg Oral Daily   bumetanide 4 mg Oral Daily   hydrocortisone  Topical Q12H   lactulose 10 g Oral TID   magic mouthwash 5 mL Swish & Spit 4x Daily   meropenem 1 g Intravenous Q12H   metoclopramide 5 mg Oral TID AC   midodrine 10 mg Oral 3 times per day on Sun Tue Thu Sat   midodrine 20 mg Oral 3 times per day on Mon Wed Fri   mirtazapine 15 mg Oral Nightly   Sally 2 patch Topical Once   pantoprazole 40 mg Oral BID AC   sertraline 25 mg Oral Daily   sodium chloride 10 mL Intravenous Q12H     Continuous Infusions:   PRN Meds:.•  acetaminophen  •  albumin human  •  albuterol  •  diphenhydrAMINE  •  heparin (porcine)  •  Morphine  •  ondansetron  •  oxyCODONE  •  phenylephrine-mineral oil-petrolatum  •  sodium chloride    Assessment/Plan   Assessment & Plan     Active Hospital Problems    Diagnosis  POA   • **Hypotension [I95.9]  Yes   • Liver cirrhosis w/ ascites  [K74.60, R18.8]  Yes   • Intractable nausea and vomiting [R11.2]  Yes   • Dehydration [E86.0]  Yes   • ARF likely 2* ATN  [N17.9]  Yes   • Chemotherapy-induced thrombocytopenia [D69.59, T45.1X5A]  Yes   • GIB w/ hematemesis [K92.0]  No   • Acute blood loss anemia [D62]  Yes   • Rectal bleeding [K62.5]  Yes   • Constipation due to opioid therapy [K59.03, T40.2X5A]  Yes   • Pain, cancer [G89.3]  Yes   • 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        Brief Hospital Course to date:  Oliver Mallory is a 55 y.o. male with relevant PMH of HTN, COPD, Pancreatitis, Cirrhosis, Tonsillar CA s/p tonsillectomy undergoing chemo and RT admitted 7/7/2020 with intractable nausea and vomiting for 4 days with no food intake.   Hospital course complicated by AUDREY, GIB, and hypotension.  He was admitted to ICU and did require pressors for some time. Ultimately stabilized after IV fluids, improvement in GIB and addition of midodrine. Transferred to floor 7/17. Renal function is continually worsening w/ NAL following. Patient lost iv access on 7/23/20, unable to obtain another IV (despite multiple attempts w/ IV, also attempted EJ access without success. Due to progressive kidney failure w/ volume overload, tunneled dialysis cath & central line was placed by Dr. Gore of interventional radiology on 7/24/20 and dialysis was initiated. Had some post-procedural right neck/supraclavicular oozing/hematoma s/p platelets and FFP.      -Progressive AUDREY w/ volume overload, now s/p dialysis catheter placement receiving Hemodialysis  -Tunneled dialysis cath placed by Dr. Gore on 7/24/20  - echo 7/7/20: normal EF, normal valves  - nephrology following   - Increase to midodrine 20 mg TID on dialysis days   - Difficulty removing volume with cirrhosis      Fever   Enterobacteriaceae bacteremia   - BCx + 7/31   - Started merrem (8/1)   - ID consulted  - Will need tunneled line removed; timing TBD - discussed with ID      -Inability to obtain peripheral iv access; s/p central line placement  -s/p central line placement by Dr. Gore 7/24/20     -right neck/supraclavicular hematoma around catheter site  - s/p platelets and FFP; Vit K PO 5 mg 7/27 and again 7/30  - R IJ removed 8/1  - monitor      -s/p gi bleed (due to esophagitis and portal colopathy related to cirrhosis)  -7/9/20: EGD: mild esophagitis, grade 1 esophageal varices, lower 1/3 esophaguts, portal htn gastropathy noted  - 7/10/20: C-scope: rectal oozing consistent w/ portal colopathy, sigmoid diverticulosis noted  - Continue ppi bid; follow up [facility] 4 weeks; repeat egd 1 year for varix surveillance     -EDUARDO cirrhosis (w/ esophageal varices, portal htn gastopathy, and portal colopathy  -  continue lactulose, diuretics, midodrine   - repeat para 7/31      -Hx stage II HPV related oropharyngeal cancer   -formerly received chemotherapy & radiotherapy; not candidate currently for any therapies  -regarding tonsillar cancer, per oncology patient is not candidate for further therapies for tonsillar cancer (given his reina)  -  thrombocytopenia due to splenomegaly from cirrhosis rather than malignancy; f/u Dr. Campbell as outpatient     -Cytopenias (thrombocytopenia & anemia)   -felt due to cirrhosis/hypersplenism per heme-on     DVT Prophylaxis:  Mechanical     Disposition: I expect the patient to be discharged TBD; pending antibiotic plan and if kidneys recover vs ESRD    CODE STATUS:   Code Status and Medical Interventions:   Ordered at: 07/07/20 1721     Level Of Support Discussed With:    Patient     Code Status:    CPR     Medical Interventions (Level of Support Prior to Arrest):    Full         Electronically signed by Ksenia Chaidez DO, 08/01/20, 12:31.

## 2020-08-01 NOTE — CONSULTS
INFECTIOUS DISEASE CONSULT/INITIAL HOSPITAL VISIT    Oliver Mallory  1965  [id number]    Date of consult: 8/1/2020    Admit date: 7/7/2020    Requesting Provider: Dr. Dianna Chaidez  Evaluating physician: Dr. Joey Graham  Reason for Consultation: Enterobacter sepsis with bacteremia, 4 out of 4 bottles positive from 7/31/2020  Chief Complaint: Intractable nausea/vomiting and constipation      Subjective   History of present illness:  Mr. Oliver Mallory 55 y.o.  Yr old male who is being evaluated for Enterobacter bacteremia.  He has a past medical history significant for hypertension, COPD, pancreatitis, cirrhosis, tonsillar cancer status post tonsillectomy undergoing chemo and radiation therapy.  He was initially admitted on 7/7/2020 with intractable nausea and vomiting x4 days.  He was initially admitted to the ICU and did require some pressors.  Hospital course was complicated by GI bleed and hypotension as well acute kidney injury.  Patient was stabilized and transferred to the floor on 7/17.  Due to progressive kidney failure with fluid volume overload, tunneled dialysis catheter and central line was placed by interventional radiology on 7/24/2020 and dialysis was initiated.  There was reported issues with bleeding from the central line and patient received platelets and FFP.  Patient was found to have IJ line pulled most the way out had to be discontinued on 8/1 morning. Dialysis catheter remains in place.  Patient underwent a CT-guided paracentesis on 7/31, 7/15 and 7/10.  Patient had a EGD on 7/9 that showed mild portal gastropathy, reflux esophagitis and grade 1 esophageal varices.  He received a colonoscopy on 7/10 which showed a few sigmoid diverticula.  There is also petechiae in the rectum with scant oozing.  It was felt, in the absence of history of radiation to this region, that this is suspected portal colopathy.  Patient also received an echo on 7/8/2020 that was normal.    Patient did develop a fever  of 101.5 on 7/30 as well as some tachycardia up to 112.  Patient did remain without leukocytosis however due to the fever blood cultures were ordered which have turned positive in 4/4 bottles for gram-negative bacilli identified as Enterobacteriaceae by bio fire report.  Currently WBC is 5.56, H&H is 8.1/25.7 and platelets are 34.  AST is elevated at 70 and alkaline phosphatase is elevated 148 and total bilirubin is elevated 2.7.  7/31 CT of chest show clear lungs and cirrhosis with ascites.  CT of the abdomen on 8/1 showed cirrhosis with splenomegaly and ascites, surgical absence of gallbladder and diverticulosis.    Patient has no known antibiotic allergies and is currently receiving IV Zosyn.  ID has been consulted for further evaluation and treatment as well as antimicrobial therapy management on 8/1/2020.    Of note:  7/8 blood cultures were finalized no growth in 4/4 bottles  7/31 body fluid culture and stain-no organisms seen  7/15 body fluid culture and stain-no organisms seen    Past Medical History:   Diagnosis Date   • Arthritis    • Cancer (CMS/HCC)    • COPD (chronic obstructive pulmonary disease) (CMS/HCC)    • Fall 05/12/2020   • Hypertension    • Pancreatitis    • Tonsillar cancer (CMS/HCC)        Past Surgical History:   Procedure Laterality Date   • ANKLE TENDON REPAIR     • CARDIAC CATHETERIZATION N/A 5/31/2018    Procedure: Left Heart Cath;  Surgeon: Ray Farley MD;  Location:  [facility] CATH INVASIVE LOCATION;  Service: Cardiovascular   • CHOLECYSTECTOMY     • COLONOSCOPY N/A 7/10/2020    Procedure: COLONOSCOPY;  Surgeon: Brunner, Mark I, MD;  Location:  [facility] ENDOSCOPY;  Service: Gastroenterology;  Laterality: N/A;   • CYST REMOVAL      coccyx   • ENDOSCOPY N/A 7/9/2020    Procedure: ESOPHAGOGASTRODUODENOSCOPY;  Surgeon: Brunner, Mark I, MD;  Location:  [facility] ENDOSCOPY;  Service: Gastroenterology;  Laterality: N/A;   • HERNIA MESH REMOVAL     • HERNIA REPAIR     • KNEE SURGERY  1981   •  TONSILLECTOMY         Pediatric History   Patient Guardian Status   • Not on file     Other Topics Concern   • Not on file   Social History Narrative    Patient ambulatory without assistive device, but has ordered       family history includes Heart disease in his father; Hypertension in his mother.    No Known Allergies    Immunization History   Administered Date(s) Administered   • Hepatitis A 07/11/2020   • Hepatitis B Vaccine Adult IM 07/11/2020       Medication:    Current Facility-Administered Medications:   •  acetaminophen (TYLENOL) tablet 650 mg, 650 mg, Oral, Q4H PRN, Abram Abraham MD, 650 mg at 07/31/20 2107  •  albumin human 25 % IV SOLN 12.5 g, 12.5 g, Intravenous, PRN, Jose Alejandro Rose MD, 12.5 g at 07/24/20 1339  •  albuterol (PROVENTIL) nebulizer solution 0.083% 2.5 mg/3mL, 2.5 mg, Nebulization, Q6H PRN, Abram Abraham MD, 2.5 mg at 07/21/20 1405  •  aspirin chewable tablet 81 mg, 81 mg, Oral, Daily, Abram Abraham MD, 81 mg at 08/01/20 0802  •  bumetanide (BUMEX) tablet 4 mg, 4 mg, Oral, Daily, Escobar Miles MD, 4 mg at 08/01/20 1026  •  diphenhydrAMINE (BENADRYL) capsule 25 mg, 25 mg, Oral, Q6H PRN, Escobar Miles MD  •  heparin (porcine) injection 1,600 Units, 1,600 Units, Intracatheter, PRN, Escobar Miles MD, 1,600 Units at 07/31/20 1013  •  hydrocortisone 1 % cream, , Topical, Q12H, Rupa Hawthorne, APRN  •  lactulose (CHRONULAC) 10 GM/15ML solution 10 g, 10 g, Oral, TID, Abram Abraham MD, 10 g at 08/01/20 1736  •  magic mouthwash oral supsension 5 mL, 5 mL, Swish & Spit, 4x Daily, Loreta Hemphill II, DO, 5 mL at 08/01/20 1233  •  meropenem (MERREM) 1 g/100 mL 0.9% NS VTB (mbp), 1 g, Intravenous, Q12H, Maynor Mcfarland, DO  •  metoclopramide (REGLAN) tablet 5 mg, 5 mg, Oral, TID Jarad ALCANTARA Yuri, MD, 5 mg at 08/01/20 1737  •  midodrine (PROAMATINE) tablet 10 mg, 10 mg, Oral, 3 times per day on Sun Tue Thu Kaylynn Zavaleta Lyndsey, DO, 10 mg at 08/01/20 1736  •  midodrine  (PROAMATINE) tablet 20 mg, 20 mg, Oral, 3 times per day on Mon Wed Fri, Ksenia Chaidez DO, 20 mg at 07/31/20 1741  •  mirtazapine (REMERON) tablet 15 mg, 15 mg, Oral, Nightly, Abram Abraham MD, 15 mg at 07/31/20 1940  •  morphine injection 1 mg, 1 mg, Intravenous, Q4H PRN, Ksenia Chaidez DO, 1 mg at 08/01/20 0210  •  Sally patch 2 patch, 2 patch, Topical, Once, Zamzam Villa APRN, Stopped at 07/31/20 2211  •  ondansetron (ZOFRAN) injection 4 mg, 4 mg, Intravenous, Q6H PRN, Abram Abraham MD, 4 mg at 07/31/20 0844  •  oxyCODONE (ROXICODONE) immediate release tablet 10 mg, 10 mg, Oral, Q6H PRN, Escobar Miles MD, 10 mg at 08/01/20 1846  •  pantoprazole (PROTONIX) EC tablet 40 mg, 40 mg, Oral, BID AC, Abram Abraham MD, 40 mg at 08/01/20 1737  •  phenylephrine-mineral oil-petrolatum (PREPARATION H) 0.25-14-74.9 % hemorhoidal ointment, , Rectal, TID PRN, Abram Abraham MD  •  sertraline (ZOLOFT) tablet 25 mg, 25 mg, Oral, Daily, Abram Abraham MD, 25 mg at 08/01/20 0802  •  sodium chloride 0.9 % flush 10 mL, 10 mL, Intravenous, Q12H, Abram Abraham MD, 10 mL at 08/01/20 1026  •  sodium chloride 0.9 % flush 10 mL, 10 mL, Intravenous, PRN, Abram Abraham MD, 10 mL at 07/10/20 0759    Please refer to the medical record for a full medication list    Review of Systems:    Constitutional--positive for fever  HEENT-- No new vision, hearing or throat complaints.  No epistaxis or oral sores.  Denies odynophagia or dysphagia.  No odynophagia or dysphagia. No headache, photophobia or neck stiffness.  CV-- No chest pain, palpitation or syncope  Resp-- No SOB/cough/Hemoptysis  GI- No nausea, vomiting, or diarrhea.  No hematochezia, melena, or hematemesis. Denies jaundice or chronic liver disease.  -- No dysuria, hematuria, or flank pain.  Denies hesitancy, urgency or flank pain.  Lymph- no swollen lymph nodes in neck/axilla or groin.   Heme- No active bruising or bleeding; no Hx of DVT or PE.  MS-- no  "swelling or pain in the bones or joints of arms/legs.  No new back pain.  Neuro-- No acute focal weakness or numbness in the arms or legs.  No seizures.  Skin--positive for bruising    Physical Exam:   Vital Signs   Temp:  [97.6 °F (36.4 °C)-98.4 °F (36.9 °C)] 98.3 °F (36.8 °C)  Heart Rate:  [] 87  Resp:  [16] 16  BP: ()/(48-60) 95/59    Temp  Min: 97.6 °F (36.4 °C)  Max: 98.4 °F (36.9 °C)  BP  Min: 83/50  Max: 113/60  Pulse  Min: 83  Max: 108  Resp  Min: 16  Max: 16  SpO2  Min: 94 %  Max: 97 %    Blood pressure 95/59, pulse 87, temperature 98.3 °F (36.8 °C), temperature source Oral, resp. rate 16, height 182.9 cm (72\"), weight 125 kg (276 lb 6.4 oz), SpO2 94 %.  GENERAL: Awake and alert, in moderate. Appears older than stated age.  Resting in bed.  HEENT:  Normocephalic, atraumatic.  Oropharynx without thrush. Dentition in good repair. Ears externally normal, Nose externally normal.  EYES: PERRL. No conjunctival injection. No icterus. EOM full.  LYMPHATICS: No lymphadenopathy of the neck or axillary regions.   HEART: Audible murmur noted. Reg rate rhythm, No JVD at 45 degrees.  LUNGS: Clear to auscultation and percussion. No respiratory distress, no use of accessory muscles.  ABDOMEN: Soft, tender x4 quadrants, nondistended. No appreciable HSM.  Bowel sounds normal.  GENITAL: Without Abbasi cath.   SKIN: Warm and dry without cutaneous eruptions.  Ecchymosis noted across bilateral chest and scattered across bilateral upper extremities  PSYCHIATRIC: Mental status lucid. No confusion.  EXT:  No cellulitic change.  NEURO: Oriented to name, CN 2 to 12 intact  LINES: Right chest Vipin cath in place.  Ecchymosis noted around the site.  Insertion site is not warm however it is tender to touch.  Central line insertion site at right IJ with dressing in place.  No erythema or tenderness noted at insertion site.  Central line was removed on the morning of 8/1.      Results Review:       Results from last 7 days   Lab " Units 08/01/20  0355 07/31/20  0456 07/30/20  1224 07/30/20  0407   WBC 10*3/mm3 5.56 4.18  --  4.68   HEMOGLOBIN g/dL 8.1* 8.3* 7.6* 8.1*   HEMATOCRIT % 25.7* 25.4* 24.6* 24.5*   PLATELETS 10*3/mm3 34* 44*  --  58*     Results from last 7 days   Lab Units 08/01/20  0355   SODIUM mmol/L 138   POTASSIUM mmol/L 4.0   CHLORIDE mmol/L 104   CO2 mmol/L 23.0   BUN mg/dL 24*   CREATININE mg/dL 4.69*   GLUCOSE mg/dL 139*   CALCIUM mg/dL 8.6     Results from last 7 days   Lab Units 08/01/20  0355   ALK PHOS U/L 148*   BILIRUBIN mg/dL 2.7*   ALT (SGPT) U/L 37   AST (SGOT) U/L 70*                     Estimated Creatinine Clearance: 24 mL/min (A) (by C-G formula based on SCr of 4.69 mg/dL (H)).    Microbiology:  Microbiology Results (last 10 days)     Procedure Component Value - Date/Time    Body Fluid Culture - Body Fluid, Peritoneum [[id number]] Collected:  07/31/20 1422    Lab Status:  Preliminary result Specimen:  Body Fluid from Peritoneum Updated:  08/01/20 0924     Body Fluid Culture No growth     Gram Stain No organisms seen      Occasional WBCs per low power field    Blood Culture - Blood, Arm, Left [[id number]]  (Abnormal) Collected:  07/31/20 0832    Lab Status:  Preliminary result Specimen:  Blood from Arm, Left Updated:  08/01/20 1242     Blood Culture Abnormal Stain     Gram Stain Anaerobic Bottle Gram negative bacilli      Aerobic Bottle Gram negative bacilli    Blood Culture - Blood, Blood, PICC Line [[id number]]  (Abnormal) Collected:  07/31/20 0830    Lab Status:  Preliminary result Specimen:  Blood, PICC Line Updated:  08/01/20 1244     Blood Culture Abnormal Stain     Gram Stain Anaerobic Bottle Gram negative bacilli      Aerobic Bottle Gram negative bacilli    Blood Culture ID, PCR - Blood, Blood, PICC Line [[id number]]  (Abnormal) Collected:  07/31/20 0830    Lab Status:  Final result Specimen:  Blood, PICC Line Updated:  08/01/20 0022     BCID, PCR Enterobacteriaceae Group. Identification by BCID PCR.             Radiology:  Imaging Results (Last 72 Hours)     Procedure Component Value Units Date/Time    CT Abdomen Pelvis Without Contrast [[id number]] Collected:  08/01/20 0002     Updated:  08/01/20 0005    Narrative:       CT Abdomen Pelvis WO    INDICATION:   Bacteremia and hypotension. Status post paracentesis today.    TECHNIQUE:   CT of the abdomen and pelvis without IV contrast. Coronal and sagittal reconstructions were obtained.  Radiation dose reduction techniques included automated exposure control or exposure modulation based on body size. Count of known CT and cardiac nuc  med studies performed in previous 12 months: 6.     COMPARISON:   7/7/2020    FINDINGS:  Abdomen: Normal caliber aorta. The spleen is enlarged. The liver is cirrhotic and there is a small volume of ascites in the abdomen and pelvis. Negative adrenal glands. Atrophic pancreas and surgical absence of the gallbladder. No focal hepatic mass  although study sensitivity is limited. Nonobstructed kidneys. No adenopathy.    Pelvis: Negative bladder and prostate gland. Diverticulosis. Appendix not clearly demonstrated but no distinct evidence of acute appendicitis. Fat-containing left inguinal hernia. No suspicious bone lesion. Chronic antegrade listhesis of L4 on L5 grade  1/2.      Impression:         1. Cirrhosis with splenomegaly and ascites.  2. Surgical absence of the gallbladder.  3. Diverticulosis.          Signer Name: Clark Smyth MD   Signed: 8/1/2020 12:02 AM   Workstation Name: [id number]    [facility]    CT Chest Without Contrast [[id number]] Collected:  07/31/20 2359     Updated:  08/01/20 0001    Narrative:       CT Chest WO    INDICATION:   Bacteremia. Hypertension. Status post paracentesis today.    TECHNIQUE:   CT of the thorax without IV contrast. Coronal and sagittal reconstructions were obtained.  Radiation dose reduction techniques included automated exposure control or exposure modulation based on  body size. Count of known CT and cardiac nuc med studies  performed in previous 12 months: 6.     COMPARISON:   7/7/2020    FINDINGS:  Lungs are essentially clear. Old healed granulomatous disease no pleural effusion. No pericardial effusion. There is an enlarged anterior pericardial lymph node measuring up to 15 mm. Included upper abdomen demonstrates cirrhosis and ascites with  probable splenomegaly. The abdomen CT has been dictated separately. No suspicious bone lesion.      Impression:         1. The lungs are clear. Mildly enlarged anterior pericardial lymph node, indeterminate.  2. Cirrhosis with ascites.    Signer Name: Clark Smyth MD   Signed: 7/31/2020 11:59 PM   Workstation Name: [facility]    [facility] [place]    CT Guided Paracentesis [[id number]] Collected:  07/31/20 2149     Updated:  07/31/20 2154    Narrative:       PROCEDURE: CT-guided paracentesis     Procedural Personnel  Attending physician(s): ABELARDO Gore M.D.  Fellow physician(s): None  Resident physician(s): None  Advanced practice provider(s): None     Pre-procedure diagnosis: Liver cirrhosis; recurrent large volume  symptomatic ascites?  Post-procedure diagnosis: Same  Indication: Diagnostic/therapeutic  Additional clinical history: None     Complications: No immediate complications.       Impression:          CT-guided paracentesis with drainage of [address]56 mL of serous fluid.  Portion sent for requested laboratory analysis     Plan:      Resume care by clinical team.  _______________________________________________________________     PROCEDURE SUMMARY:  - Limited CT  - CT-guided paracentesis  - Additional procedure(s): None     PROCEDURE DETAILS:     Pre-procedure  Consent: Informed consent for the procedure including risks, benefits  and alternatives was obtained and time-out was performed prior to the  procedure.  Preparation: The site was prepared and draped using maximal sterile  barrier technique including cutaneous  antisepsis.     Anesthesia/sedation  Level of anesthesia/sedation: No sedation     Initial abdominal CT  Initial abdominal CT was performed.  Findings: Large volume ascites. A safe window for paracentesis was  identified.     Paracentesis  Local anesthesia was administered. The peritoneal cavity was accessed  and needle position confirmed by CT. Ascites was drained. The catheter  was then removed, and a sterile bandage was applied.  Paracentesis access technique: Trocar  Catheter placed: 8.5 Cymro  Post-drainage CT: Near resolution of ascites.     Radiation Dose  CT dose length product (mGy-cm): 865      Additional Details  Additional description of procedure: None  Equipment details: None  Specimens removed: Abdominal fluid  Estimated blood loss (mL): Less than 10  Standardized report: Paracentesis     Attestation  I was present and scrubbed for the entire procedure. Imaging reviewed.  Agree with final report as written.     This report was finalized on 7/31/2020 9:51 PM by Rojas Gore.       XR Chest 1 View [[id number]] Collected:  07/31/20 2035     Updated:  07/31/20 2037    Narrative:       CR Chest 1 Vw    INDICATION:   IJ placement     COMPARISON:    Chest x-ray 5/13/2020.    FINDINGS:  Single portable AP view(s) of the chest.    There are 2 central venous catheters from a right IJ approach. Small caliber central venous catheter terminates distal SVC. The large caliber central venous catheter terminates distal SVC right atrial junction level. There is no pneumothorax. Both are  new.    Cardiac silhouette is normal in size. There is borderline central vascular congestion.. Please correlate for evidence for mild volume overload. There is no pleural effusion. There is no acute infiltrate.       Impression:         1. No pneumothorax.  2. Right IJ approach central venous catheter terminates distal SVC level. Second right IJ approach large caliber introducer type catheter terminates distal SVC/right atrial  junction level.  3. Suspect borderline vascular congestion    Signer Name: Caitlin Talavera MD   Signed: 7/31/2020 8:35 PM   Workstation Name: [facility]    [facility] [place]          IMPRESSION:     1. Sepsis with Enterobacteriaceae group bacteremia-blood cultures positive in 4/4 bottles 7/31/2020.  Etiology is unclear at this time but could likely be a dialysis/line infection. Prior echo negative on 7/8.  May possibly be an occult intra-abdominal process, or elsewhere but not obvious on CT scan of the chest abdomen and pelvis from 7/31/2020.  However due to the high-grade bacteremia and intravascular infection is suspected/likely.  2. Acute blood loss anemia secondary to GI bleed with hematemesis and rectal bleeding.  3. Liver cirrhosis with ascites status post multiple CT-guided paracentesis.  4. 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  5. Acute kidney injury likely secondary to ATN however could also be hepatorenal syndrome.  6. Intractable nausea and vomiting.  7. Thrombocytopenia- could be secondary to chemotherapy or hepatic disease.  8. Elevated bilirubin- 2.7, likely secondary to hepatic disease, but needs evaluation for biliary tract disease.  9. Elevated AST-70.  10. Elevated alkaline phosphatase 148.      RECOMMENDATIONS:    1. Diagnostically continue to follow patient's physical exam, follow labs include CBC, CMP, CRP.  I will continue to follow blood culture sensitivity reports and adjust antibiotics accordingly.  Also obtain a hepatitis panel, HIV screen and an ultrasound the right upper quadrant to rule out biliary tract disease.  Will also need repeat a TTE to rule out endocarditis.  May consider a GABRIELA.  2. Therapeutically, discontinue IV Zosyn and start Merrem pharm to dose.  He will likely need to have the dialysis catheter removed and a new one placed on the opposite side of his chest.  This will be difficult due to patient's anemia and thrombocytopenia.  Duration of therapy  will likely be 2-4 weeks from removal of dialysis catheter date.  Would recommend removing line as soon as possible and placing temporary dialysis catheter, while we clear his sepsis.  3. Continue supportive care.    Thank you for asking me to see Oliver Mallory.  Our group would be pleased to follow this patient over the course of their hospitalization and assist with outpatient antimicrobial therapy, as indicated.  Further recommendations depend on the results of the cultures and clinical course.  Side effects of medications were discussed.  Patient is at increased risk for adverse drug reactions, recurrent infection, readmission.    Joey Graham MD  8/1/2020

## 2020-08-01 NOTE — PLAN OF CARE
Problem: Patient Care Overview  Goal: Interprofessional Rounds/Family Conf  Outcome: Ongoing (interventions implemented as appropriate)  Flowsheets (Taken 8/1/2020 1514)  Summary: .  Participants: nursing  Note:   Patient was sleeping on his left side. On room air. HR 92. No family in room. Palliative will continue to follow for support, symptom management and discharge needs

## 2020-08-01 NOTE — POST-PROCEDURE NOTE
Interventional Radiology Operative Note    Date: 07/31/20     Time: 21:47     Pre-op Diagnosis: Liver cirrhosis; recurrent large volume symptomatic ascites   Post-op Diagnosis: Same     Procedure: CT guided paracentesis     Surgeon: ABELARDO Gore M.D.  Assistants: None     Sedation: None     Estimated Blood Loss (EBL): Trace      Urine Output (UOP): N/A (short procedure)     IVF: N/A (short procedure)     Findings: Large volume ascites     Specimens: 38220 mL serous fluid. Portion sent for requested laboratory analysis     Complications: No immediate     Disposition: Recovery. Stable.

## 2020-08-01 NOTE — PLAN OF CARE
Pt w/ positive blood cultures following a fever 2 nights ago. Monitored for fever. Central line removed last night per patient. Reminded patient of the importance of fluid restrictions. Oral pain meds given for pain. Continued low BP's-on midodrine scheduled. Will continue to monitor..

## 2020-08-01 NOTE — SIGNIFICANT NOTE
Blood cx positive for gram - bacilli.  Placed on zosyn.   CT chest CT abd pelvis pending to find source.  Will leave lines for now as this is his only access.

## 2020-08-01 NOTE — SIGNIFICANT NOTE
Called per nursing secondary to pt found to have IJ pulled almost completely out. Will have to discontinue for now. Peripheral access was obtained per nursing.

## 2020-08-02 NOTE — PROGRESS NOTES
Meropenem therapy for Oliver Mallory    Estimated Creatinine Clearance: 24 mL/min (A) (by C-G formula based on SCr of 4.69 mg/dL (H)).  125 kg (276 lb 6.4 oz) - Patient on Hemodialysis on MWF    Plan: Change Zosyn to Meropenem; Adjust dose to Meropenem 500 mg IV Q24H after HD  Diagnosis: BCID: Enterobacteriaceae  Consulting Provider: Dr. Mcfarland      Blood Culture ID, PCR - Blood, Blood, PICC Line   Order: [id number] - Reflex for Order [id number]   Status:  Final result   Visible to patient:  No (Not Released) Next appt:  None   Specimen Information: Blood, PICC Line        Component  Ref Range & Units    BCID, PCR  No organism detected by BCID PCR. Enterobacteriaceae Group. Identification by BCID PCR.             Pharmacy will continue to monitor patient's renal function for further potential dose ajdustements.    Raymundo Johnson [facility]  8/1/2020  12:59 AM

## 2020-08-02 NOTE — PROGRESS NOTES
"   LOS: 26 days      Reason For Visit:  F/U AUDREY  Subjective    No acute events overnight. No new complaints.   Review of Systems:   Patient denies shortness of breath, chest pain, dysuria, hematuria, nausea, vomiting.        Objective       aspirin 81 mg Oral Daily   bumetanide 4 mg Oral Daily   hydrocortisone  Topical Q12H   lactulose 10 g Oral TID   magic mouthwash 5 mL Swish & Spit 4x Daily   meropenem 500 mg Intravenous Q24H   metoclopramide 5 mg Oral TID AC   midodrine 10 mg Oral 3 times per day on Sun Tue Thu Sat   midodrine 20 mg Oral 3 times per day on Mon Wed Fri   mirtazapine 15 mg Oral Nightly   Sally 2 patch Topical Once   pantoprazole 40 mg Oral BID AC   sertraline 25 mg Oral Daily   sodium chloride 10 mL Intravenous Q12H   vitamin K1 5 mg Oral Once            Vital Signs:  Blood pressure 110/68, pulse 104, temperature 98.3 °F (36.8 °C), temperature source Oral, resp. rate 18, height 182.9 cm (72\"), weight 126 kg (276 lb 11.2 oz), SpO2 94 %.    Flowsheet Rows      First Filed Value   Admission Height  185.4 cm (73\") Documented at 07/07/2020 1640   Admission Weight  127 kg (280 lb) Documented at 07/07/2020 1640          08/01 0701 - 08/02 0700  In: 480 [P.O.:480]  Out: -     Physical Exam:    General Appearance: No obvious distress   male alert oriented  Eyes: PER, conjunctivae and sclerae normal, no icterus  Lungs: Clear on auscultation.  Chest wall ecchymosis noted from the site of the tunnel catheter.  Heart/CV: regular rhythm, bilateral lower extremity edema.  Abdomen:  Distended , soft, non-tender, no masses,  bowel sounds present  Skin: No rash, Warm and dry ecchymosis chest wall.  Neurologically grossly intact.    Radiology:        Labs:  Results from last 7 days   Lab Units 08/02/20  0422 08/01/20  0355 07/31/20  0456   WBC 10*3/mm3 6.11 5.56 4.18   HEMOGLOBIN g/dL 8.1* 8.1* 8.3*   HEMATOCRIT % 25.0* 25.7* 25.4*   PLATELETS 10*3/mm3 37* 34* 44*     Results from last 7 days   Lab Units " 08/02/20  0422 08/01/20  0355 07/31/20  0456 07/30/20  0407 07/29/20  0356  07/27/20  0429   SODIUM mmol/L 138 138 136 138 141   < > 143   POTASSIUM mmol/L 3.7 4.0 3.9 3.9 3.2*   < > 3.5   CHLORIDE mmol/L 103 104 101 102 105   < > 106   CO2 mmol/L 22.0 23.0 23.0 25.0 22.0   < > 25.0   BUN mg/dL 28* 24* 28* 21* 29*   < > 40*   CREATININE mg/dL 5.50* 4.69* 4.79* 4.00* 5.08*   < > 4.90*   CALCIUM mg/dL 8.5* 8.6 8.4* 8.6 8.9   < > 9.0   PHOSPHORUS mg/dL  --   --   --   --  2.4*  --   --    MAGNESIUM mg/dL  --   --   --   --  1.7  --  1.8   ALBUMIN g/dL 2.70* 2.80*  --  3.40* 3.50  --   --     < > = values in this interval not displayed.     Results from last 7 days   Lab Units 08/02/20  0422   GLUCOSE mg/dL 117*       Results from last 7 days   Lab Units 08/02/20  0422   ALK PHOS U/L 161*   BILIRUBIN mg/dL 2.5*   ALT (SGPT) U/L 36   AST (SGOT) U/L 72*                 Estimated Creatinine Clearance: 20.6 mL/min (A) (by C-G formula based on SCr of 5.5 mg/dL (H)).      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  Non oliguric AUDREY: Thought to be ATN gradual worsening of renal function in the setting of chronic liver disease w cirrhosis. No response to albumin and midodrine.  Started on HD on 7/24/20. For solute clearance and optimization of volume status  Hypokalemia: Management with HD   2.  Anemia: Transfuse PRN no LISA to be given due to cancer  3.  Dependent edema: Optimization with HD patient has high fluid intake.  4 decompensated liver disease:  5.  Intradialytic hypotension: midodrine 20 mg TID  6. Bacteremia - Enterobacteriacea       Recommendations:   HD per schedule. HD tomorrow after that TDC will  be removed. Antibx per ID.   Fluid restriction   Midodrine 20mg TID on HD days.   Continue bumex as he still has UOP.  Transfuse at hb<7     Rickey Hays MD  08/02/20  12:33

## 2020-08-02 NOTE — PROGRESS NOTES
INFECTIOUS DISEASE Progress Note    Oliver Mallory  1965  [id number]    Date of consult: 8/1/20    Admit date: 7/7/2020    Evaluating physician: Dr. Joey Graham  Reason for Consultation: Citrobacter sepsis with bacteremia, 4 out of 4 bottles positive from 7/31/2020  Chief Complaint: Intractable nausea/vomiting and constipation, sepsis      Subjective   History of present illness:  Mr. Oliver Mallory 55 y.o.  Yr old male who is being evaluated for Enterobacter bacteremia.  He has a past medical history significant for hypertension, COPD, pancreatitis, cirrhosis, tonsillar cancer status post tonsillectomy undergoing chemo and radiation therapy.  He was initially admitted on 7/7/2020 with intractable nausea and vomiting x4 days.  He was initially admitted to the ICU and did require some pressors.  Hospital course was complicated by GI bleed and hypotension as well acute kidney injury.  Patient was stabilized and transferred to the floor on 7/17.  Due to progressive kidney failure with fluid volume overload, tunneled dialysis catheter and central line was placed by interventional radiology on 7/24/2020 and dialysis was initiated.  There was reported issues with bleeding from the central line and patient received platelets and FFP.  Patient was found to have IJ line pulled most the way out had to be discontinued on 8/1 morning. Dialysis catheter remains in place.  Patient underwent a CT-guided paracentesis on 7/31, 7/15 and 7/10.  Patient had a EGD on 7/9 that showed mild portal gastropathy, reflux esophagitis and grade 1 esophageal varices.  He received a colonoscopy on 7/10 which showed a few sigmoid diverticula.  There is also petechiae in the rectum with scant oozing.  It was felt, in the absence of history of radiation to this region, that this is suspected portal colopathy.  Patient also received an echo on 7/8/2020 that was normal.    Patient did develop a fever of 101.5 on 7/30 as well as some tachycardia up to  112.  Patient did remain without leukocytosis however due to the fever blood cultures were ordered which have turned positive in 4/4 bottles for gram-negative bacilli identified as Enterobacteriaceae by [facility] report.  Currently WBC is 5.56, H&H is 8.1/25.7 and platelets are 34.  AST is elevated at 70 and alkaline phosphatase is elevated 148 and total bilirubin is elevated 2.7.  7/31 CT of chest show clear lungs and cirrhosis with ascites.  CT of the abdomen on 8/1 showed cirrhosis with splenomegaly and ascites, surgical absence of gallbladder and diverticulosis.    Patient has no known antibiotic allergies and is currently receiving IV Zosyn.  ID has been consulted for further evaluation and treatment as well as antimicrobial therapy management on 8/1/2020.    Of note:  7/8 blood cultures were finalized no growth in 4/4 bottles  7/31 body fluid culture and stain-no organisms seen  7/15 body fluid culture and stain-no organisms seen    8/2/2020: History reviewed.  Patient sitting up on side of bed awake and alert upon arrival.  He remains with dialysis catheter in right chest.  He denies any events overnight.  He has been afebrile with some hypotension overnight but overall BP is trending up.  Without leukocytosis with a WBC of 6.11.  However lactic acid remains elevated at 3.0 as well as a CRP of 5.49.  Procalcitonin is also elevated 1.2.  Blood cultures have been identified as Citrobacter koseri by [facility] report.  Did receive an ultrasound of the  Right upper quadrant which showed free fluid identified throughout the upper abdomen, liver with increased echogenicity with some lobulation identified at the contour suggesting cirrhosis.  Gallbladder fossa was unremarkable and the common bile duct measured 8 mm.  TTE was performed on 8/1 and is currently pending interpretation.  Awaiting for catheter removal in a.m. after dialysis.      Past Medical History:   Diagnosis Date   • Arthritis    • Cancer (CMS/HCC)    •  COPD (chronic obstructive pulmonary disease) (CMS/HCC)    • Fall 05/12/2020   • Hypertension    • Pancreatitis    • Tonsillar cancer (CMS/[facility])        Past Surgical History:   Procedure Laterality Date   • ANKLE TENDON REPAIR     • CARDIAC CATHETERIZATION N/A 5/31/2018    Procedure: Left Heart Cath;  Surgeon: Ray Farley MD;  Location:  [facility];  Service: Cardiovascular   • CHOLECYSTECTOMY     • COLONOSCOPY N/A 7/10/2020    Procedure: COLONOSCOPY;  Surgeon: Brunner, Mark I, MD;  Location:  [facility];  Service: Gastroenterology;  Laterality: N/A;   • CYST REMOVAL      coccyx   • ENDOSCOPY N/A 7/9/2020    Procedure: ESOPHAGOGASTRODUODENOSCOPY;  Surgeon: Brunner, Mark I, MD;  Location:  [facility];  Service: Gastroenterology;  Laterality: N/A;   • HERNIA MESH REMOVAL     • HERNIA REPAIR     • KNEE SURGERY  1981   • TONSILLECTOMY         Pediatric History   Patient Guardian Status   • Not on file     Other Topics Concern   • Not on file   Social History Narrative    Patient ambulatory without assistive device, but has ordered       family history includes Heart disease in his father; Hypertension in his mother.    No Known Allergies    Immunization History   Administered Date(s) Administered   • Hepatitis A 07/11/2020   • Hepatitis B Vaccine Adult IM 07/11/2020       Medication:    Current Facility-Administered Medications:   •  acetaminophen (TYLENOL) tablet 650 mg, 650 mg, Oral, Q4H PRN, Abram Abraham MD, 650 mg at 07/31/20 2107  •  albumin human 25 % IV SOLN 12.5 g, 12.5 g, Intravenous, PRN, Jose Alejandro Rose MD, 12.5 g at 07/24/20 1339  •  albuterol (PROVENTIL) nebulizer solution 0.083% 2.5 mg/3mL, 2.5 mg, Nebulization, Q6H PRN, Abram Abraham MD, 2.5 mg at 07/21/20 1405  •  aspirin chewable tablet 81 mg, 81 mg, Oral, Daily, Abram Abraham MD, 81 mg at 08/02/20 1000  •  bumetanide (BUMEX) tablet 4 mg, 4 mg, Oral, Daily, Escobar Miles MD, 4 mg at 08/02/20 1000  •   diphenhydrAMINE (BENADRYL) capsule 25 mg, 25 mg, Oral, Q6H PRN, Escobar Miles MD  •  heparin (porcine) injection 1,600 Units, 1,600 Units, Intracatheter, PRN, Escobar Miles MD, 1,600 Units at 07/31/20 1013  •  hydrocortisone 1 % cream, , Topical, Q12H, Rupa Hawthorne, APRN  •  lactulose (CHRONULAC) 10 GM/15ML solution 10 g, 10 g, Oral, TID, Abram Abraham MD, 10 g at 08/02/20 1456  •  magic mouthwash oral supsension 5 mL, 5 mL, Swish & Spit, 4x Daily, Loreta Hemphill II, DO, 5 mL at 08/02/20 1212  •  meropenem (MERREM) 500mg/100 mL 0.9% NS IVPB (mbp), 500 mg, Intravenous, Q24H, Raymundo Johnson, [facility], 500 mg at 08/02/20 1319  •  metoclopramide (REGLAN) tablet 5 mg, 5 mg, Oral, TID AC, Abram Abraham MD, 5 mg at 08/02/20 1212  •  midodrine (PROAMATINE) tablet 10 mg, 10 mg, Oral, 3 times per day on Sun Tue Thu Sat, Ksenia Chaidez DO, 10 mg at 08/02/20 1212  •  midodrine (PROAMATINE) tablet 20 mg, 20 mg, Oral, 3 times per day on Mon Wed Fri, Ksenia Chaidez DO, 20 mg at 07/31/20 1741  •  mirtazapine (REMERON) tablet 15 mg, 15 mg, Oral, Nightly, Abram Abraham MD, 15 mg at 08/01/20 2034  •  morphine injection 1 mg, 1 mg, Intravenous, Q4H PRN, Ksenia Chaidez DO, 1 mg at 08/02/20 1215  •  Sally patch 2 patch, 2 patch, Topical, Once, Zamzam Villa, FARHAN, Stopped at 07/31/20 2211  •  ondansetron (ZOFRAN) injection 4 mg, 4 mg, Intravenous, Q6H PRN, Abram Abraham MD, 4 mg at 07/31/20 0844  •  oxyCODONE (ROXICODONE) immediate release tablet 10 mg, 10 mg, Oral, Q6H PRN, Escobar Miles MD, 10 mg at 08/02/20 1000  •  pantoprazole (PROTONIX) EC tablet 40 mg, 40 mg, Oral, BID AC, Abram Abraham MD, 40 mg at 08/02/20 1000  •  phenylephrine-mineral oil-petrolatum (PREPARATION H) 0.25-14-74.9 % hemorhoidal ointment, , Rectal, TID PRN, Abram Abraham MD  •  sertraline (ZOLOFT) tablet 25 mg, 25 mg, Oral, Daily, Abram Abraham MD, 25 mg at 08/02/20 1000  •  sodium chloride 0.9 % flush 10 mL, 10 mL,  "Intravenous, Q12H, Abram Abraham MD, 10 mL at 08/01/20 2035  •  sodium chloride 0.9 % flush 10 mL, 10 mL, Intravenous, PRN, Abram Abraham MD, 10 mL at 07/10/20 2849    Please refer to the medical record for a full medication list    Review of Systems:    Constitutional--afebrile overnight  HEENT-- No new vision, hearing or throat complaints.  No epistaxis or oral sores.  Denies odynophagia or dysphagia.  No odynophagia or dysphagia. No headache, photophobia or neck stiffness.  CV-- No chest pain, palpitation or syncope  Resp-- No SOB/cough/Hemoptysis  GI- No nausea, vomiting, or diarrhea.  No hematochezia, melena, or hematemesis. Denies jaundice or chronic liver disease.  -- No dysuria, hematuria, or flank pain.  Denies hesitancy, urgency or flank pain.  Lymph- no swollen lymph nodes in neck/axilla or groin.   Heme-positive ecchymosis across chest.  MS-- no swelling or pain in the bones or joints of arms/legs.  No new back pain.  Neuro-- No acute focal weakness or numbness in the arms or legs.  No seizures.  Skin--positive for bruising, no nodules    Physical Exam:   Vital Signs   Temp:  [97.6 °F (36.4 °C)-98.5 °F (36.9 °C)] 98.3 °F (36.8 °C)  Heart Rate:  [] 104  Resp:  [16-18] 18  BP: ()/(52-77) 110/68    Temp  Min: 97.6 °F (36.4 °C)  Max: 98.5 °F (36.9 °C)  BP  Min: 88/58  Max: 120/77  Pulse  Min: 85  Max: 104  Resp  Min: 16  Max: 18  SpO2  Min: 94 %  Max: 94 %    Blood pressure 110/68, pulse 104, temperature 98.3 °F (36.8 °C), temperature source Oral, resp. rate 18, height 182.9 cm (72.01\"), weight 125 kg (276 lb), SpO2 94 %.  GENERAL: Awake and alert, in minimal distress.  Sitting up on side of bed.    HEENT:  Normocephalic, atraumatic.  Ears externally normal, Nose externally normal.  EYES: No icterus.   LYMPHATICS:   HEART: Audible murmur noted.  RRR.  LUNGS: Clear to auscultation. No respiratory distress, no use of accessory muscles.  ABDOMEN: Soft, tender x4 quadrants, nondistended. Bowel " sounds normal.  GENITAL: Without Abbasi cath.   SKIN: Warm and dry without cutaneous eruptions.  Ecchymosis noted across bilateral chest and scattered across bilateral upper extremities  PSYCHIATRIC: Mental status lucid. No confusion.  EXT:  No cellulitic change.  NEURO: Oriented to name, CN 2 to 12 intact, nonfocal  LINES: Right chest Vipin cath in place.  Ecchymosis noted around the site.  Insertion site is not warm however it is tender to touch.  Central line insertion site at right IJ with dressing in place.  No erythema or tenderness noted at insertion site.  Central line was removed on the morning of 8/1.      Results Review:       Results from last 7 days   Lab Units 08/02/20  0422 08/01/20  0355 07/31/20  0456   WBC 10*3/mm3 6.11 5.56 4.18   HEMOGLOBIN g/dL 8.1* 8.1* 8.3*   HEMATOCRIT % 25.0* 25.7* 25.4*   PLATELETS 10*3/mm3 37* 34* 44*     Results from last 7 days   Lab Units 08/02/20  0422   SODIUM mmol/L 138   POTASSIUM mmol/L 3.7   CHLORIDE mmol/L 103   CO2 mmol/L 22.0   BUN mg/dL 28*   CREATININE mg/dL 5.50*   GLUCOSE mg/dL 117*   CALCIUM mg/dL 8.5*     Results from last 7 days   Lab Units 08/02/20  0422   ALK PHOS U/L 161*   BILIRUBIN mg/dL 2.5*   ALT (SGPT) U/L 36   AST (SGOT) U/L 72*     Results from last 7 days   Lab Units 08/02/20  0422   SED RATE mm/hr 4     Results from last 7 days   Lab Units 08/02/20  0422   CRP mg/dL 5.49*         Results from last 7 days   Lab Units 08/02/20  0422   LACTATE mmol/L 3.0*     Estimated Creatinine Clearance: 20.5 mL/min (A) (by C-G formula based on SCr of 5.5 mg/dL (H)).    Microbiology:  Microbiology Results (last 10 days)     Procedure Component Value - Date/Time    Body Fluid Culture - Body Fluid, Peritoneum [[id number]] Collected:  07/31/20 1422    Lab Status:  Preliminary result Specimen:  Body Fluid from Peritoneum Updated:  08/02/20 0898     Body Fluid Culture No growth at 2 days     Gram Stain No organisms seen      Occasional WBCs per low power field     Blood Culture - Blood, Arm, Left [[id number]]  (Abnormal) Collected:  07/31/20 0832    Lab Status:  Preliminary result Specimen:  Blood from Arm, Left Updated:  08/02/20 1259     Blood Culture Citrobacter koseri     Gram Stain Anaerobic Bottle Gram negative bacilli      Aerobic Bottle Gram negative bacilli    Blood Culture - Blood, Blood, PICC Line [[id number]]  (Abnormal) Collected:  07/31/20 0830    Lab Status:  Preliminary result Specimen:  Blood, PICC Line Updated:  08/02/20 0601     Blood Culture Gram Negative Bacilli     Gram Stain Anaerobic Bottle Gram negative bacilli      Aerobic Bottle Gram negative bacilli    Blood Culture ID, PCR - Blood, Blood, PICC Line [[id number]]  (Abnormal) Collected:  07/31/20 0830    Lab Status:  Final result Specimen:  Blood, PICC Line Updated:  08/01/20 0022     BCID, PCR Enterobacteriaceae Group. Identification by BCID PCR.            Radiology:  Imaging Results (Last 72 Hours)     Procedure Component Value Units Date/Time    US Abdomen Limited [[id number]] Collected:  08/02/20 1010     Updated:  08/02/20 1510    Narrative:       EXAMINATION: US ABDOMEN LIMITED - 08/02/2020     INDICATION:  I95.89-Other hypotension; E86.1-Hypovolemia;  K92.0-Hematemesis; D62-Acute posthemorrhagic anemia; K62.5-Hemorrhage of  anus and rectum; Z74.09-Other reduced mobility; Z78.9-Other specified  health status; Z74.09-Other reduced mobility. Upper abdominal pain.     TECHNIQUE: Sonographic imaging is obtained of the right upper quadrant  in both the sagittal and transverse planes.     COMPARISON: NONE     FINDINGS: Pancreas is not visualized.. There is free fluid identified  throughout the upper abdomen. The liver is increased in echogenicity  with some lobulation identified of the contour suggesting cirrhosis. The  gallbladder fossa is unremarkable. The common bile duct measures 8 mm.  The right kidney is normal in size, configuration, and texture measuring  in length from pole to pole 11.1 cm. No  solid cortical mass or renal  cortical cysts seen within the right kidney. No hydronephrosis or  nephrolithiasis.       Impression:       Cirrhotic appearance of the liver with no underlying mass.  Free fluid identified throughout the upper abdomen. The pancreas is not  seen. The remainder the upper abdomen is unremarkable.      DICTATED:   08/02/2020  EDITED/ls :   08/02/2020        CT Abdomen Pelvis Without Contrast [[id number]] Collected:  08/01/20 0002     Updated:  08/01/20 0005    Narrative:       CT Abdomen Pelvis WO    INDICATION:   Bacteremia and hypotension. Status post paracentesis today.    TECHNIQUE:   CT of the abdomen and pelvis without IV contrast. Coronal and sagittal reconstructions were obtained.  Radiation dose reduction techniques included automated exposure control or exposure modulation based on body size. Count of known CT and cardiac nuc  med studies performed in previous 12 months: 6.     COMPARISON:   7/7/2020    FINDINGS:  Abdomen: Normal caliber aorta. The spleen is enlarged. The liver is cirrhotic and there is a small volume of ascites in the abdomen and pelvis. Negative adrenal glands. Atrophic pancreas and surgical absence of the gallbladder. No focal hepatic mass  although study sensitivity is limited. Nonobstructed kidneys. No adenopathy.    Pelvis: Negative bladder and prostate gland. Diverticulosis. Appendix not clearly demonstrated but no distinct evidence of acute appendicitis. Fat-containing left inguinal hernia. No suspicious bone lesion. Chronic antegrade listhesis of L4 on L5 grade  1/2.      Impression:         1. Cirrhosis with splenomegaly and ascites.  2. Surgical absence of the gallbladder.  3. Diverticulosis.          Signer Name: Clark Smyth MD   Signed: 8/1/2020 12:02 AM   [facility]: [email or web address]    [facility]    CT Chest Without Contrast [[id number]] Collected:  07/31/20 2359     Updated:  08/01/20 0001    Narrative:       CT Chest  WO    INDICATION:   Bacteremia. Hypertension. Status post paracentesis today.    TECHNIQUE:   CT of the thorax without IV contrast. Coronal and sagittal reconstructions were obtained.  Radiation dose reduction techniques included automated exposure control or exposure modulation based on body size. Count of known CT and cardiac nuc med studies  performed in previous 12 months: 6.     COMPARISON:   7/7/2020    FINDINGS:  Lungs are essentially clear. Old healed granulomatous disease no pleural effusion. No pericardial effusion. There is an enlarged anterior pericardial lymph node measuring up to 15 mm. Included upper abdomen demonstrates cirrhosis and ascites with  probable splenomegaly. The abdomen CT has been dictated separately. No suspicious bone lesion.      Impression:         1. The lungs are clear. Mildly enlarged anterior pericardial lymph node, indeterminate.  2. Cirrhosis with ascites.    Signer Name: Clark Smyth MD   Signed: 7/31/2020 11:59 PM   Workstation Name: [email or web address]    [facility]    CT Guided Paracentesis [[id number]] Collected:  07/31/20 2149     Updated:  07/31/20 2154    Narrative:       PROCEDURE: CT-guided paracentesis     Procedural Personnel  Attending physician(s): ABELARDO Gore M.D.  Fellow physician(s): None  Resident physician(s): None  Advanced practice provider(s): None     Pre-procedure diagnosis: Liver cirrhosis; recurrent large volume  symptomatic ascites?  Post-procedure diagnosis: Same  Indication: Diagnostic/therapeutic  Additional clinical history: None     Complications: No immediate complications.       Impression:          CT-guided [place] [address] mL of serous fluid.  Portion sent for requested laboratory analysis     Plan:      Resume care by clinical team.  _______________________________________________________________     PROCEDURE SUMMARY:  - Limited CT  - CT-guided paracentesis  - Additional procedure(s): None     PROCEDURE  DETAILS:     Pre-procedure  Consent: Informed consent for the procedure including risks, benefits  and alternatives was obtained and time-out was performed prior to the  procedure.  Preparation: The site was prepared and draped using maximal sterile  barrier technique including cutaneous antisepsis.     Anesthesia/sedation  Level of anesthesia/sedation: No sedation     Initial abdominal CT  Initial abdominal CT was performed.  Findings: Large volume ascites. A safe window for paracentesis was  identified.     Paracentesis  Local anesthesia was administered. The peritoneal cavity was accessed  and needle position confirmed by CT. Ascites was drained. The catheter  was then removed, and a sterile bandage was applied.  Paracentesis access technique: Trocar  Catheter placed: 8.5 Somali  Post-drainage CT: Near resolution of ascites.     Radiation Dose  CT dose length product (mGy-cm): 865      Additional Details  Additional description of procedure: None  Equipment details: None  Specimens removed: Abdominal fluid  Estimated blood loss (mL): Less than 10  Standardized report: Paracentesis     Attestation  I was present and scrubbed for the entire procedure. Imaging reviewed.  Agree with final report as written.     This report was finalized on 7/31/2020 9:51 PM by Rojas Gore.       XR Chest 1 View [[id number]] Collected:  07/31/20 2035     Updated:  07/31/20 2037    Narrative:       CR Chest 1 Vw    INDICATION:   IJ placement     COMPARISON:    Chest x-ray 5/13/2020.    FINDINGS:  Single portable AP view(s) of the chest.    There are 2 central venous catheters from a right IJ approach. Small caliber central venous catheter terminates distal SVC. The large caliber central venous catheter terminates distal SVC right atrial junction level. There is no pneumothorax. Both are  new.    Cardiac silhouette is normal in size. There is borderline central vascular congestion.. Please correlate for evidence for mild volume  overload. There is no pleural effusion. There is no acute infiltrate.       Impression:         1. No pneumothorax.  2. Right IJ approach central venous catheter terminates distal SVC level. Second right IJ approach large caliber introducer type catheter terminates distal SVC/right atrial junction level.  3. Suspect borderline vascular congestion    Signer Name: Caitlin Talavera MD   Signed: 7/31/2020 8:35 PM   Workstation Name: [email or web address]    [facility]          IMPRESSION:     1. Sepsis with Enterobacteriaceae group bacteremia identified as Citrobacter by [facility] report-blood cultures positive in 4/4 bottles 7/31/2020.  Etiology is unclear at this time but could likely be a dialysis/line infection. Prior echo negative on 7/8.  May possibly be an occult intra-abdominal process, or elsewhere but not obvious on CT scan of the chest abdomen and pelvis from 7/31/2020.  However due to the high-grade bacteremia and intravascular infection is suspected/likely.  8/1 TTE has been performed and is currently pending Interpretation.  8/1 right upper quadrant ultrasound has been performed with no evidence of biliary duct/gallstones disease.  2. Acute blood loss anemia secondary to GI bleed with hematemesis and rectal bleeding.  Continues.  3. Liver cirrhosis with ascites status post multiple CT-guided paracentesis.  4. 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  5. Acute kidney injury likely secondary to ATN however could also be hepatorenal syndrome.  6. Intractable nausea and vomiting.  Improved.  7. Thrombocytopenia- could be secondary to chemotherapy or hepatic disease.  8. Elevated bilirubin- 2.5, likely secondary to hepatic disease, negative ultrasound.  Elevated AST- 72, worse.  9. Elevated alkaline phosphatase 161, worse.      Plan:    1. Diagnostically continue to follow patient's physical exam, follow labs include CBC, CMP, CRP.  I will continue to follow blood culture sensitivity reports and  adjust antibiotics accordingly.  May consider a GABRIELA.  2. Therapeutically, previously discontinued on 8/1 IV Zosyn and started Merrem pharm to dose.  He will likely need to have the dialysis catheter removed and a new one placed at a later date.  This will be difficult due to patient's anemia and thrombocytopenia.  Duration of therapy will likely be 2-4 weeks from removal of dialysis catheter date.  Would recommend removing line as soon as possible and placing temporary dialysis catheter, while we clear his sepsis.  Dialysis catheter removal planned on 8/3 after dialysis.  3. Continue supportive care.    Our group would be pleased to follow this patient over the course of their hospitalization and assist with outpatient antimicrobial therapy, as indicated.  Further recommendations depend on the results of the cultures and clinical course.  Side effects of medications were discussed.  Patient is at increased risk for adverse drug reactions, recurrent infection, readmission.    Joey Graham MD  8/2/2020

## 2020-08-02 NOTE — THERAPY WOUND CARE TREATMENT
[id number] - Wound/Debridement Treatment Note/Discharge   [place]     Patient Name: Oliver Mallory  : 1965  MRN: [id number]  Today's Date: 2020   Onset of Illness/Injury or Date of Surgery: 20     Referring Physician: Dr. Brunner       Admit Date: 2020    Visit Dx:    ICD-10-CM ICD-9-CM   1. Hypotension due to hypovolemia I95.89 458.8    E86.1 276.52   2. Gastrointestinal hemorrhage with hematemesis K92.0 578.0   3. Acute blood loss anemia D62 285.1   4. Rectal bleeding K62.5 569.3   5. Impaired mobility and ADLs Z74.09 V49.89    Z78.9    6. 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    • Pain medication agreement signed   • Pain, cancer   • Throat pain in adult   • Closed fracture of one rib of right side   • Rib pain on right side   • Urinary hesitancy   • Constipation due to opioid therapy   • Suicidal ideation   • Cough   • Stressful life event affecting family   • Dependent for transportation   • Xerosis of skin   • Dehydration   • Intractable nausea and vomiting   • Hypotension   • History of essential hypertension   • Unintentional weight loss   • ARF likely 2* ATN    • Elevated LFTs   • Hyperbilirubinemia   • Chemotherapy-induced thrombocytopenia   • GIB w/ hematemesis   • Liver cirrhosis w/ ascites    • Acute blood loss anemia   • Rectal bleeding           Lymphedema     Row Name 20 0743             Lymphedema Edema Assessment    Ptting Edema Category  By severity  -      Pitting Edema  Mild  -MC         Skin Changes/Observations    Lower Extremity Conditions  bilateral:;intact;clean;dry;scaly  -      Lower Extremity Color/Pigment  bilateral:;normal  -MC         Lymphedema Pulses/Capillary Refill    Lower Extremity Capillary Refill  right:;left:;less than 3 seconds  -MC         Compression/Skin Care    Bandaging  Comments  No skin breakdown. Only mild edema present. Pt does not tolerate compression. No additional needs noted.  -        User Key  (r) = Recorded By, (t) = Taken By, (c) = Cosigned By    Initials Name Provider Type     Cheryl Mae, PT Physical Therapist            WOUND DEBRIDEMENT              Wound 07/21/20 0214 Left anterior chest Skin Tear (Active)   Dressing Appearance dry;intact 8/1/2020  8:00 PM   Closure Open to air 8/1/2020  8:00 PM   Base dry 8/1/2020  8:00 PM       Wound 07/21/20 0216 lower cervical spine Skin Tear (Active)   Dressing Appearance open to air 8/1/2020  8:00 PM   Closure None 8/1/2020  8:00 PM   Base blanchable 8/1/2020  8:00 PM       Wound 07/23/20 2048 Right posterior elbow Skin Tear (Active)   Dressing Appearance open to air 8/1/2020  8:00 PM   Closure None 8/1/2020  8:00 PM       Therapy Treatment    Rehabilitation Treatment Summary     Row Name 08/02/20 0743             Treatment Time/Intention    Discipline  physical therapist  -      Document Type  wound care;discharge treatment;therapy note (daily note)  -      Subjective Information  no complaints  -      Mode of Treatment  physical therapy;individual therapy  -MC      Recorded by [] Cheryl Mae, PT 08/02/20 0900      Row Name 08/02/20 0743             Cognitive Assessment/Intervention- PT/OT    Orientation Status (Cognition)  oriented x 4  -MC      Recorded by [MC] Cheryl Mae, PT 08/02/20 0900      Row Name 08/02/20 0743             Positioning and Restraints    Pre-Treatment Position  in bed  -      Post Treatment Position  bed  -MC      In Bed  sitting EOB;call light within reach;encouraged to call for assist  -MC      Recorded by [] Cheryl Mae, PT 08/02/20 0900      Row Name 08/02/20 0743             Pain Scale: Numbers Pre/Post-Treatment    Pain Scale: Numbers, Pretreatment  0/10 - no pain  -MC      Pain Scale: Numbers, Post-Treatment  0/10 - no pain  -MC      Recorded by []  Cheryl Mae, PT 08/02/20 0900      Row Name                Wound 07/21/20 0214 Left anterior chest Skin Tear    Wound - Properties Group Date first assessed: 07/21/20 [EB] Time first assessed: 0214 [EB] Present on Hospital Admission: N [EB] Side: Left [EB] Orientation: anterior [EB] Location: chest [EB] Primary Wound Type: Skin tear [EB] Recorded by:  [EB] Yue Henriquez RN 07/21/20 0215    Row Name                Wound 07/21/20 0216 lower cervical spine Skin Tear    Wound - Properties Group Date first assessed: 07/21/20 [EB] Time first assessed: 0216 [EB] Present on Hospital Admission: N [EB] Orientation: lower [EB] Location: cervical spine [EB] Primary Wound Type: Skin tear [EB] Recorded by:  [EB] Yue Henriquez RN 07/21/20 0217    Row Name                Wound 07/23/20 2048 Right posterior elbow Skin Tear    Wound - Properties Group Date first assessed: 07/23/20 [CB] Time first assessed: 2048 [CB] Side: Right [CB] Orientation: posterior [CB] Location: elbow [CB] Primary Wound Type: Skin tear [CB] Stage, Pressure Injury: other (see comments) [CB], RICARDO  Recorded by:  [CB] Shantel Cha, RN 07/23/20 2149    Row Name 08/02/20 0743             Coping    Observed Emotional State  accepting;calm;cooperative  -      Verbalized Emotional State  acceptance  -MC      Recorded by [] Cheryl Mae, PT 08/02/20 0900      Row Name 08/02/20 0743             Plan of Care Review    Plan of Care Reviewed With  patient  -MC      Progress  improving  -      Outcome Summary  Pt still with mild, pitting edema to the BLE, but does not tolerate compression and declines placement today. Pt reports his legs are much better than they have been. PT wound care will discharge at this time. Please re-consult if needs arise.  -MC      Recorded by [KELLY] Cheryl Mae, PT 08/02/20 0900        User Key  (r) = Recorded By, (t) = Taken By, (c) = Cosigned By    Initials Name Effective Dates Discipline     Cheryl Mae,  PT 04/03/18 -  PT    Yue Recinos RN 07/02/19 -  Nurse    Shantel Ferguson RN 02/12/20 -  Nurse                Physical Therapy Education                 Title: PT OT SLP Therapies (In Progress)     Topic: Physical Therapy (In Progress)     Point: Mobility training (In Progress)     Description:   Instruct learner(s) on safety and technique for assisting patient out of bed, chair or wheelchair.  Instruct in the proper use of assistive devices, such as walker, crutches, cane or brace.              Patient Friendly Description:   It's important to get you on your feet again, but we need to do so in a way that is safe for you. Falling has serious consequences, and your personal safety is the most important thing of all.        When it's time to get out of bed, one of us or a family member will sit next to you on the bed to give you support.     If your doctor or nurse tells you to use a walker, crutches, a cane, or a brace, be sure you use it every time you get out of bed, even if you think you don't need it.    Learning Progress Summary           Patient Acceptance, TB, NR by PS at 7/30/2020 1236    Acceptance, TB, VU by PS at 7/30/2020 1235    Acceptance, E, VU,DU by LM at 7/21/2020 1027    Acceptance, E, NR by AS at 7/19/2020 1119    Acceptance, E,D, VU,NR by LS at 7/16/2020 1057    Acceptance, E,D, VU,NR by LS at 7/15/2020 1048    Acceptance, E,D, NR,VU by LS at 7/13/2020 1444    Acceptance, E,D, VU,DU by  at 7/11/2020 0936                   Point: Home exercise program (In Progress)     Description:   Instruct learner(s) on appropriate technique for monitoring, assisting and/or progressing patient with therapeutic exercises and activities.              Learning Progress Summary           Patient Acceptance, TB, NR by PS at 7/30/2020 1236    Acceptance, TB, VU by PS at 7/30/2020 1235    Acceptance, E, VU,DU by LM at 7/21/2020 1027    Acceptance, E, NR by AS at 7/19/2020 1119    Acceptance, E,D, VU,NR by  LS at 7/16/2020 1057    Acceptance, E,D, VU,NR by LS at 7/15/2020 1048    Acceptance, E,D, NR,VU by LS at 7/13/2020 1444    Acceptance, E,D, VU,DU by SJ at 7/11/2020 0936                   Point: Body mechanics (In Progress)     Description:   Instruct learner(s) on proper positioning and spine alignment for patient and/or caregiver during mobility tasks and/or exercises.              Learning Progress Summary           Patient Acceptance, TB, NR by PS at 7/30/2020 1236    Acceptance, TB, VU by PS at 7/30/2020 1235    Acceptance, E, VU,DU by LM at 7/21/2020 1027    Acceptance, E, NR by AS at 7/19/2020 1119    Acceptance, E,D, VU,NR by LS at 7/16/2020 1057    Acceptance, E,D, VU,NR by LS at 7/15/2020 1048    Acceptance, E,D, NR,VU by LS at 7/13/2020 1444    Acceptance, E,D, VU,DU by SJ at 7/11/2020 0936                   Point: Precautions (In Progress)     Description:   Instruct learner(s) on prescribed precautions during mobility and gait tasks              Learning Progress Summary           Patient Acceptance, TB, NR by PS at 7/30/2020 1236    Acceptance, TB, VU by PS at 7/30/2020 1235    Acceptance, E, VU,DU by LM at 7/21/2020 1027    Acceptance, E, NR by AS at 7/19/2020 1119    Acceptance, E,D, VU,NR by LS at 7/16/2020 1057    Acceptance, E,D, VU,NR by LS at 7/15/2020 1048    Acceptance, E,D, NR,VU by LS at 7/13/2020 1444    Acceptance, E,D, VU,DU by  at 7/11/2020 0936                               User Key     Initials Effective Dates Name Provider Type Discipline     06/19/15 -  Vivian Hu, PT Physical Therapist PT    AS 06/22/15 -  Dori Amin, PTA Physical Therapy Assistant PT    LS 06/19/15 -  Kaylah Macias, PT Physical Therapist PT    LM 07/24/19 -  Negrita Jaffe, PT Physical Therapist PT    PS 01/16/19 -  Emy Sandoval, RN Registered Nurse Nurse                      PT Recommendation and Plan  Anticipated Discharge Disposition (PT): home with assist  Planned Therapy Interventions  (PT Eval): wound care  Therapy Frequency (PT Clinical Impression): daily    Plan of Care Reviewed With: patient   Progress: improving  Outcome Summary: Pt still with mild, pitting edema to the BLE, but does not tolerate compression and declines placement today. Pt reports his legs are much better than they have been. PT wound care will discharge at this time. Please re-consult if needs arise.   Outcome Summary/Treatment Plan (PT)  Anticipated Discharge Disposition (PT): home with assist            Time Calculation  PT Charges     Row Name 08/02/20 0743             Time Calculation    Start Time  0743  -         Timed Charges    76639 - PT Self Care/Mgmt Minutes  10  -MC        User Key  (r) = Recorded By, (t) = Taken By, (c) = Cosigned By    Initials Name Provider Type    Cheryl Hernandez, PT Physical Therapist          Therapy Charges for Today     Code Description Service Date Service Provider Modifiers Qty    [id number]  PT SELF CARE/MGMT/TRAIN EA 15 MIN 8/2/2020 Cheryl Mae, PT GP 1            PT G-Codes  Outcome Measure Options: AM-PAC 6 Clicks Daily Activity (OT)  AM-PAC 6 Clicks Score (PT): 24  AM-PAC 6 Clicks Score (OT): 23      PT Discharge Summary  Anticipated Discharge Disposition (PT): home with assist  Reason for Discharge: other (comment)(no additional wound care needs)        Cheryl Mae, MIKE  8/2/2020

## 2020-08-02 NOTE — PLAN OF CARE
Problem: Patient Care Overview  Goal: Plan of Care Review  Outcome: Ongoing (interventions implemented as appropriate)  Flowsheets (Taken 8/2/2020 8101)  Progress: improving  Plan of Care Reviewed With: patient  Outcome Summary: Pt still with mild, pitting edema to the BLE, but does not tolerate compression and declines placement today. Pt reports his legs are much better than they have been. PT wound care will discharge at this time. Please re-consult if needs arise.

## 2020-08-02 NOTE — PROGRESS NOTES
[facility] Medicine Services  PROGRESS NOTE    Patient Name: Oliver Mallory  : 1965  MRN: [id number]    Date of Admission: 2020  Primary Care Physician: Raymundo Salgado MD    Subjective   Subjective     CC:  Cirrhosis, AUDREY, coagulopathy, bacteremia     HPI:  No acute events. States he feels better today. Still with pain in neck/chest but overall improved.     Review of Systems  Gen- No fevers, chills  CV- No chest pain, palpitations  Resp- No cough, dyspnea  GI- No N/V/D, abd pain     Objective   Objective     Vital Signs:   Temp:  [97.6 °F (36.4 °C)-98.5 °F (36.9 °C)] 98.3 °F (36.8 °C)  Heart Rate:  [] 104  Resp:  [16-18] 18  BP: ()/(52-77) 110/68        Physical Exam:  Constitutional: No acute distress, awake, alert  HENT: NCAT, mucous membranes moist  Respiratory: Clear to auscultation bilaterally, respiratory effort normal   Cardiovascular: RRR, no murmurs, rubs, or gallops, palpable pedal pulses bilaterally  Gastrointestinal: Positive bowel sounds, soft, distention improved   Musculoskeletal: +1-2 bilateral ankle edema; bruising and tenderness right neck/chest improved   Psychiatric: Appropriate affect, cooperative  Neurologic: Oriented x 3, strength symmetric in all extremities, Cranial Nerves grossly intact to confrontation, speech clear  Skin: No rashes    Results Reviewed:  Results from last 7 days   Lab Units 20   WBC 10*3/mm3 6.11 5.56  --  4.18   HEMOGLOBIN g/dL 8.1* 8.1*  --  8.3*   HEMATOCRIT % 25.0* 25.7*  --  25.4*   PLATELETS 10*3/mm3 37* 34*  --  44*   INR   --  2.38* 2.30* 2.16*   PROCALCITONIN ng/mL 1.20*  --   --   --      Results from last 7 days   Lab Units 20  0407   SODIUM mmol/L 138 138 136 138   POTASSIUM mmol/L 3.7 4.0 3.9 3.9   CHLORIDE mmol/L 103 104 101 102   CO2 mmol/L 22.0 23.0 23.0 25.0   BUN mg/dL 28* 24* 28* 21*   CREATININE  mg/dL 5.50* 4.69* 4.79* 4.00*   GLUCOSE mg/dL 117* 139* 132* 125*   CALCIUM mg/dL 8.5* 8.6 8.4* 8.6   ALT (SGPT) U/L 36 37  --  46*   AST (SGOT) U/L 72* 70*  --  98*     Estimated Creatinine Clearance: 20.6 mL/min (A) (by C-G formula based on SCr of 5.5 mg/dL (H)).    Microbiology Results Abnormal     Procedure Component Value - Date/Time    Blood Culture - Blood, Arm, Left [[id number]]  (Abnormal) Collected:  07/31/20 0832    Lab Status:  Preliminary result Specimen:  Blood from Arm, Left Updated:  08/02/20 1259     Blood Culture Citrobacter koseri     Gram Stain Anaerobic Bottle Gram negative bacilli      Aerobic Bottle Gram negative bacilli    Body Fluid Culture - Body Fluid, Peritoneum [[id number]] Collected:  07/31/20 1422    Lab Status:  Preliminary result Specimen:  Body Fluid from Peritoneum Updated:  08/02/20 0851     Body Fluid Culture No growth at 2 days     Gram Stain No organisms seen      Occasional WBCs per low power field    Blood Culture - Blood, Blood, PICC Line [[id number]]  (Abnormal) Collected:  07/31/20 0830    Lab Status:  Preliminary result Specimen:  Blood, PICC Line Updated:  08/02/20 0601     Blood Culture Gram Negative Bacilli     Gram Stain Anaerobic Bottle Gram negative bacilli      Aerobic Bottle Gram negative bacilli    Blood Culture ID, PCR - Blood, Blood, PICC Line [[id number]]  (Abnormal) Collected:  07/31/20 0830    Lab Status:  Final result Specimen:  Blood, PICC Line Updated:  08/01/20 0022     BCID, PCR Enterobacteriaceae Group. Identification by BCID PCR.    Body Fluid Culture - Body Fluid, Peritoneum [[id number]] Collected:  07/15/20 1038    Lab Status:  Final result Specimen:  Body Fluid from Peritoneum Updated:  07/18/20 0710     Body Fluid Culture No growth at 3 days     Gram Stain Few (2+) WBCs seen      No organisms seen    Blood Culture - Blood, Arm, Right [[id number]] Collected:  07/08/20 1748    Lab Status:  Final result Specimen:  Blood from Arm, Right Updated:  07/13/20  1800     Blood Culture No growth at 5 days    Blood Culture - Blood, Hand, Left [[id number]] Collected:  07/08/20 1744    Lab Status:  Final result Specimen:  Blood from Hand, Left Updated:  07/13/20 1800     Blood Culture No growth at 5 days    Body Fluid Culture - Body Fluid, Peritoneum [[id number]] Collected:  07/10/20 0916    Lab Status:  Final result Specimen:  Body Fluid from Peritoneum Updated:  07/13/20 0700     Body Fluid Culture No growth at 3 days     Gram Stain Moderate (3+) WBCs seen      No organisms seen    COVID PRE-OP / PRE-PROCEDURE SCREENING ORDER (NO ISOLATION) - Swab, Nasopharynx [[id number]] Collected:  07/08/20 2009    Lab Status:  Final result Specimen:  Swab from Nasopharynx Updated:  07/08/20 2119    Narrative:       The following orders were created for panel order COVID PRE-OP / PRE-PROCEDURE SCREENING ORDER (NO ISOLATION) - Swab, Nasopharynx.  Procedure                               Abnormality         Status                     ---------                               -----------         ------                     COVID-19,CEPHEID,CLAUDIA IN-...[[id number]]  Normal              Final result                 Please view results for these tests on the individual orders.    COVID-19,CEPHEID,CLAUDIA IN-HOUSE(OR EMERGENT/ADD-ON),NP SWAB IN TRANSPORT MEDIA 3-4 HR TAT - Swab, Nasopharynx [[id number]]  (Normal) Collected:  07/08/20 2009    Lab Status:  Final result Specimen:  Swab from Nasopharynx Updated:  07/08/20 2119     COVID19 Not Detected    Narrative:       Fact sheet for providers: https://www.fda.gov/media/535028/download     Fact sheet for patients: https://www.fda.gov/media/209195/download          Imaging Results (Last 24 Hours)     Procedure Component Value Units Date/Time    US Abdomen Limited [[id number]] Collected:  08/02/20 1010     Updated:  08/02/20 1011    Narrative:       EXAMINATION: US ABDOMEN LIMITED-     INDICATION: Right upper quadrant to rule out biliary tract disease;  I95.89-Other  hypotension; E86.1-Hypovolemia; K92.0-Hematemesis;  D62-Acute posthemorrhagic anemia; K62.5-Hemorrhage of anus and rectum;  Z74.09-Other reduced mobility; Z78.9-Other specified health status;  Z74.09-Other reduced mobility upper abdominal pain     TECHNIQUE: Sonographic imaging is obtained of the right upper quadrant  of both the sagittal and transverse planes.     COMPARISON: NONE     FINDINGS: Pancreas is not visualized.. There is free fluid identified  throughout the upper abdomen. The liver is increased in echogenicity  with some lobulation identified of the contour suggesting cirrhosis. The  gallbladder fossa is unremarkable. The common bile duct measures 8 mm.  The right kidney is normal in size, configuration, texture measuring in  length from pole to pole 11.1 cm. No solid cortical mass or renal  cortical cysts seen within the right kidney. No hydronephrosis or  nephrolithiasis.          Impression:       Cirrhotic appearance of the liver with no underlying mass.  Free fluid identified throughout the upper abdomen. The pancreas is not  seen. The remainder the upper abdomen is unremarkable.                 Results for orders placed during the hospital encounter of 07/07/20   Adult Transthoracic Echo Complete W/ Cont if Necessary Per Protocol    Narrative · Estimated EF = 70%.  · The cardiac valves are anatomically and functionally normal.          I have reviewed the medications:  Scheduled Meds:  aspirin 81 mg Oral Daily   bumetanide 4 mg Oral Daily   hydrocortisone  Topical Q12H   lactulose 10 g Oral TID   magic mouthwash 5 mL Swish & Spit 4x Daily   meropenem 500 mg Intravenous Q24H   metoclopramide 5 mg Oral TID AC   midodrine 10 mg Oral 3 times per day on Sun Tue Thu Sat   midodrine 20 mg Oral 3 times per day on Mon Wed Fri   mirtazapine 15 mg Oral Nightly   Sally 2 patch Topical Once   pantoprazole 40 mg Oral BID AC   sertraline 25 mg Oral Daily   sodium chloride 10 mL Intravenous Q12H   vitamin K1 5  mg Oral Once     Continuous Infusions:   PRN Meds:.•  acetaminophen  •  albumin human  •  albuterol  •  diphenhydrAMINE  •  heparin (porcine)  •  Morphine  •  ondansetron  •  oxyCODONE  •  phenylephrine-mineral oil-petrolatum  •  sodium chloride    Assessment/Plan   Assessment & Plan     Active Hospital Problems    Diagnosis  POA   • **Hypotension [I95.9]  Yes   • Liver cirrhosis w/ ascites  [K74.60, R18.8]  Yes   • Intractable nausea and vomiting [R11.2]  Yes   • Dehydration [E86.0]  Yes   • ARF likely 2* ATN  [N17.9]  Yes   • Chemotherapy-induced thrombocytopenia [D69.59, T45.1X5A]  Yes   • GIB w/ hematemesis [K92.0]  No   • Acute blood loss anemia [D62]  Yes   • Rectal bleeding [K62.5]  Yes   • Constipation due to opioid therapy [K59.03, T40.2X5A]  Yes   • Pain, cancer [G89.3]  Yes   • 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        Brief Hospital Course to date:  Oliver Mallory is a 55 y.o. male with relevant PMH of HTN, COPD, Pancreatitis, Cirrhosis, Tonsillar CA s/p tonsillectomy undergoing chemo and RT admitted 7/7/2020 with intractable nausea and vomiting for 4 days with no food intake.  Hospital course complicated by AUDREY, GIB, and hypotension.  He was admitted to ICU and did require pressors for some time. Ultimately stabilized after IV fluids, improvement in GIB and addition of midodrine. Transferred to floor 7/17. Renal function is continually worsening w/ NAL following. Patient lost iv access on 7/23/20, unable to obtain another IV (despite multiple attempts w/ IV, also attempted EJ access without success. Due to progressive kidney failure w/ volume overload, tunneled dialysis cath & central line was placed by Dr. Gore of interventional radiology on 7/24/20 and dialysis was initiated. Had some post-procedural right neck/supraclavicular oozing/hematoma s/p platelets and FFP.      -Progressive AUDREY w/ volume overload, now s/p  dialysis catheter placement receiving Hemodialysis  -Tunneled dialysis cath placed by Dr. Gore on 7/24/20   - echo 7/7/20: normal EF, normal valves  - nephrology following   - Increase to midodrine 20 mg TID on dialysis days   - Difficulty removing volume with cirrhosis   ** Plan to remove tunneled like and place temporary access 8/3 with Dr. Gore      Fever   Enterobacteriaceae bacteremia   - BCx + 7/31   - Started merrem (8/1)   - ID consulted  - Abd US with no acute findings  ** Plan for tunneled line to be removed and temporary access placed 8/3 @ 8:30 AM -- discussed personally with radiology today      -right neck/supraclavicular hematoma around catheter site  - Coagulopathy   - s/p platelets and FFP and Vit K  - R IJ removed 8/1  ** Vit K again 8/2, FFP and platelets prior to IR procedure for line removal and temporary access placement      -s/p gi bleed (due to esophagitis and portal colopathy related to cirrhosis)  -7/9/20: EGD: mild esophagitis, grade 1 esophageal varices, lower 1/3 esophaguts, portal htn gastropathy noted  - 7/10/20: C-scope: rectal oozing consistent w/ portal colopathy, sigmoid diverticulosis noted  - Continue ppi bid; follow up [facility] 4 weeks; repeat egd 1 year for varix surveillance     -EDUARDO cirrhosis (w/ esophageal varices, portal htn gastopathy, and portal colopathy  - continue lactulose, diuretics, midodrine   - repeat para 7/31      -Hx stage II HPV related oropharyngeal cancer   -formerly received chemotherapy & radiotherapy; not candidate currently for any therapies  -regarding tonsillar cancer, per oncology patient is not candidate for further therapies for tonsillar cancer (given his reina)  -  thrombocytopenia due to splenomegaly from cirrhosis rather than malignancy; f/u Dr. Campbell as outpatient     -Cytopenias (thrombocytopenia & anemia)   -felt due to cirrhosis/hypersplenism per heme-on     DVT Prophylaxis:  Mechanical     Disposition: I expect the patient to be  discharged TBD; pending antibiotic plan and if kidneys recover vs ESRD    CODE STATUS:   Code Status and Medical Interventions:   Ordered at: 07/07/20 1721     Level Of Support Discussed With:    Patient     Code Status:    CPR     Medical Interventions (Level of Support Prior to Arrest):    Full         Electronically signed by Ksenia Chaidez DO, 08/02/20, 13:03.

## 2020-08-03 NOTE — PROGRESS NOTES
Clinical Nutrition   Reason For Visit: Follow-up protocol  Patient Name: Oliver Mallory  YOB: 1965  MRN: [id number]  Date of Encounter: 08/03/20 14:40  Admission date: 7/7/2020      -Discontinued Boost Breeze - patient dislikes.  -Discontinued consistent carbohydrate diet modification (no hx of DM, not on steroid medication currently).  -Changed daily bed time snack food preferences.  -Communicated dinner meal order to kitchen.      Nutrition Assessment     Admission Problem List:  Intractable N/V, improved  Constipation  AUDREY  Hypotension  Cirrhosis with ascites  Rectal bleeding  S/p EGD (7/9)- mild portal gastropathy, LA grade A reflux esophagitis, and 3 columns small Grade I esophageal varices.  S/p paracentesis (7/10)- 4 liters removed  S/p colonoscopy (7/10)- a few sigmoid diverticula. There are petechiae in the rectum with scant oozing. In absence of a history of radiation to this region, suspect portal colopathy.  Postprandial nausea  S/p paracentesis (7/15) - 5700 ml removed  S/p placement of hemodialysis catheter and central line, initiation of HD (7/24)  S/p paracentesis (7/31) - 7.7 L removed  (8/3) s/p removal of tunneled dialysis catheter and placement of triple lumen      Applicable PMH:  HTN  COPD  Pancreatitis  Tonsillar cancer s/p tonsillectomy (4/9/20), chemotherapy, and radiation (last radiation treatment 6/24/20)  S/p CCY  S/p hernia repair  S/p coccyx cyst removal      Reported/Observed/Food/Nutrition Related History   Patient sitting up on side of bed, missed breakfast 2/2 being NPO for procedure and didn't eat much of lunch r/t food preferences. Patient states he has continued to not eat well since previous visit from this RD last week and states it is related to food preferences. Dislikes many of the food options. Also dislikes and has not been drinking any of the Boost Breeze. Had a bedtime snack once last week but has not had one since then. Would like to change bed time snack to  cheese/crackers and chocolate milk. Provided RD with dinner meal order as well. Dislikes fat-free and sugar-free items. States he would like some salt to add to food but knows that he is supposed to be avoiding salt per health care team's recommendations.    Anthropometrics   This adm:  Height: 73 in  Weight: 273 lbs (bed scale weight 8/3 per nsg doc)  BMI: 37.0  BMI classification: Obese Class II: 35-39.9kg/m2   IBW: 184 lbs    Per RD note (7/8):  UBW: 390 lbs (a little over 1 year ago per patient); more recently 324 lbs (4/16/20 MDOV per EMR)  Weight change: Combination of intentional and unintentional weight loss of 110 lbs (28.2%) within a little more than 1 year (based on current weight and stated UBW) - RD unable to verify. Can confirm that patient has lost 44 lbs (13.6%) within the past 3 months, but unsure if 100% of this amount is unintentional (or partially intentional).    Labs reviewed   Labs reviewed: Yes    Medications reviewed   Medications reviewed: Yes  Pertinent: bumex, lactulose, reglan, remeron, protonix,    Current Nutrition Prescription   PO: Diet Regular; Cardiac, Consistent Carbohydrate, Renal, Daily Fluid Restriction; Other; 1,200   Oral supplement: Boost Breeze 2x daily  Snack: daily bedtime snack    Intake: 50% / 5 meals    Nutrition Diagnosis     7/8, updated (7/13, 7/26, 7/29, 8/3)  Problem Inadequate oral intake   Etiology Food preferences   Signs/Symptoms PO intake: 50% / 5 meals   Status: ongoing    Intervention   Intervention: Follow treatment progress, Care plan reviewed, Advised available snacks, Interview for preferences, Menu adjusted, Encourage intake, Supplement offered/refused     -Discontinued Boost Breeze - patient dislikes.  -Discontinued consistent carbohydrate diet modification (no hx of DM, not on steroid medication currently).  -Changed daily bed time snack food preferences.  -Communicated dinner meal order to kitchen.    Goal:   General: Nutrition support  treatment  PO: Increase intake    Monitoring/Evaluation:   Monitoring/Evaluation: Per protocol, I&O, PO intake, Supplement intake, Pertinent labs, Weight, GI status, Symptoms     Will continue to follow per protocol    Melba Leonard RD  Time Spent: 30 minutes

## 2020-08-03 NOTE — NURSING NOTE
Tunneled dialysis catheter removed by Dr Gore per order.  12F Juankar triple lumen placed to same (right) side.  Pt tolerated well.

## 2020-08-03 NOTE — PROGRESS NOTES
"   LOS: 27 days      Reason For Visit:  F/U AUDREY  Subjective    No acute events overnight. No new complaints.   Review of Systems:   Patient denies shortness of breath, chest pain, dysuria, hematuria, nausea, vomiting.        Objective       aspirin 81 mg Oral Daily   bumetanide 4 mg Oral Daily   hydrocortisone  Topical Q12H   lactulose 10 g Oral TID   magic mouthwash 5 mL Swish & Spit 4x Daily   meropenem 500 mg Intravenous Q24H   metoclopramide 5 mg Oral TID AC   midodrine 10 mg Oral 3 times per day on Sun Tue Thu Sat   midodrine 20 mg Oral 3 times per day on Mon Wed Fri   mirtazapine 15 mg Oral Nightly   Sally 2 patch Topical Once   pantoprazole 40 mg Oral BID AC   sertraline 25 mg Oral Daily   sodium chloride 10 mL Intravenous Q12H   vitamin K1 5 mg Oral Once            Vital Signs:  Blood pressure 103/76, pulse 110, temperature 98.3 °F (36.8 °C), temperature source Oral, resp. rate 20, height 182.9 cm (72.01\"), weight 124 kg (273 lb 1.6 oz), SpO2 99 %.    Flowsheet Rows      First Filed Value   Admission Height  185.4 cm (73\") Documented at 07/07/2020 1640   Admission Weight  127 kg (280 lb) Documented at 07/07/2020 1640          08/02 0701 - 08/03 0700  In: 222   Out: -     Physical Exam:    General Appearance:Alert, NAD  Eyes: PER, conjunctivae and sclerae normal, no icterus  Lungs: Clear on auscultation.  Chest wall ecchymosis noted from the site of the tunnel catheter.  Heart/CV: regular rhythm, bilateral lower extremity edema.  Abdomen:  Distended , soft, non-tender, no masses,  bowel sounds present  Skin: No rash, Warm and dry ecchymosis chest wall.  Neurologically grossly intact.    Radiology:        Labs:  Results from last 7 days   Lab Units 08/03/20  0459 08/02/20  0422 08/01/20  0355   WBC 10*3/mm3 5.18 6.11 5.56   HEMOGLOBIN g/dL 8.5* 8.1* 8.1*   HEMATOCRIT % 25.1* 25.0* 25.7*   PLATELETS 10*3/mm3 42* 37* 34*     Results from last 7 days   Lab Units 08/03/20  0459 08/02/20  0422 08/01/20  0355 " 07/31/20  0456 07/30/20  0407 07/29/20  0356   SODIUM mmol/L 136 138 138 136 138 141   POTASSIUM mmol/L 3.7 3.7 4.0 3.9 3.9 3.2*   CHLORIDE mmol/L 100 103 104 101 102 105   CO2 mmol/L 23.0 22.0 23.0 23.0 25.0 22.0   BUN mg/dL 44* 28* 24* 28* 21* 29*   CREATININE mg/dL 5.53* 5.50* 4.69* 4.79* 4.00* 5.08*   CALCIUM mg/dL 8.3* 8.5* 8.6 8.4* 8.6 8.9   PHOSPHORUS mg/dL  --   --   --   --   --  2.4*   MAGNESIUM mg/dL  --   --   --   --   --  1.7   ALBUMIN g/dL 2.80* 2.70* 2.80*  --  3.40* 3.50     Results from last 7 days   Lab Units 08/03/20  0459   GLUCOSE mg/dL 111*       Results from last 7 days   Lab Units 08/03/20  0459   ALK PHOS U/L 166*   BILIRUBIN mg/dL 2.4*   ALT (SGPT) U/L 36   AST (SGOT) U/L 85*                 Estimated Creatinine Clearance: 20.3 mL/min (A) (by C-G formula based on SCr of 5.53 mg/dL (H)).      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  Non oliguric AUDREY: Thought to be ATN gradual worsening of renal function in the setting of chronic liver disease w cirrhosis. No response to albumin and midodrine.  Started on HD on 7/24/20. For solute clearance and optimization of volume status  Hypokalemia: Management with HD   2.  Anemia: Transfuse PRN no LISA to be given due to cancer  3.  Dependent edema: Optimization with HD patient has high fluid intake.  4 decompensated liver disease:  5.  Intradialytic hypotension: midodrine 20 mg TID  6. Bacteremia - Enterobacteriacea       Recommendations:   Patient seen on dialysis. UF as tolerated. TDC will be removed. Antibx per ID.   Fluid restriction   Continue bumex   Transfuse at hb<7     Rickey Hays MD  08/03/20  09:43

## 2020-08-03 NOTE — PROGRESS NOTES
Palliative Care Progress Note    Date of Admission: 7/7/2020    Subjective: Patient with no complaints, stating that he feels that his pain is controlled.  Nursing staff note he continues to have bleeding issues.  Current Code Status     Date Active Code Status Order ID Comments User Context       7/7/2020 1721 CPR [id number]  Dino Debby R, FARHAN Inpatient       Questions for Current Code Status     Question Answer Comment    Code Status CPR     Medical Interventions (Level of Support Prior to Arrest) Full     Level Of Support Discussed With Patient         No current facility-administered medications on file prior to encounter.      Current Outpatient Medications on File Prior to Encounter   Medication Sig Dispense Refill   • albuterol sulfate  (90 Base) MCG/ACT inhaler Inhale 2 puffs Every 4 (Four) Hours As Needed for Wheezing or Shortness of Air. 18 g 3   • aspirin 81 MG chewable tablet Chew 81 mg Daily.     • dexamethasone (DECADRON) 4 MG tablet Take 2 tablets by mouth Daily on days 2, 3 & 4.  Take with food. 6 tablet 5   • docusate sodium (COLACE) 100 MG capsule Take 100 mg by mouth Daily.     • lisinopril-hydrochlorothiazide (PRINZIDE,ZESTORETIC) 20-25 MG per tablet Take 1 tablet by mouth Daily.     • Magic mouthwash Radonc Swish and swallow 10 mL 4 (Four) Times a Day Before Meals & at Bedtime. 480 mL 3   • metoprolol succinate XL (TOPROL-XL) 25 MG 24 hr tablet Take 25 mg by mouth Daily.     • mirtazapine (REMERON) 15 MG tablet Take 1-2 tablets by mouth every night 30 minutes before bedtime. 60 tablet 2   • Naloxegol Oxalate (MOVANTIK) 12.5 MG tablet Take 1 tablet by mouth Every Morning for 30 days. For constipation 30 tablet 0   • naloxone (NARCAN) 4 MG/0.1ML nasal spray Use 1 spray into the nostril(s) as directed by provider As Needed for opioid reversal for up to 1 dose. 2 each 0   • ondansetron (ZOFRAN) 8 MG tablet Take 1 tablet by mouth 3 (Three) Times a Day As Needed for Nausea or Vomiting. 30  "tablet 5   • pantoprazole (PROTONIX) 40 MG EC tablet Take 40 mg by mouth Daily.     • potassium chloride (K-DUR,KLOR-CON) 20 MEQ CR tablet Take 1 tablet by mouth 2 (Two) Times a Day. 60 tablet 0        •  acetaminophen  •  albumin human  •  albuterol  •  diphenhydrAMINE  •  heparin (porcine)  •  Morphine  •  ondansetron  •  oxyCODONE  •  phenylephrine-mineral oil-petrolatum  •  sodium chloride    Objective: /69 (BP Location: Left arm, Patient Position: Sitting)   Pulse 105   Temp 97.1 °F (36.2 °C) (Temporal)   Resp 18   Ht 182.9 cm (72.01\")   Wt 124 kg (273 lb 1.6 oz)   SpO2 100%   BMI 37.03 kg/m²      Intake/Output Summary (Last 24 hours) at 8/3/2020 1438  Last data filed at 8/3/2020 1323  Gross per 24 hour   Intake 1191 ml   Output 5090 ml   Net -3899 ml     Physical Exam:      General Appearance:    Alert, cooperative, in no acute distress   Head:    Normocephalic, without obvious abnormality, atraumatic   Eyes:            Lids and lashes normal, conjunctivae and sclerae normal, no   icterus, no pallor, corneas clear, PERRLA   Ears:    Ears appear intact with no abnormalities noted   Throat:   No oral lesions, no thrush, oral mucosa moist   Neck:   No adenopathy, supple, trachea midline, no thyromegaly, no   carotid bruit, no JVD   Back:     No kyphosis present, no scoliosis present, no skin lesions,      erythema or scars, no tenderness to percussion or                   palpation,   range of motion normal   Lungs:     Clear to auscultation,respirations regular, even and                  unlabored    Heart:    Regular rhythm and normal rate, normal S1 and S2, no            murmur, no gallop, no rub, no click   Chest Wall:    No abnormalities observed   Abdomen:     Normal bowel sounds, no masses, no organomegaly, soft        non-tender, non-distended, no guarding, no rebound                tenderness   Rectal:     Deferred   Extremities:   +edema   Pulses:   Pulses palpable and equal bilaterally "   Skin:   No bleeding, bruising or rash   Lymph nodes:   No palpable adenopathy   Neurologic:   Cranial nerves 2 - 12 grossly intact, sensation intact, DTR       present and equal bilaterally     Results from last 7 days   Lab Units 08/03/20  0459   WBC 10*3/mm3 5.18   HEMOGLOBIN g/dL 8.5*   HEMATOCRIT % 25.1*   PLATELETS 10*3/mm3 42*     Results from last 7 days   Lab Units 08/03/20  0459   SODIUM mmol/L 136   POTASSIUM mmol/L 3.7   CHLORIDE mmol/L 100   CO2 mmol/L 23.0   BUN mg/dL 44*   CREATININE mg/dL 5.53*   CALCIUM mg/dL 8.3*   BILIRUBIN mg/dL 2.4*   ALK PHOS U/L 166*   ALT (SGPT) U/L 36   AST (SGOT) U/L 85*   GLUCOSE mg/dL 111*       Impression:  COPD  Tonisllar Ca  Abd pain  Debility  GOC      Plan: Patient states that he wants to remain a full code.  States that he is looking at going to a rehab facility at discharge.  There is some discussion about possibility of the patient going for evaluation for transplant.  With all this in mind and the fact that the patient still has some degree of hypotension I am very hesitant about increasing the patient's pain medication and I did discuss this with him which he states that he understands.    Palliative medicine will sign off.    Josue Romero DO  08/03/20  14:38

## 2020-08-03 NOTE — POST-PROCEDURE NOTE
Interventional Radiology Operative Note    Date: 08/03/20     Time: 16:57     Pre-op Diagnosis: Enterobacteriaceae bacteremia   Post-op Diagnosis: Same    Procedure: Placement of temp HD cath. Removal of tunneled HD cath.    Surgeon: ABELARDO Gore M.D.  Assistants: None    Sedation:  Moderate sedation     Estimated Blood Loss (EBL): Trace     Urine Output (UOP): N/A (short procedure)    IVF: N/A (short procedure)    Findings: Non-purulent appearing tunneled HD catheter    Specimens: None    Complications: No immediate    Disposition: Recovery. Stable.

## 2020-08-03 NOTE — PROGRESS NOTES
Continued Stay Note  Psychiatric     Patient Name: Oliver Mallory  MRN: [id number]  Today's Date: 8/3/2020    Admit Date: 7/7/2020    Discharge Plan     Row Name 08/03/20 1546       Plan    Plan  discharge plan    Plan Comments  Discharge plan TBD/still evolving.  Pt remains on HD and will not be able to go to [facility] on HD.  Per discussion in multidisciplinary rounds, possible  transplant evaluation.  CM will cont to follow    Final Discharge Disposition Code  30 - still a patient    Row Name 08/03/20 1543       Plan    Plan  discharge plan    Plan Comments  Per discussion in multidisciplinary rounds,         Discharge Codes    No documentation.       Expected Discharge Date and Time     Expected Discharge Date Expected Discharge Time    Aug 6, 2020             Alis Pinto RN

## 2020-08-03 NOTE — PROGRESS NOTES
INFECTIOUS DISEASE Progress Note    Oliver Mallory  1965  [id number]    Date of consult: 8/1/20    Admit date: 7/7/2020    Evaluating physician: Dr. Joey Graham  Reason for Consultation: Citrobacter sepsis with bacteremia, 4 out of 4 bottles positive from 7/31/2020  Chief Complaint: Intractable nausea/vomiting and constipation, sepsis      Subjective   History of present illness:  Mr. Oliver Mallory 55 y.o.  Yr old male who is being evaluated for Enterobacter bacteremia.  He has a past medical history significant for hypertension, COPD, pancreatitis, cirrhosis, tonsillar cancer status post tonsillectomy undergoing chemo and radiation therapy.  He was initially admitted on 7/7/2020 with intractable nausea and vomiting x4 days.  He was initially admitted to the ICU and did require some pressors.  Hospital course was complicated by GI bleed and hypotension as well acute kidney injury.  Patient was stabilized and transferred to the floor on 7/17.  Due to progressive kidney failure with fluid volume overload, tunneled dialysis catheter and central line was placed by interventional radiology on 7/24/2020 and dialysis was initiated.  There was reported issues with bleeding from the central line and patient received platelets and FFP.  Patient was found to have IJ line pulled most the way out had to be discontinued on 8/1 morning. Dialysis catheter remains in place.  Patient underwent a CT-guided paracentesis on 7/31, 7/15 and 7/10.  Patient had a EGD on 7/9 that showed mild portal gastropathy, reflux esophagitis and grade 1 esophageal varices.  He received a colonoscopy on 7/10 which showed a few sigmoid diverticula.  There is also petechiae in the rectum with scant oozing.  It was felt, in the absence of history of radiation to this region, that this is suspected portal colopathy.  Patient also received an echo on 7/8/2020 that was normal.    Patient did develop a fever of 101.5 on 7/30 as well as some tachycardia up to  112.  Patient did remain without leukocytosis however due to the fever blood cultures were ordered which have turned positive in 4/4 bottles for gram-negative bacilli identified as Enterobacteriaceae by [facility] report.  Currently WBC is 5.56, H&H is 8.1/25.7 and platelets are 34.  AST is elevated at 70 and alkaline phosphatase is elevated 148 and total bilirubin is elevated 2.7.  7/31 CT of chest show clear lungs and cirrhosis with ascites.  CT of the abdomen on 8/1 showed cirrhosis with splenomegaly and ascites, surgical absence of gallbladder and diverticulosis.    Patient has no known antibiotic allergies and is currently receiving IV Zosyn.  ID has been consulted for further evaluation and treatment as well as antimicrobial therapy management on 8/1/2020.    Of note:  7/8 blood cultures were finalized no growth in 4/4 bottles  7/31 body fluid culture and stain-no organisms seen  7/15 body fluid culture and stain-no organisms seen    8/2/2020: History reviewed.  Patient sitting up on side of bed awake and alert upon arrival.  He remains with dialysis catheter in right chest.  He denies any events overnight.  He has been afebrile with some hypotension overnight but overall BP is trending up.  Without leukocytosis with a WBC of 6.11.  However lactic acid remains elevated at 3.0 as well as a CRP of 5.49.  Procalcitonin is also elevated 1.2.  Blood cultures have been identified as Citrobacter koseri by [facility] report.  Did receive an ultrasound of the  Right upper quadrant which showed free fluid identified throughout the upper abdomen, liver with increased echogenicity with some lobulation identified at the contour suggesting cirrhosis.  Gallbladder fossa was unremarkable and the common bile duct measured 8 mm.  TTE was performed on 8/1 and is currently pending interpretation.  Awaiting for catheter removal in a.m. after dialysis.    8/30/2020: Patient seen in HD today. 0 complaints overnight. Patient afebrile and  hemodynamically stable overnight.  Plan to remove Vipin cath today after dialysis.  Remains without leukocytosis.  No events to report overnight per RN.  Continues on IV meropenem for Citrobacter koseri sepsis.  Waiting for dialysis catheter removal.  No pain.  On IV meropenem until 8/17/2020.      Past Medical History:   Diagnosis Date   • Arthritis    • Cancer (CMS/HCC)    • COPD (chronic obstructive pulmonary disease) (CMS/HCC)    • Fall 05/12/2020   • Hypertension    • Pancreatitis    • Tonsillar cancer (CMS/HCC)        Past Surgical History:   Procedure Laterality Date   • ANKLE TENDON REPAIR     • CARDIAC CATHETERIZATION N/A 5/31/2018    Procedure: Left Heart Cath;  Surgeon: Ray Farley MD;  Location:  [facility];  Service: Cardiovascular   • CHOLECYSTECTOMY     • COLONOSCOPY N/A 7/10/2020    Procedure: COLONOSCOPY;  Surgeon: Brunner, Mark I, MD;  Location:  [facility];  Service: Gastroenterology;  Laterality: N/A;   • CYST REMOVAL      coccyx   • ENDOSCOPY N/A 7/9/2020    Procedure: ESOPHAGOGASTRODUODENOSCOPY;  Surgeon: Brunner, Mark I, MD;  Location:  [facility];  Service: Gastroenterology;  Laterality: N/A;   • HERNIA MESH REMOVAL     • HERNIA REPAIR     • KNEE SURGERY  1981   • TONSILLECTOMY         Pediatric History   Patient Guardian Status   • Not on file     Other Topics Concern   • Not on file   Social History Narrative    Patient ambulatory without assistive device, but has ordered       family history includes Heart disease in his father; Hypertension in his mother.    No Known Allergies    Immunization History   Administered Date(s) Administered   • Hepatitis A 07/11/2020   • Hepatitis B Vaccine Adult IM 07/11/2020       Medication:    Current Facility-Administered Medications:   •  acetaminophen (TYLENOL) tablet 650 mg, 650 mg, Oral, Q4H PRN, Abram Abraham MD, 650 mg at 07/31/20 2004  •  albumin human 25 % IV SOLN 25 g, 25 g, Intravenous, PRN, Rickey Hays  MD, 50 g at 08/03/20 1024  •  albuterol (PROVENTIL) nebulizer solution 0.083% 2.5 mg/3mL, 2.5 mg, Nebulization, Q6H PRN, Abram Abraham MD, 2.5 mg at 07/21/20 1405  •  aspirin chewable tablet 81 mg, 81 mg, Oral, Daily, Abram Abraham MD, 81 mg at 08/02/20 1000  •  bumetanide (BUMEX) tablet 4 mg, 4 mg, Oral, Daily, Escobar Miles MD, 4 mg at 08/02/20 1000  •  diphenhydrAMINE (BENADRYL) capsule 25 mg, 25 mg, Oral, Q6H PRN, Escobar Miles MD  •  heparin (porcine) 1000 UNIT/ML injection  - ADS Override Pull, , , ,   •  heparin (porcine) infusion  - ADS Override Pull, , , ,   •  heparin (porcine) injection 1,600 Units, 1,600 Units, Intracatheter, PRN, Escobar Miles MD, 1,600 Units at 07/31/20 1013  •  hydrocortisone 1 % cream, , Topical, Q12H, Rupa Hawthorne, APRN  •  lactulose (CHRONULAC) 10 GM/15ML solution 10 g, 10 g, Oral, TID, Abram Abraham MD, 10 g at 08/02/20 2027  •  lidocaine PF 1% (XYLOCAINE) 1 % injection  - ADS Override Pull, , , ,   •  magic mouthwash oral supsension 5 mL, 5 mL, Swish & Spit, 4x Daily, Loreta Hemphill II, DO, 5 mL at 08/02/20 2027  •  meropenem (MERREM) 500mg/100 mL 0.9% NS IVPB (mbp), 500 mg, Intravenous, Q24H, Raymundo Johnson, [facility], 500 mg at 08/02/20 1319  •  metoclopramide (REGLAN) tablet 5 mg, 5 mg, Oral, TID AC, Abram Abraham MD, 5 mg at 08/02/20 1805  •  midodrine (PROAMATINE) tablet 10 mg, 10 mg, Oral, 3 times per day on Sun Tue Thu Sat, Ksenia Chaidez DO, 10 mg at 08/02/20 1805  •  midodrine (PROAMATINE) tablet 20 mg, 20 mg, Oral, 3 times per day on Mon Wed Fri, Ksenia Chaidez DO, 20 mg at 07/31/20 1741  •  mirtazapine (REMERON) tablet 15 mg, 15 mg, Oral, Nightly, Abram Abraham MD, 15 mg at 08/02/20 2027  •  morphine injection 1 mg, 1 mg, Intravenous, Q4H PRN, Ksenia Chaidez DO, 1 mg at 08/02/20 1944  •  Sally patch 2 patch, 2 patch, Topical, Once, Zamzam Villa, APRN, Stopped at 07/31/20 2211  •  ondansetron (ZOFRAN) injection 4 mg, 4 mg,  Intravenous, Q6H PRN, Abram Abraham MD, 4 mg at 07/31/20 0844  •  oxyCODONE (ROXICODONE) immediate release tablet 10 mg, 10 mg, Oral, Q6H PRN, Escobar Miles MD, 10 mg at 08/03/20 0547  •  pantoprazole (PROTONIX) EC tablet 40 mg, 40 mg, Oral, BID AC, Abram Abraham MD, 40 mg at 08/02/20 1805  •  phenylephrine-mineral oil-petrolatum (PREPARATION H) 0.25-14-74.9 % hemorhoidal ointment, , Rectal, TID PRN, Abram Abraham MD  •  sertraline (ZOLOFT) tablet 25 mg, 25 mg, Oral, Daily, Abram Abraham MD, 25 mg at 08/02/20 1000  •  sodium chloride 0.9 % flush 10 mL, 10 mL, Intravenous, Q12H, Abram Abraham MD, 10 mL at 08/02/20 2157  •  sodium chloride 0.9 % flush 10 mL, 10 mL, Intravenous, PRN, Abram Abraham MD, 10 mL at 07/10/20 0759  •  vitamin K1 (PHYTONADIONE) 1 MG/1 ML oral solution 5 mg, 5 mg, Oral, Once, Sherrill Garber, DO    Please refer to the medical record for a full medication list    Review of Systems:    Constitutional--afebrile overnight  HEENT-- No new vision, hearing or throat complaints.  No epistaxis or oral sores.  Denies odynophagia or dysphagia.  No odynophagia or dysphagia. No headache, photophobia or neck stiffness.  CV-- No chest pain, palpitation or syncope  Resp-- No SOB/cough/Hemoptysis  GI- No nausea, vomiting, or diarrhea.  No hematochezia, melena, or hematemesis. Denies jaundice or chronic liver disease.  -- No dysuria, hematuria, or flank pain.  Denies hesitancy, urgency or flank pain.  Lymph- no swollen lymph nodes in neck/axilla or groin.   Heme-positive ecchymosis across chest.  MS-- no swelling or pain in the bones or joints of arms/legs.  No new back pain.  Neuro-- No acute focal weakness or numbness in the arms or legs.  No seizures.  Skin--positive for bruising, no nodules    Physical Exam:   Vital Signs   Temp:  [97 °F (36.1 °C)-98.7 °F (37.1 °C)] 97.3 °F (36.3 °C)  Heart Rate:  [] 99  Resp:  [18-20] 20  BP: ()/(61-91) 116/77    Temp  Min: 97 °F (36.1 °C)  " Max: 98.7 °F (37.1 °C)  BP  Min: 90/62  Max: 135/71  Pulse  Min: 90  Max: 115  Resp  Min: 18  Max: 20  SpO2  Min: 95 %  Max: 99 %    Blood pressure 116/77, pulse 99, temperature 97.3 °F (36.3 °C), temperature source Temporal, resp. rate 20, height 182.9 cm (72.01\"), weight 124 kg (273 lb 1.6 oz), SpO2 99 %.  GENERAL: Awake and alert, in minimal distress.  Sitting up on side of bed.    HEENT:  Normocephalic, atraumatic.  Ears externally normal, Nose externally normal.  EYES: No icterus.   LYMPHATICS:   HEART: Audible murmur noted.  RRR.  LUNGS: Clear to auscultation. No respiratory distress, no use of accessory muscles.  ABDOMEN: Soft, tender x4 quadrants, nondistended. Bowel sounds normal.  GENITAL: Without Abbasi cath.   SKIN: Warm and dry without cutaneous eruptions.  Ecchymosis noted across bilateral chest and scattered across bilateral upper extremities  PSYCHIATRIC: Mental status lucid. No confusion.  EXT:  No cellulitic change.  Normal range of motion.  NEURO: Oriented to name, CN 2 to 12 intact, nonfocal  LINES: Right chest Vipin cath in place.  Ecchymosis noted around the site.  Insertion site is not warm however it is tender to touch.  Central line insertion site at right IJ with dressing in place.  No erythema or tenderness noted at insertion site.  Central line was removed on the morning of 8/1.      Results Review:       Results from last 7 days   Lab Units 08/03/20  0459 08/02/20  0422 08/01/20  0355   WBC 10*3/mm3 5.18 6.11 5.56   HEMOGLOBIN g/dL 8.5* 8.1* 8.1*   HEMATOCRIT % 25.1* 25.0* 25.7*   PLATELETS 10*3/mm3 42* 37* 34*     Results from last 7 days   Lab Units 08/03/20  0459   SODIUM mmol/L 136   POTASSIUM mmol/L 3.7   CHLORIDE mmol/L 100   CO2 mmol/L 23.0   BUN mg/dL 44*   CREATININE mg/dL 5.53*   GLUCOSE mg/dL 111*   CALCIUM mg/dL 8.3*     Results from last 7 days   Lab Units 08/03/20  0459   ALK PHOS U/L 166*   BILIRUBIN mg/dL 2.4*   ALT (SGPT) U/L 36   AST (SGOT) U/L 85*     Results from last 7 " days   Lab Units 08/02/20  0422   SED RATE mm/hr 4     Results from last 7 days   Lab Units 08/02/20  0422   CRP mg/dL 5.49*         Results from last 7 days   Lab Units 08/03/20  0459   LACTATE mmol/L 2.3*     Estimated Creatinine Clearance: 20.3 mL/min (A) (by C-G formula based on SCr of 5.53 mg/dL (H)).    Microbiology:  Microbiology Results (last 10 days)     Procedure Component Value - Date/Time    Anaerobic Culture - Body Fluid, Peritoneum [[id number]] Collected:  07/31/20 1422    Lab Status:  Preliminary result Specimen:  Body Fluid from Peritoneum Updated:  08/03/20 1014     Anaerobic Culture No anaerobes isolated at 3 days    Body Fluid Culture - Body Fluid, Peritoneum [[id number]] Collected:  07/31/20 1422    Lab Status:  Final result Specimen:  Body Fluid from Peritoneum Updated:  08/03/20 0751     Body Fluid Culture No growth at 3 days     Gram Stain No organisms seen      Occasional WBCs per low power field    Blood Culture - Blood, Arm, Left [[id number]]  (Abnormal) Collected:  07/31/20 0832    Lab Status:  Final result Specimen:  Blood from Arm, Left Updated:  08/03/20 0649     Blood Culture Citrobacter koseri     Comment: Refer to previous blood culture collected on 7/31/2020 0830 for MICs.          Isolated from Aerobic and Anaerobic Bottles     Gram Stain Anaerobic Bottle Gram negative bacilli      Aerobic Bottle Gram negative bacilli    Blood Culture - Blood, Blood, PICC Line [[id number]]  (Abnormal)  (Susceptibility) Collected:  07/31/20 0830    Lab Status:  Final result Specimen:  Blood, PICC Line Updated:  08/03/20 0649     Blood Culture Citrobacter koseri     Isolated from Aerobic and Anaerobic Bottles     Gram Stain Anaerobic Bottle Gram negative bacilli      Aerobic Bottle Gram negative bacilli    Susceptibility      Citrobacter koseri     KALANI     Cefepime Susceptible     Ceftazidime Susceptible     Ceftriaxone Susceptible     Gentamicin Susceptible     Levofloxacin Susceptible     Piperacillin  + Tazobactam Susceptible     Trimethoprim + Sulfamethoxazole Susceptible                Susceptibility Comments     Citrobacter koseri    Cefazolin sensitivity will not be reported for Enterobacteriaceae in non-urine isolates. If cefazolin is preferred, please call the microbiology lab to request an E-test.  With the exception of urinary-sourced infections, aminoglycosides should not be used as monotherapy.             Blood Culture ID, PCR - Blood, Blood, PICC Line [[id number]]  (Abnormal) Collected:  07/31/20 0830    Lab Status:  Final result Specimen:  Blood, PICC Line Updated:  08/01/20 0022     BCID, PCR Enterobacteriaceae Group. Identification by BCID PCR.            Radiology:  Imaging Results (Last 72 Hours)     Procedure Component Value Units Date/Time    US Abdomen Limited [[id number]] Collected:  08/02/20 1010     Updated:  08/02/20 1510    Narrative:       EXAMINATION: US ABDOMEN LIMITED - 08/02/2020     INDICATION:  I95.89-Other hypotension; E86.1-Hypovolemia;  K92.0-Hematemesis; D62-Acute posthemorrhagic anemia; K62.5-Hemorrhage of  anus and rectum; Z74.09-Other reduced mobility; Z78.9-Other specified  health status; Z74.09-Other reduced mobility. Upper abdominal pain.     TECHNIQUE: Sonographic imaging is obtained of the right upper quadrant  in both the sagittal and transverse planes.     COMPARISON: NONE     FINDINGS: Pancreas is not visualized.. There is free fluid identified  throughout the upper abdomen. The liver is increased in echogenicity  with some lobulation identified of the contour suggesting cirrhosis. The  gallbladder fossa is unremarkable. The common bile duct measures 8 mm.  The right kidney is normal in size, configuration, and texture measuring  in length from pole to pole 11.1 cm. No solid cortical mass or renal  cortical cysts seen within the right kidney. No hydronephrosis or  nephrolithiasis.       Impression:       Cirrhotic appearance of the liver with no underlying mass.  Free  fluid identified throughout the upper abdomen. The pancreas is not  seen. The remainder the upper abdomen is unremarkable.      DICTATED:   08/02/2020  EDITED/ls :   08/02/2020        CT Abdomen Pelvis Without Contrast [[id number]] Collected:  08/01/20 0002     Updated:  08/01/20 0005    Narrative:       CT Abdomen Pelvis WO    INDICATION:   Bacteremia and hypotension. Status post paracentesis today.    TECHNIQUE:   CT of the abdomen and pelvis without IV contrast. Coronal and sagittal reconstructions were obtained.  Radiation dose reduction techniques included automated exposure control or exposure modulation based on body size. Count of known CT and cardiac nuc  med studies performed in previous 12 months: 6.     COMPARISON:   7/7/2020    FINDINGS:  Abdomen: Normal caliber aorta. The spleen is enlarged. The liver is cirrhotic and there is a small volume of ascites in the abdomen and pelvis. Negative adrenal glands. Atrophic pancreas and surgical absence of the gallbladder. No focal hepatic mass  although study sensitivity is limited. Nonobstructed kidneys. No adenopathy.    Pelvis: Negative bladder and prostate gland. Diverticulosis. Appendix not clearly demonstrated but no distinct evidence of acute appendicitis. Fat-containing left inguinal hernia. No suspicious bone lesion. Chronic antegrade listhesis of L4 on L5 grade  1/2.      Impression:         1. Cirrhosis with splenomegaly and ascites.  2. Surgical absence of the gallbladder.  3. Diverticulosis.          Signer Name: Clark Smyth MD   Signed: 8/1/2020 12:02 AM   Workstation Name: [id number]    [facility]    CT Chest Without Contrast [[id number]] Collected:  07/31/20 2359     Updated:  08/01/20 0001    Narrative:       CT Chest WO    INDICATION:   Bacteremia. Hypertension. Status post paracentesis today.    TECHNIQUE:   CT of the thorax without IV contrast. Coronal and sagittal reconstructions were obtained.  Radiation dose  reduction techniques included automated exposure control or exposure modulation based on body size. Count of known CT and cardiac nuc med studies  performed in previous 12 months: 6.     COMPARISON:   7/7/2020    FINDINGS:  Lungs are essentially clear. Old healed granulomatous disease no pleural effusion. No pericardial effusion. There is an enlarged anterior pericardial lymph node measuring up to 15 mm. Included upper abdomen demonstrates cirrhosis and ascites with  probable splenomegaly. The abdomen CT has been dictated separately. No suspicious bone lesion.      Impression:         1. The lungs are clear. Mildly enlarged anterior pericardial lymph node, indeterminate.  2. Cirrhosis with ascites.    Signer Name: Clark Smyth MD   Signed: 7/31/2020 11:59 PM   Workstation Name: [employer]    [facility]    CT Guided Paracentesis [[id number]] Collected:  07/31/20 2149     Updated:  07/31/20 2154    Narrative:       PROCEDURE: CT-guided paracentesis     Procedural Personnel  Attending physician(s): ABELARDO Gore M.D.  Fellow physician(s): None  Resident physician(s): None  Advanced practice provider(s): None     Pre-procedure diagnosis: Liver cirrhosis; recurrent large volume  symptomatic ascites?  Post-procedure diagnosis: Same  Indication: Diagnostic/therapeutic  Additional clinical history: None     Complications: No immediate complications.       Impression:          CT-guided [place] [address] mL of serous fluid.  Portion sent for requested laboratory analysis     Plan:      Resume care by clinical team.  _______________________________________________________________     PROCEDURE SUMMARY:  - Limited CT  - CT-guided paracentesis  - Additional procedure(s): None     PROCEDURE DETAILS:     Pre-procedure  Consent: Informed consent for the procedure including risks, benefits  and alternatives was obtained and time-out was performed prior to the  procedure.  Preparation: The  site was prepared and draped using maximal sterile  barrier technique including cutaneous antisepsis.     Anesthesia/sedation  Level of anesthesia/sedation: No sedation     Initial abdominal CT  Initial abdominal CT was performed.  Findings: Large volume ascites. A safe window for paracentesis was  identified.     Paracentesis  Local anesthesia was administered. The peritoneal cavity was accessed  and needle position confirmed by CT. Ascites was drained. The catheter  was then removed, and a sterile bandage was applied.  Paracentesis access technique: Trocar  Catheter placed: 8.5 Slovak  Post-drainage CT: Near resolution of ascites.     Radiation Dose  CT dose length product (mGy-cm): 865      Additional Details  Additional description of procedure: None  Equipment details: None  Specimens removed: Abdominal fluid  Estimated blood loss (mL): Less than 10  Standardized report: Paracentesis     Attestation  I was present and scrubbed for the entire procedure. Imaging reviewed.  Agree with final report as written.     This report was finalized on 7/31/2020 9:51 PM by Rojas Gore.       XR Chest 1 View [[id number]] Collected:  07/31/20 2035     Updated:  07/31/20 2037    Narrative:       CR Chest 1 Vw    INDICATION:   IJ placement     COMPARISON:    Chest x-ray 5/13/2020.    FINDINGS:  Single portable AP view(s) of the chest.    There are 2 central venous catheters from a right IJ approach. Small caliber central venous catheter terminates distal SVC. The large caliber central venous catheter terminates distal SVC right atrial junction level. There is no pneumothorax. Both are  new.    Cardiac silhouette is normal in size. There is borderline central vascular congestion.. Please correlate for evidence for mild volume overload. There is no pleural effusion. There is no acute infiltrate.       Impression:         1. No pneumothorax.  2. Right IJ approach central venous catheter terminates distal SVC level. Second right  IJ approach large caliber introducer type catheter terminates distal SVC/right atrial junction level.  3. Suspect borderline vascular congestion    Signer Name: Caitlin Talavera MD   Signed: 7/31/2020 8:35 PM   Workstation Name: [email or web address]    [facility]          IMPRESSION:     1. Sepsis with Enterobacteriaceae group bacteremia identified as Citrobacter by bio fire report-blood cultures positive in 4/4 bottles 7/31/2020.  Etiology is unclear at this time but could likely be a dialysis/line infection. Prior echo negative on 7/8.  May possibly be an occult intra-abdominal process, or elsewhere but not obvious on CT scan of the chest abdomen and pelvis from 7/31/2020.  However due to the high-grade bacteremia and intravascular infection is suspected/likely.  8/1 TTE has been performed and is currently pending Interpretation.  8/1 right upper quadrant ultrasound has been performed with no evidence of biliary duct/gallstones disease.  2. Acute blood loss anemia secondary to GI bleed with hematemesis and rectal bleeding.  Continues.  3. Liver cirrhosis with ascites status post multiple CT-guided paracentesis.  4. 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  5. Acute kidney injury likely secondary to ATN however could also be hepatorenal syndrome.  6. Intractable nausea and vomiting.  Improved.  7. Thrombocytopenia- could be secondary to chemotherapy or hepatic disease.  8. Elevated bilirubin- 2.4 likely secondary to hepatic disease, negative ultrasound.  Elevated AST- 72, worse.  9. Elevated alkaline phosphatase 166, worse.  10. Hypocalcemia, 8.3, worse.      Plan:    1. Diagnostically continue to follow patient's physical exam, follow labs include CBC, CMP, CRP.  I will continue to follow blood culture sensitivity reports and adjust antibiotics accordingly.  May consider a GABRIELA.  2. Therapeutically, previously discontinued on 8/1 IV Zosyn and started Merrem pharm to dose.  He will likely need to  have the dialysis catheter removed and a new one placed at a later date.  This will be difficult due to patient's anemia and thrombocytopenia.  Duration of therapy will likely be 2-4 weeks from removal of dialysis catheter (likely 8/17/2020).  Would recommend removing line as soon as possible and placing temporary dialysis catheter, while we clear his sepsis.  Dialysis catheter removal planned on 8/3 after dialysis.  3. Continue supportive care.    Our group would be pleased to follow this patient over the course of their hospitalization and assist with outpatient antimicrobial therapy, as indicated.  Further recommendations depend on the results of the cultures and clinical course.  Side effects of medications were discussed.  Patient is at increased risk for adverse drug reactions, recurrent infection, readmission.    Joey Graham MD  8/3/2020

## 2020-08-03 NOTE — PLAN OF CARE
Problem: Patient Care Overview  Goal: Interprofessional Rounds/Family Conf  Flowsheets (Taken 8/3/2020 1300)  Participants: advanced practice nurse; social work/services; physician  Note:   Palliative Care Team Meeting 1300: Dr. Josue Romero, Irasema Cunningham, APRN, Kaylah Gallegos, RN,CHPN, Alyssa Guevara, LCSW, Janet Alegria, RN/CM, Hospice and Maria L Reilly RN, CHPN.Patient sitting up on side of bed. He is complaining of pain and feels the nurses are not giving him all his pain medication. He did ask to speak to Dr. Romero. Dr. Spencers partner. Dr. Romero notified and he spoke with patient. He told patient he will not increase his pain medication. Patient remains a full code and goals are aggressive. Dr. Romero states Palliative will sign off at 1438.

## 2020-08-03 NOTE — PLAN OF CARE
Problem: Patient Care Overview  Goal: Plan of Care Review  Outcome: Ongoing (interventions implemented as appropriate)  Flowsheets  Taken 8/3/2020 0331  Progress: no change  Outcome Summary: Pt in bed resting eyes closed. No s/sx of distress. VSS. Continues on room air. C/o pain x2 thus far PRN's given. Was effective. Will continue to monitor.  Taken 8/2/2020 2000  Plan of Care Reviewed With: patient

## 2020-08-03 NOTE — PROGRESS NOTES
[facility] Medicine Services  PROGRESS NOTE    Patient Name: Oliver Mallory  : 1965  MRN: [id number]    Date of Admission: 2020  Primary Care Physician: Raymundo Salgado MD    Subjective   Subjective     CC:  Cirrhosis, AUDREY, bacteremia    HPI:  Seen on dialysis, discussed possibility of referral to  for transplant once bacteremia is improved    Review of Systems  Gen- No fevers, chills  CV- No chest pain, palpitations  Resp- No cough, dyspnea  GI- No N/V/D, abd pain      Objective   Objective     Vital Signs:   Temp:  [97 °F (36.1 °C)-98.7 °F (37.1 °C)] 97.1 °F (36.2 °C)  Heart Rate:  [] 105  Resp:  [18-20] 18  BP: ()/(61-91) 118/69        Physical Exam:  Constitutional: No acute distress, awake, alert  HENT: NCAT, mucous membranes moist  Respiratory: Clear to auscultation bilaterally, respiratory effort normal   Cardiovascular: RRR, no murmurs  Gastrointestinal: Positive bowel sounds, soft, nontender, nondistended  Musculoskeletal: 2+ pitting edema, tunneled dialysis catheter with ecchymosis  Psychiatric: Appropriate affect, cooperative  Neurologic: Oriented x 3,  speech clear  Skin: No rashes    Results Reviewed:  Results from last 7 days   Lab Units 20   WBC 10*3/mm3 5.18 6.11 5.56  --    HEMOGLOBIN g/dL 8.5* 8.1* 8.1*  --    HEMATOCRIT % 25.1* 25.0* 25.7*  --    PLATELETS 10*3/mm3 42* 37* 34*  --    INR  1.94*  --  2.38* 2.30*   PROCALCITONIN ng/mL  --  1.20*  --   --      Results from last 7 days   Lab Units 20   SODIUM mmol/L 136 138 138   POTASSIUM mmol/L 3.7 3.7 4.0   CHLORIDE mmol/L 100 103 104   CO2 mmol/L 23.0 22.0 23.0   BUN mg/dL 44* 28* 24*   CREATININE mg/dL 5.53* 5.50* 4.69*   GLUCOSE mg/dL 111* 117* 139*   CALCIUM mg/dL 8.3* 8.5* 8.6   ALT (SGPT) U/L 36 36 37   AST (SGOT) U/L 85* 72* 70*     Estimated Creatinine Clearance: 20.3 mL/min (A) (by C-G  formula based on SCr of 5.53 mg/dL (H)).    Microbiology Results Abnormal     Procedure Component Value - Date/Time    Anaerobic Culture - Body Fluid, Peritoneum [[id number]] Collected:  07/31/20 1422    Lab Status:  Preliminary result Specimen:  Body Fluid from Peritoneum Updated:  08/03/20 1014     Anaerobic Culture No anaerobes isolated at 3 days    Body Fluid Culture - Body Fluid, Peritoneum [[id number]] Collected:  07/31/20 1422    Lab Status:  Final result Specimen:  Body Fluid from Peritoneum Updated:  08/03/20 0751     Body Fluid Culture No growth at 3 days     Gram Stain No organisms seen      Occasional WBCs per low power field    Blood Culture - Blood, Arm, Left [[id number]]  (Abnormal) Collected:  07/31/20 0832    Lab Status:  Final result Specimen:  Blood from Arm, Left Updated:  08/03/20 0649     Blood Culture Citrobacter koseri     Comment: Refer to previous blood culture collected on 7/31/2020 0830 for MICs.          Isolated from Aerobic and Anaerobic Bottles     Gram Stain Anaerobic Bottle Gram negative bacilli      Aerobic Bottle Gram negative bacilli    Blood Culture - Blood, Blood, PICC Line [[id number]]  (Abnormal)  (Susceptibility) Collected:  07/31/20 0830    Lab Status:  Final result Specimen:  Blood, PICC Line Updated:  08/03/20 0649     Blood Culture Citrobacter koseri     Isolated from Aerobic and Anaerobic Bottles     Gram Stain Anaerobic Bottle Gram negative bacilli      Aerobic Bottle Gram negative bacilli    Susceptibility      Citrobacter koseri     KALANI     Cefepime Susceptible     Ceftazidime Susceptible     Ceftriaxone Susceptible     Gentamicin Susceptible     Levofloxacin Susceptible     Piperacillin + Tazobactam Susceptible     Trimethoprim + Sulfamethoxazole Susceptible                Susceptibility Comments     Citrobacter koseri    Cefazolin sensitivity will not be reported for Enterobacteriaceae in non-urine isolates. If cefazolin is preferred, please call the microbiology lab  to request an E-test.  With the exception of urinary-sourced infections, aminoglycosides should not be used as monotherapy.             Blood Culture ID, PCR - Blood, Blood, PICC Line [[id number]]  (Abnormal) Collected:  07/31/20 0830    Lab Status:  Final result Specimen:  Blood, PICC Line Updated:  08/01/20 0022     BCID, PCR Enterobacteriaceae Group. Identification by BCID PCR.    Body Fluid Culture - Body Fluid, Peritoneum [[id number]] Collected:  07/15/20 1038    Lab Status:  Final result Specimen:  Body Fluid from Peritoneum Updated:  07/18/20 0710     Body Fluid Culture No growth at 3 days     Gram Stain Few (2+) WBCs seen      No organisms seen    Blood Culture - Blood, Arm, Right [[id number]] Collected:  07/08/20 1748    Lab Status:  Final result Specimen:  Blood from Arm, Right Updated:  07/13/20 1800     Blood Culture No growth at 5 days    Blood Culture - Blood, Hand, Left [[id number]] Collected:  07/08/20 1744    Lab Status:  Final result Specimen:  Blood from Hand, Left Updated:  07/13/20 1800     Blood Culture No growth at 5 days    Body Fluid Culture - Body Fluid, Peritoneum [[id number]] Collected:  07/10/20 0916    Lab Status:  Final result Specimen:  Body Fluid from Peritoneum Updated:  07/13/20 0700     Body Fluid Culture No growth at 3 days     Gram Stain Moderate (3+) WBCs seen      No organisms seen    COVID PRE-OP / PRE-PROCEDURE SCREENING ORDER (NO ISOLATION) - Swab, Nasopharynx [[id number]] Collected:  07/08/20 2009    Lab Status:  Final result Specimen:  Swab from Nasopharynx Updated:  07/08/20 2119    Narrative:       The following orders were created for panel order COVID PRE-OP / PRE-PROCEDURE SCREENING ORDER (NO ISOLATION) - Swab, Nasopharynx.  Procedure                               Abnormality         Status                     ---------                               -----------         ------                     COVID-19,CEPHEID,CLAUDIA IN-...[[id number]]  Normal              Final result                  Please view results for these tests on the individual orders.    COVID-19,CEPHEID,LCAUDIA IN-HOUSE(OR EMERGENT/ADD-ON),NP SWAB IN TRANSPORT MEDIA 3-4 HR TAT - Swab, Nasopharynx [[id number]]  (Normal) Collected:  07/08/20 2009    Lab Status:  Final result Specimen:  Swab from Nasopharynx Updated:  07/08/20 2119     COVID19 Not Detected    Narrative:       Fact sheet for providers: https://www.fda.gov/media/645312/download     Fact sheet for patients: https://www.fda.gov/media/566929/download          Imaging Results (Last 24 Hours)     Procedure Component Value Units Date/Time    IR Tunneled Catheter Removal [[id number]] Resulted:  08/03/20 1245     Updated:  08/03/20 1311    IR insert non-tunneled CVC 5+ [[id number]] Resulted:  08/03/20 1247     Updated:  08/03/20 1310    US Abdomen Limited [[id number]] Collected:  08/02/20 1010     Updated:  08/03/20 1237    Narrative:       EXAMINATION: US ABDOMEN LIMITED - 08/02/2020     INDICATION:  I95.89-Other hypotension; E86.1-Hypovolemia;  K92.0-Hematemesis; D62-Acute posthemorrhagic anemia; K62.5-Hemorrhage of  anus and rectum; Z74.09-Other reduced mobility; Z78.9-Other specified  health status; Z74.09-Other reduced mobility. Upper abdominal pain.     TECHNIQUE: Sonographic imaging is obtained of the right upper quadrant  in both the sagittal and transverse planes.     COMPARISON: NONE     FINDINGS: Pancreas is not visualized.. There is free fluid identified  throughout the upper abdomen. The liver is increased in echogenicity  with some lobulation identified of the contour suggesting cirrhosis. The  gallbladder fossa is unremarkable. The common bile duct measures 8 mm.  The right kidney is normal in size, configuration, and texture measuring  in length from pole to pole 11.1 cm. No solid cortical mass or renal  cortical cysts seen within the right kidney. No hydronephrosis or  nephrolithiasis.       Impression:       Cirrhotic appearance of the liver with no underlying  mass.  Free fluid identified throughout the upper abdomen. The pancreas is not  seen. The remainder the upper abdomen is unremarkable.      DICTATED:   08/02/2020  EDITED/ls :   08/02/2020      This report was finalized on 8/3/2020 12:34 PM by Dr. Janeth Atkins MD.             Results for orders placed during the hospital encounter of 07/07/20   Adult Transthoracic Echo Complete W/ Cont if Necessary Per Protocol    Narrative · Estimated EF appears to be in the range of 66 - 70%.  · There is a small mobile strand on the posterior leaflet of the mitral   valve suggestive of a vegetation.  · Mild mitral annular calcification. Mild mitral regurgitation present.          I have reviewed the medications:  Scheduled Meds:  aspirin 81 mg Oral Daily   bumetanide 4 mg Oral Daily   heparin (porcine)      hydrocortisone  Topical Q12H   lactulose 10 g Oral TID   magic mouthwash 5 mL Swish & Spit 4x Daily   meropenem 500 mg Intravenous Q24H   metoclopramide 5 mg Oral TID AC   midodrine 10 mg Oral 3 times per day on Sun Tue Thu Sat   midodrine 20 mg Oral 3 times per day on Mon Wed Fri   mirtazapine 15 mg Oral Nightly   Sally 2 patch Topical Once   pantoprazole 40 mg Oral BID AC   sertraline 25 mg Oral Daily   sodium chloride 10 mL Intravenous Q12H     Continuous Infusions:   PRN Meds:.•  acetaminophen  •  albumin human  •  albuterol  •  diphenhydrAMINE  •  heparin (porcine)  •  Morphine  •  ondansetron  •  oxyCODONE  •  phenylephrine-mineral oil-petrolatum  •  sodium chloride    Assessment/Plan   Assessment & Plan     Active Hospital Problems    Diagnosis  POA   • **Hypotension [I95.9]  Yes   • Liver cirrhosis w/ ascites  [K74.60, R18.8]  Yes   • Intractable nausea and vomiting [R11.2]  Yes   • Dehydration [E86.0]  Yes   • ARF likely 2* ATN  [N17.9]  Yes   • Chemotherapy-induced thrombocytopenia [D69.59, T45.1X5A]  Yes   • GIB w/ hematemesis [K92.0]  No   • Acute blood loss anemia [D62]  Yes   • Rectal bleeding [K62.5]   Yes   • Constipation due to opioid therapy [K59.03, T40.2X5A]  Yes   • Pain, cancer [G89.3]  Yes   • 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        Brief Hospital Course to date:  Oliver Mallory is a 55 y.o. male with relevant PMH of HTN, COPD, Pancreatitis, Cirrhosis, Tonsillar CA s/p tonsillectomy undergoing chemo and RT admitted 7/7/2020 with intractable nausea and vomiting for 4 days with no food intake.  Hospital course complicated by AUDREY, GIB, and hypotension.  He was admitted to ICU and did require pressors for some time. Ultimately stabilized after IV fluids, improvement in GIB and addition of midodrine. Transferred to floor 7/17. Renal function is continually worsening w/ NAL following. Patient lost iv access on 7/23/20, unable to obtain another IV (despite multiple attempts w/ IV, also attempted EJ access without success. Due to progressive kidney failure w/ volume overload, tunneled dialysis cath & central line was placed by Dr. Gore of interventional radiology on 7/24/20 and dialysis was initiated. Had some post-procedural right neck/supraclavicular oozing/hematoma s/p platelets and FFP.      -Progressive AUDREY w/ volume overload, now s/p dialysis catheter placement receiving Hemodialysis  -Tunneled dialysis cath placed by Dr. Gore on 7/24/20  - removed on 8/3 with placement of temporary line  - echo 7/7/20: normal EF, normal valves  - nephrology following   - Increase to midodrine 20 mg TID on dialysis days   - Difficulty removing volume with cirrhosis   Plan to remove tunneled like and place temporary access today 8/3 with Dr. Gore      Fever   Enterobacteriaceae bacteremia   - BCx + 7/31   - Started merrem (8/1)   - ID consulted  - Abd US with no acute findings    -right neck/supraclavicular hematoma around catheter site  - Coagulopathy   - s/p platelets and FFP and Vit K  - R IJ removed 8/1  ** Vit K again 8/2 and  8/3, FFP and platelets prior to IR procedure for line removal and temporary access placement      -s/p gi bleed (due to esophagitis and portal colopathy related to cirrhosis)  -7/9/20: EGD: mild esophagitis, grade 1 esophageal varices, lower 1/3 esophaguts, portal htn gastropathy noted  - 7/10/20: C-scope: rectal oozing consistent w/ portal colopathy, sigmoid diverticulosis noted  - Continue ppi bid; follow up [facility] 4 weeks; repeat egd 1 year for varix surveillance     -EDUARDO cirrhosis (w/ esophageal varices, portal htn gastopathy, and portal colopathy  - continue lactulose, diuretics, midodrine   - repeat para 7/31      -Hx stage II HPV related oropharyngeal cancer   -formerly received chemotherapy & radiotherapy; not candidate currently for any therapies  -regarding tonsillar cancer, per oncology patient is not candidate for further therapies for tonsillar cancer (given his reina)  -  thrombocytopenia due to splenomegaly from cirrhosis rather than malignancy; f/u Dr. Campbell as outpatient     -Cytopenias (thrombocytopenia & anemia)   -felt due to cirrhosis/hypersplenism per heme-onc      This patient's problems and plans were partially entered by my partner and updated as appropriate by me 08/03/20.       DVT Prophylaxis:  mechanical      Disposition: I expect the patient to be discharged TBD    CODE STATUS:   Code Status and Medical Interventions:   Ordered at: 07/07/20 1721     Level Of Support Discussed With:    Patient     Code Status:    CPR     Medical Interventions (Level of Support Prior to Arrest):    Full         Electronically signed by Sherrill Garber DO, 08/03/20, 15:01.

## 2020-08-04 NOTE — NURSING NOTE
Late Note: (7-31-20) Patient's dialysis and central line dressing were falling off and bleeding late Friday afternoon. It was a very busy day gauze applied to contain the bleeding and a dressing over the top to hold it in place until I could return to change it. Night shift came in and I explained that I needed to change the dressing because it wasn't on right. The oncoming nurse said she wanted to do it. I explained that it was not correct and needed to be redressed.  The RN assured me she would take care of it.

## 2020-08-04 NOTE — PROGRESS NOTES
INFECTIOUS DISEASE Progress Note    Oliver Mallory  1965  [id number]    Date of consult: 8/1/20    Admit date: 7/7/2020    Evaluating physician: Dr. Joey Graham  Reason for Consultation: Citrobacter sepsis with bacteremia, 4 out of 4 bottles positive from 7/31/2020  Chief Complaint: Intractable nausea/vomiting and constipation, sepsis      Subjective   History of present illness:  Mr. Oliver Mallory 55 y.o.  Yr old male who is being evaluated for Enterobacter bacteremia.  He has a past medical history significant for hypertension, COPD, pancreatitis, cirrhosis, tonsillar cancer status post tonsillectomy undergoing chemo and radiation therapy.  He was initially admitted on 7/7/2020 with intractable nausea and vomiting x4 days.  He was initially admitted to the ICU and did require some pressors.  Hospital course was complicated by GI bleed and hypotension as well acute kidney injury.  Patient was stabilized and transferred to the floor on 7/17.  Due to progressive kidney failure with fluid volume overload, tunneled dialysis catheter and central line was placed by interventional radiology on 7/24/2020 and dialysis was initiated.  There was reported issues with bleeding from the central line and patient received platelets and FFP.  Patient was found to have IJ line pulled most the way out had to be discontinued on 8/1 morning. Dialysis catheter remains in place.  Patient underwent a CT-guided paracentesis on 7/31, 7/15 and 7/10.  Patient had a EGD on 7/9 that showed mild portal gastropathy, reflux esophagitis and grade 1 esophageal varices.  He received a colonoscopy on 7/10 which showed a few sigmoid diverticula.  There is also petechiae in the rectum with scant oozing.  It was felt, in the absence of history of radiation to this region, that this is suspected portal colopathy.  Patient also received an echo on 7/8/2020 that was normal.    Patient did develop a fever of 101.5 on 7/30 as well as some tachycardia up to  112.  Patient did remain without leukocytosis however due to the fever blood cultures were ordered which have turned positive in 4/4 bottles for gram-negative bacilli identified as Enterobacteriaceae by Expert report.  Currently WBC is 5.56, H&H is 8.1/25.7 and platelets are 34.  AST is elevated at 70 and alkaline phosphatase is elevated 148 and total bilirubin is elevated 2.7.  7/31 CT of chest show clear lungs and cirrhosis with ascites.  CT of the abdomen on 8/1 showed cirrhosis with splenomegaly and ascites, surgical absence of gallbladder and diverticulosis.    Patient has no known antibiotic allergies and is currently receiving IV Zosyn.  ID has been consulted for further evaluation and treatment as well as antimicrobial therapy management on 8/1/2020.    Of note:  7/8 blood cultures were finalized no growth in 4/4 bottles  7/31 body fluid culture and stain-no organisms seen  7/15 body fluid culture and stain-no organisms seen    8/2/2020: History reviewed.  Patient sitting up on side of bed awake and alert upon arrival.  He remains with dialysis catheter in right chest.  He denies any events overnight.  He has been afebrile with some hypotension overnight but overall BP is trending up.  Without leukocytosis with a WBC of 6.11.  However lactic acid remains elevated at 3.0 as well as a CRP of 5.49.  Procalcitonin is also elevated 1.2.  Blood cultures have been identified as Citrobacter koseri by Expert report.  Did receive an ultrasound of the  Right upper quadrant which showed free fluid identified throughout the upper abdomen, liver with increased echogenicity with some lobulation identified at the contour suggesting cirrhosis.  Gallbladder fossa was unremarkable and the common bile duct measured 8 mm.  TTE was performed on 8/1 and is currently pending interpretation.  Awaiting for catheter removal in a.m. after dialysis.    8/3/2020: Patient seen in HD today. 0 complaints overnight. Patient afebrile and  hemodynamically stable overnight.  Plan to remove Vipin cath today after dialysis.  Remains without leukocytosis.  No events to report overnight per RN.  Continues on IV meropenem for Citrobacter koseri sepsis.  Waiting for dialysis catheter removal.  No pain.  On IV meropenem until 8/17/2020.    8/4/2020 history reviewed.  No high fevers or chills.  On meropenem until 8/17, or longer depending upon GABRIELA.  8/1/2020 TTE with small mobile strand on the posterior leaflet of the mitral valve suggestive of a vegetation.  No pain.  Tunneled Vipin cath removed right chest, replaced by temporary dialysis catheter on 8/3/2020.      Past Medical History:   Diagnosis Date   • Arthritis    • Cancer (CMS/HCC)    • COPD (chronic obstructive pulmonary disease) (CMS/HCC)    • Fall 05/12/2020   • Hypertension    • Pancreatitis    • Tonsillar cancer (CMS/HCC)        Past Surgical History:   Procedure Laterality Date   • ANKLE TENDON REPAIR     • CARDIAC CATHETERIZATION N/A 5/31/2018    Procedure: Left Heart Cath;  Surgeon: Ray Farley MD;  Location:  [facility] CATH INVASIVE [facility];  Service: Cardiovascular   • CHOLECYSTECTOMY     • COLONOSCOPY N/A 7/10/2020    Procedure: COLONOSCOPY;  Surgeon: Brunner, Mark I, MD;  Location:  [facility] ENDOSCOPY;  Service: Gastroenterology;  Laterality: N/A;   • CYST REMOVAL      coccyx   • ENDOSCOPY N/A 7/9/2020    Procedure: ESOPHAGOGASTRODUODENOSCOPY;  Surgeon: Brunner, Mark I, MD;  Location:  [facility] ENDOSCOPY;  Service: Gastroenterology;  Laterality: N/A;   • HERNIA MESH REMOVAL     • HERNIA REPAIR     • KNEE SURGERY  1981   • TONSILLECTOMY         Pediatric History   Patient Guardian Status   • Not on file     Other Topics Concern   • Not on file   Social History Narrative    Patient ambulatory without assistive device, but has ordered       family history includes Heart disease in his father; Hypertension in his mother.    No Known Allergies    Immunization History   Administered Date(s) Administered    • Hepatitis A 07/11/2020   • Hepatitis B Vaccine Adult IM 07/11/2020       Medication:    Current Facility-Administered Medications:   •  acetaminophen (TYLENOL) tablet 650 mg, 650 mg, Oral, Q4H PRN, Abram Abraham MD, 650 mg at 07/31/20 2107  •  albumin human 25 % IV SOLN 25 g, 25 g, Intravenous, PRN, SaúlRickey mendiola MD, 50 g at 08/03/20 1024  •  albuterol (PROVENTIL) nebulizer solution 0.083% 2.5 mg/3mL, 2.5 mg, Nebulization, Q6H PRN, Abram Abraham MD, 2.5 mg at 07/21/20 1405  •  aspirin chewable tablet 81 mg, 81 mg, Oral, Daily, Abram Abraham MD, 81 mg at 08/04/20 0851  •  bumetanide (BUMEX) tablet 4 mg, 4 mg, Oral, Daily, Escobar Miles MD, 4 mg at 08/04/20 0852  •  diphenhydrAMINE (BENADRYL) capsule 25 mg, 25 mg, Oral, Q6H PRN, Escobar Miles MD  •  heparin (porcine) injection 1,600 Units, 1,600 Units, Intracatheter, PRN, Escobar Miles MD, 1,600 Units at 07/31/20 1013  •  hydrocortisone 1 % cream, , Topical, Q12H, Rupa Hawthorne, APRN  •  lactulose (CHRONULAC) 10 GM/15ML solution 10 g, 10 g, Oral, TID, Abram Abraham MD, 10 g at 08/04/20 0851  •  magic mouthwash oral supsension 5 mL, 5 mL, Swish & Spit, 4x Daily, Loreta Hemphill II, DO, 5 mL at 08/04/20 0902  •  meropenem (MERREM) 500mg/100 mL 0.9% NS IVPB (mbp), 500 mg, Intravenous, Q24H, Raymundo Johnson, RPH, 500 mg at 08/03/20 1624  •  metoclopramide (REGLAN) tablet 5 mg, 5 mg, Oral, TID AC, Abram Abraham MD, 5 mg at 08/04/20 0853  •  midodrine (PROAMATINE) tablet 10 mg, 10 mg, Oral, 3 times per day on Sun Tue Thu Sat, Ksenia Chaidez DO, 10 mg at 08/04/20 0852  •  midodrine (PROAMATINE) tablet 20 mg, 20 mg, Oral, 3 times per day on Mon Wed Fri, Ksenia Chaidez DO, 20 mg at 08/03/20 1803  •  mirtazapine (REMERON) tablet 15 mg, 15 mg, Oral, Nightly, Abram Abraham MD, 15 mg at 08/03/20 2109  •  morphine injection 1 mg, 1 mg, Intravenous, Q4H PRN, Ksenia Chaidez DO, 1 mg at 08/03/20 2220  •  Sally patch 2 patch, 2  patch, Topical, Once, Zamzam Villa APRN, Stopped at 07/31/20 2211  •  ondansetron (ZOFRAN) injection 4 mg, 4 mg, Intravenous, Q6H PRN, Abram Abraham MD, 4 mg at 07/31/20 0844  •  oxyCODONE (ROXICODONE) immediate release tablet 10 mg, 10 mg, Oral, Q6H PRN, Escobar Miles MD, 10 mg at 08/04/20 0851  •  pantoprazole (PROTONIX) EC tablet 40 mg, 40 mg, Oral, BID AC, Abram Abraham MD, 40 mg at 08/04/20 0900  •  phenylephrine-mineral oil-petrolatum (PREPARATION H) 0.25-14-74.9 % hemorhoidal ointment, , Rectal, TID PRN, Abram Abraham MD  •  sertraline (ZOLOFT) tablet 25 mg, 25 mg, Oral, Daily, Abram Abraham MD, 25 mg at 08/04/20 0853  •  sodium chloride 0.9 % flush 10 mL, 10 mL, Intravenous, Q12H, Abram Abraham MD, 10 mL at 08/02/20 2157  •  sodium chloride 0.9 % flush 10 mL, 10 mL, Intravenous, PRN, Abram Abraham MD, 10 mL at 07/10/20 0759    Please refer to the medical record for a full medication list    Review of Systems:    Constitutional--afebrile overnight  HEENT-- No new vision, hearing or throat complaints.  No epistaxis or oral sores.  Denies odynophagia or dysphagia.  No odynophagia or dysphagia. No headache, photophobia or neck stiffness.  CV-- No chest pain, palpitation or syncope  Resp-- No SOB/cough/Hemoptysis  GI- No nausea, vomiting, or diarrhea.  No hematochezia, melena, or hematemesis. Denies jaundice or chronic liver disease.  -- No dysuria, hematuria, or flank pain.  Denies hesitancy, urgency or flank pain.  Lymph- no swollen lymph nodes in neck/axilla or groin.   Heme-positive ecchymosis across chest.  MS-- no swelling or pain in the bones or joints of arms/legs.  No new back pain.  Neuro-- No acute focal weakness or numbness in the arms or legs.  No seizures.  Skin--positive for bruising, no nodules, no pain right temporary dialysis catheter chest    Physical Exam:   Vital Signs   Temp:  [98.2 °F (36.8 °C)-98.4 °F (36.9 °C)] 98.4 °F (36.9 °C)  Heart Rate:  [] 86  Resp:   "[18-20] 18  BP: ()/(41-81) 115/53    Temp  Min: 98.2 °F (36.8 °C)  Max: 98.4 °F (36.9 °C)  BP  Min: 92/41  Max: 121/71  Pulse  Min: 81  Max: 113  Resp  Min: 18  Max: 20  SpO2  Min: 100 %  Max: 100 %    Blood pressure 115/53, pulse 86, temperature 98.4 °F (36.9 °C), temperature source Oral, resp. rate 18, height 182.9 cm (72.01\"), weight 124 kg (272 lb 11.2 oz), SpO2 100 %.  GENERAL: Awake and alert, in minimal distress.  Sitting up on side of bed.    HEENT:  Normocephalic, atraumatic.  Ears externally normal, Nose externally normal.  EYES: No icterus.   LYMPHATICS:   HEART: Audible murmur noted.  RRR.  LUNGS: Clear to auscultation. No respiratory distress, no use of accessory muscles.  ABDOMEN: Soft, tender x4 quadrants, nondistended. Bowel sounds normal.  GENITAL: Without Abbasi cath.   SKIN: Warm and dry without cutaneous eruptions.  Ecchymosis noted across bilateral chest and scattered across bilateral upper extremities  PSYCHIATRIC: Mental status lucid. No confusion.  EXT:  No cellulitic change.  Normal range of motion.  NEURO: Oriented to name, CN 2 to 12 intact, nonfocal  LINES: Right chest temporary dialysis cath in place.  Ecchymosis noted around the site.  No      Results Review:       Results from last 7 days   Lab Units 08/04/20 0418 08/03/20 0459 08/02/20 0422   WBC 10*3/mm3 4.03 5.18 6.11   HEMOGLOBIN g/dL 7.0* 8.5* 8.1*   HEMATOCRIT % 21.4* 25.1* 25.0*   PLATELETS 10*3/mm3 48* 42* 37*     Results from last 7 days   Lab Units 08/04/20 0418   SODIUM mmol/L 138   POTASSIUM mmol/L 3.6   CHLORIDE mmol/L 103   CO2 mmol/L 26.0   BUN mg/dL 29*   CREATININE mg/dL 4.79*   GLUCOSE mg/dL 127*   CALCIUM mg/dL 8.3*     Results from last 7 days   Lab Units 08/03/20 0459   ALK PHOS U/L 166*   BILIRUBIN mg/dL 2.4*   ALT (SGPT) U/L 36   AST (SGOT) U/L 85*     Results from last 7 days   Lab Units 08/02/20  0422   SED RATE mm/hr 4     Results from last 7 days   Lab Units 08/02/20  0422   CRP mg/dL 5.49*       "   Results from last 7 days   Lab Units 08/03/20  0459   LACTATE mmol/L 2.3*     Estimated Creatinine Clearance: 23.4 mL/min (A) (by C-G formula based on SCr of 4.79 mg/dL (H)).    Microbiology:  Microbiology Results (last 10 days)     Procedure Component Value - Date/Time    Anaerobic Culture - Body Fluid, Peritoneum [[id number]] Collected:  07/31/20 1422    Lab Status:  Preliminary result Specimen:  Body Fluid from Peritoneum Updated:  08/03/20 1014     Anaerobic Culture No anaerobes isolated at 3 days    Body Fluid Culture - Body Fluid, Peritoneum [[id number]] Collected:  07/31/20 1422    Lab Status:  Final result Specimen:  Body Fluid from Peritoneum Updated:  08/03/20 0751     Body Fluid Culture No growth at 3 days     Gram Stain No organisms seen      Occasional WBCs per low power field    Blood Culture - Blood, Arm, Left [[id number]]  (Abnormal) Collected:  07/31/20 0832    Lab Status:  Final result Specimen:  Blood from Arm, Left Updated:  08/03/20 0649     Blood Culture Citrobacter koseri     Comment: Refer to previous blood culture collected on 7/31/2020 0830 for MICs.          Isolated from Aerobic and Anaerobic Bottles     Gram Stain Anaerobic Bottle Gram negative bacilli      Aerobic Bottle Gram negative bacilli    Blood Culture - Blood, Blood, PICC Line [[id number]]  (Abnormal)  (Susceptibility) Collected:  07/31/20 0830    Lab Status:  Final result Specimen:  Blood, PICC Line Updated:  08/03/20 0649     Blood Culture Citrobacter koseri     Isolated from Aerobic and Anaerobic Bottles     Gram Stain Anaerobic Bottle Gram negative bacilli      Aerobic Bottle Gram negative bacilli    Susceptibility      Citrobacter koseri     KALANI     Cefepime Susceptible     Ceftazidime Susceptible     Ceftriaxone Susceptible     Gentamicin Susceptible     Levofloxacin Susceptible     Piperacillin + Tazobactam Susceptible     Trimethoprim + Sulfamethoxazole Susceptible                Susceptibility Comments     Citrobacter  marv    Cefazolin sensitivity will not be reported for Enterobacteriaceae in non-urine isolates. If cefazolin is preferred, please call the microbiology lab to request an E-test.  With the exception of urinary-sourced infections, aminoglycosides should not be used as monotherapy.             Blood Culture ID, PCR - Blood, Blood, PICC Line [[id number]]  (Abnormal) Collected:  07/31/20 0830    Lab Status:  Final result Specimen:  Blood, PICC Line Updated:  08/01/20 0022     BCID, PCR Enterobacteriaceae Group. Identification by BCID PCR.            Radiology:  Imaging Results (Last 72 Hours)     Procedure Component Value Units Date/Time    IR Tunneled Catheter Removal [[id number]] Collected:  08/03/20 1657     Updated:  08/03/20 1711    Narrative:       PROCEDURE:   Non-tunneled central venous catheter placement  Tunneled catheter removal     Procedural Personnel  Attending physician(s): ABELARDO Gore M.D.  Fellow physician(s): None  Resident physician(s): None  Advanced practice provider(s): None     Pre-procedure diagnosis: Enterobacteriaceae bacteremia   Post-procedure diagnosis: Same  Indication: Therapeutic  Additional clinical history: None     Complications: No immediate complications.       Impression:          Insertion of right-sided non-tunneled triple-lumen hemodialysis  catheter, with tip in the expected location of the cavoatrial junction.  Removal of tunneled hemodialysis catheter     Plan:      The catheter may be used immediately.  _______________________________________________________________     PROCEDURE SUMMARY:  - Venous access with ultrasound guidance  - Non-tunneled central venous catheter insertion with fluoroscopic  guidance  - Additional procedure(s): None     PROCEDURE DETAILS:     Pre-procedure  Consent: Informed consent for the procedure including risks, benefits  and alternatives was obtained and time-out was performed prior to the  procedure.  Preparation (MIPS): The site was prepared and  draped using all elements  of maximal sterile barrier technique including sterile gloves, sterile  gown, cap, mask, large sterile sheet, sterile ultrasound probe cover,  hand hygiene and cutaneous antisepsis with 2% chlorhexidine.   Medical reason for site preparation exception (MIPS): Not applicable     Anesthesia/sedation  Level of anesthesia/sedation: Moderate sedation (conscious sedation)  Anesthesia/sedation administered by: Independent trained observer under  attending supervision with continuous monitoring of the patient?s level  of consciousness and physiologic status  Total intra-service sedation time (minutes): 18     Access  Local anesthesia was administered. The vessel was sonographically  evaluated and determined to be patent. Real time ultrasound was used to  visualize needle entry into the vessel and a permanent image obtained.  Vein accessed: Right internal jugular vein  Access technique: Micropuncture technique under ultrasound guidance     Catheter placement  The access site was dilated and the catheter was placed into the vein  over a wire  under fluoroscopic guidance.  The catheter tip location was  fluoroscopically verified and a permanent image was stored.. A sterile  dressing was applied.  Catheter placed: [employer] Acute Triple Lumen Catheter. Lot No  [id number].  Catheter size (Romansh): 12  Catheter length (cm): 16  Catheter flush: Dialysis lumens flushed with heparin (1000 units per  mL). Venous access lumen flushed with heparin (100 units per mL)  Catheter securement technique: 2-0 silk suture     Tunneled catheter removal  Local anesthesia was administered. The catheter was removed with  traction.     Contrast  Contrast agent: None  Contrast volume (mL): 0     Radiation Dose  Fluoroscopy time: 0.1 minutes  Dose: 1.7 mGy     Additional Details  Additional description of procedure: None  Equipment details: None  Specimens removed: None  Estimated blood loss (mL): Less than  10  Standardized report: CVA_NonTunneledCatheter     Attestation  I was present and scrubbed for entire procedure. Imaging reviewed. Agree  with final report as written..     This report was finalized on 8/3/2020 5:08 PM by Rojas Gore.       IR insert non-tunneled CVC 5+ [[id number]] Collected:  08/03/20 1657     Updated:  08/03/20 1711    Narrative:       PROCEDURE:   Non-tunneled central venous catheter placement  Tunneled catheter removal     Procedural Personnel  Attending physician(s): ABELARDO Gore M.D.  Fellow physician(s): None  Resident physician(s): None  Advanced practice provider(s): None     Pre-procedure diagnosis: Enterobacteriaceae bacteremia   Post-procedure diagnosis: Same  Indication: Therapeutic  Additional clinical history: None     Complications: No immediate complications.       Impression:          Insertion of right-sided non-tunneled triple-lumen hemodialysis  catheter, with tip in the expected location of the cavoatrial junction.  Removal of tunneled hemodialysis catheter     Plan:      The catheter may be used immediately.  _______________________________________________________________     PROCEDURE SUMMARY:  - Venous access with ultrasound guidance  - Non-tunneled central venous catheter insertion with fluoroscopic  guidance  - Additional procedure(s): None     PROCEDURE DETAILS:     Pre-procedure  Consent: Informed consent for the procedure including risks, benefits  and alternatives was obtained and time-out was performed prior to the  procedure.  Preparation (MIPS): The site was prepared and draped using all elements  of maximal sterile barrier technique including sterile gloves, sterile  gown, cap, mask, large sterile sheet, sterile ultrasound probe cover,  hand hygiene and cutaneous antisepsis with 2% chlorhexidine.   Medical reason for site preparation exception (MIPS): Not applicable     Anesthesia/sedation  Level of anesthesia/sedation: Moderate sedation (conscious  sedation)  Anesthesia/sedation administered by: Independent trained observer under  attending supervision with continuous monitoring of the patient?s level  of consciousness and physiologic status  Total intra-service sedation time (minutes): 18     Access  Local anesthesia was administered. The vessel was sonographically  evaluated and determined to be patent. Real time ultrasound was used to  visualize needle entry into the vessel and a permanent image obtained.  Vein accessed: Right internal jugular vein  Access technique: Micropuncture technique under ultrasound guidance     Catheter placement  The access site was dilated and the catheter was placed into the vein  over a wire  under fluoroscopic guidance.  The catheter tip location was  fluoroscopically verified and a permanent image was stored.. A sterile  dressing was applied.  Catheter placed: [email or web address] Acute Triple Lumen Catheter. Lot No  [id number].  Catheter size (Bots[place]an): 12  Catheter length (cm): 16  Catheter flush: Dialysis lumens flushed with heparin (1000 units per  mL). Venous access lumen flushed with heparin (100 units per mL)  Catheter securement technique: 2-0 silk suture     Tunneled catheter removal  Local anesthesia was administered. The catheter was removed with  traction.     Contrast  Contrast agent: None  Contrast volume (mL): 0     Radiation Dose  Fluoroscopy time: 0.1 minutes  Dose: 1.7 mGy     Additional Details  Additional description of procedure: None  Equipment details: None  Specimens removed: None  Estimated blood loss (mL): Less than 10  Standardized report: CVA_NonTunneledCatheter     Attestation  I was present and scrubbed for entire procedure. Imaging reviewed. Agree  with final report as written..     This report was finalized on 8/3/2020 5:08 PM by Rojas Gore.       US Abdomen Limited [[id number]] Collected:  08/02/20 1010     Updated:  08/03/20 1237    Narrative:       EXAMINATION: US ABDOMEN LIMITED -  08/02/2020     INDICATION:  I95.89-Other hypotension; E86.1-Hypovolemia;  K92.0-Hematemesis; D62-Acute posthemorrhagic anemia; K62.5-Hemorrhage of  anus and rectum; Z74.09-Other reduced mobility; Z78.9-Other specified  health status; Z74.09-Other reduced mobility. Upper abdominal pain.     TECHNIQUE: Sonographic imaging is obtained of the right upper quadrant  in both the sagittal and transverse planes.     COMPARISON: NONE     FINDINGS: Pancreas is not visualized.. There is free fluid identified  throughout the upper abdomen. The liver is increased in echogenicity  with some lobulation identified of the contour suggesting cirrhosis. The  gallbladder fossa is unremarkable. The common bile duct measures 8 mm.  The right kidney is normal in size, configuration, and texture measuring  in length from pole to pole 11.1 cm. No solid cortical mass or renal  cortical cysts seen within the right kidney. No hydronephrosis or  nephrolithiasis.       Impression:       Cirrhotic appearance of the liver with no underlying mass.  Free fluid identified throughout the upper abdomen. The pancreas is not  seen. The remainder the upper abdomen is unremarkable.      DICTATED:   08/02/2020  EDITED/ls :   08/02/2020      This report was finalized on 8/3/2020 12:34 PM by Dr. Janeth Atkins MD.             IMPRESSION:     1. Sepsis with Enterobacteriaceae group bacteremia identified as Citrobacter by bio fire report-blood cultures positive in 4/4 bottles 7/31/2020.  Etiology is unclear at this time but could likely be a dialysis/line infection. Prior echo negative on 7/8.  May possibly be an occult intra-abdominal process, or elsewhere but not obvious on CT scan of the chest abdomen and pelvis from 7/31/2020.  However due to the high-grade bacteremia and intravascular infection is suspected/likely.  8/1 TTE has been performed and is currently pending Interpretation.  8/1 right upper quadrant ultrasound has been performed with no  evidence of biliary duct/gallstones disease.  2. TTE from 8/1 with possible mitral valve vegetation.  New..  3. Liver cirrhosis with ascites status post multiple CT-guided paracentesis.  4. 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  5. Acute kidney injury likely secondary to ATN however could also be hepatorenal syndrome.  6. Intractable nausea and vomiting.  Improved.  7. Thrombocytopenia- could be secondary to chemotherapy or hepatic disease.  8. Elevated bilirubin- 2.4 likely secondary to hepatic disease, negative ultrasound.  Elevated AST- 72, continues.  9. Elevated alkaline phosphatase 166, continues.  10. Hypocalcemia, 8.3, ongoing.   11. Anemia, chronic disease, worse.      Plan:    1. Diagnostically continue to follow patient's physical exam, follow labs include CBC, CMP, CRP.  I will continue to follow blood culture sensitivity reports and adjust antibiotics accordingly.  Consider a GABRIELA, given TTE results.  2. Therapeutically, previously discontinued on 8/1 IV Zosyn and started Merrem pharm to dose.  He will likely need to have the dialysis catheter removed and a new one placed at a later date.  This will be difficult due to patient's anemia and thrombocytopenia.  Duration of therapy will likely be 2-4 weeks from removal of dialysis catheter (depending on GABRIELA results).  Would recommend removing line as soon as possible and placing temporary dialysis catheter, while we clear his sepsis.  Dialysis catheter removal explanted on 8/3 after dialysis.  Temporary dialysis catheter placed right chest 8/3.  3. Continue supportive care.    Our group would be pleased to follow this patient over the course of their hospitalization and assist with outpatient antimicrobial therapy, as indicated.  Further recommendations depend on the results of the cultures and clinical course.  Side effects of medications were discussed.  Patient is at increased risk for adverse drug reactions, recurrent infection, readmission.   Discussed with the hospitalist service.    Joey Grhaam MD  8/4/2020

## 2020-08-04 NOTE — PROGRESS NOTES
Continued Stay Note  [facility]     Patient Name: Oliver Mallory  MRN: [id number]  Today's Date: 8/4/2020    Admit Date: 7/7/2020    Discharge Plan     Row Name 08/04/20 1344       Plan    Plan  discharge plan    Plan Comments  Per discussion in multidisciplinary rounds, pt will probably need outpatient HD at discharge. Spoke with pt in room and he wants a dialysis clinic in [place].  Dialysis chair initiated for [facility] in [place] with clinical information faxed.  CM will cont till follow.     Final Discharge Disposition Code  30 - still a patient        Discharge Codes    No documentation.       Expected Discharge Date and Time     Expected Discharge Date Expected Discharge Time    Aug 7, 2020             Alis Pinto RN

## 2020-08-04 NOTE — PROGRESS NOTES
"   LOS: 28 days      Reason For Visit:  F/U AUDREY  Subjective    No acute events overnight. No new complaints.   Review of Systems:   Patient denies shortness of breath, chest pain, dysuria, hematuria, nausea, vomiting.        Objective       aspirin 81 mg Oral Daily   bumetanide 4 mg Oral Daily   hydrocortisone  Topical Q12H   lactulose 10 g Oral TID   magic mouthwash 5 mL Swish & Spit 4x Daily   meropenem 500 mg Intravenous Q24H   metoclopramide 5 mg Oral TID AC   midodrine 10 mg Oral 3 times per day on Sun Tue Thu Sat   midodrine 20 mg Oral 3 times per day on Mon Wed Fri   mirtazapine 15 mg Oral Nightly   Sally 2 patch Topical Once   pantoprazole 40 mg Oral BID AC   sertraline 25 mg Oral Daily   sodium chloride 10 mL Intravenous Q12H            Vital Signs:  Blood pressure 115/53, pulse 86, temperature 98.4 °F (36.9 °C), temperature source Oral, resp. rate 18, height 182.9 cm (72.01\"), weight 124 kg (272 lb 11.2 oz), SpO2 100 %.    Flowsheet Rows      First Filed Value   Admission Height  185.4 cm (73\") Documented at 07/07/2020 1640   Admission Weight  127 kg (280 lb) Documented at 07/07/2020 1640          08/03 0701 - 08/04 0700  In: 2009 [P.O.:1040]  Out: 5090     Physical Exam:    General Appearance:Alert, NAD  Eyes: PER, conjunctivae and sclerae normal, no icterus  Lungs: Clear on auscultation.  Chest wall ecchymosis noted from the site of the tunnel catheter.  Heart/CV: regular rhythm, bilateral lower extremity edema.  Abdomen:  Distended , soft, non-tender, no masses,  bowel sounds present  Skin: No rash, Warm and dry ecchymosis chest wall.  Neurologically grossly intact.    Radiology:        Labs:  Results from last 7 days   Lab Units 08/04/20  0418 08/03/20  0459 08/02/20  0422   WBC 10*3/mm3 4.03 5.18 6.11   HEMOGLOBIN g/dL 7.0* 8.5* 8.1*   HEMATOCRIT % 21.4* 25.1* 25.0*   PLATELETS 10*3/mm3 48* 42* 37*     Results from last 7 days   Lab Units 08/04/20  0418 08/03/20  0459 08/02/20  0422 08/01/20  0355  " 07/29/20  0356   SODIUM mmol/L 138 136 138 138   < > 141   POTASSIUM mmol/L 3.6 3.7 3.7 4.0   < > 3.2*   CHLORIDE mmol/L 103 100 103 104   < > 105   CO2 mmol/L 26.0 23.0 22.0 23.0   < > 22.0   BUN mg/dL 29* 44* 28* 24*   < > 29*   CREATININE mg/dL 4.79* 5.53* 5.50* 4.69*   < > 5.08*   CALCIUM mg/dL 8.3* 8.3* 8.5* 8.6   < > 8.9   PHOSPHORUS mg/dL 2.4*  --   --   --   --  2.4*   MAGNESIUM mg/dL  --   --   --   --   --  1.7   ALBUMIN g/dL 2.80* 2.80* 2.70* 2.80*   < > 3.50    < > = values in this interval not displayed.     Results from last 7 days   Lab Units 08/04/20  0418   GLUCOSE mg/dL 127*       Results from last 7 days   Lab Units 08/03/20  0459   ALK PHOS U/L 166*   BILIRUBIN mg/dL 2.4*   ALT (SGPT) U/L 36   AST (SGOT) U/L 85*                 Estimated Creatinine Clearance: 23.4 mL/min (A) (by C-G formula based on SCr of 4.79 mg/dL (H)).      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  Non oliguric AUDREY: Thought to be ATN gradual worsening of renal function in the setting of chronic liver disease w cirrhosis. No response to albumin and midodrine.  Started on HD on 7/24/20. For solute clearance and optimization of volume status  Hypokalemia: Management with HD   2.  Anemia: Transfuse PRN no LISA to be given due to cancer  3.  Dependent edema: Optimization with HD patient has high fluid intake.  4 decompensated liver disease:  5.  Intradialytic hypotension: midodrine 20 mg TID  6. Bacteremia - Enterobacteriacea       Recommendations:   HD tomorrow,  UF as tolerated. Antibx per ID.   Will transfuse 2 units of blood with dialysis tomorrow .  Fluid restriction   Continue alejandrina Hays,  MD  08/04/20  12:11

## 2020-08-04 NOTE — PROGRESS NOTES
[facility] Medicine Services  PROGRESS NOTE    Patient Name: Oliver Mallory  : 1965  MRN: [id number]    Date of Admission: 2020  Primary Care Physician: Raymundo Salgado MD    Subjective   Subjective     CC:  AUDREY, GIB, cirrhosis      HPI:  Discussed case with daughter this morning, questions answered. Tolerating HD      Review of Systems  Gen- No fevers, chills  CV- No chest pain, palpitations  Resp- No cough, dyspnea  GI- No N/V/D, abd pain      Objective   Objective     Vital Signs:   Temp:  [97 °F (36.1 °C)-98.4 °F (36.9 °C)] 98.4 °F (36.9 °C)  Heart Rate:  [] 86  Resp:  [18-20] 18  BP: ()/(41-85) 115/53        Physical Exam:  Constitutional: No acute distress, awake, alert  HENT: NCAT, mucous membranes moist, temporary dialysis catheter in place  Respiratory: Clear to auscultation bilaterally, respiratory effort normal   Cardiovascular: RRR, no murmurs, rubs, or gallops, palpable pedal pulses bilaterally  Gastrointestinal: Positive bowel sounds, distended  Musculoskeletal: No bilateral ankle edema  Psychiatric: Appropriate affect, cooperative  Neurologic: Oriented x 3,  speech clear  Skin: No rashes, diffuse ecchymosis      Results Reviewed:  Results from last 7 days   Lab Units 20   WBC 10*3/mm3 4.03 5.18 6.11 5.56  --    HEMOGLOBIN g/dL 7.0* 8.5* 8.1* 8.1*  --    HEMATOCRIT % 21.4* 25.1* 25.0* 25.7*  --    PLATELETS 10*3/mm3 48* 42* 37* 34*  --    INR   --  1.94*  --  2.38* 2.30*   PROCALCITONIN ng/mL  --   --  1.20*  --   --      Results from last 7 days   Lab Units 20  035   SODIUM mmol/L 138 136 138 138   POTASSIUM mmol/L 3.6 3.7 3.7 4.0   CHLORIDE mmol/L 103 100 103 104   CO2 mmol/L 26.0 23.0 22.0 23.0   BUN mg/dL 29* 44* 28* 24*   CREATININE mg/dL 4.79* 5.53* 5.50* 4.69*   GLUCOSE mg/dL 127* 111* 117* 139*   CALCIUM mg/dL 8.3* 8.3*  8.5* 8.6   ALT (SGPT) U/L  --  36 36 37   AST (SGOT) U/L  --  85* 72* 70*     Estimated Creatinine Clearance: 23.4 mL/min (A) (by C-G formula based on SCr of 4.79 mg/dL (H)).    Microbiology Results Abnormal     Procedure Component Value - Date/Time    Anaerobic Culture - Body Fluid, Peritoneum [[id number]] Collected:  07/31/20 1422    Lab Status:  Preliminary result Specimen:  Body Fluid from Peritoneum Updated:  08/03/20 1014     Anaerobic Culture No anaerobes isolated at 3 days    Body Fluid Culture - Body Fluid, Peritoneum [[id number]] Collected:  07/31/20 1422    Lab Status:  Final result Specimen:  Body Fluid from Peritoneum Updated:  08/03/20 0751     Body Fluid Culture No growth at 3 days     Gram Stain No organisms seen      Occasional WBCs per low power field    Blood Culture - Blood, Arm, Left [[id number]]  (Abnormal) Collected:  07/31/20 0832    Lab Status:  Final result Specimen:  Blood from Arm, Left Updated:  08/03/20 0649     Blood Culture Citrobacter koseri     Comment: Refer to previous blood culture collected on 7/31/2020 0830 for MICs.          Isolated from Aerobic and Anaerobic Bottles     Gram Stain Anaerobic Bottle Gram negative bacilli      Aerobic Bottle Gram negative bacilli    Blood Culture - Blood, Blood, PICC Line [[id number]]  (Abnormal)  (Susceptibility) Collected:  07/31/20 0830    Lab Status:  Final result Specimen:  Blood, PICC Line Updated:  08/03/20 0649     Blood Culture Citrobacter koseri     Isolated from Aerobic and Anaerobic Bottles     Gram Stain Anaerobic Bottle Gram negative bacilli      Aerobic Bottle Gram negative bacilli    Susceptibility      Citrobacter koseri     KALANI     Cefepime Susceptible     Ceftazidime Susceptible     Ceftriaxone Susceptible     Gentamicin Susceptible     Levofloxacin Susceptible     Piperacillin + Tazobactam Susceptible     Trimethoprim + Sulfamethoxazole Susceptible                Susceptibility Comments     Citrobacter koseri    Cefazolin  sensitivity will not be reported for Enterobacteriaceae in non-urine isolates. If cefazolin is preferred, please call the microbiology lab to request an E-test.  With the exception of urinary-sourced infections, aminoglycosides should not be used as monotherapy.             Blood Culture ID, PCR - Blood, Blood, PICC Line [[id number]]  (Abnormal) Collected:  07/31/20 0830    Lab Status:  Final result Specimen:  Blood, PICC Line Updated:  08/01/20 0022     BCID, PCR Enterobacteriaceae Group. Identification by BCID PCR.    Body Fluid Culture - Body Fluid, Peritoneum [[id number]] Collected:  07/15/20 1038    Lab Status:  Final result Specimen:  Body Fluid from Peritoneum Updated:  07/18/20 0710     Body Fluid Culture No growth at 3 days     Gram Stain Few (2+) WBCs seen      No organisms seen    Blood Culture - Blood, Arm, Right [[id number]] Collected:  07/08/20 1748    Lab Status:  Final result Specimen:  Blood from Arm, Right Updated:  07/13/20 1800     Blood Culture No growth at 5 days    Blood Culture - Blood, Hand, Left [[id number]] Collected:  07/08/20 1744    Lab Status:  Final result Specimen:  Blood from Hand, Left Updated:  07/13/20 1800     Blood Culture No growth at 5 days    Body Fluid Culture - Body Fluid, Peritoneum [[id number]] Collected:  07/10/20 0916    Lab Status:  Final result Specimen:  Body Fluid from Peritoneum Updated:  07/13/20 0700     Body Fluid Culture No growth at 3 days     Gram Stain Moderate (3+) WBCs seen      No organisms seen    COVID PRE-OP / PRE-PROCEDURE SCREENING ORDER (NO ISOLATION) - Swab, Nasopharynx [[id number]] Collected:  07/08/20 2009    Lab Status:  Final result Specimen:  Swab from Nasopharynx Updated:  07/08/20 2119    Narrative:       The following orders were created for panel order COVID PRE-OP / PRE-PROCEDURE SCREENING ORDER (NO ISOLATION) - Swab, Nasopharynx.  Procedure                               Abnormality         Status                     ---------                                -----------         ------                     COVID-19,CEPHEID,CLAUDIA IN-...[[id number]]  Normal              Final result                 Please view results for these tests on the individual orders.    COVID-19,CEPHEID,CLAUDIA IN-HOUSE(OR EMERGENT/ADD-ON),NP SWAB IN TRANSPORT MEDIA 3-4 HR TAT - Swab, Nasopharynx [[id number]]  (Normal) Collected:  07/08/20 2009    Lab Status:  Final result Specimen:  Swab from Nasopharynx Updated:  07/08/20 2119     COVID19 Not Detected    Narrative:       Fact sheet for providers: https://www.fda.gov/media/065750/download     Fact sheet for patients: https://www.fda.gov/media/850295/download          Imaging Results (Last 24 Hours)     Procedure Component Value Units Date/Time    IR Tunneled Catheter Removal [[id number]] Collected:  08/03/20 1657     Updated:  08/03/20 1711    Narrative:       PROCEDURE:   Non-tunneled central venous catheter placement  Tunneled catheter removal     Procedural Personnel  Attending physician(s): ABELARDO Gore M.D.  Fellow physician(s): None  Resident physician(s): None  Advanced practice provider(s): None     Pre-procedure diagnosis: Enterobacteriaceae bacteremia   Post-procedure diagnosis: Same  Indication: Therapeutic  Additional clinical history: None     Complications: No immediate complications.       Impression:          Insertion of right-sided non-tunneled triple-lumen hemodialysis  catheter, with tip in the expected location of the cavoatrial junction.  Removal of tunneled hemodialysis catheter     Plan:      The catheter may be used immediately.  _______________________________________________________________     PROCEDURE SUMMARY:  - Venous access with ultrasound guidance  - Non-tunneled central venous catheter insertion with fluoroscopic  guidance  - Additional procedure(s): None     PROCEDURE DETAILS:     Pre-procedure  Consent: Informed consent for the procedure including risks, benefits  and alternatives was obtained and time-out  was performed prior to the  procedure.  Preparation (MIPS): The site was prepared and draped using all elements  of maximal sterile barrier technique including sterile gloves, sterile  gown, cap, mask, large sterile sheet, sterile ultrasound probe cover,  hand hygiene and cutaneous antisepsis with 2% chlorhexidine.   Medical reason for site preparation exception (MIPS): Not applicable     Anesthesia/sedation  Level of anesthesia/sedation: Moderate sedation (conscious sedation)  Anesthesia/sedation administered by: Independent trained observer under  attending supervision with continuous monitoring of the patient?s level  of consciousness and physiologic status  Total intra-service sedation time (minutes): 18     Access  Local anesthesia was administered. The vessel was sonographically  evaluated and determined to be patent. Real time ultrasound was used to  visualize needle entry into the vessel and a permanent image obtained.  Vein accessed: Right internal jugular vein  Access technique: Micropuncture technique under ultrasound guidance     Catheter placement  The access site was dilated and the catheter was placed into the vein  over a wire  under fluoroscopic guidance.  The catheter tip location was  fluoroscopically verified and a permanent image was stored.. A sterile  dressing was applied.  Catheter placed: [employer] Acute Triple Lumen Catheter. Lot No  [id number].  Catheter size (Mongolian): 12  Catheter length (cm): 16  Catheter flush: Dialysis lumens flushed with heparin (1000 units per  mL). Venous access lumen flushed with heparin (100 units per mL)  Catheter securement technique: 2-0 silk suture     Tunneled catheter removal  Local anesthesia was administered. The catheter was removed with  traction.     Contrast  Contrast agent: None  Contrast volume (mL): 0     Radiation Dose  Fluoroscopy time: 0.1 minutes  Dose: 1.7 mGy     Additional Details  Additional description of procedure: None  Equipment  details: None  Specimens removed: None  Estimated blood loss (mL): Less than 10  Standardized report: CVA_NonTunneledCatheter     Attestation  I was present and scrubbed for entire procedure. Imaging reviewed. Agree  with final report as written..     This report was finalized on 8/3/2020 5:08 PM by Rojas Gore.       IR insert non-tunneled CVC 5+ [[id number]] Collected:  08/03/20 1657     Updated:  08/03/20 1711    Narrative:       PROCEDURE:   Non-tunneled central venous catheter placement  Tunneled catheter removal     Procedural Personnel  Attending physician(s): ABELARDO Gore M.D.  Fellow physician(s): None  Resident physician(s): None  Advanced practice provider(s): None     Pre-procedure diagnosis: Enterobacteriaceae bacteremia   Post-procedure diagnosis: Same  Indication: Therapeutic  Additional clinical history: None     Complications: No immediate complications.       Impression:          Insertion of right-sided non-tunneled triple-lumen hemodialysis  catheter, with tip in the expected location of the cavoatrial junction.  Removal of tunneled hemodialysis catheter     Plan:      The catheter may be used immediately.  _______________________________________________________________     PROCEDURE SUMMARY:  - Venous access with ultrasound guidance  - Non-tunneled central venous catheter insertion with fluoroscopic  guidance  - Additional procedure(s): None     PROCEDURE DETAILS:     Pre-procedure  Consent: Informed consent for the procedure including risks, benefits  and alternatives was obtained and time-out was performed prior to the  procedure.  Preparation (MIPS): The site was prepared and draped using all elements  of maximal sterile barrier technique including sterile gloves, sterile  gown, cap, mask, large sterile sheet, sterile ultrasound probe cover,  hand hygiene and cutaneous antisepsis with 2% chlorhexidine.   Medical reason for site preparation exception (MIPS): Not applicable      Anesthesia/sedation  Level of anesthesia/sedation: Moderate sedation (conscious sedation)  Anesthesia/sedation administered by: Independent trained observer under  attending supervision with continuous monitoring of the patient?s level  of consciousness and physiologic status  Total intra-service sedation time (minutes): 18     Access  Local anesthesia was administered. The vessel was sonographically  evaluated and determined to be patent. Real time ultrasound was used to  visualize needle entry into the vessel and a permanent image obtained.  Vein accessed: Right internal jugular vein  Access technique: Micropuncture technique under ultrasound guidance     Catheter placement  The access site was dilated and the catheter was placed into the vein  over a wire  under fluoroscopic guidance.  The catheter tip location was  fluoroscopically verified and a permanent image was stored.. A sterile  dressing was applied.  Catheter placed: [employer] Acute Triple Lumen Catheter. Lot No  [id number].  Catheter size (Slovenian): 12  Catheter length (cm): 16  Catheter flush: Dialysis lumens flushed with heparin (1000 units per  mL). Venous access lumen flushed with heparin (100 units per mL)  Catheter securement technique: 2-0 silk suture     Tunneled catheter removal  Local anesthesia was administered. The catheter was removed with  traction.     Contrast  Contrast agent: None  Contrast volume (mL): 0     Radiation Dose  Fluoroscopy time: 0.1 minutes  Dose: 1.7 mGy     Additional Details  Additional description of procedure: None  Equipment details: None  Specimens removed: None  Estimated blood loss (mL): Less than 10  Standardized report: CVA_NonTunneledCatheter     Attestation  I was present and scrubbed for entire procedure. Imaging reviewed. Agree  with final report as written..     This report was finalized on 8/3/2020 5:08 PM by Rojas Gore.       US Abdomen Limited [[id number]] Collected:  08/02/20 1010      Updated:  08/03/20 1237    Narrative:       EXAMINATION: US ABDOMEN LIMITED - 08/02/2020     INDICATION:  I95.89-Other hypotension; E86.1-Hypovolemia;  K92.0-Hematemesis; D62-Acute posthemorrhagic anemia; K62.5-Hemorrhage of  anus and rectum; Z74.09-Other reduced mobility; Z78.9-Other specified  health status; Z74.09-Other reduced mobility. Upper abdominal pain.     TECHNIQUE: Sonographic imaging is obtained of the right upper quadrant  in both the sagittal and transverse planes.     COMPARISON: NONE     FINDINGS: Pancreas is not visualized.. There is free fluid identified  throughout the upper abdomen. The liver is increased in echogenicity  with some lobulation identified of the contour suggesting cirrhosis. The  gallbladder fossa is unremarkable. The common bile duct measures 8 mm.  The right kidney is normal in size, configuration, and texture measuring  in length from pole to pole 11.1 cm. No solid cortical mass or renal  cortical cysts seen within the right kidney. No hydronephrosis or  nephrolithiasis.       Impression:       Cirrhotic appearance of the liver with no underlying mass.  Free fluid identified throughout the upper abdomen. The pancreas is not  seen. The remainder the upper abdomen is unremarkable.      DICTATED:   08/02/2020  EDITED/ls :   08/02/2020      This report was finalized on 8/3/2020 12:34 PM by Dr. Janeth Atkins MD.             Results for orders placed during the hospital encounter of 07/07/20   Adult Transthoracic Echo Complete W/ Cont if Necessary Per Protocol    Narrative · Estimated EF appears to be in the range of 66 - 70%.  · There is a small mobile strand on the posterior leaflet of the mitral   valve suggestive of a vegetation.  · Mild mitral annular calcification. Mild mitral regurgitation present.          I have reviewed the medications:  Scheduled Meds:  aspirin 81 mg Oral Daily   bumetanide 4 mg Oral Daily   hydrocortisone  Topical Q12H   lactulose 10 g Oral TID    magic mouthwash 5 mL Swish & Spit 4x Daily   meropenem 500 mg Intravenous Q24H   metoclopramide 5 mg Oral TID AC   midodrine 10 mg Oral 3 times per day on Sun Tue Thu Sat   midodrine 20 mg Oral 3 times per day on Mon Wed Fri   mirtazapine 15 mg Oral Nightly   Sally 2 patch Topical Once   pantoprazole 40 mg Oral BID AC   sertraline 25 mg Oral Daily   sodium chloride 10 mL Intravenous Q12H     Continuous Infusions:   PRN Meds:.•  acetaminophen  •  albumin human  •  albuterol  •  diphenhydrAMINE  •  heparin (porcine)  •  Morphine  •  ondansetron  •  oxyCODONE  •  phenylephrine-mineral oil-petrolatum  •  sodium chloride    Assessment/Plan   Assessment & Plan     Active Hospital Problems    Diagnosis  POA   • **Hypotension [I95.9]  Yes   • Liver cirrhosis w/ ascites  [K74.60, R18.8]  Yes   • Intractable nausea and vomiting [R11.2]  Yes   • Dehydration [E86.0]  Yes   • ARF likely 2* ATN  [N17.9]  Yes   • Chemotherapy-induced thrombocytopenia [D69.59, T45.1X5A]  Yes   • GIB w/ hematemesis [K92.0]  No   • Acute blood loss anemia [D62]  Yes   • Rectal bleeding [K62.5]  Yes   • Constipation due to opioid therapy [K59.03, T40.2X5A]  Yes   • Pain, cancer [G89.3]  Yes   • 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        Brief Hospital Course to date:  Oliver Mallory is a 55 y.o. male  with relevant PMH of HTN, COPD, Pancreatitis, Cirrhosis, Tonsillar CA s/p tonsillectomy undergoing chemo and RT admitted 7/7/2020 with intractable nausea and vomiting for 4 days with no food intake.  Hospital course complicated by AUDREY, GIB, and hypotension.  He was admitted to ICU and did require pressors for some time. Ultimately stabilized after IV fluids, improvement in GIB and addition of midodrine. Transferred to floor 7/17. Renal function is continually worsening w/ NAL following. Patient lost iv access on 7/23/20, unable to obtain another IV (despite multiple  attempts w/ IV, also attempted EJ access without success. Due to progressive kidney failure w/ volume overload, tunneled dialysis cath & central line was placed by Dr. Gore of interventional radiology on 7/24/20 and dialysis was initiated. Had some post-procedural right neck/supraclavicular oozing/hematoma s/p platelets and FFP.      -Progressive AUDREY w/ volume overload, now s/p dialysis catheter placement receiving Hemodialysis  -Tunneled dialysis cath placed by Dr. Gore on 7/24/20  - removed on 8/3 with placement of temporary line  - echo 7/7/20: normal EF, normal valves  - nephrology following   - Increase to midodrine 20 mg TID on dialysis days   - Difficulty removing volume with cirrhosis   - GABRIELA in AM as ECHO concerning for possible vegetation, NPO at midnight     Fever   Enterobacteriaceae bacteremia   - BCx + 7/31   - Started merrem (8/1)   - ID consulted  - Abd US with no acute findings     -right neck/supraclavicular hematoma around catheter site  - Coagulopathy   - s/p platelets and FFP and Vit K  - R IJ removed 8/1  ** Vit K again 8/2 and 8/3, FFP and platelets prior to IR procedure for line removal and temporary access placement      -s/p gi bleed (due to esophagitis and portal colopathy related to cirrhosis)  -7/9/20: EGD: mild esophagitis, grade 1 esophageal varices, lower 1/3 esophaguts, portal htn gastropathy noted  - 7/10/20: C-scope: rectal oozing consistent w/ portal colopathy, sigmoid diverticulosis noted  - Continue ppi bid; follow up [facility] 4 weeks; repeat egd 1 year for varix surveillance     -EDUARDO cirrhosis (w/ esophageal varices, portal htn gastopathy, and portal colopathy  - continue lactulose, diuretics, midodrine   - repeat para 7/31      -Hx stage II HPV related oropharyngeal cancer   -formerly received chemotherapy & radiotherapy; not candidate currently for any therapies  -regarding tonsillar cancer, per oncology patient is not candidate for further therapies for tonsillar  cancer (given his reina)  -  thrombocytopenia due to splenomegaly from cirrhosis rather than malignancy; f/u Dr. Campbell as outpatient     -Cytopenias (thrombocytopenia & anemia)   -felt due to cirrhosis/hypersplenism per heme-onc    DVT Prophylaxis:  mechanical      Disposition: I expect the patient to be discharged TBD    CODE STATUS:   Code Status and Medical Interventions:   Ordered at: 07/07/20 1721     Level Of Support Discussed With:    Patient     Code Status:    CPR     Medical Interventions (Level of Support Prior to Arrest):    Full         Electronically signed by Sherrill Garbre DO, 08/04/20, 10:23.

## 2020-08-05 NOTE — DISCHARGE PLACEMENT REQUEST
"Danielle Mallory (55 y.o. Male)      Jo Ann Pinto, [phone]      Date of Birth Social Security Number Address Home Phone MRN    [id number]  [id number]   [facility] [address] [phone] [id number]    Alevism Marital Status          Presybeterian        Admission Date Admission Type Admitting Provider Attending Provider Department, Room/Bed    7/7/20 Emergency Sherrill Garber DO Carroll, Meghan C, DO [facility] 5H, S573/1    Discharge Date Discharge Disposition Discharge Destination                       Attending Provider:  Sherrill Garber DO    Allergies:  No Known Allergies    Isolation:  None   Infection:  None   Code Status:  CPR    Ht:  182.9 cm (72.01\")   Wt:  125 kg (276 lb 9.6 oz)    Admission Cmt:  None   Principal Problem:  Hypotension [I95.9]                 Active Insurance as of 7/7/2020     Primary Coverage     Payor Plan Insurance Group Employer/Plan Group    ANTH MEDICAID [facility] MEDICAID [id number]     Payor Plan Address Payor Plan Phone Number Payor Plan Fax Number Effective Dates    [address] [address] [phone]  4/1/2018 - None Entered    [facility] [phone]       Subscriber Name Subscriber Birth Date Member ID       DANIELLE MALLORY 1965 [id number]                 Emergency Contacts      (Rel.) Home Phone Work Phone Mobile Phone    MELISSA MALLORY (Daughter) -- -- [phone]    MohamudAlfonso ornelas (Son) -- -- [phone]           Default Flowsheet Data (07/24/20 0000--07/24/20 2359)   Adult Hemodialysis Record   Row Name    07/24/20  1550    07/24/20  1250                     Assessments (Pre/Post)   Consent Obtained  --  yes             CS         Hepatitis Status  negative  negative           CS  CS         Date Hepatitis Profile Obtained  07/09/20 07/09/20           CS  CS         Transport Modality  bed  bed           CS  CS         Dialyzer Clearance  mildly streaked  --           CS           Access Functioned  Well bleeding " from access site. IR md aware.  --           CS             Row Name    07/24/20 2000 07/24/20  1802    07/24/20  1550    07/24/20  1250    07/24/20  0800   Cognitive/Neuro/Behavioral WDL   Cognitive/Neuro/Behavioral WDL  WDL  WDL  WDL  WDL  WDL     EB  ML  CS  CS  ML   Level of Consciousness  Responds to  Voice  --  --  --  --     EB           Arousal Level  arouses to voi-  ce  --  --  --  --     EB           Orientation  oriented x 4  --  --  --  --     EB           Speech  clear;spontane-  ous;logical  --  --  --  --     EB           Mood/Behavior  calm;cooperati-  ve;behavior  appropriate to  situation  --  --  --  --     EB             Row Name    07/24/20 2000 07/24/20  1550    07/24/20  1250               Motor Response   Motor Response  general motor  response  general motor  response  general motor  response         EB  CS  CS       General Motor Response  purposeful mot-  or response  purposeful mot-  or response  purposeful mot-  or response         EB  CS  CS         Row Name    07/24/20  2300    07/24/20  2200    07/24/20  2100    07/24/20 2000 07/24/20  1900   Safety WDL   Safety WDL  WDL  WDL  WDL  WDL  WDL     KR  EB  KR  EB  KR   Safety Promotion/Fall Prevention  activity super-  vised;fall pre-  vention program  maintained;  nonskid shoes/  slippers when  out of bed;saf-  ety round/check  completed;daryl-  leting schedul-  ed  activity super-  vised;elopement  precautions  initiated;fall  prevention pro-  gram maintaine-  d;nonskid shoe-  s/slippers when  out of bed;  safety round/  check completed  activity super-  vised;fall pre-  vention program  maintained;  nonskid shoes/  slippers when  out of bed;saf-  ety round/check  completed;daryl-  leting schedul-  ed  activity super-  vised;elopement  precautions  initiated;fall  prevention pro-  gram maintaine-  d;nonskid shoe-  s/slippers when  out of bed;  safety round/  check completed  activity super-  vised;fall pre-  vention  program  maintained;  nonskid shoes/  slippers when  out of bed;saf-  ety round/check  completed;daryl-  leting schedul-  ed     KR  EB  KR  EB  KR   Safety Factors  bed in low pos-  ition;wheels  locked;call  light in reach;  ID band on;upp-  er side rails  raised x 2  --  bed in low pos-  ition;wheels  locked;upper  side rails chu-  sed x 2;call  light in reach;  ID band on  ID band on;call  light in reac-  h;upper side  rails raised x  2;wheels adeel-  d;bed in low  position  bed in low pos-  ition;wheels  locked;call  light in reach;  upper side chu-  ls raised x 2;  ID band on     KR    KR  EB  KR   Elopement Precautions  room near unit  station  room near unit  station  room near unit  station  room near unit  station  room near unit  station     KR  EB  KR  EB  KR     Row Name    07/24/20  1802    07/24/20  1727    07/24/20  1550    07/24/20  1300    07/24/20  1250   Safety WDL   Safety WDL  WDL  WDL  WDL  --  WDL     ML  TG  CS    CS   Safety Promotion/Fall Prevention  --  activity super-  vised;nonskid  shoes/slippers  when out of  bed;safety rou-  nd/check compl-  eted  patient off  unit  patient off  unit  --       TG  TG  TG     Safety Factors  --  ID band on;call  light in reac-  h;bed in low  position  --  --  --       TG         Elopement Precautions  --  room near unit  station  --  --  --       TG           Row Name    07/24/20  1100    07/24/20  0900    07/24/20  0800    07/24/20  0700    07/24/20  0600   Safety WDL   Safety WDL  --  WDL  WDL  WDL  WDL       TG  ML  TG  CB   Safety Promotion/Fall Prevention  patient off  unit  activity super-  vised;nonskid  shoes/slippers  when out of  bed;safety rou-  nd/check compl-  eted  activity super-  vised;nonskid  shoes/slippers  when out of  bed;safety rou-  nd/check compl-  eted;toileting  scheduled  activity super-  vised;nonskid  shoes/slippers  when out of  bed;safety rou-  nd/check compl-  eted  activity super-  vised;fall pre-  vention  program  maintained;  nonskid shoes/  slippers when  out of bed;saf-  ety round/check  completed     TG  TG  ML  TG  CB   Safety Factors  --  ID band on;call  light in reac-  h;bed in low  position  --  --  --       TG         Elopement Precautions  --  room near unit  station  --  room near unit  station  room near unit  station       TG    TG  CB     Row Name    07/24/20  0400    07/24/20  0300    07/24/20  0200    07/24/20  0100    07/24/20  0000   Safety WDL   Safety WDL  WDL  WDL  WDL  WDL  WDL     CB  IM  CB  IM  CB   Safety Promotion/Fall Prevention  activity super-  vised;fall pre-  vention program  maintained;  nonskid shoes/  slippers when  out of bed;saf-  ety round/check  completed  activity super-  vised;fall pre-  vention program  maintained;  gait belt;nons-  kid shoes/slip-  pers when out  of bed;safety  round/check  completed;toil-  eting scheduled  activity super-  vised;fall pre-  vention program  maintained;  nonskid shoes/  slippers when  out of bed;saf-  ety round/check  completed  activity super-  vised;fall pre-  vention program  maintained;  gait belt;nons-  kid shoes/slip-  pers when out  of bed;safety  round/check  completed;toil-  eting scheduled  activity super-  vised;fall pre-  vention program  maintained;  nonskid shoes/  slippers when  out of bed;saf-  ety round/check  completed     CB  IM  CB  IM  CB   Elopement Precautions  room near unit  station  --  room near unit  station  --  room near unit  station     CB    CB    CB     Row Name    07/24/20 2000 07/24/20  1802    07/24/20  1550    07/24/20  1250    07/24/20  0800   Cardiovascular WDL   Cardiac WDL  ex;rhythm  ex;rhythm  ex;rhythm  ex;rhythm  ex;rhythm     EB  ML  CS  CS  ML   Cardiac Rhythm  tachycardic  tachycardic  tachycardic  tachycardic  tachycardic     EB  ML  CS  CS  ML     Row Name    07/24/20  2200    07/24/20 2000 07/24/20  1802    07/24/20  1550    07/24/20  1250   ECG   Lead Monitored  Lead II  Lead II   Lead II  --  --     EB  EB  ML       Rhythm  sinus tachycar-  helene  sinus tachycar-  helene  sinus tachycar-  helene  sinus tachycar-  helene  sinus tachycar-  helene     EB  EB  ML  CS  CS   Frequency/Ectopy  PACs  PACs  --  --  --     EB  EB           Row Name    07/24/20  0800    07/24/20  0600    07/24/20  0400    07/24/20  0200    07/24/20  0000   ECG   Lead Monitored  Lead II  Lead II  Lead II  Lead II  Lead II     ML  CB  CB  CB  CB   Rhythm  sinus tachycar-  helene  sinus tachycar-  helene  sinus tachycar-  helene  sinus tachycar-  helene  sinus tachycar-  helene     ML  CB  CB  CB  CB   Frequency/Ectopy  --  PACs  PACs  PACs  PACs       CB  CB  CB  CB   Telemetry Box Number  --  [id number]  [id number]  [id number]  [id number]       CB  CB  CB  CB     Row Name    07/24/20 2000 07/24/20  1802    07/24/20  1550    07/24/20  1250    07/24/20  0800   Peripheral Neurovascular WDL   Peripheral Neurovascular WDL  ex;edema  ex;edema  ex;edema  ex;edema  ex;edema     EB  ML  CS  CS  ML     Row Name    07/24/20  2000    07/24/20  1550    07/24/20  1250    07/24/20  0800         Edema   Edema  foot, left;lucio-  t, right;knee,  right;ankle,  left;ankle,  right;knee,  left  --  --  --       EB           Generalized Edema  3+ (Moderate)  3+ (Moderate)  3+ (Moderate)  --       EB  CS  CS       Abdomen Edema  2+ (Mild)  2+ (Mild)  2+ (Mild)  --       EB  CS  CS       Leg, Left Edema  2+ (Mild)  2+ (Mild)  2+ (Mild)  --       EB  CS  CS       Leg, Right Edema  2+ (Mild)  2+ (Mild)  2+ (Mild)  --       EB  CS  CS       Knee, Left Edema  3+ (Moderate)  3+ (Moderate)  3+ (Moderate)  --       EB  CS  CS       Knee, Right Edema  2+ (Mild)  2+ (Mild)  2+ (Mild)  --       EB  CS  CS       Ankle, Left Edema  4+ (Severe)  4+ (Severe)  4+ (Severe)  4+ (Severe)       EB  CS  CS  ML     Ankle, Right Edema  4+ (Severe)  4+ (Severe)  4+ (Severe)  4+ (Severe)       EB  CS  CS  ML     Foot, Left Edema  4+ (Severe)  4+ (Severe)  4+ (Severe)  4+ (Severe)       EB  CS  CS  ML     Foot, Right  Edema  4+ (Severe)  4+ (Severe)  4+ (Severe)  4+ (Severe)       EB  CS  CS  ML       Row Name    07/24/20 2000 07/24/20 1802 07/24/20 1550 07/24/20  1250 07/24/20  0800   Respiratory WDL   Respiratory WDL  ex;breath soun-  ds  ex;breath soun-  ds  ex;breath soun-  ds  ex;breath soun-  ds  ex;cough     EB  ML  CS  CS  ML   Rhythm/Pattern, Respiratory  pattern regula-  r;depth regula-  r;no shortness  of breath repo-  rted  --  --  --  --     EB           Cough Frequency  infrequent  --  --  --  frequent     EB        ML   Cough Type  productive;con-  gested  --  --  --  productive;con-  gested     EB        ML     Row Name    07/24/20 2000 07/24/20 1550 07/24/20  1250               Breath Sounds   Breath Sounds  All Fields  --  --         EB           All Lung Fields Breath Sounds  Anterior:;Late-  ral:;diminished  Anterior:;Late-  ral:;diminished  Anterior:;Late-  ral:;diminished         EB  CS  CS       ANTHONY Breath Sounds  rhonchi  --  --         EB           LLL Breath Sounds  diminished  --  --         EB           RUL Breath Sounds  clear;Anterior:  --  --         EB           RML Breath Sounds  diminished;Ant-  erior:;crackle-  s, fine  --  --         EB           RLL Breath Sounds  diminished  --  --         EB             Row Name    07/24/20 2000 07/24/20 1802 07/24/20 1550 07/24/20  1250    07/24/20  0800   Gastrointestinal WDL   GI WDL  ex;appearance/  characteristics  ex;appearance/  characteristics  ex;appearance/  characteristics  ex;appearance/  characteristics  ex;appearance/  characteristics     EB  ML  CS  CS  ML   Abdominal Appearance  obese;rounded  --  obese;rounded  obese;rounded  rounded;obese     EB    CS  CS  ML   Abdominal Palpation  All Quadrants  --  --  --  --     EB           All Quadrants Abdominal Palpation  soft/nontender  --  --  --  --     EB           LUQ Abdominal Palpation  soft/nontender  --  --  --  --     EB           RUQ Abdominal Palpation   soft/nontender  --  --  --  --     EB           LLQ Abdominal Palpation  soft/nontender  --  --  --  --     EB           RLQ Abdominal Palpation  soft/nontender  --  --  --  --     EB           Bowel Sounds  All Quadrants  --  --  --  --     EB           All Quadrants Bowel Sounds  audible and  normoactive  --  --  --  --     EB           GI Signs/Symptoms  no gastrointes-  tinal signs/  symptoms  --  --  --  --     EB             Row Name    07/24/20 2000                           Nausea/Vomiting   Nausea/Vomiting Signs/Symptoms  other (see com-  ments) denies at thia time             EB             Row Name    07/24/20 2000 07/24/20  1802 07/24/20  1550    07/24/20  1250    07/24/20  0800   Genitourinary WDL   Genitourinary WDL  WDL  WDL  WDL  WDL  WDL     EB  ML  CS  CS  ML   Voiding Characteristics  voids spontane-  ously without  difficulty  --  --  --  --     EB             Row Name    07/24/20 2200 07/24/20 2000 07/24/20  1802 07/24/20  1727    07/24/20  1550   Pain   Presence of Pain  non-verbal ind-  icators absent  non-verbal ind-  icators absent  non-verbal ind-  icators absent  complains of  pain/discomfort  --     EB  EB  ML  AP     Preferred Pain Scale  number (Numeric  Rating Pain  Scale)  number (Numeric  Rating Pain  Scale)  --  number (Numeric  Rating Pain  Scale)  --     EB  EB    AP     Pain Body Location  --  --  --  throat  throat           AP  CS   Pain Rating (0-10): Rest  --  --  --  10  10           AP  CS   Frequency  --  --  --  constant  --           AP     Quality  --  --  --  sharp  --           AP     Nonverbal Indicators of Pain  --  --  --  grimace  --           AP     Pain Management Interventions  --  --  --  see MAR  --           AP       Row Name    07/24/20  1250    07/24/20  0800 07/24/20  0600 07/24/20  0400    07/24/20  0200   Pain   Presence of Pain  denies pain/  discomfort  complains of  pain/discomfort  denies pain/  discomfort  denies  pain/  discomfort  complains of  pain/discomfort     CS  ML  CB  CB  CB   Preferred Pain Scale  --  number (Numeric  Rating Pain  Scale)  number (Numeric  Rating Pain  Scale)  number (Numeric  Rating Pain  Scale)  number (Numeric  Rating Pain  Scale)       ML  CB  CB  CB   Pain Body Location - Orientation  --  --  --  --  generalized             CB   Pain Body Location  --  throat  --  --  throat       ML      CB   Pain Rating (0-10): Rest  --  6  --  --  7       ML      CB   Pain Rating (0-10): Activity  --  --  --  --  7             CB   Quality  --  aching  --  --  --       ML         Pain Management Interventions  --  see MAR;quiet  environment  facilitated;  unnecessary  movement minim-  ized;pain dixon-  gement plan  reviewed with  patient/caregi-  benson  --  --  see MAR       ML      CB     Row Name    07/24/20  0000                           Pain   Presence of Pain  complains of  pain/discomfort             CB           Preferred Pain Scale  number (Numeric  Rating Pain  Scale)             CB           Pain Rating (0-10): Rest  5             CB           Pain Rating (0-10): Activity  5             CB           Pain Management Interventions  see MAR             CB             Row Name    07/24/20  2300    07/24/20  2200    07/24/20  2100    07/24/20  2000    07/24/20  1900   Interventions   Medication Review/Management  --  medications  reviewed  --  medications  reviewed  --       EB    EB     Environmental Safety Modification  assistive allison-  ce/personal  items within  reach;clutter  free environme-  nt maintained;  lighting adjus-  santiago;room near  unit station;  room organizat-  ion consistent  assistive allison-  ce/personal  items within  reach;lighting  adjusted;clutt-  er free enviro-  nment maintain-  ed;room near  unit station;  room organizat-  ion consistent  assistive allison-  ce/personal  items within  reach;clutter  free environme-  nt maintained;  lighting adjus-  santiago;room near  unit station;  room  organizat-  ion consistent  assistive allison-  ce/personal  items within  reach;clutter  free environme-  nt maintained;  lighting adjus-  santiago;room near  unit station;  room organizat-  ion consistent  assistive allison-  ce/personal  items within  reach;clutter  free environme-  nt maintained;  lighting adjus-  santiago;room near  unit station;  room organizat-  ion consistent     KR  EB  KR  EB  KR     Row Name    07/24/20  1727    07/24/20  1550    07/24/20  1250    07/24/20  0900    07/24/20  0800   Interventions   Medication Review/Management  --  --  medications  reviewed;high  risk medicatio-  ns identified  --  medications  reviewed         CS    ML   Environmental Safety Modification  assistive allison-  ce/personal  items within  reach;clutter  free environme-  nt maintained  clutter free  environment  maintained;room  near unit sta-  tion;room orga-  nization consi-  stent  clutter free  environment  maintained  clutter free  environment  maintained;ass-  istive device/  personal items  within reach  assistive allison-  ce/personal  items within  reach;clutter  free environme-  nt maintained;  lighting adjus-  santiago;room organ-  ization consis-  tent     TG  CS  CS  TG  ML     Row Name    07/24/20  0700    07/24/20  0600    07/24/20  0400    07/24/20  0300    07/24/20  0200   Interventions   Environmental Safety Modification  clutter free  environment  maintained;ass-  istive device/  personal items  within reach  assistive allison-  ce/personal  items within  reach;clutter  free environme-  nt maintained;  room near unit  station;room  organization  consistent  assistive allison-  ce/personal  items within  reach;clutter  free environme-  nt maintained;  room near unit  station;room  organization  consistent  assistive allison-  ce/personal  items within  reach;clutter  free environme-  nt maintained;  lighting adjus-  santiago;room organ-  ization consis-  tent  assistive allison-  ce/personal  items within  reach;clutter  free  environme-  nt maintained;  room near unit  station;room  organization  consistent     TG  CB  CB  IM  CB     Row Name    07/24/20  0100    07/24/20  0000                     Interventions   Environmental Safety Modification  assistive allison-  ce/personal  items within  reach;clutter  free environme-  nt maintained;  lighting adjus-  santiago;room organ-  ization consis-  tent  assistive allison-  ce/personal  items within  reach;clutter  free environme-  nt maintained;  room near unit  station;room  organization  consistent           IM  CB           Row Name    07/24/20  2330    07/24/20  2200    07/24/20  2115    07/24/20  2030    07/24/20  2000   Oxygen Therapy   Flow (L/min)  2  2  2  2  2     EB  EB  EB  EB  EB   Device (Oxygen Therapy)  nasal cannula  nasal cannula  --  --  nasal cannula     EB  EB      EB     Row Name    07/24/20  1802    07/24/20  1550    07/24/20  1315    07/24/20  1250    07/24/20  12:15:03   Oxygen Therapy   Flow (L/min)  2  2  2  --  2     ML  CS  CS    GD   Device (Oxygen Therapy)  nasal cannula  nasal cannula  nasal cannula  room air  --     ML  CS  CS  CS       Row Name    07/24/20  12:10:29    07/24/20  10:50:58    07/24/20  1045    07/24/20  0900    07/24/20  0800   Oxygen Therapy   Flow (L/min)  2  2  2  --  --     GD  GD  GD       Oxygen Concentration (%)  --  --  3  --  --         GD       Device (Oxygen Therapy)  nasal cannula  with ETCO2  nasal cannula  with ETCO2  nasal cannula  with ETCO2  room air  room air     GD  GD  GD  ML  ML     Row Name    07/24/20  0600    07/24/20  0400    07/24/20  0200    07/24/20  0000         Oxygen Therapy   Device (Oxygen Therapy)  room air  room air  room air  room air       CB  CB  CB  CB       Row Name    07/24/20  1550    07/24/20  1250                     Machine Checks   Machine Number  --  9             CS         Station Number  --  3             CS         RO Number  --  Room [id number]             CS         Machine Temperature  --  96.6 °F  (35.9  °C)             CS         Conductivity  --  14.8             CS         pH Level  --  7.2             CS         Chloramines  --  0             CS         Alarms Activated  --  yes           CS  CS           Row Name    07/24/20  1550    07/24/20  1250                     Hemodialysis Prescription   Mode of Treatment  --  HD (hemodialys-  is)           CS  CS         Treatment Duration (hours)  --  3             CS         Blood Flow (BFR)  --  300           CS  CS         Dialysate (K)  --  2             CS         Dialysate (Ca)  --  2.5             CS         Na+ Program  --  140           CS  CS         Bicarb (mEq/L)  --  33             CS         UF Goal  --  2 liters             CS         Heparin Concentration  --  not applicable           CS  CS         Heparin Dosage  --  none           CS  CS         Heparin Comment  --  no heparin           CS  CS         Dialysate Flow  --  500           CS  CS         Dialyzer  --  Revaclear           CS  CS           Row Name    07/24/20  2330    07/24/20  2115    07/24/20 2058 07/24/20  2030    07/24/20  1802   Vitals   Temp  97.2 °F (36.2  °C)  97.3 °F (36.3  °C)  97.4 °F (36.3  °C)  97.8 °F (36.6  °C)  97.5 °F (36.4  °C)     EB  EB  EB  EB  ML   Temp src  Oral  Oral  --  Oral  Oral     EB  EB    EB  ML   Pulse  93  92  100  --  103     EB  EB  EB    ML   Resp  16  16  16  18  18     EB  EB  EB  EB  ML   BP  113/63  99/47  106/58  96/54  110/55     EB  EB  EB  EB  ML     Row Name    07/24/20  1705    07/24/20  1550    07/24/20  1545    07/24/20  1530    07/24/20  1515   Vitals   Initiation  --  -- 310 ml  --  --  --       CS         Temp  97.1 °F (36.2  °C)  98 °F (36.7 °C)  --  --  --     ML  CS         Temp src  Oral  --  --  --  --     ML           Pulse  104  100  100  100  109     ML  CS  CS  CS  CS   Resp  18  18  18  18  18     ML  CS  CS  CS  CS   BP  116/56  104/71  117/77  114/61  110/69     ML  CS  CS  CS  CS   Noninvasive MAP (mmHg)  --  78   84  84  84       CS  CS  CS  CS     Row Name    07/24/20  1500    07/24/20  1445    07/24/20  1430    07/24/20  1417    07/24/20  1415   Vitals   Temp  --  98 °F (36.7 °C)  98 °F (36.7 °C)  98 °F (36.7 °C)  --       CS  CS  CS     Pulse  99  97  99  100  100     CS  CS  CS  CS  CS   Resp  18  18  18  16  18     CS  CS  CS  CS  CS   BP  106/64  110/57  112/69  117/79  105/61     CS  CS  CS  CS  CS   Noninvasive MAP (mmHg)  75  71  84  --  83     CS  CS  CS    CS     Row Name    07/24/20  1400    07/24/20  1355    07/24/20  1349    07/24/20  1345    07/24/20  1330   Vitals   Temp  --  98 °F (36.7 °C)  --  --  --       CS         Pulse  98  98  95  99  109     CS  CS  CS  CS  CS   Resp  16  16  16  16  16     CS  CS  CS  CS  CS   BP  96/62  76/53  96/62  75/53  80/51     CS  CS  CS  CS  CS   Noninvasive MAP (mmHg)  76  57  75  58  59     CS  CS  CS  CS  CS     Row Name    07/24/20  1315    07/24/20  1250    07/24/20  1218    07/24/20  12:17:14    07/24/20  12:15:03   Vitals   Initiation  --  air foam detec-  tor engaged;all  connections  secured;parame-  ters set, samia-  rial;parameters  set, venous;  saline line  double clamped;  prime given  (specify mL) 310 ml  --  --  --       CS         Temp  --  98 °F (36.7 °C)  --  --  --       CS         Pulse  103  103  100  100  103     CS  CS  GD  GD  GD   Heart Rate Source  --  Monitor  --  --  --       CS         Resp  16  16  18  18  16     CS  CS  GD  GD  GD   Resp Rate Source  --  Visual  --  --  --       CS         BP  88/54  122/64  108/55  --  103/60     CS  CS  GD    GD   Noninvasive MAP (mmHg)  62  79  --  --  --     CS  CS         BP Location  --  Right arm  --  --  --       CS         BP Method  --  Automatic  --  --  --       CS         Patient Position  --  Lying  --  --  --       CS           Row Name    07/24/20  12:10:29    07/24/20  1205    07/24/20  12:02:36    07/24/20  12:01:12    07/24/20  1155   Vitals   Pulse  100  97  101  100  99     GD  GD  GD  GD   GD   Resp  16  12  11  12  12     GD  GD  GD  GD  GD   BP  93/51  87/47  80/59  80/59  98/62     GD  GD  GD  GD  GD     Row Name    07/24/20  1150    07/24/20  1145    07/24/20  1140    07/24/20  1135    07/24/20  1130   Vitals   Pulse  98  99  99  98  99     GD  GD  GD  GD  GD   Resp  12  12  12  13  12     GD  GD  GD  GD  GD   BP  109/62  110/56  114/62  111/58  108/60     GD  GD  GD  GD  GD     Row Name    07/24/20  1125    07/24/20  1120    07/24/20  1115    07/24/20  1110    07/24/20  1105   Vitals   Pulse  102  100  101  101  99     GD  GD  GD  GD  GD   Resp  18  18  17  14  18     GD  GD  GD  GD  GD   BP  116/57  113/63  116/70  107/57  111/66     GD  GD  GD  GD  GD     Row Name    07/24/20  1100    07/24/20  1055    07/24/20  10:50:58    07/24/20  1045    07/24/20  1037   Vitals   Pulse  100  100  103  95  99     GD  GD  GD  GD  GD   Resp  20  20  18  18  14     GD  GD  GD  GD  GD   BP  112/66  117/64  116/66  115/67  110/63     GD  GD  GD  GD  GD     Row Name    07/24/20  0900    07/24/20  0730    07/24/20  0700    07/24/20  0539    07/24/20  0116   Vitals   Temp  97.6 °F (36.4  °C)  --  --  97.1 °F (36.2  °C)  97.1 °F (36.2  °C)     ML      IM  IM   Temp src  Oral  --  --  --  Oral     ML        IM   Pulse  103  --  90  --  --     TG    TG       Heart Rate Source  Monitor  --  Monitor  --  Monitor     TG    TG    IM   Resp  --  --  --  18  --           IM     BP  --  102/55  --  101/66  98/54       ML    IM  IM   Noninvasive MAP (mmHg)  --  65  --  --  70       ML      IM   BP Location  --  Right arm  --  --  Right arm       ML      IM   BP Method  --  Automatic  --  --  Automatic       ML      IM   Patient Position  --  Lying  --  --  Lying       ML      IM     Row Name    07/24/20 2330 07/24/20 2115 07/24/20 2058 07/24/20  2030    07/24/20  1802   Oxygen Therapy   SpO2  99 %  97 %  98 %  --  97 %     EB  EB  EB    ML   Pulse Oximetry Type  Continuous  Continuous  --  Continuous  Continuous      EB  EB    EB  ML   Device (Oxygen Therapy)  --  nasal cannula  --  nasal cannula  nasal cannula       EB    EB  ML     Row Name    07/24/20  1550    07/24/20  1545    07/24/20  1530    07/24/20  1515    07/24/20  1500   Oxygen Therapy   SpO2  100 %  100 %  100 %  100 %  100 %     CS  CS  CS  CS  CS     Row Name    07/24/20  1445    07/24/20  1430    07/24/20  1417    07/24/20  1415    07/24/20  1400   Oxygen Therapy   SpO2  100 %  100 %  100 %  100 %  100 %     CS  CS  CS  CS  CS     Row Name    07/24/20  1355    07/24/20  1349    07/24/20  1345    07/24/20  1330    07/24/20  1315   Oxygen Therapy   SpO2  95 %  95 %  92 %  95 %  88 %     CS  CS  CS  CS  CS   Device (Oxygen Therapy)  --  --  --  --  nasal cannula             CS     Row Name    07/24/20  1250    07/24/20  1218    07/24/20  12:17:14    07/24/20  12:15:03    07/24/20  12:10:29   Oxygen Therapy   SpO2  97 %  94 %  96 %  98 %  98 %     CS  GD  GD  GD  GD   Pulse Oximetry Type  --  --  Continuous  --  Continuous         GD    GD   Device (Oxygen Therapy)  room air  room air  room air  --  --     CS  GD  GD       ETCO2 (mmHg)  --  --  --  --  30 mmHg             GD     Row Name    07/24/20  1205    07/24/20  12:02:36    07/24/20  12:01:12    07/24/20  1155    07/24/20  1150   Oxygen Therapy   SpO2  96 %  95 %  94 %  95 %  98 %     GD  GD  GD  GD  GD   ETCO2 (mmHg)  31 mmHg  30 mmHg  27 mmHg  --  --     GD  GD  GD         Row Name    07/24/20  1145    07/24/20  1140    07/24/20  1135    07/24/20  1130    07/24/20  1125   Oxygen Therapy   SpO2  99 %  99 %  98 %  100 %  100 %     GD  GD  GD  GD  GD     Row Name    07/24/20  1120    07/24/20  1115    07/24/20  1110    07/24/20  1105    07/24/20  1100   Oxygen Therapy   SpO2  100 %  100 %  100 %  100 %  100 %     GD  GD  GD  GD  GD     Row Name    07/24/20  1055    07/24/20  10:50:58    07/24/20  1045    07/24/20  1037    07/24/20  0900   Oxygen Therapy   SpO2  100 %  100 %  100 %  97 %  92 %     GD  GD  GD  GD   ML   Pulse Oximetry Type  --  Continuous  Continuous  --  Intermittent       GD  GD    ML   ETCO2 (mmHg)  --  21 mmHg  --  28 mmHg  --       GD    GD       Row Name    07/24/20  1700    07/24/20  1550    07/24/20  1545    07/24/20  1530    07/24/20  1515   Treatment Assessment   Blood Flow Rate (BFR)  --  --  200  200  200         CS  CS  CS   Venous Pressure ()  --  --  60  60  70         CS  CS  CS   Arterial Pressure (AP)  --  --  -40  -50  -40         CS  CS  CS   Hemodialysis, Fluids  --  --  0  0  0         CS  CS  CS   Total Mean Pressure (TMP)  --  --  35  25  20         CS  CS  CS   Ultrafiltration Rate (UFR)  --  --  1140  620  100         CS  CS  CS   Blood Liters Processed (BLP)  --  36.8  --  --  --       CS         Safety Check WDL  --  ex  ex  ex  ex       CS  CS  CS  CS   Hemodialysis, Safety Factors  --  supplemental  oxygenation bleeding from access site  supplemental  oxygenation  supplemental  oxygenation  supplemental  oxygenation       CS  CS  CS  CS   Hemodialysis, Comments  Report to RN.  [facility]  Pressure held  at 3 bleeding  sites from 1415  until return  to floor.   HD complete.  --  --  --     CS  CS           Row Name    07/24/20  1500    07/24/20  1445    07/24/20  1430    07/24/20  1417    07/24/20  1415   Treatment Assessment   Blood Flow Rate (BFR)  200  200  200  --  200     CS  CS  CS    CS   Venous Pressure ()  70  70  70  --  70     CS  CS  CS    CS   Arterial Pressure (AP)  -40  -40  -40  --  -40     CS  CS  CS    CS   Hemodialysis, Fluids  0  0  0  --  0     CS  CS  CS    CS   Total Mean Pressure (TMP)  20  20  20  --  20     CS  CS  CS    CS   Ultrafiltration Rate (UFR)  100  100  100  --  100     CS  CS  CS    CS   Safety Check WDL  ex  ex  ex  --  ex     CS  CS  CS    CS   Hemodialysis, Safety Factors  supplemental  oxygenation  supplemental  oxygenation;  other (see com-  ments)  supplemental  oxygenation  --   supplemental  oxygenation     CS  CS  CS    CS   Hemodialysis, Comments  --  --  --  1 unit PRBC  started.   Bleeding noted  from dialysis  catheter, cent-  ral line and  left side of  neck. MD Miles  and MD Frey at  bedside. IR  notified. Ronit-  ding pressure  on all bleeding  sites.            CS  CS     Row Name    07/24/20  1400    07/24/20  1355    07/24/20  1349    07/24/20  1345    07/24/20  1330   Treatment Assessment   Blood Flow Rate (BFR)  200  --  200  220  250     CS    CS  CS  CS   Venous Pressure ()  70  --  70  70  80     CS    CS  CS  CS   Arterial Pressure (AP)  -40  --  -50  -50  -70     CS    CS  CS  CS   Hemodialysis, Fluids  0  --  0  0  0     CS    CS  CS  CS   Total Mean Pressure (TMP)  15  --  15  15  15     CS    CS  CS  CS   Ultrafiltration Rate (UFR)  100  --  100  100  100     CS    CS  CS  CS   Safety Check WDL  ex  --  ex  ex  ex     CS    CS  CS  CS   Hemodialysis, Safety Factors  supplemental  oxygenation  --  supplemental  oxygenation  supplemental  oxygenation  supplemental  oxygenation     CS    CS  CS  CS   Hemodialysis, Comments  --  MD Jeffery notif-  ied of VS.  Orders to give  Midodrine, and  25 g 25% album-  in.   decreaszed BFR.  decreased temp  to 35.0 c .  300 ml NS give-  n.   Albumin given  see MAR       CS  CS  CS  CS     Row Name    07/24/20  1315    07/24/20  1250                     Treatment Assessment   Blood Flow Rate (BFR)  250  200           CS  CS         Venous Pressure ()  80  60           CS  CS         Arterial Pressure (AP)  -70  -50           CS  CS         Hemodialysis, Fluids  0  0           CS  CS         Total Mean Pressure (TMP)  15  20           CS  CS         Ultrafiltration Rate (UFR)  100  250           CS  CS         Safety Check WDL  ex  WDL           CS  CS         Hemodialysis, Safety Factors  supplemental  oxygenation  --           CS           Hemodialysis, Comments  Placed in Mini-  mum UFR. Album-  in given see  MAR.  HD initiated.             CS  CS           Row Name                                 Hemodialysis Cath Double   Hemodialysis Properties Placement Date: -- -CS Hand Hygiene Completed: -- -CS Verification by X-ray: -- -CS Access Type: -- -CS Orientation: -- -CS     Row Name    07/24/20 2200 07/24/20 2000 07/24/20 1802 07/24/20  1550         [REMOVED] Hemodialysis Cath Double with Pigtail   Hemodialysis Properties Placement Date: 07/24/20 -GD Placement Time: 1204 -GD Hand Hygiene Completed: Yes -GD Verification by X-ray: Yes -GD Site Prep: Chlorhexidine isopropyl alcohol -GD Site Prep Agent has Completely Dried Before Insertion: Yes -GD All 5 Sterile Barriers Used (Gloves, Gown, Cap, Mask, Large Sterile Drape): Yes -GD Local Anesthetic: Injectable -GD Technique: Ultrasound guidance -GD Size: 14.5 -GD Initial Exposed Catheter (cm): 2 cm -GD Inserted by: Dr Gore -GD Total insertion attempts: 1 -GD Securement Method: Sutured -GD Patient Tolerance: Tolerated well -GD Placement Verification: X-ray -GD Access Type: Tunneled catheter -GD Orientation: Right -GD Access Location: Internal Jugular -GD Removal Date: 07/25/20 -CS Removal Time: 0905 -CS Removal Reason: Other (Comment) -CS   Site Assessment  Bleeding  Bleeding  Bleeding  Bleeding       EB  EB  ML  CS     #1 Lumen Status  Flushed;Heparin  locked;Capped  Flushed;Heparin  locked;Capped  --  Flushed;Heparin  locked;Capped       EB  EB    CS     #2 Lumen Status  Flushed;Heparin  locked;Capped  Flushed;Heparin  locked;Capped  --  Flushed;Heparin  locked;Capped       EB  EB    CS     Dressing Type  Gauze  Gauze  Gauze  Gauze;Transpar-  ent;Antimicrob-  ial dressing/  disc       EB  EB  ML  CS     Dressing Status  Old drainage  Old drainage  New drainage  New drainage       EB  EB  ML  CS     Dressing Intervention  --  New dressing  --  New dressing         EB    CS     Liquid Adhesive  --  --  --  Applied             CS     Dressing Change Due  --  07/25/20  --  07/25/20          EB    CS     Indication/Daily Review of Necessity  hemodialysis  hemodialysis  hemodialysis  hemodialysis       EB  EB  ML  CS       Row Name    07/24/20  1551                           Catheter Medication Management   Catheter Medications  Heparin             CS           Concentration (units/mL)  1000             CS           Arterial (mL)  1.6             CS           Venous (mL)  1.6             CS           User Key   (r) = Recorded By, (t) = Taken By, (c) = Cosigned By   Initials Name   ML Erica Holcomb RN EB Best, Ellen M, RN   IM Leticia Villagran Ginger, RN    Annie Corrigan, Yvonne Looney PCT CB Begley, Caitlyn, RN CS Stokes, Crystal, RN AP Price, Arwen, RN       Immunizations   DANIELLE MALLORY   Hepatitis A     Date  Lot# Route/Site Dose VIS Date Admin By   7/11/2020 DANIEL [id number] IM/RD 1,440 Units 7/20/16 Nunu Reddy RN   Hepatitis B Vaccine Adult IM     Date  Lot# Route/Site Dose VIS Date Admin By   7/11/2020 DANIEL [id number] IM/LD 20 mcg 8/15/19 Nunu Reddy RN   Immunization Questions       Question Answer Comment   Hepatitis A 07/11/2020 Date VIS given 7/11/2020    Hepatitis B Vaccine Adult IM 07/11/2020 Have you ever had a serious reaction to [employer] Yeast? NO        Are you sick today with a moderate to severe illness (e.g. fever) NO        Date VIS given 7/11/2020     Jump to Immunization Activity         Insurance Information                [employer] [employer] Phone: [phone]    Subscriber: Danielle Mallory Subscriber#: [id number]    Group#: [id number] Precert#:

## 2020-08-05 NOTE — PROGRESS NOTES
Continued Stay Note  [facility]     Patient Name: Oliver Mallory  MRN: [id number]  Today's Date: 8/5/2020    Admit Date: 7/7/2020    Discharge Plan     Row Name 08/05/20 5590       Plan    Plan  discharge plan    Plan Comments  Spoke with pt in room regarding discharge plan.  Pt plans to stay with daughter at discharge, address [address] [place] [address].  CM cont to work on HD chair at [facility]. Per [facility]'s request,  additional clinical information faxed to [facility] at [phone]. CM will cont to follow.        Discharge Codes    No documentation.       Expected Discharge Date and Time     Expected Discharge Date Expected Discharge Time    Aug 8, 2020             Alis Pinto RN

## 2020-08-05 NOTE — PLAN OF CARE
HD in progress.     Problem: Hemodialysis (Adult)  Goal: Signs and Symptoms of Listed Potential Problems Will be Absent, Minimized or Managed (Hemodialysis)  Flowsheets (Taken 8/5/2020 9455)  Problems Assessed (Hemodialysis): all  Problems Present (Hemodialysis): electrolyte imbalance; fluid imbalance

## 2020-08-05 NOTE — PROGRESS NOTES
INFECTIOUS DISEASE Progress Note    Oliver Mallory  1965  [id number]    Date of consult: 8/1/20    Admit date: 7/7/2020    Evaluating physician: Dr. Joey Graham  Reason for Consultation: Citrobacter sepsis with bacteremia, 4 out of 4 bottles positive from 7/31/2020  Chief Complaint: Intractable nausea/vomiting and constipation, sepsis      Subjective   History of present illness:  Mr. Oliver Mallory 55 y.o.  Yr old male who is being evaluated for Enterobacter bacteremia.  He has a past medical history significant for hypertension, COPD, pancreatitis, cirrhosis, tonsillar cancer status post tonsillectomy undergoing chemo and radiation therapy.  He was initially admitted on 7/7/2020 with intractable nausea and vomiting x4 days.  He was initially admitted to the ICU and did require some pressors.  Hospital course was complicated by GI bleed and hypotension as well acute kidney injury.  Patient was stabilized and transferred to the floor on 7/17.  Due to progressive kidney failure with fluid volume overload, tunneled dialysis catheter and central line was placed by interventional radiology on 7/24/2020 and dialysis was initiated.  There was reported issues with bleeding from the central line and patient received platelets and FFP.  Patient was found to have IJ line pulled most the way out had to be discontinued on 8/1 morning. Dialysis catheter remains in place.  Patient underwent a CT-guided paracentesis on 7/31, 7/15 and 7/10.  Patient had a EGD on 7/9 that showed mild portal gastropathy, reflux esophagitis and grade 1 esophageal varices.  He received a colonoscopy on 7/10 which showed a few sigmoid diverticula.  There is also petechiae in the rectum with scant oozing.  It was felt, in the absence of history of radiation to this region, that this is suspected portal colopathy.  Patient also received an echo on 7/8/2020 that was normal.    Patient did develop a fever of 101.5 on 7/30 as well as some tachycardia up to  112.  Patient did remain without leukocytosis however due to the fever blood cultures were ordered which have turned positive in 4/4 bottles for gram-negative bacilli identified as Enterobacteriaceae by [facility] report.  Currently WBC is 5.56, H&H is 8.1/25.7 and platelets are 34.  AST is elevated at 70 and alkaline phosphatase is elevated 148 and total bilirubin is elevated 2.7.  7/31 CT of chest show clear lungs and cirrhosis with ascites.  CT of the abdomen on 8/1 showed cirrhosis with splenomegaly and ascites, surgical absence of gallbladder and diverticulosis.    Patient has no known antibiotic allergies and is currently receiving IV Zosyn.  ID has been consulted for further evaluation and treatment as well as antimicrobial therapy management on 8/1/2020.    Of note:  7/8 blood cultures were finalized no growth in 4/4 bottles  7/31 body fluid culture and stain-no organisms seen  7/15 body fluid culture and stain-no organisms seen    8/2/2020: History reviewed.  Patient sitting up on side of bed awake and alert upon arrival.  He remains with dialysis catheter in right chest.  He denies any events overnight.  He has been afebrile with some hypotension overnight but overall BP is trending up.  Without leukocytosis with a WBC of 6.11.  However lactic acid remains elevated at 3.0 as well as a CRP of 5.49.  Procalcitonin is also elevated 1.2.  Blood cultures have been identified as Citrobacter koseri by [facility] report.  Did receive an ultrasound of the  Right upper quadrant which showed free fluid identified throughout the upper abdomen, liver with increased echogenicity with some lobulation identified at the contour suggesting cirrhosis.  Gallbladder fossa was unremarkable and the common bile duct measured 8 mm.  TTE was performed on 8/1 and is currently pending interpretation.  Awaiting for catheter removal in a.m. after dialysis.    8/3/2020: Patient seen in HD today. 0 complaints overnight. Patient afebrile and  hemodynamically stable overnight.  Plan to remove Vipin cath today after dialysis.  Remains without leukocytosis.  No events to report overnight per RN.  Continues on IV meropenem for Citrobacter koseri sepsis.  Waiting for dialysis catheter removal.  No pain.  On IV meropenem until 8/17/2020.    8/4/2020 history reviewed.  No high fevers or chills.  On meropenem until 8/17, or longer depending upon GABRIELA.  8/1/2020 TTE with small mobile strand on the posterior leaflet of the mitral valve suggestive of a vegetation.  No pain.  Tunneled Vipin cath removed right chest, replaced by temporary dialysis catheter on 8/3/2020.    8/5/2020 history reviewed.  On meropenem but changing to post hemodialysis Fortaz until 8/17 depending on GABRIELA results.  TTE with possible mitral valve vegetation.  Tunneled Vipin cath removed on 8/3 with temporary placed.  Being treated for Citrobacter sepsis from blood cultures from 7/31.  Sensitive to levofloxacin, cefotaxime, trimethoprim sulfa.  Changing to post hemodialysis Fortaz.      Past Medical History:   Diagnosis Date   • Arthritis    • Cancer (CMS/HCC)    • COPD (chronic obstructive pulmonary disease) (CMS/HCC)    • Fall 05/12/2020   • Hypertension    • Pancreatitis    • Tonsillar cancer (CMS/HCC)        Past Surgical History:   Procedure Laterality Date   • ANKLE TENDON REPAIR     • CARDIAC CATHETERIZATION N/A 5/31/2018    Procedure: Left Heart Cath;  Surgeon: Ray aFrley MD;  Location:  [facility] CATH INVASIVE LOCATION;  Service: Cardiovascular   • CHOLECYSTECTOMY     • COLONOSCOPY N/A 7/10/2020    Procedure: COLONOSCOPY;  Surgeon: Brunner, Mark I, MD;  Location:  [facility];  Service: Gastroenterology;  Laterality: N/A;   • CYST REMOVAL      coccyx   • ENDOSCOPY N/A 7/9/2020    Procedure: ESOPHAGOGASTRODUODENOSCOPY;  Surgeon: Brunner, Mark I, MD;  Location:  [facility];  Service: Gastroenterology;  Laterality: N/A;   • HERNIA MESH REMOVAL     • HERNIA REPAIR     • KNEE SURGERY   1981   • TONSILLECTOMY         Pediatric History   Patient Guardian Status   • Not on file     Other Topics Concern   • Not on file   Social History Narrative    Patient ambulatory without assistive device, but has ordered       family history includes Heart disease in his father; Hypertension in his mother.    No Known Allergies    Immunization History   Administered Date(s) Administered   • Hepatitis A 07/11/2020   • Hepatitis B Vaccine Adult IM 07/11/2020       Medication:    Current Facility-Administered Medications:   •  acetaminophen (TYLENOL) tablet 650 mg, 650 mg, Oral, Q4H PRN, Abram Abraham MD, 650 mg at 07/31/20 2107  •  albumin human 25 % IV SOLN 12.5 g, 12.5 g, Intravenous, PRN, Rickey Hays MD  •  albumin human 25 % IV SOLN 25 g, 25 g, Intravenous, PRN, Rickey Hays MD, 50 g at 08/03/20 1024  •  albuterol (PROVENTIL) nebulizer solution 0.083% 2.5 mg/3mL, 2.5 mg, Nebulization, Q6H PRN, Abram Abraham MD, 2.5 mg at 07/21/20 1405  •  aspirin chewable tablet 81 mg, 81 mg, Oral, Daily, Abram Abraham MD, 81 mg at 08/04/20 0851  •  bumetanide (BUMEX) tablet 4 mg, 4 mg, Oral, Daily, Escobar Miles MD, 4 mg at 08/04/20 0852  •  diphenhydrAMINE (BENADRYL) capsule 25 mg, 25 mg, Oral, Q6H PRN, Escobar Miles MD  •  heparin (porcine) injection 1,600 Units, 1,600 Units, Intracatheter, PRN, Escobar Miles MD, 1,600 Units at 07/31/20 1013  •  hydrocortisone 1 % cream, , Topical, Q12H, Rupa Hawthorne, APRN  •  lactulose (CHRONULAC) 10 GM/15ML solution 10 g, 10 g, Oral, TID, Abram Abraham MD, 10 g at 08/04/20 2000  •  magic mouthwash oral supsension 5 mL, 5 mL, Swish & Spit, 4x Daily, Loreta Hemphill II, DO, 5 mL at 08/04/20 2000  •  meropenem (MERREM) 500mg/100 mL 0.9% NS IVPB (mbp), 500 mg, Intravenous, Q24H, Raymundo Johnson, [facility], 500 mg at 08/04/20 1506  •  metoclopramide (REGLAN) tablet 5 mg, 5 mg, Oral, TID AC, Abram Abraham MD, 5 mg at 08/04/20 1746  •  midodrine  (PROAMATINE) tablet 10 mg, 10 mg, Oral, 3 times per day on Sun Tue Thu Sat, Ksenia Chaidez, DO, 10 mg at 08/04/20 1747  •  midodrine (PROAMATINE) tablet 20 mg, 20 mg, Oral, 3 times per day on Mon Wed Fri, Ksenia Chaidez DO, 20 mg at 08/03/20 1803  •  mirtazapine (REMERON) tablet 15 mg, 15 mg, Oral, Nightly, Abram Abraham MD, 15 mg at 08/04/20 1959  •  morphine injection 1 mg, 1 mg, Intravenous, Q4H PRN, Sherrill Garber DO, 1 mg at 08/05/20 0027  •  Sally patch 2 patch, 2 patch, Topical, Once, Zamzam Villa, APRN, Stopped at 07/31/20 2211  •  ondansetron (ZOFRAN) injection 4 mg, 4 mg, Intravenous, Q6H PRN, Abram Abraham MD, 4 mg at 07/31/20 0844  •  oxyCODONE (ROXICODONE) immediate release tablet 10 mg, 10 mg, Oral, Q6H PRN, Escobar Miles MD, 10 mg at 08/04/20 2118  •  pantoprazole (PROTONIX) EC tablet 40 mg, 40 mg, Oral, BID AC, Abram Abraham MD, 40 mg at 08/04/20 1747  •  phenylephrine-mineral oil-petrolatum (PREPARATION H) 0.25-14-74.9 % hemorhoidal ointment, , Rectal, TID PRN, Abram Abraham MD  •  sertraline (ZOLOFT) tablet 25 mg, 25 mg, Oral, Daily, Abram Abraham MD, 25 mg at 08/04/20 0853  •  sodium chloride 0.9 % flush 10 mL, 10 mL, Intravenous, Q12H, Abram Abraham MD, 10 mL at 08/04/20 2037  •  sodium chloride 0.9 % flush 10 mL, 10 mL, Intravenous, PRN, Abram Abraham MD, 10 mL at 07/10/20 0759    Please refer to the medical record for a full medication list    Review of Systems:    Constitutional--afebrile overnight  HEENT-- No new vision, hearing or throat complaints.  No epistaxis or oral sores.  Denies odynophagia or dysphagia.  No odynophagia or dysphagia. No headache, photophobia or neck stiffness.  CV-- No chest pain, palpitation or syncope  Resp-- No SOB/cough/Hemoptysis  GI- No nausea, vomiting, or diarrhea.  No hematochezia, melena, or hematemesis. Denies jaundice or chronic liver disease.  -- No dysuria, hematuria, or flank pain.  Denies hesitancy, urgency or flank  "pain.  Lymph- no swollen lymph nodes in neck/axilla or groin.   Heme-positive ecchymosis across chest.  MS-- no swelling or pain in the bones or joints of arms/legs.  No new back pain.  Neuro-- No acute focal weakness or numbness in the arms or legs.  No seizures.  Skin--positive for bruising, no nodules, no pain right temporary dialysis catheter chest, no petechiae    Physical Exam:   Vital Signs   Temp:  [97.7 °F (36.5 °C)-98.3 °F (36.8 °C)] 97.7 °F (36.5 °C)  Heart Rate:  [] 105  Resp:  [16-18] 18  BP: ()/(52-73) 105/61    Temp  Min: 97.7 °F (36.5 °C)  Max: 98.3 °F (36.8 °C)  BP  Min: 91/64  Max: 126/73  Pulse  Min: 83  Max: 108  Resp  Min: 16  Max: 18  SpO2  Min: 90 %  Max: 94 %    Blood pressure 105/61, pulse 105, temperature 97.7 °F (36.5 °C), resp. rate 18, height 182.9 cm (72.01\"), weight 125 kg (276 lb 9.6 oz), SpO2 90 %.  GENERAL: Awake and alert, in minimal distress.  Sitting up on side of bed.    HEENT:  Normocephalic, atraumatic.  Ears externally normal, Nose externally normal.  EYES: No icterus.   LYMPHATICS:   HEART: Audible murmur noted 2/6 left sternal border.  RRR.  No JVD.  LUNGS: Clear to auscultation. No respiratory distress, no use of accessory muscles.  ABDOMEN: Soft, tender x4 quadrants, nondistended. Bowel sounds normal.  GENITAL: Without Abbasi cath.   SKIN: Warm and dry without cutaneous eruptions.  Ecchymosis noted across bilateral chest and scattered across bilateral upper extremities  PSYCHIATRIC: Mental status lucid. No confusion.  EXT:  No cellulitic change.  Normal range of motion.  NEURO: Oriented to name, CN 2 to 12 intact, nonfocal  LINES: Right chest temporary dialysis cath in place.  Ecchymosis noted around the site.  No      Results Review:       Results from last 7 days   Lab Units 08/05/20  0518 08/04/20  0418 08/03/20  0459   WBC 10*3/mm3 4.25 4.03 5.18   HEMOGLOBIN g/dL 7.5* 7.0* 8.5*   HEMATOCRIT % 22.4* 21.4* 25.1*   PLATELETS 10*3/mm3 52* 48* 42*     Results " from last 7 days   Lab Units 08/05/20  0518   SODIUM mmol/L 137   POTASSIUM mmol/L 3.6   CHLORIDE mmol/L 102   CO2 mmol/L 23.0   BUN mg/dL 38*   CREATININE mg/dL 5.03*   GLUCOSE mg/dL 118*   CALCIUM mg/dL 8.2*     Results from last 7 days   Lab Units 08/03/20  0459   ALK PHOS U/L 166*   BILIRUBIN mg/dL 2.4*   ALT (SGPT) U/L 36   AST (SGOT) U/L 85*     Results from last 7 days   Lab Units 08/02/20  0422   SED RATE mm/hr 4     Results from last 7 days   Lab Units 08/02/20  0422   CRP mg/dL 5.49*         Results from last 7 days   Lab Units 08/03/20  0459   LACTATE mmol/L 2.3*     Estimated Creatinine Clearance: 22.4 mL/min (A) (by C-G formula based on SCr of 5.03 mg/dL (H)).    Microbiology:  Microbiology Results (last 10 days)     Procedure Component Value - Date/Time    Anaerobic Culture - Body Fluid, Peritoneum [[id number]] Collected:  07/31/20 1422    Lab Status:  Preliminary result Specimen:  Body Fluid from Peritoneum Updated:  08/03/20 1014     Anaerobic Culture No anaerobes isolated at 3 days    Body Fluid Culture - Body Fluid, Peritoneum [[id number]] Collected:  07/31/20 1422    Lab Status:  Final result Specimen:  Body Fluid from Peritoneum Updated:  08/03/20 0751     Body Fluid Culture No growth at 3 days     Gram Stain No organisms seen      Occasional WBCs per low power field    Blood Culture - Blood, Arm, Left [[id number]]  (Abnormal) Collected:  07/31/20 0832    Lab Status:  Final result Specimen:  Blood from Arm, Left Updated:  08/03/20 0649     Blood Culture Citrobacter koseri     Comment: Refer to previous blood culture collected on 7/31/2020 0830 for MICs.          Isolated from Aerobic and Anaerobic Bottles     Gram Stain Anaerobic Bottle Gram negative bacilli      Aerobic Bottle Gram negative bacilli    Blood Culture - Blood, Blood, PICC Line [[id number]]  (Abnormal)  (Susceptibility) Collected:  07/31/20 0830    Lab Status:  Final result Specimen:  Blood, PICC Line Updated:  08/03/20 0649     Blood  Culture Citrobacter koseri     Isolated from Aerobic and Anaerobic Bottles     Gram Stain Anaerobic Bottle Gram negative bacilli      Aerobic Bottle Gram negative bacilli    Susceptibility      Citrobacter koseri     KALANI     Cefepime Susceptible     Ceftazidime Susceptible     Ceftriaxone Susceptible     Gentamicin Susceptible     Levofloxacin Susceptible     Piperacillin + Tazobactam Susceptible     Trimethoprim + Sulfamethoxazole Susceptible                Susceptibility Comments     Citrobacter koseri    Cefazolin sensitivity will not be reported for Enterobacteriaceae in non-urine isolates. If cefazolin is preferred, please call the microbiology lab to request an E-test.  With the exception of urinary-sourced infections, aminoglycosides should not be used as monotherapy.             Blood Culture ID, PCR - Blood, Blood, PICC Line [[id number]]  (Abnormal) Collected:  07/31/20 0830    Lab Status:  Final result Specimen:  Blood, PICC Line Updated:  08/01/20 0022     BCID, PCR Enterobacteriaceae Group. Identification by BCID PCR.            Radiology:  Imaging Results (Last 72 Hours)     Procedure Component Value Units Date/Time    IR Tunneled Catheter Removal [[id number]] Collected:  08/03/20 1657     Updated:  08/03/20 1711    Narrative:       PROCEDURE:   Non-tunneled central venous catheter placement  Tunneled catheter removal     Procedural Personnel  Attending physician(s): ABELARDO Gore M.D.  Fellow physician(s): None  Resident physician(s): None  Advanced practice provider(s): None     Pre-procedure diagnosis: Enterobacteriaceae bacteremia   Post-procedure diagnosis: Same  Indication: Therapeutic  Additional clinical history: None     Complications: No immediate complications.       Impression:          Insertion of right-sided non-tunneled triple-lumen hemodialysis  catheter, with tip in the expected location of the cavoatrial junction.  Removal of tunneled hemodialysis catheter     Plan:      The catheter may  be used immediately.  _______________________________________________________________     PROCEDURE SUMMARY:  - Venous access with ultrasound guidance  - Non-tunneled central venous catheter insertion with fluoroscopic  guidance  - Additional procedure(s): None     PROCEDURE DETAILS:     Pre-procedure  Consent: Informed consent for the procedure including risks, benefits  and alternatives was obtained and time-out was performed prior to the  procedure.  Preparation (MIPS): The site was prepared and draped using all elements  of maximal sterile barrier technique including sterile gloves, sterile  gown, cap, mask, large sterile sheet, sterile ultrasound probe cover,  hand hygiene and cutaneous antisepsis with 2% chlorhexidine.   Medical reason for site preparation exception (MIPS): Not applicable     Anesthesia/sedation  Level of anesthesia/sedation: Moderate sedation (conscious sedation)  Anesthesia/sedation administered by: Independent trained observer under  attending supervision with continuous monitoring of the patient?s level  of consciousness and physiologic status  Total intra-service sedation time (minutes): 18     Access  Local anesthesia was administered. The vessel was sonographically  evaluated and determined to be patent. Real time ultrasound was used to  visualize needle entry into the vessel and a permanent image obtained.  Vein accessed: Right internal jugular vein  Access technique: Micropuncture technique under ultrasound guidance     Catheter placement  The access site was dilated and the catheter was placed into the vein  over a wire  under fluoroscopic guidance.  The catheter tip location was  fluoroscopically verified and a permanent image was stored.. A sterile  dressing was applied.  Catheter placed: Rafaela Kwok Acute Triple Lumen Catheter. Lot No  [id number].  Catheter size (Slovak): 12  Catheter length (cm): 16  Catheter flush: Dialysis lumens flushed with heparin (1000 units per  mL).  Venous access lumen flushed with heparin (100 units per mL)  Catheter securement technique: 2-0 silk suture     Tunneled catheter removal  Local anesthesia was administered. The catheter was removed with  traction.     Contrast  Contrast agent: None  Contrast volume (mL): 0     Radiation Dose  Fluoroscopy time: 0.1 minutes  Dose: 1.7 mGy     Additional Details  Additional description of procedure: None  Equipment details: None  Specimens removed: None  Estimated blood loss (mL): Less than 10  Standardized report: CVA_NonTunneledCatheter     Attestation  I was present and scrubbed for entire procedure. Imaging reviewed. Agree  with final report as written..     This report was finalized on 8/3/2020 5:08 PM by Rojas Gore.       IR insert non-tunneled CVC 5+ [[id number]] Collected:  08/03/20 1657     Updated:  08/03/20 1711    Narrative:       PROCEDURE:   Non-tunneled central venous catheter placement  Tunneled catheter removal     Procedural Personnel  Attending physician(s): ABELARDO Gore M.D.  Fellow physician(s): None  Resident physician(s): None  Advanced practice provider(s): None     Pre-procedure diagnosis: Enterobacteriaceae bacteremia   Post-procedure diagnosis: Same  Indication: Therapeutic  Additional clinical history: None     Complications: No immediate complications.       Impression:          Insertion of right-sided non-tunneled triple-lumen hemodialysis  catheter, with tip in the expected location of the cavoatrial junction.  Removal of tunneled hemodialysis catheter     Plan:      The catheter may be used immediately.  _______________________________________________________________     PROCEDURE SUMMARY:  - Venous access with ultrasound guidance  - Non-tunneled central venous catheter insertion with fluoroscopic  guidance  - Additional procedure(s): None     PROCEDURE DETAILS:     Pre-procedure  Consent: Informed consent for the procedure including risks, benefits  and alternatives was obtained and  time-out was performed prior to the  procedure.  Preparation (MIPS): The site was prepared and draped using all elements  of maximal sterile barrier technique including sterile gloves, sterile  gown, cap, mask, large sterile sheet, sterile ultrasound probe cover,  hand hygiene and cutaneous antisepsis with 2% chlorhexidine.   Medical reason for site preparation exception (MIPS): Not applicable     Anesthesia/sedation  Level of anesthesia/sedation: Moderate sedation (conscious sedation)  Anesthesia/sedation administered by: Independent trained observer under  attending supervision with continuous monitoring of the patient?s level  of consciousness and physiologic status  Total intra-service sedation time (minutes): 18     Access  Local anesthesia was administered. The vessel was sonographically  evaluated and determined to be patent. Real time ultrasound was used to  visualize needle entry into the vessel and a permanent image obtained.  Vein accessed: Right internal jugular vein  Access technique: Micropuncture technique under ultrasound guidance     Catheter placement  The access site was dilated and the catheter was placed into the vein  over a wire  under fluoroscopic guidance.  The catheter tip location was  fluoroscopically verified and a permanent image was stored.. A sterile  dressing was applied.  Catheter placed: Tourjiveurkar Acute Triple Lumen Catheter. Lot No  [id number].  Catheter size (Hungarian): 12  Catheter length (cm): 16  Catheter flush: Dialysis lumens flushed with heparin (1000 units per  mL). Venous access lumen flushed with heparin (100 units per mL)  Catheter securement technique: 2-0 silk suture     Tunneled catheter removal  Local anesthesia was administered. The catheter was removed with  traction.     Contrast  Contrast agent: None  Contrast volume (mL): 0     Radiation Dose  Fluoroscopy time: 0.1 minutes  Dose: 1.7 mGy     Additional Details  Additional description of procedure:  None  Equipment details: None  Specimens removed: None  Estimated blood loss (mL): Less than 10  Standardized report: CVA_NonTunneledCatheter     Attestation  I was present and scrubbed for entire procedure. Imaging reviewed. Agree  with final report as written..     This report was finalized on 8/3/2020 5:08 PM by Rojas Gore.       US Abdomen Limited [[id number]] Collected:  08/02/20 1010     Updated:  08/03/20 1237    Narrative:       EXAMINATION: US ABDOMEN LIMITED - 08/02/2020     INDICATION:  I95.89-Other hypotension; E86.1-Hypovolemia;  K92.0-Hematemesis; D62-Acute posthemorrhagic anemia; K62.5-Hemorrhage of  anus and rectum; Z74.09-Other reduced mobility; Z78.9-Other specified  health status; Z74.09-Other reduced mobility. Upper abdominal pain.     TECHNIQUE: Sonographic imaging is obtained of the right upper quadrant  in both the sagittal and transverse planes.     COMPARISON: NONE     FINDINGS: Pancreas is not visualized.. There is free fluid identified  throughout the upper abdomen. The liver is increased in echogenicity  with some lobulation identified of the contour suggesting cirrhosis. The  gallbladder fossa is unremarkable. The common bile duct measures 8 mm.  The right kidney is normal in size, configuration, and texture measuring  in length from pole to pole 11.1 cm. No solid cortical mass or renal  cortical cysts seen within the right kidney. No hydronephrosis or  nephrolithiasis.       Impression:       Cirrhotic appearance of the liver with no underlying mass.  Free fluid identified throughout the upper abdomen. The pancreas is not  seen. The remainder the upper abdomen is unremarkable.      DICTATED:   08/02/2020  EDITED/ls :   08/02/2020      This report was finalized on 8/3/2020 12:34 PM by Dr. Janeth Atkins MD.             IMPRESSION:     1. Sepsis with Enterobacteriaceae group bacteremia identified as Citrobacter koseri by bio [facility] report-blood cultures positive in 4/4 bottles  7/31/2020.  Etiology is unclear at this time but could likely be a dialysis/line infection. Prior echo negative on 7/8.  May possibly be an occult intra-abdominal process, or elsewhere but not obvious on CT scan of the chest abdomen and pelvis from 7/31/2020.  However due to the high-grade bacteremia and intravascular infection is suspected/likely.  8/1 TTE was performed with possible mitral valve vegetation.  GABRIELA pending 8/4/2020.  8/1 right upper quadrant ultrasound has been performed with no evidence of biliary duct/gallstones disease.  2. TTE from 8/1 with possible mitral valve vegetation.  GABRIELA pending.  3. Liver cirrhosis with ascites status post multiple CT-guided paracentesis.  4. 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  5. Acute kidney injury likely secondary to ATN however could also be hepatorenal syndrome.  Creatinine 5.03, worse.  6. Intractable nausea and vomiting.  Improved.  7. Thrombocytopenia- could be secondary to chemotherapy or hepatic disease.  8. Elevated bilirubin- 2.4 likely secondary to hepatic disease, negative ultrasound.  Elevated AST- 72, continues.  9. Elevated alkaline phosphatase 166, continues.  10. Hypocalcemia, 8.2, worse.   11. Anemia, chronic disease, continues.      Plan:    1. Diagnostically continue to follow patient's physical exam, follow labs include CBC, CMP, CRP.  I will continue to follow blood culture sensitivity reports and adjust antibiotics accordingly.  Consider a GABRIELA, given TTE results.  2. Therapeutically, previously discontinued on 8/1 IV Zosyn and started Merrem pharm to dose. Dialysis catheter removal explanted on 8/3 after dialysis.  Temporary dialysis catheter placed right chest 8/3.  Changing to Fortaz 1 g post each hemodialysis to continue until 8/17, but await GABRIELA results.  If there is suggestive of mitral valve vegetation, then will prolong the Fortaz treatment for 6 weeks.  This will be until approximately 9/17/2020.  3. Continue supportive  care.    Our group would be pleased to follow this patient over the course of their hospitalization and assist with outpatient antimicrobial therapy, as indicated.  Further recommendations depend on the results of the cultures and clinical course.  Side effects of medications were discussed.  Patient is at increased risk for adverse drug reactions, recurrent infection, readmission.  Discussed with the hospitalist service.    Joey Graham MD  8/5/2020

## 2020-08-05 NOTE — PROGRESS NOTES
[facility]  PROGRESS NOTE    Patient Name: Oliver Mallory  : 1965  MRN: [id number]    Date of Admission: 2020  Primary Care Physician: Raymundo Salgado MD    Subjective   Subjective     CC:  AUDREY, bacteremia, cirrhosis    HPI:  GABRIELA changed to tomorrow.  Doing ok on dialysis, no new complaints    Review of Systems  Gen- No fevers, chills  CV- No chest pain, palpitations  Resp- No cough, dyspnea  GI- No N/V/D, abd pain       Objective   Objective     Vital Signs:   Temp:  [97.6 °F (36.4 °C)-98.3 °F (36.8 °C)] 97.8 °F (36.6 °C)  Heart Rate:  [] 89  Resp:  [16-18] 18  BP: ()/(52-81) 117/75        Physical Exam:  Constitutional: No acute distress, awake, alert  HENT: NCAT, mucous membranes moist  Respiratory: Clear to auscultation bilaterally, respiratory effort normal   Cardiovascular: RRR, no murmurs, temporary dialysis catheter  Gastrointestinal: Positive bowel sounds, soft, nontender, ascites  Musculoskeletal: BL le edema  Psychiatric: Appropriate affect, cooperative  Neurologic: Oriented x 3, speech clear  Skin: No rashes, + ecchymosis    Results Reviewed:  Results from last 7 days   Lab Units 20   WBC 10*3/mm3 4.25 4.03 5.18 6.11 5.56  --    HEMOGLOBIN g/dL 7.5* 7.0* 8.5* 8.1* 8.1*  --    HEMATOCRIT % 22.4* 21.4* 25.1* 25.0* 25.7*  --    PLATELETS 10*3/mm3 52* 48* 42* 37* 34*  --    INR   --   --  1.94*  --  2.38* 2.30*   PROCALCITONIN ng/mL  --   --   --  1.20*  --   --      Results from last 7 days   Lab Units 20   SODIUM mmol/L 137 138 136 138 138   POTASSIUM mmol/L 3.6 3.6 3.7 3.7 4.0   CHLORIDE mmol/L 102 103 100 103 104   CO2 mmol/L 23.0 26.0 23.0 22.0 23.0   BUN mg/dL 38* 29* 44* 28* 24*   CREATININE mg/dL 5.03* 4.79* 5.53* 5.50* 4.69*   GLUCOSE mg/dL 118* 127* 111* 117* 139*   CALCIUM  mg/dL 8.2* 8.3* 8.3* 8.5* 8.6   ALT (SGPT) U/L  --   --  36 36 37   AST (SGOT) U/L  --   --  85* 72* 70*     Estimated Creatinine Clearance: 22.4 mL/min (A) (by C-G formula based on SCr of 5.03 mg/dL (H)).    Microbiology Results Abnormal     Procedure Component Value - Date/Time    Anaerobic Culture - Body Fluid, Peritoneum [[id number]] Collected:  07/31/20 1422    Lab Status:  Final result Specimen:  Body Fluid from Peritoneum Updated:  08/05/20 1201     Anaerobic Culture No anaerobes isolated at 5 days    Body Fluid Culture - Body Fluid, Peritoneum [[id number]] Collected:  07/31/20 1422    Lab Status:  Final result Specimen:  Body Fluid from Peritoneum Updated:  08/03/20 0751     Body Fluid Culture No growth at 3 days     Gram Stain No organisms seen      Occasional WBCs per low power field    Blood Culture - Blood, Arm, Left [[id number]]  (Abnormal) Collected:  07/31/20 0832    Lab Status:  Final result Specimen:  Blood from Arm, Left Updated:  08/03/20 0649     Blood Culture Citrobacter koseri     Comment: Refer to previous blood culture collected on 7/31/2020 0830 for MICs.          Isolated from Aerobic and Anaerobic Bottles     Gram Stain Anaerobic Bottle Gram negative bacilli      Aerobic Bottle Gram negative bacilli    Blood Culture - Blood, Blood, PICC Line [[id number]]  (Abnormal)  (Susceptibility) Collected:  07/31/20 0830    Lab Status:  Final result Specimen:  Blood, PICC Line Updated:  08/03/20 0649     Blood Culture Citrobacter koseri     Isolated from Aerobic and Anaerobic Bottles     Gram Stain Anaerobic Bottle Gram negative bacilli      Aerobic Bottle Gram negative bacilli    Susceptibility      Citrobacter koseri     KALANI     Cefepime Susceptible     Ceftazidime Susceptible     Ceftriaxone Susceptible     Gentamicin Susceptible     Levofloxacin Susceptible     Piperacillin + Tazobactam Susceptible     Trimethoprim + Sulfamethoxazole Susceptible                Susceptibility Comments     Citrobacter  marv    Cefazolin sensitivity will not be reported for Enterobacteriaceae in non-urine isolates. If cefazolin is preferred, please call the microbiology lab to request an E-test.  With the exception of urinary-sourced infections, aminoglycosides should not be used as monotherapy.             Blood Culture ID, PCR - Blood, Blood, PICC Line [[id number]]  (Abnormal) Collected:  07/31/20 0830    Lab Status:  Final result Specimen:  Blood, PICC Line Updated:  08/01/20 0022     BCID, PCR Enterobacteriaceae Group. Identification by BCID PCR.    Body Fluid Culture - Body Fluid, Peritoneum [[id number]] Collected:  07/15/20 1038    Lab Status:  Final result Specimen:  Body Fluid from Peritoneum Updated:  07/18/20 0710     Body Fluid Culture No growth at 3 days     Gram Stain Few (2+) WBCs seen      No organisms seen    Blood Culture - Blood, Arm, Right [[id number]] Collected:  07/08/20 1748    Lab Status:  Final result Specimen:  Blood from Arm, Right Updated:  07/13/20 1800     Blood Culture No growth at 5 days    Blood Culture - Blood, Hand, Left [[id number]] Collected:  07/08/20 1744    Lab Status:  Final result Specimen:  Blood from Hand, Left Updated:  07/13/20 1800     Blood Culture No growth at 5 days    Body Fluid Culture - Body Fluid, Peritoneum [[id number]] Collected:  07/10/20 0916    Lab Status:  Final result Specimen:  Body Fluid from Peritoneum Updated:  07/13/20 0700     Body Fluid Culture No growth at 3 days     Gram Stain Moderate (3+) WBCs seen      No organisms seen    COVID PRE-OP / PRE-PROCEDURE SCREENING ORDER (NO ISOLATION) - Swab, Nasopharynx [[id number]] Collected:  07/08/20 2009    Lab Status:  Final result Specimen:  Swab from Nasopharynx Updated:  07/08/20 2119    Narrative:       The following orders were created for panel order COVID PRE-OP / PRE-PROCEDURE SCREENING ORDER (NO ISOLATION) - Swab, Nasopharynx.  Procedure                               Abnormality         Status                      ---------                               -----------         ------                     COVID-19,CEPHEID,CLAUDIA IN-...[[id number]]  Normal              Final result                 Please view results for these tests on the individual orders.    COVID-19,CEPHEID,CLAUDIA IN-HOUSE(OR EMERGENT/ADD-ON),NP SWAB IN TRANSPORT MEDIA 3-4 HR TAT - Swab, Nasopharynx [[id number]]  (Normal) Collected:  07/08/20 2009    Lab Status:  Final result Specimen:  Swab from Nasopharynx Updated:  07/08/20 2119     COVID19 Not Detected    Narrative:       Fact sheet for providers: https://www.fda.gov/media/197622/download     Fact sheet for patients: https://www.fda.gov/media/950262/download          Imaging Results (Last 24 Hours)     ** No results found for the last 24 hours. **          Results for orders placed during the hospital encounter of 07/07/20   Adult Transthoracic Echo Complete W/ Cont if Necessary Per Protocol    Narrative · Estimated EF appears to be in the range of 66 - 70%.  · There is a small mobile strand on the posterior leaflet of the mitral   valve suggestive of a vegetation.  · Mild mitral annular calcification. Mild mitral regurgitation present.          I have reviewed the medications:  Scheduled Meds:  aspirin 81 mg Oral Daily   bumetanide 4 mg Oral Daily   cefTAZidime 1 g Intravenous Once per day on Mon Wed Fri   hydrocortisone  Topical Q12H   lactulose 10 g Oral TID   magic mouthwash 5 mL Swish & Spit 4x Daily   metoclopramide 5 mg Oral TID AC   midodrine 10 mg Oral 3 times per day on Sun Tue Thu Sat   midodrine 20 mg Oral 3 times per day on Mon Wed Fri   mirtazapine 15 mg Oral Nightly   Sally 2 patch Topical Once   pantoprazole 40 mg Oral BID AC   sertraline 25 mg Oral Daily   sodium chloride 10 mL Intravenous Q12H     Continuous Infusions:  Pharmacy Consult - Pharmacy to dose      PRN Meds:.•  acetaminophen  •  albumin human  •  albumin human  •  albuterol  •  diphenhydrAMINE  •  heparin (porcine)  •  Morphine  •   ondansetron  •  oxyCODONE  •  Pharmacy Consult - Pharmacy to dose  •  phenylephrine-mineral oil-petrolatum  •  sodium chloride    Assessment/Plan   Assessment & Plan     Active Hospital Problems    Diagnosis  POA   • **Hypotension [I95.9]  Yes   • Liver cirrhosis w/ ascites  [K74.60, R18.8]  Yes   • Intractable nausea and vomiting [R11.2]  Yes   • Dehydration [E86.0]  Yes   • ARF likely 2* ATN  [N17.9]  Yes   • Chemotherapy-induced thrombocytopenia [D69.59, T45.1X5A]  Yes   • GIB w/ hematemesis [K92.0]  No   • Acute blood loss anemia [D62]  Yes   • Rectal bleeding [K62.5]  Yes   • Constipation due to opioid therapy [K59.03, T40.2X5A]  Yes   • Pain, cancer [G89.3]  Yes   • 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        Brief Hospital Course to date:  Oliver Mallory is a 55 y.o. male with relevant PMH of HTN, COPD, Pancreatitis, Cirrhosis, Tonsillar CA s/p tonsillectomy undergoing chemo and RT admitted 7/7/2020 with intractable nausea and vomiting for 4 days with no food intake.  Hospital course complicated by AUDREY, GIB, and hypotension.  He was admitted to ICU and did require pressors for some time. Ultimately stabilized after IV fluids, improvement in GIB and addition of midodrine. Transferred to floor 7/17. Renal function is continually worsening w/ NAL following. Patient lost iv access on 7/23/20, unable to obtain another IV (despite multiple attempts w/ IV, also attempted EJ access without success. Due to progressive kidney failure w/ volume overload, tunneled dialysis cath & central line was placed by Dr. Gore of interventional radiology on 7/24/20 and dialysis was initiated. Had some post-procedural right neck/supraclavicular oozing/hematoma s/p platelets and FFP.      -Progressive AUDREY w/ volume overload, now s/p dialysis catheter placement receiving Hemodialysis  -Tunneled dialysis cath placed by Dr. Gore on 7/24/20  - removed  on 8/3 with placement of temporary line  - echo 7/7/20: normal EF, normal valves  - nephrology following   - Increase to midodrine 20 mg TID on dialysis days   - Difficulty removing volume with cirrhosis   - GABRIELA in AM as ECHO concerning for possible vegetation, NPO at midnight     Fever   Enterobacteriaceae bacteremia   - BCx + 7/31   - Started merrem (8/1), changed to fortaz post HD on 8/5  - ID consulted  - Abd US with no acute findings     -right neck/supraclavicular hematoma around catheter site  - Coagulopathy   - s/p platelets and FFP and Vit K  - R IJ removed 8/1     -s/p gi bleed (due to esophagitis and portal colopathy related to cirrhosis)  -7/9/20: EGD: mild esophagitis, grade 1 esophageal varices, lower 1/3 esophaguts, portal htn gastropathy noted  - 7/10/20: C-scope: rectal oozing consistent w/ portal colopathy, sigmoid diverticulosis noted  - Continue ppi bid; follow up [facility] 4 weeks; repeat egd 1 year for varix surveillance     -EDUARDO cirrhosis (w/ esophageal varices, portal htn gastopathy, and portal colopathy  - continue lactulose, diuretics, midodrine   - repeat para 7/31      -Hx stage II HPV related oropharyngeal cancer   -formerly received chemotherapy & radiotherapy; not candidate currently for any therapies  -regarding tonsillar cancer, per oncology patient is not candidate for further therapies for tonsillar cancer (given his reina)  -  thrombocytopenia due to splenomegaly from cirrhosis rather than malignancy; f/u Dr. Campbell as outpatient     -Cytopenias (thrombocytopenia & anemia)   -felt due to cirrhosis/hypersplenism per heme-onc         DVT Prophylaxis:  On hold secondary to liver failure, GI bleed      Disposition: I expect the patient to be discharged TBD    CODE STATUS:   Code Status and Medical Interventions:   Ordered at: 07/07/20 1721     Level Of Support Discussed With:    Patient     Code Status:    CPR     Medical Interventions (Level of Support Prior to Arrest):    Full          Electronically signed by Sherrill Garber DO, 08/05/20, 12:16.

## 2020-08-05 NOTE — PROGRESS NOTES
"   LOS: 29 days      Reason For Visit:  F/U AUDREY  Subjective    No acute events overnight. No new complaints.   Review of Systems:   Patient denies shortness of breath, chest pain, dysuria, hematuria, nausea, vomiting.        Objective       aspirin 81 mg Oral Daily   bumetanide 4 mg Oral Daily   hydrocortisone  Topical Q12H   lactulose 10 g Oral TID   magic mouthwash 5 mL Swish & Spit 4x Daily   metoclopramide 5 mg Oral TID AC   midodrine 10 mg Oral 3 times per day on Sun Tue Thu Sat   midodrine 20 mg Oral 3 times per day on Mon Wed Fri   mirtazapine 15 mg Oral Nightly   Sally 2 patch Topical Once   pantoprazole 40 mg Oral BID AC   sertraline 25 mg Oral Daily   sodium chloride 10 mL Intravenous Q12H       Pharmacy Consult - Pharmacy to dose          Vital Signs:  Blood pressure 116/81, pulse 97, temperature 97.6 °F (36.4 °C), temperature source Oral, resp. rate 18, height 182.9 cm (72.01\"), weight 125 kg (276 lb 9.6 oz), SpO2 94 %.    Flowsheet Rows      First Filed Value   Admission Height  185.4 cm (73\") Documented at 07/07/2020 1640   Admission Weight  127 kg (280 lb) Documented at 07/07/2020 1640          08/04 0701 - 08/05 0700  In: 600 [P.O.:600]  Out: -     Physical Exam:    General Appearance:Alert, NAD  Eyes: PER, conjunctivae and sclerae normal, no icterus  Lungs: Clear on auscultation.  Chest wall ecchymosis noted from the site of the tunnel catheter.  Heart/CV: regular rhythm, bilateral lower extremity edema.  Abdomen:  Distended , soft, non-tender, no masses,  bowel sounds present  Skin: No rash, Warm and dry ecchymosis chest wall.  Neurologically grossly intact.    Temp dialysis access    Radiology:        Labs:  Results from last 7 days   Lab Units 08/05/20  0518 08/04/20  0418 08/03/20  0459   WBC 10*3/mm3 4.25 4.03 5.18   HEMOGLOBIN g/dL 7.5* 7.0* 8.5*   HEMATOCRIT % 22.4* 21.4* 25.1*   PLATELETS 10*3/mm3 52* 48* 42*     Results from last 7 days   Lab Units 08/05/20  0518 08/04/20  0418 " 08/03/20  0459 08/02/20  0422   SODIUM mmol/L 137 138 136 138   POTASSIUM mmol/L 3.6 3.6 3.7 3.7   CHLORIDE mmol/L 102 103 100 103   CO2 mmol/L 23.0 26.0 23.0 22.0   BUN mg/dL 38* 29* 44* 28*   CREATININE mg/dL 5.03* 4.79* 5.53* 5.50*   CALCIUM mg/dL 8.2* 8.3* 8.3* 8.5*   PHOSPHORUS mg/dL 3.1 2.4*  --   --    ALBUMIN g/dL 2.70* 2.80* 2.80* 2.70*     Results from last 7 days   Lab Units 08/05/20  0518   GLUCOSE mg/dL 118*       Results from last 7 days   Lab Units 08/03/20  0459   ALK PHOS U/L 166*   BILIRUBIN mg/dL 2.4*   ALT (SGPT) U/L 36   AST (SGOT) U/L 85*                 Estimated Creatinine Clearance: 22.4 mL/min (A) (by C-G formula based on SCr of 5.03 mg/dL (H)).      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  Non oliguric AUDREY: Thought to be ATN gradual worsening of renal function in the setting of chronic liver disease w cirrhosis. No response to albumin and midodrine.  Started on HD on 7/24/20. For solute clearance and optimization of volume status  Hypokalemia: Management with HD   2.  Anemia: Transfuse PRN no LISA to be given due to cancer  3.  Dependent edema: Optimization with HD patient has high fluid intake.  4 decompensated liver disease:  5.  Intradialytic hypotension: midodrine 20 mg TID  6. Bacteremia - Enterobacteriacea       Recommendations:   Patient seen on dialysis,  UF as tolerated. Antibx per ID.   Will transfuse 1 units of blood with dialysis  Fluid restriction     Rickey Hays MD  08/05/20  09:47

## 2020-08-06 NOTE — PLAN OF CARE
Problem: Patient Care Overview  Goal: Plan of Care Review  Flowsheets (Taken 8/6/2020 0447)  Progress: improving  Plan of Care Reviewed With: patient  Outcome Summary: VSS, pt c/o neck pain, pain medication given prn, pt sleeping throughout the night, no new concerns. will continue to monitor.     Problem: Pain, Chronic (Adult)  Goal: Acceptable Pain/Comfort Level and Functional Ability  Flowsheets (Taken 8/6/2020 0447)  Acceptable Pain/Comfort Level and Functional Ability: making progress toward outcome

## 2020-08-06 NOTE — PROGRESS NOTES
Continued Stay Note  [facility]     Patient Name: Oliver Mallory  MRN: [id number]  Today's Date: 8/6/2020    Admit Date: 7/7/2020    Discharge Plan     Row Name 08/06/20 0390       Plan    Plan  discharge plan    Patient/Family in Agreement with Plan  yes    Plan Comments  Cont to work on dialysis chair at [facility] in [facility]. Orders for antibiotics from [facility] faxed to [facility] at [facility]([phone]). CM will cont to follow.    Final Discharge Disposition Code  30 - still a patient        Discharge Codes    No documentation.       Expected Discharge Date and Time     Expected Discharge Date Expected Discharge Time    Aug 8, 2020             Alis Pinto RN

## 2020-08-06 NOTE — PROGRESS NOTES
"   LOS: 30 days      Reason For Visit:  F/U AUDREY  Subjective    No acute events overnight. No new complaints.   Review of Systems:   Patient denies shortness of breath, chest pain,nausea, vomiting.        Objective       aspirin 81 mg Oral Daily   bumetanide 4 mg Oral Daily   cefTAZidime 1 g Intravenous Once per day on Mon Wed Fri   hydrocortisone  Topical Q12H   lactulose 10 g Oral TID   magic mouthwash 5 mL Swish & Spit 4x Daily   metoclopramide 5 mg Oral TID AC   midodrine 10 mg Oral 3 times per day on Sun Tue Thu Sat   midodrine 20 mg Oral 3 times per day on Mon Wed Fri   mirtazapine 15 mg Oral Nightly   Sally 2 patch Topical Once   pantoprazole 40 mg Oral BID AC   sertraline 25 mg Oral Daily   sodium chloride 10 mL Intravenous Q12H       Pharmacy Consult - Pharmacy to dose          Vital Signs:  Blood pressure 128/84, pulse 92, temperature 98.2 °F (36.8 °C), temperature source Oral, resp. rate 20, height 182.9 cm (72.01\"), weight 124 kg (272 lb 12.8 oz), SpO2 98 %.    Flowsheet Rows      First Filed Value   Admission Height  185.4 cm (73\") Documented at 07/07/2020 1640   Admission Weight  127 kg (280 lb) Documented at 07/07/2020 1640          08/05 0701 - 08/06 0700  In: 500   Out: 2650     Physical Exam:    General Appearance:Alert, NAD  Eyes: PER, conjunctivae and sclerae normal, no icterus  Lungs: Clear on auscultation. No wheezing.   Heart/CV: regular rhythm, bilateral lower extremity edema.  Abdomen:  Distended , soft, non-tender,  bowel sounds present  Skin: No rash, Warm and dry ecchymosis chest wall.  Neurologically grossly intact.    Temp dialysis access    Radiology:        Labs:  Results from last 7 days   Lab Units 08/06/20  0424 08/05/20 0518 08/04/20 0418   WBC 10*3/mm3 6.44 4.25 4.03   HEMOGLOBIN g/dL 9.3* 7.5* 7.0*   HEMATOCRIT % 28.4* 22.4* 21.4*   PLATELETS 10*3/mm3 72* 52* 48*     Results from last 7 days   Lab Units 08/06/20  0424 08/05/20 0518 08/04/20  0418 08/03/20  0459   SODIUM " mmol/L 135* 137 138 136   POTASSIUM mmol/L 3.9 3.6 3.6 3.7   CHLORIDE mmol/L 102 102 103 100   CO2 mmol/L 22.0 23.0 26.0 23.0   BUN mg/dL 23* 38* 29* 44*   CREATININE mg/dL 3.43* 5.03* 4.79* 5.53*   CALCIUM mg/dL 8.6 8.2* 8.3* 8.3*   PHOSPHORUS mg/dL 2.3* 3.1 2.4*  --    ALBUMIN g/dL 3.20* 2.70* 2.80* 2.80*     Results from last 7 days   Lab Units 08/06/20  0424   GLUCOSE mg/dL 140*       Results from last 7 days   Lab Units 08/06/20  0424   ALK PHOS U/L 175*   BILIRUBIN mg/dL 3.9*   ALT (SGPT) U/L 28   AST (SGOT) U/L 102*                 Estimated Creatinine Clearance: 32.7 mL/min (A) (by C-G formula based on SCr of 3.43 mg/dL (H)).      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  Non oliguric AUDREY: Thought to be ATN gradual worsening of renal function in the setting of chronic liver disease w cirrhosis. No response to albumin and midodrine.  Started on HD on 7/24/20. For solute clearance and optimization of volume status  Hypokalemia: Management with HD   2.  Anemia: Transfuse PRN no LISA to be given due to cancer  3.  Dependent edema: Optimization with HD patient has high fluid intake.  4 decompensated liver disease:  5.  Intradialytic hypotension: midodrine 20 mg TID  6. Bacteremia - Enterobacteriacea       Recommendations:   HD tomorrow, UF as tolerated.   Fluid restriction     Rickey Hays MD  08/06/20  09:02

## 2020-08-06 NOTE — PROGRESS NOTES
INFECTIOUS DISEASE Progress Note    Oliver Mallory  1965  [id number]    Date of consult: 8/1/20    Admit date: 7/7/2020    Evaluating physician: Dr. Joey Graham  Reason for Consultation: Citrobacter sepsis with bacteremia, 4 out of 4 bottles positive from 7/31/2020  Chief Complaint: Intractable nausea/vomiting and constipation, sepsis      Subjective   History of present illness:  Mr. Oliver Mallory 55 y.o.  Yr old male who is being evaluated for Enterobacter bacteremia.  He has a past medical history significant for hypertension, COPD, pancreatitis, cirrhosis, tonsillar cancer status post tonsillectomy undergoing chemo and radiation therapy.  He was initially admitted on 7/7/2020 with intractable nausea and vomiting x4 days.  He was initially admitted to the ICU and did require some pressors.  Hospital course was complicated by GI bleed and hypotension as well acute kidney injury.  Patient was stabilized and transferred to the floor on 7/17.  Due to progressive kidney failure with fluid volume overload, tunneled dialysis catheter and central line was placed by interventional radiology on 7/24/2020 and dialysis was initiated.  There was reported issues with bleeding from the central line and patient received platelets and FFP.  Patient was found to have IJ line pulled most the way out had to be discontinued on 8/1 morning. Dialysis catheter remains in place.  Patient underwent a CT-guided paracentesis on 7/31, 7/15 and 7/10.  Patient had a EGD on 7/9 that showed mild portal gastropathy, reflux esophagitis and grade 1 esophageal varices.  He received a colonoscopy on 7/10 which showed a few sigmoid diverticula.  There is also petechiae in the rectum with scant oozing.  It was felt, in the absence of history of radiation to this region, that this is suspected portal colopathy.  Patient also received an echo on 7/8/2020 that was normal.    Patient did develop a fever of 101.5 on 7/30 as well as some tachycardia up to  112.  Patient did remain without leukocytosis however due to the fever blood cultures were ordered which have turned positive in 4/4 bottles for gram-negative bacilli identified as Enterobacteriaceae by [employer] report.  Currently WBC is 5.56, H&H is 8.1/25.7 and platelets are 34.  AST is elevated at 70 and alkaline phosphatase is elevated 148 and total bilirubin is elevated 2.7.  7/31 CT of chest show clear lungs and cirrhosis with ascites.  CT of the abdomen on 8/1 showed cirrhosis with splenomegaly and ascites, surgical absence of gallbladder and diverticulosis.    Patient has no known antibiotic allergies and is currently receiving IV Zosyn.  ID has been consulted for further evaluation and treatment as well as antimicrobial therapy management on 8/1/2020.    Of note:  7/8 blood cultures were finalized no growth in 4/4 bottles  7/31 body fluid culture and stain-no organisms seen  7/15 body fluid culture and stain-no organisms seen    8/2/2020: History reviewed.  Patient sitting up on side of bed awake and alert upon arrival.  He remains with dialysis catheter in right chest.  He denies any events overnight.  He has been afebrile with some hypotension overnight but overall BP is trending up.  Without leukocytosis with a WBC of 6.11.  However lactic acid remains elevated at 3.0 as well as a CRP of 5.49.  Procalcitonin is also elevated 1.2.  Blood cultures have been identified as Citrobacter koseri by [employer] report.  Did receive an ultrasound of the  Right upper quadrant which showed free fluid identified throughout the upper abdomen, liver with increased echogenicity with some lobulation identified at the contour suggesting cirrhosis.  Gallbladder fossa was unremarkable and the common bile duct measured 8 mm.  TTE was performed on 8/1 and is currently pending interpretation.  Awaiting for catheter removal in a.m. after dialysis.    8/3/2020: Patient seen in HD today. 0 complaints overnight. Patient afebrile and  hemodynamically stable overnight.  Plan to remove Vipin cath today after dialysis.  Remains without leukocytosis.  No events to report overnight per RN.  Continues on IV meropenem for Citrobacter koseri sepsis.  Waiting for dialysis catheter removal.  No pain.  On IV meropenem until 8/17/2020.    8/4/2020 history reviewed.  No high fevers or chills.  On meropenem until 8/17, or longer depending upon GABRIELA.  8/1/2020 TTE with small mobile strand on the posterior leaflet of the mitral valve suggestive of a vegetation.  No pain.  Tunneled Vipin cath removed right chest, replaced by temporary dialysis catheter on 8/3/2020.    8/5/2020 history reviewed.  On meropenem but changing to post hemodialysis Fortaz until 8/17 depending on GABRIELA results.  TTE with possible mitral valve vegetation.  Tunneled Vipin cath removed on 8/3 with temporary placed.  Being treated for Citrobacter sepsis from blood cultures from 7/31.  Sensitive to levofloxacin, cefotaxime, trimethoprim sulfa.  Changing to post hemodialysis Fortaz.    8/6/20 hx rev.  On fortaz till 8/17 for Vipin cath infxn with septicemia w/ Citrobacter with a neg GABRIELA 8/4.  Vipin cath removed 8/3 w/ temp placed.        Past Medical History:   Diagnosis Date   • Arthritis    • Cancer (CMS/HCC)    • COPD (chronic obstructive pulmonary disease) (CMS/HCC)    • Fall 05/12/2020   • Hypertension    • Pancreatitis    • Tonsillar cancer (CMS/HCC)        Past Surgical History:   Procedure Laterality Date   • ANKLE TENDON REPAIR     • CARDIAC CATHETERIZATION N/A 5/31/2018    Procedure: Left Heart Cath;  Surgeon: Ray Farley MD;  Location:  [email or web address] CATH INVASIVE LOC[facility];  Service: Cardiovascular   • CHOLECYSTECTOMY     • COLONOSCOPY N/A 7/10/2020    Procedure: COLONOSCOPY;  Surgeon: Brunner, Mark I, MD;  Location:  [email or web address] ENDOSCOPY;  Service: Gastroenterology;  Laterality: N/A;   • CYST REMOVAL      coccyx   • ENDOSCOPY N/A 7/9/2020    Procedure: ESOPHAGOGASTRODUODENOSCOPY;  Surgeon: Brunner, Mark  MD BLANCA;  Location: [facility] ENDOSCOPY;  Service: Gastroenterology;  Laterality: N/A;   • HERNIA MESH REMOVAL     • HERNIA REPAIR     • KNEE SURGERY  1981   • TONSILLECTOMY         Pediatric History   Patient Guardian Status   • Not on file     Other Topics Concern   • Not on file   Social History Narrative    Patient ambulatory without assistive device, but has ordered       family history includes Heart disease in his father; Hypertension in his mother.    No Known Allergies    Immunization History   Administered Date(s) Administered   • Hepatitis A 07/11/2020   • Hepatitis B Vaccine Adult IM 07/11/2020       Medication:    Current Facility-Administered Medications:   •  acetaminophen (TYLENOL) tablet 650 mg, 650 mg, Oral, Q4H PRN, Abram Abraham MD, 650 mg at 07/31/20 2107  •  albumin human 25 % IV SOLN 25 g, 25 g, Intravenous, PRN, SaúlRickey MD, 50 g at 08/03/20 1024  •  albuterol (PROVENTIL) nebulizer solution 0.083% 2.5 mg/3mL, 2.5 mg, Nebulization, Q6H PRN, Abram Abraham MD, 2.5 mg at 07/21/20 1405  •  aspirin chewable tablet 81 mg, 81 mg, Oral, Daily, Abram Abraham MD, 81 mg at 08/06/20 0907  •  bumetanide (BUMEX) tablet 4 mg, 4 mg, Oral, Daily, Escobar Miles MD, 4 mg at 08/06/20 0907  •  cefTAZidime (FORTAZ) 1 g/100 mL 0.9% NS IVPB (mpb), 1 g, Intravenous, Once per day on Mon Wed Fri, Gino Nielsen IV, RPH, 1 g at 08/05/20 1217  •  diphenhydrAMINE (BENADRYL) capsule 25 mg, 25 mg, Oral, Q6H PRN, Escobar Miles MD  •  heparin (porcine) injection 1,600 Units, 1,600 Units, Intracatheter, PRN, Escobar Miles MD, 1,600 Units at 08/05/20 1106  •  hydrocortisone 1 % cream, , Topical, Q12H, Rupa Hawthorne APRN  •  lactulose (CHRONULAC) 10 GM/15ML solution 10 g, 10 g, Oral, TID, Abram Abraham MD, 10 g at 08/06/20 0908  •  magic mouthwash oral supsension 5 mL, 5 mL, Swish & Spit, 4x Daily, Loreta Hemphill II, DO, 5 mL at 08/06/20 1008  •  metoclopramide (REGLAN) tablet 5 mg, 5  mg, Oral, TID AC, Abram Abraham MD, 5 mg at 08/06/20 0646  •  midodrine (PROAMATINE) tablet 10 mg, 10 mg, Oral, 3 times per day on Sun Tue Thu Sat, Ksenia Chaidez DO, 10 mg at 08/06/20 0646  •  midodrine (PROAMATINE) tablet 20 mg, 20 mg, Oral, 3 times per day on Mon Wed Fri, Ksenia Chaidez DO, 20 mg at 08/05/20 1730  •  mirtazapine (REMERON) tablet 15 mg, 15 mg, Oral, Nightly, Abram Abraham MD, 15 mg at 08/05/20 1952  •  morphine injection 1 mg, 1 mg, Intravenous, Q4H PRN, Sherrill Garber DO, 1 mg at 08/06/20 0723  •  Sally patch 2 patch, 2 patch, Topical, Once, Zamzam Villa, APRN, Stopped at 07/31/20 2211  •  ondansetron (ZOFRAN) injection 4 mg, 4 mg, Intravenous, Q6H PRN, Abram Abraham MD, 4 mg at 07/31/20 0844  •  oxyCODONE (ROXICODONE) immediate release tablet 10 mg, 10 mg, Oral, Q6H PRN, Escobar Miles MD, 10 mg at 08/06/20 0359  •  pantoprazole (PROTONIX) EC tablet 40 mg, 40 mg, Oral, BID MARICRUZ, Abram Abraham MD, 40 mg at 08/06/20 0647  •  Pharmacy Consult - Pharmacy to dose, , Does not apply, Continuous PRN, Joey Graham MD  •  phenylephrine-mineral oil-petrolatum (PREPARATION H) 0.25-14-74.9 % hemorhoidal ointment, , Rectal, TID PRN, Abram Abraham MD  •  sertraline (ZOLOFT) tablet 25 mg, 25 mg, Oral, Daily, Abram Abraham MD, 25 mg at 08/06/20 0908  •  sodium chloride 0.9 % flush 10 mL, 10 mL, Intravenous, Q12H, Abram Abraham MD, 10 mL at 08/06/20 0908  •  sodium chloride 0.9 % flush 10 mL, 10 mL, Intravenous, PRN, Abram Abraham MD, 10 mL at 07/10/20 0759    Please refer to the medical record for a full medication list    Review of Systems:    Constitutional--afebrile overnight, no fatigue  HEENT-- No new vision, hearing or throat complaints.  No epistaxis or oral sores.  Denies odynophagia or dysphagia.  No odynophagia or dysphagia. No headache, photophobia or neck stiffness.  CV-- No chest pain, palpitation or syncope  Resp-- No SOB/cough/Hemoptysis  GI- No nausea,  "vomiting, or diarrhea.  No hematochezia, melena, or hematemesis. Denies jaundice or chronic liver disease.  -- No dysuria, hematuria, or flank pain.  Denies hesitancy, urgency or flank pain.  Lymph- no swollen lymph nodes in neck/axilla or groin.   Heme-positive ecchymosis across chest.  MS-- no swelling or pain in the bones or joints of arms/legs.  No new back pain.  Neuro-- No acute focal weakness or numbness in the arms or legs.  No seizures.  Skin--positive for bruising, no nodules, no pain right temporary dialysis catheter chest, no petechiae    Physical Exam:   Vital Signs   Temp:  [98.2 °F (36.8 °C)] 98.2 °F (36.8 °C)  Heart Rate:  [] 92  Resp:  [16-20] 20  BP: (112-184)/() 128/84    Temp  Min: 98.2 °F (36.8 °C)  Max: 98.2 °F (36.8 °C)  BP  Min: 112/65  Max: 184/93  Pulse  Min: 92  Max: 104  Resp  Min: 16  Max: 20  SpO2  Min: 94 %  Max: 100 %    Blood pressure 128/84, pulse 92, temperature 98.2 °F (36.8 °C), temperature source Oral, resp. rate 20, height 182.9 cm (72.01\"), weight 124 kg (272 lb 12.8 oz), SpO2 98 %.  GENERAL: Awake and alert, in minor distress.  Sitting up on side of bed.    HEENT:  Normocephalic, atraumatic.  Ears externally normal, Nose externally normal.  EYES: No icterus.   LYMPHATICS:   HEART: Audible murmur noted 2/6 left sternal border.  RRR.  No JVD.  LUNGS: Clear to auscultation. No respiratory distress, no use of accessory muscles.  ABDOMEN: Soft, tender x4 quadrants, nondistended. Bowel sounds normal.  GENITAL: Without Abbasi cath.   SKIN: Warm and dry without cutaneous eruptions.  Ecchymosis noted across bilateral chest and scattered across bilateral upper extremities  PSYCHIATRIC: Mental status lucid. No confusion.  EXT:  No cellulitic change.  Normal range of motion.  NEURO: Oriented to name, CN 2 to 12 intact, nonfocal  LINES: Right chest temporary dialysis cath in place.  Ecchymosis noted around the site.  No      Results Review:       Results from last 7 days "   Lab Units 08/06/20  0424 08/05/20  0518 08/04/20  0418   WBC 10*3/mm3 6.44 4.25 4.03   HEMOGLOBIN g/dL 9.3* 7.5* 7.0*   HEMATOCRIT % 28.4* 22.4* 21.4*   PLATELETS 10*3/mm3 72* 52* 48*     Results from last 7 days   Lab Units 08/06/20  0424   SODIUM mmol/L 135*   POTASSIUM mmol/L 3.9   CHLORIDE mmol/L 102   CO2 mmol/L 22.0   BUN mg/dL 23*   CREATININE mg/dL 3.43*   GLUCOSE mg/dL 140*   CALCIUM mg/dL 8.6     Results from last 7 days   Lab Units 08/06/20  0424   ALK PHOS U/L 175*   BILIRUBIN mg/dL 3.9*   ALT (SGPT) U/L 28   AST (SGOT) U/L 102*     Results from last 7 days   Lab Units 08/02/20  0422   SED RATE mm/hr 4     Results from last 7 days   Lab Units 08/02/20  0422   CRP mg/dL 5.49*         Results from last 7 days   Lab Units 08/06/20  0443   LACTATE mmol/L 3.7*     Estimated Creatinine Clearance: 32.7 mL/min (A) (by C-G formula based on SCr of 3.43 mg/dL (H)).    Microbiology:  Microbiology Results (last 10 days)     Procedure Component Value - Date/Time    Anaerobic Culture - Body Fluid, Peritoneum [[id number]] Collected:  07/31/20 1422    Lab Status:  Final result Specimen:  Body Fluid from Peritoneum Updated:  08/05/20 1201     Anaerobic Culture No anaerobes isolated at 5 days    Body Fluid Culture - Body Fluid, Peritoneum [[id number]] Collected:  07/31/20 1422    Lab Status:  Final result Specimen:  Body Fluid from Peritoneum Updated:  08/03/20 0751     Body Fluid Culture No growth at 3 days     Gram Stain No organisms seen      Occasional WBCs per low power field    Blood Culture - Blood, Arm, Left [[id number]]  (Abnormal) Collected:  07/31/20 0832    Lab Status:  Final result Specimen:  Blood from Arm, Left Updated:  08/03/20 0649     Blood Culture Citrobacter koseri     Comment: Refer to previous blood culture collected on 7/31/2020 0830 for MICs.          Isolated from Aerobic and Anaerobic Bottles     Gram Stain Anaerobic Bottle Gram negative bacilli      Aerobic Bottle Gram negative bacilli    Blood  Culture - Blood, Blood, PICC Line [[id number]]  (Abnormal)  (Susceptibility) Collected:  07/31/20 0830    Lab Status:  Final result Specimen:  Blood, PICC Line Updated:  08/03/20 0649     Blood Culture Citrobacter koseri     Isolated from Aerobic and Anaerobic Bottles     Gram Stain Anaerobic Bottle Gram negative bacilli      Aerobic Bottle Gram negative bacilli    Susceptibility      Citrobacter koseri     KALANI     Cefepime Susceptible     Ceftazidime Susceptible     Ceftriaxone Susceptible     Gentamicin Susceptible     Levofloxacin Susceptible     Piperacillin + Tazobactam Susceptible     Trimethoprim + Sulfamethoxazole Susceptible                Susceptibility Comments     Citrobacter koseri    Cefazolin sensitivity will not be reported for Enterobacteriaceae in non-urine isolates. If cefazolin is preferred, please call the microbiology lab to request an E-test.  With the exception of urinary-sourced infections, aminoglycosides should not be used as monotherapy.             Blood Culture ID, PCR - Blood, Blood, PICC Line [[id number]]  (Abnormal) Collected:  07/31/20 0830    Lab Status:  Final result Specimen:  Blood, PICC Line Updated:  08/01/20 0022     BCID, PCR Enterobacteriaceae Group. Identification by BCID PCR.            Radiology:  Imaging Results (Last 72 Hours)     Procedure Component Value Units Date/Time    IR Tunneled Catheter Removal [[id number]] Collected:  08/03/20 1657     Updated:  08/03/20 1711    Narrative:       PROCEDURE:   Non-tunneled central venous catheter placement  Tunneled catheter removal     Procedural Personnel  Attending physician(s): ABELARDO Gore M.D.  Fellow physician(s): None  Resident physician(s): None  Advanced practice provider(s): None     Pre-procedure diagnosis: Enterobacteriaceae bacteremia   Post-procedure diagnosis: Same  Indication: Therapeutic  Additional clinical history: None     Complications: No immediate complications.       Impression:          Insertion of  right-sided non-tunneled triple-lumen hemodialysis  catheter, with tip in the expected location of the cavoatrial junction.  Removal of tunneled hemodialysis catheter     Plan:      The catheter may be used immediately.  _______________________________________________________________     PROCEDURE SUMMARY:  - Venous access with ultrasound guidance  - Non-tunneled central venous catheter insertion with fluoroscopic  guidance  - Additional procedure(s): None     PROCEDURE DETAILS:     Pre-procedure  Consent: Informed consent for the procedure including risks, benefits  and alternatives was obtained and time-out was performed prior to the  procedure.  Preparation (MIPS): The site was prepared and draped using all elements  of maximal sterile barrier technique including sterile gloves, sterile  gown, cap, mask, large sterile sheet, sterile ultrasound probe cover,  hand hygiene and cutaneous antisepsis with 2% chlorhexidine.   Medical reason for site preparation exception (MIPS): Not applicable     Anesthesia/sedation  Level of anesthesia/sedation: Moderate sedation (conscious sedation)  Anesthesia/sedation administered by: Independent trained observer under  attending supervision with continuous monitoring of the patient?s level  of consciousness and physiologic status  Total intra-service sedation time (minutes): 18     Access  Local anesthesia was administered. The vessel was sonographically  evaluated and determined to be patent. Real time ultrasound was used to  visualize needle entry into the vessel and a permanent image obtained.  Vein accessed: Right internal jugular vein  Access technique: Micropuncture technique under ultrasound guidance     Catheter placement  The access site was dilated and the catheter was placed into the vein  over a wire  under fluoroscopic guidance.  The catheter tip location was  fluoroscopically verified and a permanent image was stored.. A sterile  dressing was applied.  Catheter  placed: Rafaela [employer]ok Acute Triple Lumen Catheter. Lot No  [id number].  Catheter size (Malian): 12  Catheter length (cm): 16  Catheter flush: Dialysis lumens flushed with heparin (1000 units per  mL). Venous access lumen flushed with heparin (100 units per mL)  Catheter securement technique: 2-0 silk suture     Tunneled catheter removal  Local anesthesia was administered. The catheter was removed with  traction.     Contrast  Contrast agent: None  Contrast volume (mL): 0     Radiation Dose  Fluoroscopy time: 0.1 minutes  Dose: 1.7 mGy     Additional Details  Additional description of procedure: None  Equipment details: None  Specimens removed: None  Estimated blood loss (mL): Less than 10  Standardized report: CVA_NonTunneledCatheter     Attestation  I was present and scrubbed for entire procedure. Imaging reviewed. Agree  with final report as written..     This report was finalized on 8/3/2020 5:08 PM by Rojas Gore.       IR insert non-tunneled CVC 5+ [[id number]] Collected:  08/03/20 1657     Updated:  08/03/20 1711    Narrative:       PROCEDURE:   Non-tunneled central venous catheter placement  Tunneled catheter removal     Procedural Personnel  Attending physician(s): ABELARDO Gore M.D.  Fellow physician(s): None  Resident physician(s): None  Advanced practice provider(s): None     Pre-procedure diagnosis: Enterobacteriaceae bacteremia   Post-procedure diagnosis: Same  Indication: Therapeutic  Additional clinical history: None     Complications: No immediate complications.       Impression:          Insertion of right-sided non-tunneled triple-lumen hemodialysis  catheter, with tip in the expected location of the cavoatrial junction.  Removal of tunneled hemodialysis catheter     Plan:      The catheter may be used immediately.  _______________________________________________________________     PROCEDURE SUMMARY:  - Venous access with ultrasound guidance  - Non-tunneled central venous catheter insertion  with fluoroscopic  guidance  - Additional procedure(s): None     PROCEDURE DETAILS:     Pre-procedure  Consent: Informed consent for the procedure including risks, benefits  and alternatives was obtained and time-out was performed prior to the  procedure.  Preparation (MIPS): The site was prepared and draped using all elements  of maximal sterile barrier technique including sterile gloves, sterile  gown, cap, mask, large sterile sheet, sterile ultrasound probe cover,  hand hygiene and cutaneous antisepsis with 2% chlorhexidine.   Medical reason for site preparation exception (MIPS): Not applicable     Anesthesia/sedation  Level of anesthesia/sedation: Moderate sedation (conscious sedation)  Anesthesia/sedation administered by: Independent trained observer under  attending supervision with continuous monitoring of the patient?s level  of consciousness and physiologic status  Total intra-service sedation time (minutes): 18     Access  Local anesthesia was administered. The vessel was sonographically  evaluated and determined to be patent. Real time ultrasound was used to  visualize needle entry into the vessel and a permanent image obtained.  Vein accessed: Right internal jugular vein  Access technique: Micropuncture technique under ultrasound guidance     Catheter placement  The access site was dilated and the catheter was placed into the vein  over a wire  under fluoroscopic guidance.  The catheter tip location was  fluoroscopically verified and a permanent image was stored.. A sterile  dressing was applied.  Catheter placed: [employer] [facility]. Lot No  [id number].  Catheter size (Slovak): 12  Catheter length (cm): 16  Catheter flush: Dialysis lumens flushed with heparin (1000 units per  mL). Venous access lumen flushed with heparin (100 units per mL)  Catheter securement technique: 2-0 silk suture     Tunneled catheter removal  Local anesthesia was administered. The catheter was removed  with  traction.     Contrast  Contrast agent: None  Contrast volume (mL): 0     Radiation Dose  Fluoroscopy time: 0.1 minutes  Dose: 1.7 mGy     Additional Details  Additional description of procedure: None  Equipment details: None  Specimens removed: None  Estimated blood loss (mL): Less than 10  Standardized report: CVA_NonTunneledCatheter     Attestation  I was present and scrubbed for entire procedure. Imaging reviewed. Agree  with final report as written..     This report was finalized on 8/3/2020 5:08 PM by Rojas Gore.       US Abdomen Limited [[id number]] Collected:  08/02/20 1010     Updated:  08/03/20 1237    Narrative:       EXAMINATION: US ABDOMEN LIMITED - 08/02/2020     INDICATION:  I95.89-Other hypotension; E86.1-Hypovolemia;  K92.0-Hematemesis; D62-Acute posthemorrhagic anemia; K62.5-Hemorrhage of  anus and rectum; Z74.09-Other reduced mobility; Z78.9-Other specified  health status; Z74.09-Other reduced mobility. Upper abdominal pain.     TECHNIQUE: Sonographic imaging is obtained of the right upper quadrant  in both the sagittal and transverse planes.     COMPARISON: NONE     FINDINGS: Pancreas is not visualized.. There is free fluid identified  throughout the upper abdomen. The liver is increased in echogenicity  with some lobulation identified of the contour suggesting cirrhosis. The  gallbladder fossa is unremarkable. The common bile duct measures 8 mm.  The right kidney is normal in size, configuration, and texture measuring  in length from pole to pole 11.1 cm. No solid cortical mass or renal  cortical cysts seen within the right kidney. No hydronephrosis or  nephrolithiasis.       Impression:       Cirrhotic appearance of the liver with no underlying mass.  Free fluid identified throughout the upper abdomen. The pancreas is not  seen. The remainder the upper abdomen is unremarkable.      DICTATED:   08/02/2020  EDITED/ls :   08/02/2020      This report was finalized on 8/3/2020 12:34 PM by  Dr. Janeth Atkins MD.             IMPRESSION:     1. Sepsis with Enterobacteriaceae group bacteremia identified as Citrobacter koseri by [employer] report-blood cultures positive in 4/4 bottles 7/31/2020.  Etiology is unclear at this time but could likely be a dialysis/line infection. Prior echo negative on 7/8.  May possibly be an occult intra-abdominal process, or elsewhere but not obvious on CT scan of the chest abdomen and pelvis from 7/31/2020.  However due to the high-grade bacteremia and intravascular infection is suspected/likely.  8/1 TTE was performed with possible mitral valve vegetation.  GABRIELA pending 8/4/2020.  8/1 right upper quadrant ultrasound has been performed with no evidence of biliary duct/gallstones disease.  Repeat cx 8/5 neg.  2. TTE from 8/1 with possible mitral valve vegetation.  GABRIELA neg 8/4/20.  3. Liver cirrhosis with ascites status post multiple CT-guided paracentesis.  4. 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  5. Acute kidney injury likely secondary to ATN however could also be hepatorenal syndrome.  Creatinine 3.43.  6. Intractable nausea and vomiting. Resolved.  7. Thrombocytopenia- could be secondary to chemotherapy or hepatic disease.  8. Elevated bilirubin- 2.4 likely secondary to hepatic disease, negative ultrasound.  Elevated AST- 102, worse.  9. Elevated alkaline phosphatase 166, continues.  10. Hypocalcemia, resolved.   11. Anemia, chronic disease, continues.  12. Hyponatremia 135, worse.  13. Lactic acid 3.7, worse.  Not toxic.      Plan:    1. Diagnostically continue to follow patient's physical exam, follow labs include CBC, CMP, CRP.  Follow repeat cx 8/5.  2. Therapeutically, previously discontinued on 8/1 IV Zosyn and started Merrem then changed to Fortaz for post HD. Dialysis catheter removal explanted on 8/3 after dialysis.  Temporary dialysis catheter placed right chest 8/3.  Changed to Fortaz 1 g post each hemodialysis to continue until 8/17.     3. Continue supportive care.    Our group would be pleased to follow this patient over the course of their hospitalization and assist with outpatient antimicrobial therapy, as indicated.  Further recommendations depend on the results of the cultures and clinical course.  Side effects of medications were discussed.  Patient is at increased risk for adverse drug reactions, recurrent infection, readmission.  Discussed with the hospitalist service.    Case management orders:  Please arrange for post HD Fortaz 1 g IV post each HD until 8/17/20.  Check cbc, cmp, crp weekly while on IV abx.  FAX orders to [phone], and call [phone] with final arrangements.  Arrange for f/u with me in 1 week post discharge.    Joey Graham MD  8/6/2020

## 2020-08-06 NOTE — DISCHARGE PLACEMENT REQUEST
Oliver Mallory (55 y.o. Male)     To [facility] [place] [facility]  From [facility]()[phone]        [address] [address]  Phone:  [phone]  Fax:          Patient:     Oliver Mallory MRN:  [id number]   [address] [facility] [address] :  1965  SSN:    Phone: [phone] Sex:  M      INSURANCE PAYOR PLAN GROUP # SUBSCRIBER ID   Primary:    [facility] MEDICAID [id number]   Admitting Diagnosis: Acute renal failure (ARF) (CMS/HCC) [N17.9]  Order Date:  Aug 6, 2020         Inpatient Case Management  Consult       (Order ID: [id number])     Diagnosis:         Priority:  Routine Expected Date:   Expiration Date:        Interval:   Count:    Comments: Please arrange for post HD [place] 1 g IV post each HD (Mon, Wed, Fri) until 20.  Check cbc, cmp, crp weekly while on IV abx.  FAX orders to [phone], and call [phone] with final arrangements.  Arrange for f/u with me in 1 week post discharge.  Reason for Consult? abx     Specimen Type:   Specimen Source:   Specimen Taken Date:   Specimen Taken Time:                   Authorizing Provider:Joey Graham MD  Authorizing Provider's NPI: [id number]  Order Entered By: Joey Graham MD 2020 11:32 AM     Electronically signed by: Joey Graham MD 2020 11:32 AM            Date of Birth Social Security Number Address Home Phone MRN    1965  [address] [facility] [id number] [phone] [id number]    Buddhism Marital Status          Sabianism        Admission Date Admission Type Admitting Provider Attending Provider Department, Room/Bed    20 Emergency Mustapha Ramires MD Kalantar, Masoud, MD [address], S573/1    Discharge Date Discharge Disposition Discharge Destination                       Attending Provider:  Mustapha Ramires MD    Allergies:  No Known Allergies    Isolation:  None   Infection:  None  "  Code Status:  CPR    Ht:  182.9 cm (72.01\")   Wt:  124 kg (272 lb 12.8 oz)    Admission Cmt:  None   Principal Problem:  Hypotension [I95.9]                 Active Insurance as of 2020     Primary Coverage     Payor Plan Insurance Group Employer/Plan Group    [employer] MEDICA[employer] MEDICAID [id number]     Payor Plan Address Payor Plan Phone Number Payor Plan Fax Number Effective Dates    [address] [address] [phone]  2018 - None Entered    [facility] [id number]       Subscriber Name Subscriber Birth Date Member ID       DANIELLE MALLORY 1965 [id number]                 Emergency Contacts      (Rel.) Home Phone Work Phone Mobile Phone    MELISSA MALLORY (Daughter) -- -- [phone]    Alfonso Mallory (Son) -- -- [phone]               History & Physical      Rojas Gore MD at 20 1208          H&P reviewed. The patient was examined and there are no changes to the H&P.          Electronically signed by Rojas Gore MD at 20 1208   Source Note              [facility] Medicine Services  HISTORY AND PHYSICAL    Patient Name: Danielle Mallory  : 1965  MRN: [id number]  Primary Care Physician: Raymundo Salgado MD  Date of admission: 2020      Subjective   Subjective     Chief Complaint:  Intractable N/V, constipation    HPI:  Danielle Mallory is a 55 y.o. male with pmh significant for HTN, COPD, pancreatitis, tonsillar cancer s/p tonsillectomy undergoing chemo and radiation that presents from palliative office for intractable n/v x 4 days. Patient states not keeping much fluid down. Has been able to take medications over past 4 days but no food intake. Having some weight loss in past few mos, but not sure amount. Endorses weeks of constipation without adequate BM, increasing fatigue/ weakness, dizziness, decreased urination in past 4 days.    Found to have creatinine of 7.46 (last 3.85 on ), lactate 2.7, bili 2.8 and elevated LFTs. Patient to be " admitted for further eval/ management.    Review of Systems   Constitutional: Positive for activity change, appetite change and fatigue. Negative for diaphoresis and fever.   HENT: Negative.    Respiratory: Negative.  Negative for cough, chest tightness, shortness of breath and wheezing.    Cardiovascular: Positive for leg swelling. Negative for chest pain and palpitations.   Gastrointestinal: Positive for abdominal distention, blood in stool, constipation, nausea, rectal pain and vomiting. Negative for abdominal pain.   Genitourinary: Positive for decreased urine volume.   Musculoskeletal: Negative.    Skin: Negative.    Neurological: Positive for weakness and light-headedness.   Psychiatric/Behavioral: Negative.         All other systems reviewed and are negative.     Personal History     Past Medical History:   Diagnosis Date   • Arthritis    • Cancer (CMS/HCC)    • COPD (chronic obstructive pulmonary disease) (CMS/HCC)    • Fall 05/12/2020   • Hypertension    • Pancreatitis    • Tonsillar cancer (CMS/HCC)        Past Surgical History:   Procedure Laterality Date   • ANKLE TENDON REPAIR     • CARDIAC CATHETERIZATION N/A 5/31/2018    Procedure: Left Heart Cath;  Surgeon: Ray Farley MD;  Location: [facility] INVASIVE LOCATION;  Service: Cardiovascular   • CHOLECYSTECTOMY     • CYST REMOVAL      coccyx   • HERNIA MESH REMOVAL     • HERNIA REPAIR     • KNEE SURGERY  1981   • TONSILLECTOMY         Family History: family history includes Heart disease in his father; Hypertension in his mother. Otherwise pertinent FHx was reviewed and unremarkable.     Social History:  reports that he has quit smoking. His smoking use included cigarettes. He has never used smokeless tobacco. He reports that he drank alcohol. He reports that he has current or past drug history. Drug: Marijuana.  Social History     Social History Narrative    Patient ambulatory without assistive device, but has ordered       Medications:  Available  home medication information reviewed.  Medications Prior to Admission   Medication Sig Dispense Refill Last Dose   • albuterol sulfate  (90 Base) MCG/ACT inhaler Inhale 2 puffs Every 4 (Four) Hours As Needed for Wheezing or Shortness of Air. 18 g 3 Taking   • aspirin 81 MG chewable tablet Chew 81 mg Daily.   Taking   • dexamethasone (DECADRON) 4 MG tablet Take 2 tablets by mouth Daily on days 2, 3 & 4.  Take with food. 6 tablet 5 Taking   • docusate sodium (COLACE) 100 MG capsule Take 100 mg by mouth Daily.   Taking   • lisinopril-hydrochlorothiazide (PRINZIDE,ZESTORETIC) 20-25 MG per tablet Take 1 tablet by mouth Daily.   Taking   • Magic mouthwash Radonc Swish and swallow 10 mL 4 (Four) Times a Day Before Meals & at Bedtime. 480 mL 3 Taking   • metoprolol succinate XL (TOPROL-XL) 25 MG 24 hr tablet Take 25 mg by mouth Daily.   Taking   • mirtazapine (REMERON) 15 MG tablet Take 1-2 tablets by mouth every night 30 minutes before bedtime. 60 tablet 2 Taking   • Naloxegol Oxalate (MOVANTIK) 12.5 MG tablet Take 1 tablet by mouth Every Morning for 30 days. For constipation 30 tablet 0    • naloxone (NARCAN) 4 MG/0.1ML nasal spray Use 1 spray into the nostril(s) as directed by provider As Needed for opioid reversal for up to 1 dose. 2 each 0 Taking   • OLANZapine zydis (zyPREXA) 5 MG disintegrating tablet Place 1 tablet on the tongue Every Night for 20 days For severe nausea 10 tablet 1    • ondansetron (ZOFRAN) 8 MG tablet Take 1 tablet by mouth 3 (Three) Times a Day As Needed for Nausea or Vomiting. 30 tablet 5 Taking   • oxyCODONE (ROXICODONE) 10 MG tablet Take 1 tablet by mouth Every 6 (Six) Hours As Needed for Severe Pain  for up to 15 days. 60 tablet 0    • pantoprazole (PROTONIX) 40 MG EC tablet Take 40 mg by mouth Daily.   Taking   • potassium chloride (K-DUR,KLOR-CON) 20 MEQ CR tablet Take 1 tablet by mouth 2 (Two) Times a Day. 60 tablet 0 Taking   • sennosides-docusate (senna-docusate sodium) 8.6-50 MG  per tablet Take 2 tablets by mouth 2 (two) times a day for 30 days. 120 tablet 0 Taking       No Known Allergies    Objective   Objective     Vital Signs:   Temp:  [97.4 °F (36.3 °C)] 97.4 °F (36.3 °C)  Heart Rate:  [69] 69  Resp:  [17] 17  BP: (91)/(52) 91/52        Physical Exam   Constitutional: Awake, alert, sitting up in bed- RN at bedside  Eyes: PERRLA, sclerae icteric, no conjunctival injection  HENT: NCAT, mucous membranes dry, lips cracked  Neck: Supple, no thyromegaly, no lymphadenopathy, trachea midline  Respiratory: Clear to auscultation bilaterally, nonlabored respirations   Cardiovascular: RRR, no murmurs, rubs, or gallops, palpable pedal pulses bilaterally  Gastrointestinal: Positive bowel sounds, soft, nontender, moderate distention   Musculoskeletal: trace-1+ bilateral ankle edema, no clubbing or cyanosis to extremities  Psychiatric: Appropriate affect, cooperative  Neurologic: Oriented x 3, strength symmetric in all extremities, Cranial Nerves grossly intact to confrontation, speech clear  Skin: No rashes- dry cracked skin with poor skin turgor      Results Reviewed:  I have personally reviewed current lab and radiology data.    Results from last 7 days   Lab Units 07/07/20  1653   WBC 10*3/mm3 6.98   HEMOGLOBIN g/dL 11.0*   HEMATOCRIT % 32.0*   PLATELETS 10*3/mm3 98*     Results from last 7 days   Lab Units 07/07/20  1656 07/07/20  1653   SODIUM mmol/L  --  144   POTASSIUM mmol/L  --  4.6   CHLORIDE mmol/L  --  100   CO2 mmol/L  --  24.0   BUN mg/dL  --  128*   CREATININE mg/dL  --  7.46*   GLUCOSE mg/dL  --  123*   CALCIUM mg/dL  --  8.4*   ALT (SGPT) U/L  --  52*   AST (SGOT) U/L  --  79*   LACTATE mmol/L 2.7*  --      Estimated Creatinine Clearance: 15.4 mL/min (A) (by C-G formula based on SCr of 7.46 mg/dL (H)).  Brief Urine Lab Results     None        Imaging Results (Last 24 Hours)     ** No results found for the last 24 hours. **             Assessment/Plan   Assessment & Plan     Active  Hospital Problems    Diagnosis POA   • **Acute renal failure (ARF) (CMS/HCC) [N17.9] Unknown   • Dehydration [E86.0] Yes     Priority: High   • Hypotension [I95.9] Unknown     Priority: High   • Constipation due to opioid therapy [K59.03, T40.2X5A] Yes     Priority: Medium   • Intractable nausea and vomiting [R11.2] Unknown   • History of essential hypertension [Z86.79] Not Applicable   • Unintentional weight loss [R63.4] Unknown   • Elevated LFTs [R79.89] Unknown   • Hyperbilirubinemia [E80.6] Unknown   • Chemotherapy-induced thrombocytopenia [D69.59, T45.1X5A] Unknown   • Pain, cancer [G89.3] Yes       -- stat labs ordered: pending troponin  -- start NS bolus x 2 then MIVFs NS. Monitor renal function/ electrolytes, BP. Renal consult in am. Will get urine studies. Prerenal versus nephrotox from cisplatin, versus both.  -- hold home BP meds  -- prn zofran, zyprexa for nausea/ vomiting  -- GI soft diet as tolerates  -- CT a/p for elevated LFTs/ bilirubin  -- bowel regimen- home and add PRN with scheduled relistor every other day. Preparation H for BRBPR with BM. Monitor. H/H stable  -- Palliative consult  -- PT/OT and nutrition consults  -- am labs      DVT prophylaxis:  Heparin sc      CODE STATUS:    Code Status and Medical Interventions:   Ordered at: 07/07/20 2251     Level Of Support Discussed With:    Patient     Code Status:    CPR     Medical Interventions (Level of Support Prior to Arrest):    Full       Admission Status:  I believe this patient meets inpatient criteria due to acute renal failure as well as intractable N/V and requiring specialty consultation    Electronically signed by FARHAN Kelly, 07/07/20, 5:22 PM.      Attending   Admission Attestation       I have seen and examined the patient, performing an independent face-to-face diagnostic evaluation with plan of care reviewed and developed with the advanced practice clinician (APC).      Brief Summary Statement:   Oliver Mallory is a 55  y.o. 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  Also with HTN, COPD, history of pancreatitis.  The patient has had increased N/V and some constipation for about a week.  He is not keeping much of anything down.  He has maintained taking his medications (including BP meds).  He also states he has had weakness, lightheadedness, and decreased UOP.  Of note, he had creatinine 3.85 on 6/24 and patient somehow missed follow up labs.  His creatinine was 1.8 oin 6/10, 3.3 on 6/17 and 3.85 on 6/24.  The patient had a scheduled follow up with Dr. Spencer in palliative care and she sent him for direct admission due to his intractable N/V.     Found to have creatinine of 7.46 (last 3.85 on 6/24), lactate 2.7, bili 2.8 and elevated LFTs. Patient to be admitted for further eval/ management    Remainder of detailed HPI is as noted by APC and has been reviewed and/or edited by me for completeness.    Attending Physical Exam:  Constitutional: Awake, alert, chronically ill appearing  Eyes: PERRLA, sclerae anicteric, no conjunctival injection  HENT: NCAT, mucous membranes dry - lips dry and cracked  Neck: Supple, no thyromegaly, no lymphadenopathy, trachea midline  Respiratory: Clear to auscultation bilaterally, nonlabored respirations   Cardiovascular: RRR, palpable pedal pulses bilaterally  Gastrointestinal: Positive bowel sounds, soft, nontender, nondistended  Musculoskeletal: Tr bilateral ankle edema, no clubbing or cyanosis to extremities  Psychiatric: Appropriate affect, cooperative  Neurologic: Oriented x 3, strength symmetric in all extremities, Cranial Nerves grossly intact to confrontation, speech clear  Skin: poor turgor      Brief Assessment/Plan :  See detailed assessment and plan developed with APC which I have reviewed and/or edited for completeness.    Electronically signed by Megan Wolf MD, 07/07/20, 9:11 PM.                Electronically signed by  Megan Wolf MD at 20             Rojas Gore MD at 20          H&P reviewed. The patient was examined and there are no changes to the H&P.          Electronically signed by Rojas Gore MD at 20   Source Note              [facility] Medicine Services  HISTORY AND PHYSICAL    Patient Name: Oliver Mallory  : 1965  MRN: [id number]  Primary Care Physician: Raymundo Salgado MD  Date of admission: 2020      Subjective   Subjective     Chief Complaint:  Intractable N/V, constipation    HPI:  Oliver Mallory is a 55 y.o. male with pmh significant for HTN, COPD, pancreatitis, tonsillar cancer s/p tonsillectomy undergoing chemo and radiation that presents from palliative office for intractable n/v x 4 days. Patient states not keeping much fluid down. Has been able to take medications over past 4 days but no food intake. Having some weight loss in past few mos, but not sure amount. Endorses weeks of constipation without adequate BM, increasing fatigue/ weakness, dizziness, decreased urination in past 4 days.    Found to have creatinine of 7.46 (last 3.85 on ), lactate 2.7, bili 2.8 and elevated LFTs. Patient to be admitted for further eval/ management.    Review of Systems   Constitutional: Positive for activity change, appetite change and fatigue. Negative for diaphoresis and fever.   HENT: Negative.    Respiratory: Negative.  Negative for cough, chest tightness, shortness of breath and wheezing.    Cardiovascular: Positive for leg swelling. Negative for chest pain and palpitations.   Gastrointestinal: Positive for abdominal distention, blood in stool, constipation, nausea, rectal pain and vomiting. Negative for abdominal pain.   Genitourinary: Positive for decreased urine volume.   Musculoskeletal: Negative.    Skin: Negative.    Neurological: Positive for weakness and light-headedness.   Psychiatric/Behavioral: Negative.         All other  systems reviewed and are negative.     Personal History     Past Medical History:   Diagnosis Date   • Arthritis    • Cancer (CMS/HCC)    • COPD (chronic obstructive pulmonary disease) (CMS/HCC)    • Fall 05/12/2020   • Hypertension    • Pancreatitis    • Tonsillar cancer (CMS/HCC)        Past Surgical History:   Procedure Laterality Date   • ANKLE TENDON REPAIR     • CARDIAC CATHETERIZATION N/A 5/31/2018    Procedure: Left Heart Cath;  Surgeon: Ray Farley MD;  Location: [facility] CATH INVASIVE LOCATION;  Service: Cardiovascular   • CHOLECYSTECTOMY     • CYST REMOVAL      coccyx   • HERNIA MESH REMOVAL     • HERNIA REPAIR     • KNEE SURGERY  1981   • TONSILLECTOMY         Family History: family history includes Heart disease in his father; Hypertension in his mother. Otherwise pertinent FHx was reviewed and unremarkable.     Social History:  reports that he has quit smoking. His smoking use included cigarettes. He has never used smokeless tobacco. He reports that he drank alcohol. He reports that he has current or past drug history. Drug: Marijuana.  Social History     Social History Narrative    Patient ambulatory without assistive device, but has ordered       Medications:  Available home medication information reviewed.  Medications Prior to Admission   Medication Sig Dispense Refill Last Dose   • albuterol sulfate  (90 Base) MCG/ACT inhaler Inhale 2 puffs Every 4 (Four) Hours As Needed for Wheezing or Shortness of Air. 18 g 3 Taking   • aspirin 81 MG chewable tablet Chew 81 mg Daily.   Taking   • dexamethasone (DECADRON) 4 MG tablet Take 2 tablets by mouth Daily on days 2, 3 & 4.  Take with food. 6 tablet 5 Taking   • docusate sodium (COLACE) 100 MG capsule Take 100 mg by mouth Daily.   Taking   • lisinopril-hydrochlorothiazide (PRINZIDE,ZESTORETIC) 20-25 MG per tablet Take 1 tablet by mouth Daily.   Taking   • Magic mouthwash Radonc Swish and swallow 10 mL 4 (Four) Times a Day Before Meals & at  Bedtime. 480 mL 3 Taking   • metoprolol succinate XL (TOPROL-XL) 25 MG 24 hr tablet Take 25 mg by mouth Daily.   Taking   • mirtazapine (REMERON) 15 MG tablet Take 1-2 tablets by mouth every night 30 minutes before bedtime. 60 tablet 2 Taking   • Naloxegol Oxalate (MOVANTIK) 12.5 MG tablet Take 1 tablet by mouth Every Morning for 30 days. For constipation 30 tablet 0    • naloxone (NARCAN) 4 MG/0.1ML nasal spray Use 1 spray into the nostril(s) as directed by provider As Needed for opioid reversal for up to 1 dose. 2 each 0 Taking   • OLANZapine zydis (zyPREXA) 5 MG disintegrating tablet Place 1 tablet on the tongue Every Night for 20 days For severe nausea 10 tablet 1    • ondansetron (ZOFRAN) 8 MG tablet Take 1 tablet by mouth 3 (Three) Times a Day As Needed for Nausea or Vomiting. 30 tablet 5 Taking   • oxyCODONE (ROXICODONE) 10 MG tablet Take 1 tablet by mouth Every 6 (Six) Hours As Needed for Severe Pain  for up to 15 days. 60 tablet 0    • pantoprazole (PROTONIX) 40 MG EC tablet Take 40 mg by mouth Daily.   Taking   • potassium chloride (K-DUR,KLOR-CON) 20 MEQ CR tablet Take 1 tablet by mouth 2 (Two) Times a Day. 60 tablet 0 Taking   • sennosides-docusate (senna-docusate sodium) 8.6-50 MG per tablet Take 2 tablets by mouth 2 (two) times a day for 30 days. 120 tablet 0 Taking       No Known Allergies    Objective   Objective     Vital Signs:   Temp:  [97.4 °F (36.3 °C)] 97.4 °F (36.3 °C)  Heart Rate:  [69] 69  Resp:  [17] 17  BP: (91)/(52) 91/52        Physical Exam   Constitutional: Awake, alert, sitting up in bed- RN at bedside  Eyes: PERRLA, sclerae icteric, no conjunctival injection  HENT: NCAT, mucous membranes dry, lips cracked  Neck: Supple, no thyromegaly, no lymphadenopathy, trachea midline  Respiratory: Clear to auscultation bilaterally, nonlabored respirations   Cardiovascular: RRR, no murmurs, rubs, or gallops, palpable pedal pulses bilaterally  Gastrointestinal: Positive bowel sounds, soft,  nontender, moderate distention   Musculoskeletal: trace-1+ bilateral ankle edema, no clubbing or cyanosis to extremities  Psychiatric: Appropriate affect, cooperative  Neurologic: Oriented x 3, strength symmetric in all extremities, Cranial Nerves grossly intact to confrontation, speech clear  Skin: No rashes- dry cracked skin with poor skin turgor      Results Reviewed:  I have personally reviewed current lab and radiology data.    Results from last 7 days   Lab Units 07/07/20  1653   WBC 10*3/mm3 6.98   HEMOGLOBIN g/dL 11.0*   HEMATOCRIT % 32.0*   PLATELETS 10*3/mm3 98*     Results from last 7 days   Lab Units 07/07/20  1656 07/07/20  1653   SODIUM mmol/L  --  144   POTASSIUM mmol/L  --  4.6   CHLORIDE mmol/L  --  100   CO2 mmol/L  --  24.0   BUN mg/dL  --  128*   CREATININE mg/dL  --  7.46*   GLUCOSE mg/dL  --  123*   CALCIUM mg/dL  --  8.4*   ALT (SGPT) U/L  --  52*   AST (SGOT) U/L  --  79*   LACTATE mmol/L 2.7*  --      Estimated Creatinine Clearance: 15.4 mL/min (A) (by C-G formula based on SCr of 7.46 mg/dL (H)).  Brief Urine Lab Results     None        Imaging Results (Last 24 Hours)     ** No results found for the last 24 hours. **             Assessment/Plan   Assessment & Plan     Active Hospital Problems    Diagnosis POA   • **Acute renal failure (ARF) (CMS/HCC) [N17.9] Unknown   • Dehydration [E86.0] Yes     Priority: High   • Hypotension [I95.9] Unknown     Priority: High   • Constipation due to opioid therapy [K59.03, T40.2X5A] Yes     Priority: Medium   • Intractable nausea and vomiting [R11.2] Unknown   • History of essential hypertension [Z86.79] Not Applicable   • Unintentional weight loss [R63.4] Unknown   • Elevated LFTs [R79.89] Unknown   • Hyperbilirubinemia [E80.6] Unknown   • Chemotherapy-induced thrombocytopenia [D69.59, T45.1X5A] Unknown   • Pain, cancer [G89.3] Yes       -- stat labs ordered: pending troponin  -- start NS bolus x 2 then MIVFs NS. Monitor renal function/ electrolytes, BP.  Renal consult in am. Will get urine studies. Prerenal versus nephrotox from cisplatin, versus both.  -- hold home BP meds  -- prn zofran, zyprexa for nausea/ vomiting  -- GI soft diet as tolerates  -- CT a/p for elevated LFTs/ bilirubin  -- bowel regimen- home and add PRN with scheduled relistor every other day. Preparation H for BRBPR with BM. Monitor. H/H stable  -- Palliative consult  -- PT/OT and nutrition consults  -- am labs      DVT prophylaxis:  Heparin sc      CODE STATUS:    Code Status and Medical Interventions:   Ordered at: 07/07/20 1721     Level Of Support Discussed With:    Patient     Code Status:    CPR     Medical Interventions (Level of Support Prior to Arrest):    Full       Admission Status:  I believe this patient meets inpatient criteria due to acute renal failure as well as intractable N/V and requiring specialty consultation    Electronically signed by FARHAN Kelly, 07/07/20, 5:22 PM.      Attending   Admission Attestation       I have seen and examined the patient, performing an independent face-to-face diagnostic evaluation with plan of care reviewed and developed with the advanced practice clinician (APC).      Brief Summary Statement:   Oliver Mallory is a 55 y.o. 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  Also with HTN, COPD, history of pancreatitis.  The patient has had increased N/V and some constipation for about a week.  He is not keeping much of anything down.  He has maintained taking his medications (including BP meds).  He also states he has had weakness, lightheadedness, and decreased UOP.  Of note, he had creatinine 3.85 on 6/24 and patient somehow missed follow up labs.  His creatinine was 1.8 oin 6/10, 3.3 on 6/17 and 3.85 on 6/24.  The patient had a scheduled follow up with Dr. Spencer in palliative care and she sent him for direct admission due to his intractable N/V.     Found to have creatinine of 7.46  (last 3.85 on ), lactate 2.7, bili 2.8 and elevated LFTs. Patient to be admitted for further eval/ management    Remainder of detailed HPI is as noted by APC and has been reviewed and/or edited by me for completeness.    Attending Physical Exam:  Constitutional: Awake, alert, chronically ill appearing  Eyes: PERRLA, sclerae anicteric, no conjunctival injection  HENT: NCAT, mucous membranes dry - lips dry and cracked  Neck: Supple, no thyromegaly, no lymphadenopathy, trachea midline  Respiratory: Clear to auscultation bilaterally, nonlabored respirations   Cardiovascular: RRR, palpable pedal pulses bilaterally  Gastrointestinal: Positive bowel sounds, soft, nontender, nondistended  Musculoskeletal: Tr bilateral ankle edema, no clubbing or cyanosis to extremities  Psychiatric: Appropriate affect, cooperative  Neurologic: Oriented x 3, strength symmetric in all extremities, Cranial Nerves grossly intact to confrontation, speech clear  Skin: poor turgor      Brief Assessment/Plan :  See detailed assessment and plan developed with APC which I have reviewed and/or edited for completeness.    Electronically signed by Megan Wolf MD, 20, 9:11 PM.                Electronically signed by Megan Wolf MD at 20             Rojas Gore MD at 20 1348          H&P reviewed. The patient was examined and there are no changes to the H&P.          Electronically signed by Rojas Gore MD at 20 1348   Source Note              [facility] Medicine Services  HISTORY AND PHYSICAL    Patient Name: Oliver Mallory  : 1965  MRN: [id number]  Primary Care Physician: Raymundo Salgado MD  Date of admission: 2020      Subjective   Subjective     Chief Complaint:  Intractable N/V, constipation    HPI:  Oliver Mallory is a 55 y.o. male with pmh significant for HTN, COPD, pancreatitis, tonsillar cancer s/p tonsillectomy undergoing chemo and radiation that presents  from palliative office for intractable n/v x 4 days. Patient states not keeping much fluid down. Has been able to take medications over past 4 days but no food intake. Having some weight loss in past few mos, but not sure amount. Endorses weeks of constipation without adequate BM, increasing fatigue/ weakness, dizziness, decreased urination in past 4 days.    Found to have creatinine of 7.46 (last 3.85 on 6/24), lactate 2.7, bili 2.8 and elevated LFTs. Patient to be admitted for further eval/ management.    Review of Systems   Constitutional: Positive for activity change, appetite change and fatigue. Negative for diaphoresis and fever.   HENT: Negative.    Respiratory: Negative.  Negative for cough, chest tightness, shortness of breath and wheezing.    Cardiovascular: Positive for leg swelling. Negative for chest pain and palpitations.   Gastrointestinal: Positive for abdominal distention, blood in stool, constipation, nausea, rectal pain and vomiting. Negative for abdominal pain.   Genitourinary: Positive for decreased urine volume.   Musculoskeletal: Negative.    Skin: Negative.    Neurological: Positive for weakness and light-headedness.   Psychiatric/Behavioral: Negative.         All other systems reviewed and are negative.     Personal History     Past Medical History:   Diagnosis Date   • Arthritis    • Cancer (CMS/HCC)    • COPD (chronic obstructive pulmonary disease) (CMS/HCC)    • Fall 05/12/2020   • Hypertension    • Pancreatitis    • Tonsillar cancer (CMS/HCC)        Past Surgical History:   Procedure Laterality Date   • ANKLE TENDON REPAIR     • CARDIAC CATHETERIZATION N/A 5/31/2018    Procedure: Left Heart Cath;  Surgeon: Ray Farley MD;  Location: [facility] CATH INVASIVE LOCATION;  Service: Cardiovascular   • CHOLECYSTECTOMY     • CYST REMOVAL      coccyx   • HERNIA MESH REMOVAL     • HERNIA REPAIR     • KNEE SURGERY  1981   • TONSILLECTOMY         Family History: family history includes Heart disease  in his father; Hypertension in his mother. Otherwise pertinent FHx was reviewed and unremarkable.     Social History:  reports that he has quit smoking. His smoking use included cigarettes. He has never used smokeless tobacco. He reports that he drank alcohol. He reports that he has current or past drug history. Drug: Marijuana.  Social History     Social History Narrative    Patient ambulatory without assistive device, but has ordered       Medications:  Available home medication information reviewed.  Medications Prior to Admission   Medication Sig Dispense Refill Last Dose   • albuterol sulfate  (90 Base) MCG/ACT inhaler Inhale 2 puffs Every 4 (Four) Hours As Needed for Wheezing or Shortness of Air. 18 g 3 Taking   • aspirin 81 MG chewable tablet Chew 81 mg Daily.   Taking   • dexamethasone (DECADRON) 4 MG tablet Take 2 tablets by mouth Daily on days 2, 3 & 4.  Take with food. 6 tablet 5 Taking   • docusate sodium (COLACE) 100 MG capsule Take 100 mg by mouth Daily.   Taking   • lisinopril-hydrochlorothiazide (PRINZIDE,ZESTORETIC) 20-25 MG per tablet Take 1 tablet by mouth Daily.   Taking   • Magic mouthwash Radonc Swish and swallow 10 mL 4 (Four) Times a Day Before Meals & at Bedtime. 480 mL 3 Taking   • metoprolol succinate XL (TOPROL-XL) 25 MG 24 hr tablet Take 25 mg by mouth Daily.   Taking   • mirtazapine (REMERON) 15 MG tablet Take 1-2 tablets by mouth every night 30 minutes before bedtime. 60 tablet 2 Taking   • Naloxegol Oxalate (MOVANTIK) 12.5 MG tablet Take 1 tablet by mouth Every Morning for 30 days. For constipation 30 tablet 0    • naloxone (NARCAN) 4 MG/0.1ML nasal spray Use 1 spray into the nostril(s) as directed by provider As Needed for opioid reversal for up to 1 dose. 2 each 0 Taking   • OLANZapine zydis (zyPREXA) 5 MG disintegrating tablet Place 1 tablet on the tongue Every Night for 20 days For severe nausea 10 tablet 1    • ondansetron (ZOFRAN) 8 MG tablet Take 1 tablet by mouth 3  (Three) Times a Day As Needed for Nausea or Vomiting. 30 tablet 5 Taking   • oxyCODONE (ROXICODONE) 10 MG tablet Take 1 tablet by mouth Every 6 (Six) Hours As Needed for Severe Pain  for up to 15 days. 60 tablet 0    • pantoprazole (PROTONIX) 40 MG EC tablet Take 40 mg by mouth Daily.   Taking   • potassium chloride (K-DUR,KLOR-CON) 20 MEQ CR tablet Take 1 tablet by mouth 2 (Two) Times a Day. 60 tablet 0 Taking   • sennosides-docusate (senna-docusate sodium) 8.6-50 MG per tablet Take 2 tablets by mouth 2 (two) times a day for 30 days. 120 tablet 0 Taking       No Known Allergies    Objective   Objective     Vital Signs:   Temp:  [97.4 °F (36.3 °C)] 97.4 °F (36.3 °C)  Heart Rate:  [69] 69  Resp:  [17] 17  BP: (91)/(52) 91/52        Physical Exam   Constitutional: Awake, alert, sitting up in bed- RN at bedside  Eyes: PERRLA, sclerae icteric, no conjunctival injection  HENT: NCAT, mucous membranes dry, lips cracked  Neck: Supple, no thyromegaly, no lymphadenopathy, trachea midline  Respiratory: Clear to auscultation bilaterally, nonlabored respirations   Cardiovascular: RRR, no murmurs, rubs, or gallops, palpable pedal pulses bilaterally  Gastrointestinal: Positive bowel sounds, soft, nontender, moderate distention   Musculoskeletal: trace-1+ bilateral ankle edema, no clubbing or cyanosis to extremities  Psychiatric: Appropriate affect, cooperative  Neurologic: Oriented x 3, strength symmetric in all extremities, Cranial Nerves grossly intact to confrontation, speech clear  Skin: No rashes- dry cracked skin with poor skin turgor      Results Reviewed:  I have personally reviewed current lab and radiology data.    Results from last 7 days   Lab Units 07/07/20  1653   WBC 10*3/mm3 6.98   HEMOGLOBIN g/dL 11.0*   HEMATOCRIT % 32.0*   PLATELETS 10*3/mm3 98*     Results from last 7 days   Lab Units 07/07/20  1656 07/07/20  1653   SODIUM mmol/L  --  144   POTASSIUM mmol/L  --  4.6   CHLORIDE mmol/L  --  100   CO2 mmol/L  --   24.0   BUN mg/dL  --  128*   CREATININE mg/dL  --  7.46*   GLUCOSE mg/dL  --  123*   CALCIUM mg/dL  --  8.4*   ALT (SGPT) U/L  --  52*   AST (SGOT) U/L  --  79*   LACTATE mmol/L 2.7*  --      Estimated Creatinine Clearance: 15.4 mL/min (A) (by C-G formula based on SCr of 7.46 mg/dL (H)).  Brief Urine Lab Results     None        Imaging Results (Last 24 Hours)     ** No results found for the last 24 hours. **             Assessment/Plan   Assessment & Plan     Active Hospital Problems    Diagnosis POA   • **Acute renal failure (ARF) (CMS/HCC) [N17.9] Unknown   • Dehydration [E86.0] Yes     Priority: High   • Hypotension [I95.9] Unknown     Priority: High   • Constipation due to opioid therapy [K59.03, T40.2X5A] Yes     Priority: Medium   • Intractable nausea and vomiting [R11.2] Unknown   • History of essential hypertension [Z86.79] Not Applicable   • Unintentional weight loss [R63.4] Unknown   • Elevated LFTs [R79.89] Unknown   • Hyperbilirubinemia [E80.6] Unknown   • Chemotherapy-induced thrombocytopenia [D69.59, T45.1X5A] Unknown   • Pain, cancer [G89.3] Yes       -- stat labs ordered: pending troponin  -- start NS bolus x 2 then MIVFs NS. Monitor renal function/ electrolytes, BP. Renal consult in am. Will get urine studies. Prerenal versus nephrotox from cisplatin, versus both.  -- hold home BP meds  -- prn zofran, zyprexa for nausea/ vomiting  -- GI soft diet as tolerates  -- CT a/p for elevated LFTs/ bilirubin  -- bowel regimen- home and add PRN with scheduled relistor every other day. Preparation H for BRBPR with BM. Monitor. H/H stable  -- Palliative consult  -- PT/OT and nutrition consults  -- am labs      DVT prophylaxis:  Heparin sc      CODE STATUS:    Code Status and Medical Interventions:   Ordered at: 07/07/20 2761     Level Of Support Discussed With:    Patient     Code Status:    CPR     Medical Interventions (Level of Support Prior to Arrest):    Full       Admission Status:  I believe this patient  meets inpatient criteria due to acute renal failure as well as intractable N/V and requiring specialty consultation    Electronically signed by Debby Sun, FARHAN, 07/07/20, 5:22 PM.      Attending   Admission Attestation       I have seen and examined the patient, performing an independent face-to-face diagnostic evaluation with plan of care reviewed and developed with the advanced practice clinician (APC).      Brief Summary Statement:   Oliver Mallory is a 55 y.o. 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  Also with HTN, COPD, history of pancreatitis.  The patient has had increased N/V and some constipation for about a week.  He is not keeping much of anything down.  He has maintained taking his medications (including BP meds).  He also states he has had weakness, lightheadedness, and decreased UOP.  Of note, he had creatinine 3.85 on 6/24 and patient somehow missed follow up labs.  His creatinine was 1.8 oin 6/10, 3.3 on 6/17 and 3.85 on 6/24.  The patient had a scheduled follow up with Dr. Spencer in palliative care and she sent him for direct admission due to his intractable N/V.     Found to have creatinine of 7.46 (last 3.85 on 6/24), lactate 2.7, bili 2.8 and elevated LFTs. Patient to be admitted for further eval/ management    Remainder of detailed HPI is as noted by APC and has been reviewed and/or edited by me for completeness.    Attending Physical Exam:  Constitutional: Awake, alert, chronically ill appearing  Eyes: PERRLA, sclerae anicteric, no conjunctival injection  HENT: NCAT, mucous membranes dry - lips dry and cracked  Neck: Supple, no thyromegaly, no lymphadenopathy, trachea midline  Respiratory: Clear to auscultation bilaterally, nonlabored respirations   Cardiovascular: RRR, palpable pedal pulses bilaterally  Gastrointestinal: Positive bowel sounds, soft, nontender, nondistended  Musculoskeletal: Tr bilateral ankle edema, no  clubbing or cyanosis to extremities  Psychiatric: Appropriate affect, cooperative  Neurologic: Oriented x 3, strength symmetric in all extremities, Cranial Nerves grossly intact to confrontation, speech clear  Skin: poor turgor      Brief Assessment/Plan :  See detailed assessment and plan developed with APC which I have reviewed and/or edited for completeness.    Electronically signed by Megan Wolf MD, 20, 9:11 PM.                Electronically signed by Megan Wolf MD at 20 2132             Rojas Gore MD at 07/15/20 1045          H&P reviewed. The patient was examined and there are no changes to the H&P.          Electronically signed by Rojas Gore MD at 07/15/20 1045   Source Note              [facility] Medicine Services  HISTORY AND PHYSICAL    Patient Name: Oliver Mallory  : 1965  MRN: [id number]  Primary Care Physician: Raymundo Salgado MD  Date of admission: 2020      Subjective   Subjective     Chief Complaint:  Intractable N/V, constipation    HPI:  Oliver Mallory is a 55 y.o. male with pmh significant for HTN, COPD, pancreatitis, tonsillar cancer s/p tonsillectomy undergoing chemo and radiation that presents from palliative office for intractable n/v x 4 days. Patient states not keeping much fluid down. Has been able to take medications over past 4 days but no food intake. Having some weight loss in past few mos, but not sure amount. Endorses weeks of constipation without adequate BM, increasing fatigue/ weakness, dizziness, decreased urination in past 4 days.    Found to have creatinine of 7.46 (last 3.85 on ), lactate 2.7, bili 2.8 and elevated LFTs. Patient to be admitted for further eval/ management.    Review of Systems   Constitutional: Positive for activity change, appetite change and fatigue. Negative for diaphoresis and fever.   HENT: Negative.    Respiratory: Negative.  Negative for cough, chest tightness, shortness of  breath and wheezing.    Cardiovascular: Positive for leg swelling. Negative for chest pain and palpitations.   Gastrointestinal: Positive for abdominal distention, blood in stool, constipation, nausea, rectal pain and vomiting. Negative for abdominal pain.   Genitourinary: Positive for decreased urine volume.   Musculoskeletal: Negative.    Skin: Negative.    Neurological: Positive for weakness and light-headedness.   Psychiatric/Behavioral: Negative.         All other systems reviewed and are negative.     Personal History     Past Medical History:   Diagnosis Date   • Arthritis    • Cancer (CMS/HCC)    • COPD (chronic obstructive pulmonary disease) (CMS/HCC)    • Fall 05/12/2020   • Hypertension    • Pancreatitis    • Tonsillar cancer (CMS/[facility])        Past Surgical History:   Procedure Laterality Date   • ANKLE TENDON REPAIR     • CARDIAC CATHETERIZATION N/A 5/31/2018    Procedure: Left Heart Cath;  Surgeon: Ray Farley MD;  Location: [facility] CATH INVASIVE LOCATION;  Service: Cardiovascular   • CHOLECYSTECTOMY     • CYST REMOVAL      coccyx   • HERNIA MESH REMOVAL     • HERNIA REPAIR     • KNEE SURGERY  1981   • TONSILLECTOMY         Family History: family history includes Heart disease in his father; Hypertension in his mother. Otherwise pertinent FHx was reviewed and unremarkable.     Social History:  reports that he has quit smoking. His smoking use included cigarettes. He has never used smokeless tobacco. He reports that he drank alcohol. He reports that he has current or past drug history. Drug: Marijuana.  Social History     Social History Narrative    Patient ambulatory without assistive device, but has ordered       Medications:  Available home medication information reviewed.  Medications Prior to Admission   Medication Sig Dispense Refill Last Dose   • albuterol sulfate  (90 Base) MCG/ACT inhaler Inhale 2 puffs Every 4 (Four) Hours As Needed for Wheezing or Shortness of Air. 18 g 3 Taking   •  aspirin 81 MG chewable tablet Chew 81 mg Daily.   Taking   • dexamethasone (DECADRON) 4 MG tablet Take 2 tablets by mouth Daily on days 2, 3 & 4.  Take with food. 6 tablet 5 Taking   • docusate sodium (COLACE) 100 MG capsule Take 100 mg by mouth Daily.   Taking   • lisinopril-hydrochlorothiazide (PRINZIDE,ZESTORETIC) 20-25 MG per tablet Take 1 tablet by mouth Daily.   Taking   • Magic mouthwash Radonc Swish and swallow 10 mL 4 (Four) Times a Day Before Meals & at Bedtime. 480 mL 3 Taking   • metoprolol succinate XL (TOPROL-XL) 25 MG 24 hr tablet Take 25 mg by mouth Daily.   Taking   • mirtazapine (REMERON) 15 MG tablet Take 1-2 tablets by mouth every night 30 minutes before bedtime. 60 tablet 2 Taking   • Naloxegol Oxalate (MOVANTIK) 12.5 MG tablet Take 1 tablet by mouth Every Morning for 30 days. For constipation 30 tablet 0    • naloxone (NARCAN) 4 MG/0.1ML nasal spray Use 1 spray into the nostril(s) as directed by provider As Needed for opioid reversal for up to 1 dose. 2 each 0 Taking   • OLANZapine zydis (zyPREXA) 5 MG disintegrating tablet Place 1 tablet on the tongue Every Night for 20 days For severe nausea 10 tablet 1    • ondansetron (ZOFRAN) 8 MG tablet Take 1 tablet by mouth 3 (Three) Times a Day As Needed for Nausea or Vomiting. 30 tablet 5 Taking   • oxyCODONE (ROXICODONE) 10 MG tablet Take 1 tablet by mouth Every 6 (Six) Hours As Needed for Severe Pain  for up to 15 days. 60 tablet 0    • pantoprazole (PROTONIX) 40 MG EC tablet Take 40 mg by mouth Daily.   Taking   • potassium chloride (K-DUR,KLOR-CON) 20 MEQ CR tablet Take 1 tablet by mouth 2 (Two) Times a Day. 60 tablet 0 Taking   • sennosides-docusate (senna-docusate sodium) 8.6-50 MG per tablet Take 2 tablets by mouth 2 (two) times a day for 30 days. 120 tablet 0 Taking       No Known Allergies    Objective   Objective     Vital Signs:   Temp:  [97.4 °F (36.3 °C)] 97.4 °F (36.3 °C)  Heart Rate:  [69] 69  Resp:  [17] 17  BP: (91)/(52) 91/52         Physical Exam   Constitutional: Awake, alert, sitting up in bed- RN at bedside  Eyes: PERRLA, sclerae icteric, no conjunctival injection  HENT: NCAT, mucous membranes dry, lips cracked  Neck: Supple, no thyromegaly, no lymphadenopathy, trachea midline  Respiratory: Clear to auscultation bilaterally, nonlabored respirations   Cardiovascular: RRR, no murmurs, rubs, or gallops, palpable pedal pulses bilaterally  Gastrointestinal: Positive bowel sounds, soft, nontender, moderate distention   Musculoskeletal: trace-1+ bilateral ankle edema, no clubbing or cyanosis to extremities  Psychiatric: Appropriate affect, cooperative  Neurologic: Oriented x 3, strength symmetric in all extremities, Cranial Nerves grossly intact to confrontation, speech clear  Skin: No rashes- dry cracked skin with poor skin turgor      Results Reviewed:  I have personally reviewed current lab and radiology data.    Results from last 7 days   Lab Units 07/07/20  1653   WBC 10*3/mm3 6.98   HEMOGLOBIN g/dL 11.0*   HEMATOCRIT % 32.0*   PLATELETS 10*3/mm3 98*     Results from last 7 days   Lab Units 07/07/20  1656 07/07/20  1653   SODIUM mmol/L  --  144   POTASSIUM mmol/L  --  4.6   CHLORIDE mmol/L  --  100   CO2 mmol/L  --  24.0   BUN mg/dL  --  128*   CREATININE mg/dL  --  7.46*   GLUCOSE mg/dL  --  123*   CALCIUM mg/dL  --  8.4*   ALT (SGPT) U/L  --  52*   AST (SGOT) U/L  --  79*   LACTATE mmol/L 2.7*  --      Estimated Creatinine Clearance: 15.4 mL/min (A) (by C-G formula based on SCr of 7.46 mg/dL (H)).  Brief Urine Lab Results     None        Imaging Results (Last 24 Hours)     ** No results found for the last 24 hours. **             Assessment/Plan   Assessment & Plan     Active Hospital Problems    Diagnosis POA   • **Acute renal failure (ARF) (CMS/HCC) [N17.9] Unknown   • Dehydration [E86.0] Yes     Priority: High   • Hypotension [I95.9] Unknown     Priority: High   • Constipation due to opioid therapy [K59.03, T40.2X5A] Yes     Priority:  Medium   • Intractable nausea and vomiting [R11.2] Unknown   • History of essential hypertension [Z86.79] Not Applicable   • Unintentional weight loss [R63.4] Unknown   • Elevated LFTs [R79.89] Unknown   • Hyperbilirubinemia [E80.6] Unknown   • Chemotherapy-induced thrombocytopenia [D69.59, T45.1X5A] Unknown   • Pain, cancer [G89.3] Yes       -- stat labs ordered: pending troponin  -- start NS bolus x 2 then MIVFs NS. Monitor renal function/ electrolytes, BP. Renal consult in am. Will get urine studies. Prerenal versus nephrotox from cisplatin, versus both.  -- hold home BP meds  -- prn zofran, zyprexa for nausea/ vomiting  -- GI soft diet as tolerates  -- CT a/p for elevated LFTs/ bilirubin  -- bowel regimen- home and add PRN with scheduled relistor every other day. Preparation H for BRBPR with BM. Monitor. H/H stable  -- Palliative consult  -- PT/OT and nutrition consults  -- am labs      DVT prophylaxis:  Heparin sc      CODE STATUS:    Code Status and Medical Interventions:   Ordered at: 07/07/20 1721     Level Of Support Discussed With:    Patient     Code Status:    CPR     Medical Interventions (Level of Support Prior to Arrest):    Full       Admission Status:  I believe this patient meets inpatient criteria due to acute renal failure as well as intractable N/V and requiring specialty consultation    Electronically signed by FARHAN Kelly, 07/07/20, 5:22 PM.      Attending   Admission Attestation       I have seen and examined the patient, performing an independent face-to-face diagnostic evaluation with plan of care reviewed and developed with the advanced practice clinician (APC).      Brief Summary Statement:   Oliver Mallory is a 55 y.o. 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  Also with HTN, COPD, history of pancreatitis.  The patient has had increased N/V and some constipation for about a week.  He is not keeping much of anything  down.  He has maintained taking his medications (including BP meds).  He also states he has had weakness, lightheadedness, and decreased UOP.  Of note, he had creatinine 3.85 on 6/24 and patient somehow missed follow up labs.  His creatinine was 1.8 oin 6/10, 3.3 on 6/17 and 3.85 on 6/24.  The patient had a scheduled follow up with Dr. Spencer in palliative care and she sent him for direct admission due to his intractable N/V.     Found to have creatinine of 7.46 (last 3.85 on 6/24), lactate 2.7, bili 2.8 and elevated LFTs. Patient to be admitted for further eval/ management    Remainder of detailed HPI is as noted by APC and has been reviewed and/or edited by me for completeness.    Attending Physical Exam:  Constitutional: Awake, alert, chronically ill appearing  Eyes: PERRLA, sclerae anicteric, no conjunctival injection  HENT: NCAT, mucous membranes dry - lips dry and cracked  Neck: Supple, no thyromegaly, no lymphadenopathy, trachea midline  Respiratory: Clear to auscultation bilaterally, nonlabored respirations   Cardiovascular: RRR, palpable pedal pulses bilaterally  Gastrointestinal: Positive bowel sounds, soft, nontender, nondistended  Musculoskeletal: Tr bilateral ankle edema, no clubbing or cyanosis to extremities  Psychiatric: Appropriate affect, cooperative  Neurologic: Oriented x 3, strength symmetric in all extremities, Cranial Nerves grossly intact to confrontation, speech clear  Skin: poor turgor      Brief Assessment/Plan :  See detailed assessment and plan developed with APC which I have reviewed and/or edited for completeness.    Electronically signed by Megan Wolf MD, 07/07/20, 9:11 PM.                Electronically signed by Megan Wolf MD at 07/07/20 5536             Rojas Gore MD at 07/10/20 8110          H&P reviewed. The patient was examined and there are no changes to the H&P.          Electronically signed by Rojas Gore MD at 07/10/20 0621   Source Note               [facility] Medicine Services  HISTORY AND PHYSICAL    Patient Name: Oliver Mallory  : 1965  MRN: [id number]  Primary Care Physician: Raymundo Salgado MD  Date of admission: 2020      Subjective   Subjective     Chief Complaint:  Intractable N/V, constipation    HPI:  Oliver Mallory is a 55 y.o. male with pmh significant for HTN, COPD, pancreatitis, tonsillar cancer s/p tonsillectomy undergoing chemo and radiation that presents from palliative office for intractable n/v x 4 days. Patient states not keeping much fluid down. Has been able to take medications over past 4 days but no food intake. Having some weight loss in past few mos, but not sure amount. Endorses weeks of constipation without adequate BM, increasing fatigue/ weakness, dizziness, decreased urination in past 4 days.    Found to have creatinine of 7.46 (last 3.85 on ), lactate 2.7, bili 2.8 and elevated LFTs. Patient to be admitted for further eval/ management.    Review of Systems   Constitutional: Positive for activity change, appetite change and fatigue. Negative for diaphoresis and fever.   HENT: Negative.    Respiratory: Negative.  Negative for cough, chest tightness, shortness of breath and wheezing.    Cardiovascular: Positive for leg swelling. Negative for chest pain and palpitations.   Gastrointestinal: Positive for abdominal distention, blood in stool, constipation, nausea, rectal pain and vomiting. Negative for abdominal pain.   Genitourinary: Positive for decreased urine volume.   Musculoskeletal: Negative.    Skin: Negative.    Neurological: Positive for weakness and light-headedness.   Psychiatric/Behavioral: Negative.         All other systems reviewed and are negative.     Personal History     Past Medical History:   Diagnosis Date   • Arthritis    • Cancer (CMS/HCC)    • COPD (chronic obstructive pulmonary disease) (CMS/HCC)    • Fall 2020   • Hypertension    • Pancreatitis    • Tonsillar  cancer (CMS/HCC)        Past Surgical History:   Procedure Laterality Date   • ANKLE TENDON REPAIR     • CARDIAC CATHETERIZATION N/A 5/31/2018    Procedure: Left Heart Cath;  Surgeon: Ray Farley MD;  Location: [facility] CATH INVASIVE LOCATION;  Service: Cardiovascular   • CHOLECYSTECTOMY     • CYST REMOVAL      coccyx   • HERNIA MESH REMOVAL     • HERNIA REPAIR     • KNEE SURGERY  1981   • TONSILLECTOMY         Family History: family history includes Heart disease in his father; Hypertension in his mother. Otherwise pertinent FHx was reviewed and unremarkable.     Social History:  reports that he has quit smoking. His smoking use included cigarettes. He has never used smokeless tobacco. He reports that he drank alcohol. He reports that he has current or past drug history. Drug: Marijuana.  Social History     Social History Narrative    Patient ambulatory without assistive device, but has ordered       Medications:  Available home medication information reviewed.  Medications Prior to Admission   Medication Sig Dispense Refill Last Dose   • albuterol sulfate  (90 Base) MCG/ACT inhaler Inhale 2 puffs Every 4 (Four) Hours As Needed for Wheezing or Shortness of Air. 18 g 3 Taking   • aspirin 81 MG chewable tablet Chew 81 mg Daily.   Taking   • dexamethasone (DECADRON) 4 MG tablet Take 2 tablets by mouth Daily on days 2, 3 & 4.  Take with food. 6 tablet 5 Taking   • docusate sodium (COLACE) 100 MG capsule Take 100 mg by mouth Daily.   Taking   • lisinopril-hydrochlorothiazide (PRINZIDE,ZESTORETIC) 20-25 MG per tablet Take 1 tablet by mouth Daily.   Taking   • Magic mouthwash Radonc Swish and swallow 10 mL 4 (Four) Times a Day Before Meals & at Bedtime. 480 mL 3 Taking   • metoprolol succinate XL (TOPROL-XL) 25 MG 24 hr tablet Take 25 mg by mouth Daily.   Taking   • mirtazapine (REMERON) 15 MG tablet Take 1-2 tablets by mouth every night 30 minutes before bedtime. 60 tablet 2 Taking   • Naloxegol Oxalate  (MOVANTIK) 12.5 MG tablet Take 1 tablet by mouth Every Morning for 30 days. For constipation 30 tablet 0    • naloxone (NARCAN) 4 MG/0.1ML nasal spray Use 1 spray into the nostril(s) as directed by provider As Needed for opioid reversal for up to 1 dose. 2 each 0 Taking   • OLANZapine zydis (zyPREXA) 5 MG disintegrating tablet Place 1 tablet on the tongue Every Night for 20 days For severe nausea 10 tablet 1    • ondansetron (ZOFRAN) 8 MG tablet Take 1 tablet by mouth 3 (Three) Times a Day As Needed for Nausea or Vomiting. 30 tablet 5 Taking   • oxyCODONE (ROXICODONE) 10 MG tablet Take 1 tablet by mouth Every 6 (Six) Hours As Needed for Severe Pain  for up to 15 days. 60 tablet 0    • pantoprazole (PROTONIX) 40 MG EC tablet Take 40 mg by mouth Daily.   Taking   • potassium chloride (K-DUR,KLOR-CON) 20 MEQ CR tablet Take 1 tablet by mouth 2 (Two) Times a Day. 60 tablet 0 Taking   • sennosides-docusate (senna-docusate sodium) 8.6-50 MG per tablet Take 2 tablets by mouth 2 (two) times a day for 30 days. 120 tablet 0 Taking       No Known Allergies    Objective   Objective     Vital Signs:   Temp:  [97.4 °F (36.3 °C)] 97.4 °F (36.3 °C)  Heart Rate:  [69] 69  Resp:  [17] 17  BP: (91)/(52) 91/52        Physical Exam   Constitutional: Awake, alert, sitting up in bed- RN at bedside  Eyes: PERRLA, sclerae icteric, no conjunctival injection  HENT: NCAT, mucous membranes dry, lips cracked  Neck: Supple, no thyromegaly, no lymphadenopathy, trachea midline  Respiratory: Clear to auscultation bilaterally, nonlabored respirations   Cardiovascular: RRR, no murmurs, rubs, or gallops, palpable pedal pulses bilaterally  Gastrointestinal: Positive bowel sounds, soft, nontender, moderate distention   Musculoskeletal: trace-1+ bilateral ankle edema, no clubbing or cyanosis to extremities  Psychiatric: Appropriate affect, cooperative  Neurologic: Oriented x 3, strength symmetric in all extremities, Cranial Nerves grossly intact to  confrontation, speech clear  Skin: No rashes- dry cracked skin with poor skin turgor      Results Reviewed:  I have personally reviewed current lab and radiology data.    Results from last 7 days   Lab Units 07/07/20  1653   WBC 10*3/mm3 6.98   HEMOGLOBIN g/dL 11.0*   HEMATOCRIT % 32.0*   PLATELETS 10*3/mm3 98*     Results from last 7 days   Lab Units 07/07/20  1656 07/07/20  1653   SODIUM mmol/L  --  144   POTASSIUM mmol/L  --  4.6   CHLORIDE mmol/L  --  100   CO2 mmol/L  --  24.0   BUN mg/dL  --  128*   CREATININE mg/dL  --  7.46*   GLUCOSE mg/dL  --  123*   CALCIUM mg/dL  --  8.4*   ALT (SGPT) U/L  --  52*   AST (SGOT) U/L  --  79*   LACTATE mmol/L 2.7*  --      Estimated Creatinine Clearance: 15.4 mL/min (A) (by C-G formula based on SCr of 7.46 mg/dL (H)).  Brief Urine Lab Results     None        Imaging Results (Last 24 Hours)     ** No results found for the last 24 hours. **             Assessment/Plan   Assessment & Plan     Active Hospital Problems    Diagnosis POA   • **Acute renal failure (ARF) (CMS/HCC) [N17.9] Unknown   • Dehydration [E86.0] Yes     Priority: High   • Hypotension [I95.9] Unknown     Priority: High   • Constipation due to opioid therapy [K59.03, T40.2X5A] Yes     Priority: Medium   • Intractable nausea and vomiting [R11.2] Unknown   • History of essential hypertension [Z86.79] Not Applicable   • Unintentional weight loss [R63.4] Unknown   • Elevated LFTs [R79.89] Unknown   • Hyperbilirubinemia [E80.6] Unknown   • Chemotherapy-induced thrombocytopenia [D69.59, T45.1X5A] Unknown   • Pain, cancer [G89.3] Yes       -- stat labs ordered: pending troponin  -- start NS bolus x 2 then MIVFs NS. Monitor renal function/ electrolytes, BP. Renal consult in am. Will get urine studies. Prerenal versus nephrotox from cisplatin, versus both.  -- hold home BP meds  -- prn zofran, zyprexa for nausea/ vomiting  -- GI soft diet as tolerates  -- CT a/p for elevated LFTs/ bilirubin  -- bowel regimen- home and  add PRN with scheduled relistor every other day. Preparation H for BRBPR with BM. Monitor. H/H stable  -- Palliative consult  -- PT/OT and nutrition consults  -- am labs      DVT prophylaxis:  Heparin sc      CODE STATUS:    Code Status and Medical Interventions:   Ordered at: 07/07/20 1721     Level Of Support Discussed With:    Patient     Code Status:    CPR     Medical Interventions (Level of Support Prior to Arrest):    Full       Admission Status:  I believe this patient meets inpatient criteria due to acute renal failure as well as intractable N/V and requiring specialty consultation    Electronically signed by FARHAN Kelly, 07/07/20, 5:22 PM.      Attending   Admission Attestation       I have seen and examined the patient, performing an independent face-to-face diagnostic evaluation with plan of care reviewed and developed with the advanced practice clinician (APC).      Brief Summary Statement:   Oliver Mallory is a 55 y.o. 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  Also with HTN, COPD, history of pancreatitis.  The patient has had increased N/V and some constipation for about a week.  He is not keeping much of anything down.  He has maintained taking his medications (including BP meds).  He also states he has had weakness, lightheadedness, and decreased UOP.  Of note, he had creatinine 3.85 on 6/24 and patient somehow missed follow up labs.  His creatinine was 1.8 oin 6/10, 3.3 on 6/17 and 3.85 on 6/24.  The patient had a scheduled follow up with Dr. Spencer in palliative care and she sent him for direct admission due to his intractable N/V.     Found to have creatinine of 7.46 (last 3.85 on 6/24), lactate 2.7, bili 2.8 and elevated LFTs. Patient to be admitted for further eval/ management    Remainder of detailed HPI is as noted by APC and has been reviewed and/or edited by me for completeness.    Attending Physical  Exam:  Constitutional: Awake, alert, chronically ill appearing  Eyes: PERRLA, sclerae anicteric, no conjunctival injection  HENT: NCAT, mucous membranes dry - lips dry and cracked  Neck: Supple, no thyromegaly, no lymphadenopathy, trachea midline  Respiratory: Clear to auscultation bilaterally, nonlabored respirations   Cardiovascular: RRR, palpable pedal pulses bilaterally  Gastrointestinal: Positive bowel sounds, soft, nontender, nondistended  Musculoskeletal: Tr bilateral ankle edema, no clubbing or cyanosis to extremities  Psychiatric: Appropriate affect, cooperative  Neurologic: Oriented x 3, strength symmetric in all extremities, Cranial Nerves grossly intact to confrontation, speech clear  Skin: poor turgor      Brief Assessment/Plan :  See detailed assessment and plan developed with APC which I have reviewed and/or edited for completeness.    Electronically signed by Megan Wolf MD, 20, 9:11 PM.                Electronically signed by Megan Wolf MD at 20 2132             Megan Wolf MD at 20 1722              [facility] Medicine Services  HISTORY AND PHYSICAL    Patient Name: Oliver Mallory  : 1965  MRN: [id number]  Primary Care Physician: Raymundo Salgado MD  Date of admission: 2020      Subjective   Subjective     Chief Complaint:  Intractable N/V, constipation    HPI:  Oliver Mallory is a 55 y.o. male with pmh significant for HTN, COPD, pancreatitis, tonsillar cancer s/p tonsillectomy undergoing chemo and radiation that presents from palliative office for intractable n/v x 4 days. Patient states not keeping much fluid down. Has been able to take medications over past 4 days but no food intake. Having some weight loss in past few mos, but not sure amount. Endorses weeks of constipation without adequate BM, increasing fatigue/ weakness, dizziness, decreased urination in past 4 days.    Found to have creatinine of 7.46 (last 3.85 on ),  lactate 2.7, bili 2.8 and elevated LFTs. Patient to be admitted for further eval/ management.    Review of Systems   Constitutional: Positive for activity change, appetite change and fatigue. Negative for diaphoresis and fever.   HENT: Negative.    Respiratory: Negative.  Negative for cough, chest tightness, shortness of breath and wheezing.    Cardiovascular: Positive for leg swelling. Negative for chest pain and palpitations.   Gastrointestinal: Positive for abdominal distention, blood in stool, constipation, nausea, rectal pain and vomiting. Negative for abdominal pain.   Genitourinary: Positive for decreased urine volume.   Musculoskeletal: Negative.    Skin: Negative.    Neurological: Positive for weakness and light-headedness.   Psychiatric/Behavioral: Negative.         All other systems reviewed and are negative.     Personal History     Past Medical History:   Diagnosis Date   • Arthritis    • Cancer (CMS/HCC)    • COPD (chronic obstructive pulmonary disease) (CMS/HCC)    • Fall 05/12/2020   • Hypertension    • Pancreatitis    • Tonsillar cancer (CMS/HCC)        Past Surgical History:   Procedure Laterality Date   • ANKLE TENDON REPAIR     • CARDIAC CATHETERIZATION N/A 5/31/2018    Procedure: Left Heart Cath;  Surgeon: Ray Farley MD;  Location: [facility] INVASIVE LOCATION;  Service: Cardiovascular   • CHOLECYSTECTOMY     • CYST REMOVAL      coccyx   • HERNIA MESH REMOVAL     • HERNIA REPAIR     • KNEE SURGERY  1981   • TONSILLECTOMY         Family History: family history includes Heart disease in his father; Hypertension in his mother. Otherwise pertinent FHx was reviewed and unremarkable.     Social History:  reports that he has quit smoking. His smoking use included cigarettes. He has never used smokeless tobacco. He reports that he drank alcohol. He reports that he has current or past drug history. Drug: Marijuana.  Social History     Social History Narrative    Patient ambulatory without assistive  device, but has ordered       Medications:  Available home medication information reviewed.  Medications Prior to Admission   Medication Sig Dispense Refill Last Dose   • albuterol sulfate  (90 Base) MCG/ACT inhaler Inhale 2 puffs Every 4 (Four) Hours As Needed for Wheezing or Shortness of Air. 18 g 3 Taking   • aspirin 81 MG chewable tablet Chew 81 mg Daily.   Taking   • dexamethasone (DECADRON) 4 MG tablet Take 2 tablets by mouth Daily on days 2, 3 & 4.  Take with food. 6 tablet 5 Taking   • docusate sodium (COLACE) 100 MG capsule Take 100 mg by mouth Daily.   Taking   • lisinopril-hydrochlorothiazide (PRINZIDE,ZESTORETIC) 20-25 MG per tablet Take 1 tablet by mouth Daily.   Taking   • Magic mouthwash Radonc Swish and swallow 10 mL 4 (Four) Times a Day Before Meals & at Bedtime. 480 mL 3 Taking   • metoprolol succinate XL (TOPROL-XL) 25 MG 24 hr tablet Take 25 mg by mouth Daily.   Taking   • mirtazapine (REMERON) 15 MG tablet Take 1-2 tablets by mouth every night 30 minutes before bedtime. 60 tablet 2 Taking   • Naloxegol Oxalate (MOVANTIK) 12.5 MG tablet Take 1 tablet by mouth Every Morning for 30 days. For constipation 30 tablet 0    • naloxone (NARCAN) 4 MG/0.1ML nasal spray Use 1 spray into the nostril(s) as directed by provider As Needed for opioid reversal for up to 1 dose. 2 each 0 Taking   • OLANZapine zydis (zyPREXA) 5 MG disintegrating tablet Place 1 tablet on the tongue Every Night for 20 days For severe nausea 10 tablet 1    • ondansetron (ZOFRAN) 8 MG tablet Take 1 tablet by mouth 3 (Three) Times a Day As Needed for Nausea or Vomiting. 30 tablet 5 Taking   • oxyCODONE (ROXICODONE) 10 MG tablet Take 1 tablet by mouth Every 6 (Six) Hours As Needed for Severe Pain  for up to 15 days. 60 tablet 0    • pantoprazole (PROTONIX) 40 MG EC tablet Take 40 mg by mouth Daily.   Taking   • potassium chloride (K-DUR,KLOR-CON) 20 MEQ CR tablet Take 1 tablet by mouth 2 (Two) Times a Day. 60 tablet 0 Taking   •  sennosides-docusate (senna-docusate sodium) 8.6-50 MG per tablet Take 2 tablets by mouth 2 (two) times a day for 30 days. 120 tablet 0 Taking       No Known Allergies    Objective   Objective     Vital Signs:   Temp:  [97.4 °F (36.3 °C)] 97.4 °F (36.3 °C)  Heart Rate:  [69] 69  Resp:  [17] 17  BP: (91)/(52) 91/52        Physical Exam   Constitutional: Awake, alert, sitting up in bed- RN at bedside  Eyes: PERRLA, sclerae icteric, no conjunctival injection  HENT: NCAT, mucous membranes dry, lips cracked  Neck: Supple, no thyromegaly, no lymphadenopathy, trachea midline  Respiratory: Clear to auscultation bilaterally, nonlabored respirations   Cardiovascular: RRR, no murmurs, rubs, or gallops, palpable pedal pulses bilaterally  Gastrointestinal: Positive bowel sounds, soft, nontender, moderate distention   Musculoskeletal: trace-1+ bilateral ankle edema, no clubbing or cyanosis to extremities  Psychiatric: Appropriate affect, cooperative  Neurologic: Oriented x 3, strength symmetric in all extremities, Cranial Nerves grossly intact to confrontation, speech clear  Skin: No rashes- dry cracked skin with poor skin turgor      Results Reviewed:  I have personally reviewed current lab and radiology data.    Results from last 7 days   Lab Units 07/07/20  1653   WBC 10*3/mm3 6.98   HEMOGLOBIN g/dL 11.0*   HEMATOCRIT % 32.0*   PLATELETS 10*3/mm3 98*     Results from last 7 days   Lab Units 07/07/20  1656 07/07/20  1653   SODIUM mmol/L  --  144   POTASSIUM mmol/L  --  4.6   CHLORIDE mmol/L  --  100   CO2 mmol/L  --  24.0   BUN mg/dL  --  128*   CREATININE mg/dL  --  7.46*   GLUCOSE mg/dL  --  123*   CALCIUM mg/dL  --  8.4*   ALT (SGPT) U/L  --  52*   AST (SGOT) U/L  --  79*   LACTATE mmol/L 2.7*  --      Estimated Creatinine Clearance: 15.4 mL/min (A) (by C-G formula based on SCr of 7.46 mg/dL (H)).  Brief Urine Lab Results     None        Imaging Results (Last 24 Hours)     ** No results found for the last 24 hours. **              Assessment/Plan   Assessment & Plan     Active Hospital Problems    Diagnosis POA   • **Acute renal failure (ARF) (CMS/HCC) [N17.9] Unknown   • Dehydration [E86.0] Yes     Priority: High   • Hypotension [I95.9] Unknown     Priority: High   • Constipation due to opioid therapy [K59.03, T40.2X5A] Yes     Priority: Medium   • Intractable nausea and vomiting [R11.2] Unknown   • History of essential hypertension [Z86.79] Not Applicable   • Unintentional weight loss [R63.4] Unknown   • Elevated LFTs [R79.89] Unknown   • Hyperbilirubinemia [E80.6] Unknown   • Chemotherapy-induced thrombocytopenia [D69.59, T45.1X5A] Unknown   • Pain, cancer [G89.3] Yes       -- stat labs ordered: pending troponin  -- start NS bolus x 2 then MIVFs NS. Monitor renal function/ electrolytes, BP. Renal consult in am. Will get urine studies. Prerenal versus nephrotox from cisplatin, versus both.  -- hold home BP meds  -- prn zofran, zyprexa for nausea/ vomiting  -- GI soft diet as tolerates  -- CT a/p for elevated LFTs/ bilirubin  -- bowel regimen- home and add PRN with scheduled relistor every other day. Preparation H for BRBPR with BM. Monitor. H/H stable  -- Palliative consult  -- PT/OT and nutrition consults  -- am labs      DVT prophylaxis:  Heparin sc      CODE STATUS:    Code Status and Medical Interventions:   Ordered at: 07/07/20 1721     Level Of Support Discussed With:    Patient     Code Status:    CPR     Medical Interventions (Level of Support Prior to Arrest):    Full       Admission Status:  I believe this patient meets inpatient criteria due to acute renal failure as well as intractable N/V and requiring specialty consultation    Electronically signed by FARHAN Kelly, 07/07/20, 5:22 PM.      Attending   Admission Attestation       I have seen and examined the patient, performing an independent face-to-face diagnostic evaluation with plan of care reviewed and developed with the advanced practice clinician  (APC).      Brief Summary Statement:   Oliver Mallory is a 55 y.o. 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  Also with HTN, COPD, history of pancreatitis.  The patient has had increased N/V and some constipation for about a week.  He is not keeping much of anything down.  He has maintained taking his medications (including BP meds).  He also states he has had weakness, lightheadedness, and decreased UOP.  Of note, he had creatinine 3.85 on 6/24 and patient somehow missed follow up labs.  His creatinine was 1.8 oin 6/10, 3.3 on 6/17 and 3.85 on 6/24.  The patient had a scheduled follow up with Dr. Spencer in palliative care and she sent him for direct admission due to his intractable N/V.     Found to have creatinine of 7.46 (last 3.85 on 6/24), lactate 2.7, bili 2.8 and elevated LFTs. Patient to be admitted for further eval/ management    Remainder of detailed HPI is as noted by APC and has been reviewed and/or edited by me for completeness.    Attending Physical Exam:  Constitutional: Awake, alert, chronically ill appearing  Eyes: PERRLA, sclerae anicteric, no conjunctival injection  HENT: NCAT, mucous membranes dry - lips dry and cracked  Neck: Supple, no thyromegaly, no lymphadenopathy, trachea midline  Respiratory: Clear to auscultation bilaterally, nonlabored respirations   Cardiovascular: RRR, palpable pedal pulses bilaterally  Gastrointestinal: Positive bowel sounds, soft, nontender, nondistended  Musculoskeletal: Tr bilateral ankle edema, no clubbing or cyanosis to extremities  Psychiatric: Appropriate affect, cooperative  Neurologic: Oriented x 3, strength symmetric in all extremities, Cranial Nerves grossly intact to confrontation, speech clear  Skin: poor turgor      Brief Assessment/Plan :  See detailed assessment and plan developed with APC which I have reviewed and/or edited for completeness.    Electronically signed by Megan Wolf MD,  "07/07/20, 9:11 PM.                Electronically signed by Megan Wolf MD at 07/07/20 2132          Physician Progress Notes (most recent note)      Rickey Hays MD at 08/06/20 0902             LOS: 30 days      Reason For Visit:  F/U AUDREY  Subjective    No acute events overnight. No new complaints.   Review of Systems:   Patient denies shortness of breath, chest pain,nausea, vomiting.        Objective       aspirin 81 mg Oral Daily   bumetanide 4 mg Oral Daily   cefTAZidime 1 g Intravenous Once per day on Mon Wed Fri   hydrocortisone  Topical Q12H   lactulose 10 g Oral TID   magic mouthwash 5 mL Swish & Spit 4x Daily   metoclopramide 5 mg Oral TID AC   midodrine 10 mg Oral 3 times per day on Sun Tue Thu Sat   midodrine 20 mg Oral 3 times per day on Mon Wed Fri   mirtazapine 15 mg Oral Nightly   Sally 2 patch Topical Once   pantoprazole 40 mg Oral BID AC   sertraline 25 mg Oral Daily   sodium chloride 10 mL Intravenous Q12H       Pharmacy Consult - Pharmacy to dose          Vital Signs:  Blood pressure 128/84, pulse 92, temperature 98.2 °F (36.8 °C), temperature source Oral, resp. rate 20, height 182.9 cm (72.01\"), weight 124 kg (272 lb 12.8 oz), SpO2 98 %.    Flowsheet Rows      First Filed Value   Admission Height  185.4 cm (73\") Documented at 07/07/2020 1640   Admission Weight  127 kg (280 lb) Documented at 07/07/2020 1640          08/05 0701 - 08/06 0700  In: 500   Out: 2650     Physical Exam:    General Appearance:Alert, NAD  Eyes: PER, conjunctivae and sclerae normal, no icterus  Lungs: Clear on auscultation. No wheezing.   Heart/CV: regular rhythm, bilateral lower extremity edema.  Abdomen:  Distended , soft, non-tender,  bowel sounds present  Skin: No rash, Warm and dry ecchymosis chest wall.  Neurologically grossly intact.    Temp dialysis access    Radiology:        Labs:  Results from last 7 days   Lab Units 08/06/20  0424 08/05/20  0518 08/04/20  0418   WBC 10*3/mm3 6.44 4.25 4.03 "   HEMOGLOBIN g/dL 9.3* 7.5* 7.0*   HEMATOCRIT % 28.4* 22.4* 21.4*   PLATELETS 10*3/mm3 72* 52* 48*     Results from last 7 days   Lab Units 08/06/20  0424 08/05/20  0518 08/04/20  0418 08/03/20  0459   SODIUM mmol/L 135* 137 138 136   POTASSIUM mmol/L 3.9 3.6 3.6 3.7   CHLORIDE mmol/L 102 102 103 100   CO2 mmol/L 22.0 23.0 26.0 23.0   BUN mg/dL 23* 38* 29* 44*   CREATININE mg/dL 3.43* 5.03* 4.79* 5.53*   CALCIUM mg/dL 8.6 8.2* 8.3* 8.3*   PHOSPHORUS mg/dL 2.3* 3.1 2.4*  --    ALBUMIN g/dL 3.20* 2.70* 2.80* 2.80*     Results from last 7 days   Lab Units 08/06/20  0424   GLUCOSE mg/dL 140*       Results from last 7 days   Lab Units 08/06/20  0424   ALK PHOS U/L 175*   BILIRUBIN mg/dL 3.9*   ALT (SGPT) U/L 28   AST (SGOT) U/L 102*                 Estimated Creatinine Clearance: 32.7 mL/min (A) (by C-G formula based on SCr of 3.43 mg/dL (H)).      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  Non oliguric AUDREY: Thought to be ATN gradual worsening of renal function in the setting of chronic liver disease w cirrhosis. No response to albumin and midodrine.  Started on HD on 7/24/20. For solute clearance and optimization of volume status  Hypokalemia: Management with HD   2.  Anemia: Transfuse PRN no LISA to be given due to cancer  3.  Dependent edema: Optimization with HD patient has high fluid intake.  4 decompensated liver disease:  5.  Intradialytic hypotension: midodrine 20 mg TID  6. Bacteremia - Enterobacteriacea       Recommendations:   HD tomorrow, UF as tolerated.   Fluid restriction     Lang Irfan Saúl, MD  08/06/20  09:02          Electronically signed by Rickey Hays MD at 08/06/20  1407          Consult Notes (most recent note)      Joey Graham MD at 08/01/20 1102      Consult Orders    1. Inpatient Infectious Diseases Consult [[id number]] ordered by Ksenia Chaidez DO at 08/01/20 0801                INFECTIOUS DISEASE CONSULT/INITIAL HOSPITAL VISIT    Oliver Mallory  1965  [id number]    Date of consult: 8/1/2020    Admit date: 7/7/2020    Requesting Provider: Dr. Dianna Chaidez  Evaluating physician: Dr. Joey Graham  Reason for Consultation: Enterobacter sepsis with bacteremia, 4 out of 4 bottles positive from 7/31/2020  Chief Complaint: Intractable nausea/vomiting and constipation      Subjective   History of present illness:  Mr. Oliver Mallory 55 y.o.  Yr old male who is being evaluated for Enterobacter bacteremia.  He has a past medical history significant for hypertension, COPD, pancreatitis, cirrhosis, tonsillar cancer status post tonsillectomy undergoing chemo and radiation therapy.  He was initially admitted on 7/7/2020 with intractable nausea and vomiting x4 days.  He was initially admitted to the ICU and did require some pressors.  Hospital course was complicated by GI bleed and hypotension as well acute kidney injury.  Patient was stabilized and transferred to the floor on 7/17.  Due to progressive kidney failure with fluid volume overload, tunneled dialysis catheter and central line was placed by interventional radiology on 7/24/2020 and dialysis was initiated.  There was reported issues with bleeding from the central line and patient received platelets and FFP.  Patient was found to have IJ line pulled most the way out had to be discontinued on 8/1 morning. Dialysis catheter remains in place.  Patient underwent a CT-guided paracentesis on 7/31, 7/15 and 7/10.  Patient had a EGD on 7/9 that showed mild portal gastropathy, reflux esophagitis and grade 1 esophageal varices.  He received a colonoscopy on 7/10 which showed a few sigmoid diverticula.  There is also petechiae in  the rectum with scant oozing.  It was felt, in the absence of history of radiation to this region, that this is suspected portal colopathy.  Patient also received an echo on 7/8/2020 that was normal.    Patient did develop a fever of 101.5 on 7/30 as well as some tachycardia up to 112.  Patient did remain without leukocytosis however due to the fever blood cultures were ordered which have turned positive in 4/4 bottles for gram-negative bacilli identified as Enterobacteriaceae by bio fire report.  Currently WBC is 5.56, H&H is 8.1/25.7 and platelets are 34.  AST is elevated at 70 and alkaline phosphatase is elevated 148 and total bilirubin is elevated 2.7.  7/31 CT of chest show clear lungs and cirrhosis with ascites.  CT of the abdomen on 8/1 showed cirrhosis with splenomegaly and ascites, surgical absence of gallbladder and diverticulosis.    Patient has no known antibiotic allergies and is currently receiving IV Zosyn.  ID has been consulted for further evaluation and treatment as well as antimicrobial therapy management on 8/1/2020.    Of note:  7/8 blood cultures were finalized no growth in 4/4 bottles  7/31 body fluid culture and stain-no organisms seen  7/15 body fluid culture and stain-no organisms seen    Past Medical History:   Diagnosis Date   • Arthritis    • Cancer (CMS/HCC)    • COPD (chronic obstructive pulmonary disease) (CMS/HCC)    • Fall 05/12/2020   • Hypertension    • Pancreatitis    • Tonsillar cancer (CMS/HCC)        Past Surgical History:   Procedure Laterality Date   • ANKLE TENDON REPAIR     • CARDIAC CATHETERIZATION N/A 5/31/2018    Procedure: Left Heart Cath;  Surgeon: Ray Farley MD;  Location:  [facility];  Service: Cardiovascular   • CHOLECYSTECTOMY     • COLONOSCOPY N/A 7/10/2020    Procedure: COLONOSCOPY;  Surgeon: Brunner, Mark I, MD;  Location:  [facility] ENDOSCOPY;  Service: Gastroenterology;  Laterality: N/A;   • CYST REMOVAL      coccyx   • ENDOSCOPY N/A  7/9/2020    Procedure: ESOPHAGOGASTRODUODENOSCOPY;  Surgeon: Brunner, Mark I, MD;  Location: [facility] ENDOSCOPY;  Service: Gastroenterology;  Laterality: N/A;   • HERNIA MESH REMOVAL     • HERNIA REPAIR     • KNEE SURGERY  1981   • TONSILLECTOMY         Pediatric History   Patient Guardian Status   • Not on file     Other Topics Concern   • Not on file   Social History Narrative    Patient ambulatory without assistive device, but has ordered       family history includes Heart disease in his father; Hypertension in his mother.    No Known Allergies    Immunization History   Administered Date(s) Administered   • Hepatitis A 07/11/2020   • Hepatitis B Vaccine Adult IM 07/11/2020       Medication:    Current Facility-Administered Medications:   •  acetaminophen (TYLENOL) tablet 650 mg, 650 mg, Oral, Q4H PRN, Abram Abraham MD, 650 mg at 07/31/20 2107  •  albumin human 25 % IV SOLN 12.5 g, 12.5 g, Intravenous, PRN, Jose Alejandro Rose MD, 12.5 g at 07/24/20 1339  •  albuterol (PROVENTIL) nebulizer solution 0.083% 2.5 mg/3mL, 2.5 mg, Nebulization, Q6H PRN, Abram Abraham MD, 2.5 mg at 07/21/20 1405  •  aspirin chewable tablet 81 mg, 81 mg, Oral, Daily, Abram Abraham MD, 81 mg at 08/01/20 0802  •  bumetanide (BUMEX) tablet 4 mg, 4 mg, Oral, Daily, Escobar Miles MD, 4 mg at 08/01/20 1026  •  diphenhydrAMINE (BENADRYL) capsule 25 mg, 25 mg, Oral, Q6H PRN, Escobar Miles MD  •  heparin (porcine) injection 1,600 Units, 1,600 Units, Intracatheter, PRN, Escobar Miles MD, 1,600 Units at 07/31/20 1013  •  hydrocortisone 1 % cream, , Topical, Q12H, Rupa Hawthorne APRN  •  lactulose (CHRONULAC) 10 GM/15ML solution 10 g, 10 g, Oral, TID, Abram Abraham MD, 10 g at 08/01/20 1736  •  magic mouthwash oral supsension 5 mL, 5 mL, Swish & Spit, 4x Daily, Loreta Hemphill II, DO, 5 mL at 08/01/20 1233  •  meropenem (MERREM) 1 g/100 mL 0.9% NS VTB (mbp), 1 g, Intravenous, Q12H, Maynor Mcfarland, DO  •  metoclopramide  (REGLAN) tablet 5 mg, 5 mg, Oral, TID AC, Abram Abraham MD, 5 mg at 08/01/20 1737  •  midodrine (PROAMATINE) tablet 10 mg, 10 mg, Oral, 3 times per day on Sun Tue Thu Sat, Ksenia Chaidez DO, 10 mg at 08/01/20 1736  •  midodrine (PROAMATINE) tablet 20 mg, 20 mg, Oral, 3 times per day on Mon Wed Fri, Ksenia Chaidez DO, 20 mg at 07/31/20 1741  •  mirtazapine (REMERON) tablet 15 mg, 15 mg, Oral, Nightly, Abram Abraham MD, 15 mg at 07/31/20 1940  •  morphine injection 1 mg, 1 mg, Intravenous, Q4H PRN, Ksenia Chaidez DO, 1 mg at 08/01/20 0210  •  Sally patch 2 patch, 2 patch, Topical, Once, Zamzam Villa, APRN, Stopped at 07/31/20 2211  •  ondansetron (ZOFRAN) injection 4 mg, 4 mg, Intravenous, Q6H PRN, Abram Abraham MD, 4 mg at 07/31/20 0844  •  oxyCODONE (ROXICODONE) immediate release tablet 10 mg, 10 mg, Oral, Q6H PRN, Escobar Miles MD, 10 mg at 08/01/20 1846  •  pantoprazole (PROTONIX) EC tablet 40 mg, 40 mg, Oral, BID Jarad ALCANTARA Yuri, MD, 40 mg at 08/01/20 1737  •  phenylephrine-mineral oil-petrolatum (PREPARATION H) 0.25-14-74.9 % hemorhoidal ointment, , Rectal, TID PRN, Abram Abraham MD  •  sertraline (ZOLOFT) tablet 25 mg, 25 mg, Oral, Daily, Abram Abraham MD, 25 mg at 08/01/20 0802  •  sodium chloride 0.9 % flush 10 mL, 10 mL, Intravenous, Q12H, Abram Abraham MD, 10 mL at 08/01/20 1026  •  sodium chloride 0.9 % flush 10 mL, 10 mL, Intravenous, PRN, Abram Abraham MD, 10 mL at 07/10/20 0759    Please refer to the medical record for a full medication list    Review of Systems:    Constitutional--positive for fever  HEENT-- No new vision, hearing or throat complaints.  No epistaxis or oral sores.  Denies odynophagia or dysphagia.  No odynophagia or dysphagia. No headache, photophobia or neck stiffness.  CV-- No chest pain, palpitation or syncope  Resp-- No SOB/cough/Hemoptysis  GI- No nausea, vomiting, or diarrhea.  No hematochezia, melena, or hematemesis. Denies jaundice or chronic  "liver disease.  -- No dysuria, hematuria, or flank pain.  Denies hesitancy, urgency or flank pain.  Lymph- no swollen lymph nodes in neck/axilla or groin.   Heme- No active bruising or bleeding; no Hx of DVT or PE.  MS-- no swelling or pain in the bones or joints of arms/legs.  No new back pain.  Neuro-- No acute focal weakness or numbness in the arms or legs.  No seizures.  Skin--positive for bruising    Physical Exam:   Vital Signs   Temp:  [97.6 °F (36.4 °C)-98.4 °F (36.9 °C)] 98.3 °F (36.8 °C)  Heart Rate:  [] 87  Resp:  [16] 16  BP: ()/(48-60) 95/59    Temp  Min: 97.6 °F (36.4 °C)  Max: 98.4 °F (36.9 °C)  BP  Min: 83/50  Max: 113/60  Pulse  Min: 83  Max: 108  Resp  Min: 16  Max: 16  SpO2  Min: 94 %  Max: 97 %    Blood pressure 95/59, pulse 87, temperature 98.3 °F (36.8 °C), temperature source Oral, resp. rate 16, height 182.9 cm (72\"), weight 125 kg (276 lb 6.4 oz), SpO2 94 %.  GENERAL: Awake and alert, in moderate. Appears older than stated age.  Resting in bed.  HEENT:  Normocephalic, atraumatic.  Oropharynx without thrush. Dentition in good repair. Ears externally normal, Nose externally normal.  EYES: PERRL. No conjunctival injection. No icterus. EOM full.  LYMPHATICS: No lymphadenopathy of the neck or axillary regions.   HEART: Audible murmur noted. Reg rate rhythm, No JVD at 45 degrees.  LUNGS: Clear to auscultation and percussion. No respiratory distress, no use of accessory muscles.  ABDOMEN: Soft, tender x4 quadrants, nondistended. No appreciable HSM.  Bowel sounds normal.  GENITAL: Without Abbasi cath.   SKIN: Warm and dry without cutaneous eruptions.  Ecchymosis noted across bilateral chest and scattered across bilateral upper extremities  PSYCHIATRIC: Mental status lucid. No confusion.  EXT:  No cellulitic change.  NEURO: Oriented to name, CN 2 to 12 intact  LINES: Right chest Vipin cath in place.  Ecchymosis noted around the site.  Insertion site is not warm however it is tender to " touch.  Central line insertion site at right IJ with dressing in place.  No erythema or tenderness noted at insertion site.  Central line was removed on the morning of 8/1.      Results Review:       Results from last 7 days   Lab Units 08/01/20  0355 07/31/20  0456 07/30/20  1224 07/30/20  0407   WBC 10*3/mm3 5.56 4.18  --  4.68   HEMOGLOBIN g/dL 8.1* 8.3* 7.6* 8.1*   HEMATOCRIT % 25.7* 25.4* 24.6* 24.5*   PLATELETS 10*3/mm3 34* 44*  --  58*     Results from last 7 days   Lab Units 08/01/20  0355   SODIUM mmol/L 138   POTASSIUM mmol/L 4.0   CHLORIDE mmol/L 104   CO2 mmol/L 23.0   BUN mg/dL 24*   CREATININE mg/dL 4.69*   GLUCOSE mg/dL 139*   CALCIUM mg/dL 8.6     Results from last 7 days   Lab Units 08/01/20  0355   ALK PHOS U/L 148*   BILIRUBIN mg/dL 2.7*   ALT (SGPT) U/L 37   AST (SGOT) U/L 70*                     Estimated Creatinine Clearance: 24 mL/min (A) (by C-G formula based on SCr of 4.69 mg/dL (H)).    Microbiology:  Microbiology Results (last 10 days)     Procedure Component Value - Date/Time    Body Fluid Culture - Body Fluid, Peritoneum [[id number]] Collected:  07/31/20 1422    Lab Status:  Preliminary result Specimen:  Body Fluid from Peritoneum Updated:  08/01/20 0924     Body Fluid Culture No growth     Gram Stain No organisms seen      Occasional WBCs per low power field    Blood Culture - Blood, Arm, Left [[id number]]  (Abnormal) Collected:  07/31/20 0832    Lab Status:  Preliminary result Specimen:  Blood from Arm, Left Updated:  08/01/20 1242     Blood Culture Abnormal Stain     Gram Stain Anaerobic Bottle Gram negative bacilli      Aerobic Bottle Gram negative bacilli    Blood Culture - Blood, Blood, PICC Line [[id number]]  (Abnormal) Collected:  07/31/20 0830    Lab Status:  Preliminary result Specimen:  Blood, PICC Line Updated:  08/01/20 1244     Blood Culture Abnormal Stain     Gram Stain Anaerobic Bottle Gram negative bacilli      Aerobic Bottle Gram negative bacilli    Blood Culture ID, PCR -  Blood, Blood, PICC Line [[id number]]  (Abnormal) Collected:  07/31/20 0830    Lab Status:  Final result Specimen:  Blood, PICC Line Updated:  08/01/20 0022     BCID, PCR Enterobacteriaceae Group. Identification by BCID PCR.            Radiology:  Imaging Results (Last 72 Hours)     Procedure Component Value Units Date/Time    CT Abdomen Pelvis Without Contrast [[id number]] Collected:  08/01/20 0002     Updated:  08/01/20 0005    Narrative:       CT Abdomen Pelvis WO    INDICATION:   Bacteremia and hypotension. Status post paracentesis today.    TECHNIQUE:   CT of the abdomen and pelvis without IV contrast. Coronal and sagittal reconstructions were obtained.  Radiation dose reduction techniques included automated exposure control or exposure modulation based on body size. Count of known CT and cardiac nuc  med studies performed in previous 12 months: 6.     COMPARISON:   7/7/2020    FINDINGS:  Abdomen: Normal caliber aorta. The spleen is enlarged. The liver is cirrhotic and there is a small volume of ascites in the abdomen and pelvis. Negative adrenal glands. Atrophic pancreas and surgical absence of the gallbladder. No focal hepatic mass  although study sensitivity is limited. Nonobstructed kidneys. No adenopathy.    Pelvis: Negative bladder and prostate gland. Diverticulosis. Appendix not clearly demonstrated but no distinct evidence of acute appendicitis. Fat-containing left inguinal hernia. No suspicious bone lesion. Chronic antegrade listhesis of L4 on L5 grade  1/2.      Impression:         1. Cirrhosis with splenomegaly and ascites.  2. Surgical absence of the gallbladder.  3. Diverticulosis.          Signer Name: Clark Smyth MD   Signed: 8/1/2020 12:02 AM   Workstation Name: [id number]    [facility]    CT Chest Without Contrast [[id number]] Collected:  07/31/20 2359     Updated:  08/01/20 0001    Narrative:       CT Chest WO    INDICATION:   Bacteremia. Hypertension. Status post  paracentesis today.    TECHNIQUE:   CT of the thorax without IV contrast. Coronal and sagittal reconstructions were obtained.  Radiation dose reduction techniques included automated exposure control or exposure modulation based on body size. Count of known CT and cardiac nuc med studies  performed in previous 12 months: 6.     COMPARISON:   7/7/2020    FINDINGS:  Lungs are essentially clear. Old healed granulomatous disease no pleural effusion. No pericardial effusion. There is an enlarged anterior pericardial lymph node measuring up to 15 mm. Included upper abdomen demonstrates cirrhosis and ascites with  probable splenomegaly. The abdomen CT has been dictated separately. No suspicious bone lesion.      Impression:         1. The lungs are clear. Mildly enlarged anterior pericardial lymph node, indeterminate.  2. Cirrhosis with ascites.    Signer Name: Clark Smyth MD   Signed: 7/31/2020 11:59 PM   Workstation Name: [email or web address]    [facility]    CT Guided Paracentesis [[id number]] Collected:  07/31/20 2149     Updated:  07/31/20 2154    Narrative:       PROCEDURE: CT-guided paracentesis     Procedural Personnel  Attending physician(s): ABELARDO Gore M.D.  Fellow physician(s): None  Resident physician(s): None  Advanced practice provider(s): None     Pre-procedure diagnosis: Liver cirrhosis; recurrent large volume  symptomatic ascites?  Post-procedure diagnosis: Same  Indication: Diagnostic/therapeutic  Additional clinical history: None     Complications: No immediate complications.       Impression:          CT-guided [place] [address] mL of serous fluid.  Portion sent for requested laboratory analysis     Plan:      Resume care by clinical team.  _______________________________________________________________     PROCEDURE SUMMARY:  - Limited CT  - CT-guided paracentesis  - Additional procedure(s): None     PROCEDURE DETAILS:     Pre-procedure  Consent: Informed consent for  the procedure including risks, benefits  and alternatives was obtained and time-out was performed prior to the  procedure.  Preparation: The site was prepared and draped using maximal sterile  barrier technique including cutaneous antisepsis.     Anesthesia/sedation  Level of anesthesia/sedation: No sedation     Initial abdominal CT  Initial abdominal CT was performed.  Findings: Large volume ascites. A safe window for paracentesis was  identified.     Paracentesis  Local anesthesia was administered. The peritoneal cavity was accessed  and needle position confirmed by CT. Ascites was drained. The catheter  was then removed, and a sterile bandage was applied.  Paracentesis access technique: Trocar  Catheter placed: 8.5 [place]  Post-drainage CT: Near resolution of ascites.     Radiation Dose  CT dose length product (mGy-cm): 865      Additional Details  Additional description of procedure: None  Equipment details: None  Specimens removed: Abdominal fluid  Estimated blood loss (mL): Less than 10  Standardized report: Paracentesis     Attestation  I was present and scrubbed for the entire procedure. Imaging reviewed.  Agree with final report as written.     This report was finalized on 7/31/2020 9:51 PM by Rojas Gore.       XR Chest 1 View [[id number]] Collected:  07/31/20 2035     Updated:  07/31/20 2037    Narrative:       CR Chest 1 Vw    INDICATION:   IJ placement     COMPARISON:    Chest x-ray 5/13/2020.    FINDINGS:  Single portable AP view(s) of the chest.    There are 2 central venous catheters from a right IJ approach. Small caliber central venous catheter terminates distal SVC. The large caliber central venous catheter terminates distal SVC right atrial junction level. There is no pneumothorax. Both are  new.    Cardiac silhouette is normal in size. There is borderline central vascular congestion.. Please correlate for evidence for mild volume overload. There is no pleural effusion. There is no acute  infiltrate.       Impression:         1. No pneumothorax.  2. Right IJ approach central venous catheter terminates distal SVC level. Second right IJ approach large caliber introducer type catheter terminates distal SVC/right atrial junction level.  3. Suspect borderline vascular congestion    Signer Name: Caitlin Talavera MD   Signed: 7/31/2020 8:35 PM   Workstation Name: [email or web address]    [facility]          IMPRESSION:     1. Sepsis with Enterobacteriaceae group bacteremia-blood cultures positive in 4/4 bottles 7/31/2020.  Etiology is unclear at this time but could likely be a dialysis/line infection. Prior echo negative on 7/8.  May possibly be an occult intra-abdominal process, or elsewhere but not obvious on CT scan of the chest abdomen and pelvis from 7/31/2020.  However due to the high-grade bacteremia and intravascular infection is suspected/likely.  2. Acute blood loss anemia secondary to GI bleed with hematemesis and rectal bleeding.  3. Liver cirrhosis with ascites status post multiple CT-guided paracentesis.  4. 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  5. Acute kidney injury likely secondary to ATN however could also be hepatorenal syndrome.  6. Intractable nausea and vomiting.  7. Thrombocytopenia- could be secondary to chemotherapy or hepatic disease.  8. Elevated bilirubin- 2.7, likely secondary to hepatic disease, but needs evaluation for biliary tract disease.  9. Elevated AST-70.  10. Elevated alkaline phosphatase 148.      RECOMMENDATIONS:    1. Diagnostically continue to follow patient's physical exam, follow labs include CBC, CMP, CRP.  I will continue to follow blood culture sensitivity reports and adjust antibiotics accordingly.  Also obtain a hepatitis panel, HIV screen and an ultrasound the right upper quadrant to rule out biliary tract disease.  Will also need repeat a TTE to rule out endocarditis.  May consider a GABRIELA.  2. Therapeutically, discontinue IV Zosyn and  start Merrem pharm to dose.  He will likely need to have the dialysis catheter removed and a new one placed on the opposite side of his chest.  This will be difficult due to patient's anemia and thrombocytopenia.  Duration of therapy will likely be 2-4 weeks from removal of dialysis catheter date.  Would recommend removing line as soon as possible and placing temporary dialysis catheter, while we clear his sepsis.  3. Continue supportive care.    Thank you for asking me to see Oliver Mallory.  Our group would be pleased to follow this patient over the course of their hospitalization and assist with outpatient antimicrobial therapy, as indicated.  Further recommendations depend on the results of the cultures and clinical course.  Side effects of medications were discussed.  Patient is at increased risk for adverse drug reactions, recurrent infection, readmission.    Joey Graham MD  8/1/2020      Electronically signed by Joey Graham MD at 08/01/20 1923

## 2020-08-06 NOTE — PROGRESS NOTES
[facility] Medicine Services  PROGRESS NOTE    Patient Name: Oliver Mallory  : 1965  MRN: [id number]    Date of Admission: 2020  Primary Care Physician: Raymundo Salgado MD    Subjective   Subjective     CC:  AUDREY, bacteremia, cirrhosis    HPI:  Resting in bed in no acute distress and tells me he is comfortable.  Denies any fever or chills.  No chest pain or palpitation.    Review of Systems  Gen- No fevers, chills  CV- No chest pain, palpitations  Resp- No cough, dyspnea  GI- No N/V/D, abd pain       Objective   Objective     Vital Signs:   Temp:  [98.2 °F (36.8 °C)] 98.2 °F (36.8 °C)  Heart Rate:  [92] 92  Resp:  [20] 20  BP: (121-134)/(79-92) 121/79        Physical Exam:  Constitutional: No acute distress  HENT: NCAT, mucous membranes moist  Respiratory: Clear to auscultation bilaterally, respiratory effort normal   Cardiovascular: RRR, no murmurs, temporary dialysis catheter  Gastrointestinal: Very obese distended abdomen positive bowel sounds, soft, nontender, evidence of ascites  Musculoskeletal: Severe edema of lower extremities bilaterally.  Psychiatric: Appropriate affect, cooperative  Neurologic: Oriented x 3, speech clear  Skin: No rashes    Results Reviewed:  Results from last 7 days   Lab Units 2018 208 20  0459 202 20  0355 20   WBC 10*3/mm3 6.44 4.25 4.03 5.18 6.11 5.56  --    HEMOGLOBIN g/dL 9.3* 7.5* 7.0* 8.5* 8.1* 8.1*  --    HEMATOCRIT % 28.4* 22.4* 21.4* 25.1* 25.0* 25.7*  --    PLATELETS 10*3/mm3 72* 52* 48* 42* 37* 34*  --    INR   --   --   --  1.94*  --  2.38* 2.30*   PROCALCITONIN ng/mL  --   --   --   --  1.20*  --   --      Results from last 7 days   Lab Units 20/20  0518 20  0418 20  0459 20  0422   SODIUM mmol/L 135* 137 138 136 138   POTASSIUM mmol/L 3.9 3.6 3.6 3.7 3.7   CHLORIDE mmol/L 102 102 103 100 103   CO2 mmol/L 22.0 23.0 26.0 23.0 22.0    BUN mg/dL 23* 38* 29* 44* 28*   CREATININE mg/dL 3.43* 5.03* 4.79* 5.53* 5.50*   GLUCOSE mg/dL 140* 118* 127* 111* 117*   CALCIUM mg/dL 8.6 8.2* 8.3* 8.3* 8.5*   ALT (SGPT) U/L 28  --   --  36 36   AST (SGOT) U/L 102*  --   --  85* 72*     Estimated Creatinine Clearance: 32.7 mL/min (A) (by C-G formula based on SCr of 3.43 mg/dL (H)).    Microbiology Results Abnormal     Procedure Component Value - Date/Time    Blood Culture - Blood, Arm, Left [[id number]] Collected:  08/05/20 1547    Lab Status:  Preliminary result Specimen:  Blood from Arm, Left Updated:  08/06/20 1615     Blood Culture No growth at 24 hours    Blood Culture - Blood, Arm, Left [[id number]] Collected:  08/05/20 1204    Lab Status:  Preliminary result Specimen:  Blood from Arm, Left Updated:  08/06/20 1245     Blood Culture No growth at 24 hours    Anaerobic Culture - Body Fluid, Peritoneum [[id number]] Collected:  07/31/20 1422    Lab Status:  Final result Specimen:  Body Fluid from Peritoneum Updated:  08/05/20 1201     Anaerobic Culture No anaerobes isolated at 5 days    Body Fluid Culture - Body Fluid, Peritoneum [[id number]] Collected:  07/31/20 1422    Lab Status:  Final result Specimen:  Body Fluid from Peritoneum Updated:  08/03/20 0751     Body Fluid Culture No growth at 3 days     Gram Stain No organisms seen      Occasional WBCs per low power field    Blood Culture - Blood, Arm, Left [[id number]]  (Abnormal) Collected:  07/31/20 0832    Lab Status:  Final result Specimen:  Blood from Arm, Left Updated:  08/03/20 0649     Blood Culture Citrobacter koseri     Comment: Refer to previous blood culture collected on 7/31/2020 0830 for MICs.          Isolated from Aerobic and Anaerobic Bottles     Gram Stain Anaerobic Bottle Gram negative bacilli      Aerobic Bottle Gram negative bacilli    Blood Culture - Blood, Blood, PICC Line [[id number]]  (Abnormal)  (Susceptibility) Collected:  07/31/20 0830    Lab Status:  Final result Specimen:  Blood, PICC  Line Updated:  08/03/20 0649     Blood Culture Citrobacter koseri     Isolated from Aerobic and Anaerobic Bottles     Gram Stain Anaerobic Bottle Gram negative bacilli      Aerobic Bottle Gram negative bacilli    Susceptibility      Citrobacter koseri     KALANI     Cefepime Susceptible     Ceftazidime Susceptible     Ceftriaxone Susceptible     Gentamicin Susceptible     Levofloxacin Susceptible     Piperacillin + Tazobactam Susceptible     Trimethoprim + Sulfamethoxazole Susceptible                Susceptibility Comments     Citrobacter koseri    Cefazolin sensitivity will not be reported for Enterobacteriaceae in non-urine isolates. If cefazolin is preferred, please call the microbiology lab to request an E-test.  With the exception of urinary-sourced infections, aminoglycosides should not be used as monotherapy.             Blood Culture ID, PCR - Blood, Blood, PICC Line [[id number]]  (Abnormal) Collected:  07/31/20 0830    Lab Status:  Final result Specimen:  Blood, PICC Line Updated:  08/01/20 0022     BCID, PCR Enterobacteriaceae Group. Identification by BCID PCR.    Body Fluid Culture - Body Fluid, Peritoneum [[id number]] Collected:  07/15/20 1038    Lab Status:  Final result Specimen:  Body Fluid from Peritoneum Updated:  07/18/20 0710     Body Fluid Culture No growth at 3 days     Gram Stain Few (2+) WBCs seen      No organisms seen    Blood Culture - Blood, Arm, Right [[id number]] Collected:  07/08/20 1748    Lab Status:  Final result Specimen:  Blood from Arm, Right Updated:  07/13/20 1800     Blood Culture No growth at 5 days    Blood Culture - Blood, Hand, Left [[id number]] Collected:  07/08/20 1744    Lab Status:  Final result Specimen:  Blood from Hand, Left Updated:  07/13/20 1800     Blood Culture No growth at 5 days    Body Fluid Culture - Body Fluid, Peritoneum [[id number]] Collected:  07/10/20 0916    Lab Status:  Final result Specimen:  Body Fluid from Peritoneum Updated:  07/13/20 0700     Body Fluid  Culture No growth at 3 days     Gram Stain Moderate (3+) WBCs seen      No organisms seen    COVID PRE-OP / PRE-PROCEDURE SCREENING ORDER (NO ISOLATION) - Swab, Nasopharynx [[id number]] Collected:  07/08/20 2009    Lab Status:  Final result Specimen:  Swab from Nasopharynx Updated:  07/08/20 2119    Narrative:       The following orders were created for panel order COVID PRE-OP / PRE-PROCEDURE SCREENING ORDER (NO ISOLATION) - Swab, Nasopharynx.  Procedure                               Abnormality         Status                     ---------                               -----------         ------                     COVID-19,CEPHEID,CLAUDIA IN-...[[id number]]  Normal              Final result                 Please view results for these tests on the individual orders.    COVID-19,CEPHEID,CLAUDIA IN-HOUSE(OR EMERGENT/ADD-ON),NP SWAB IN TRANSPORT MEDIA 3-4 HR TAT - Swab, Nasopharynx [[id number]]  (Normal) Collected:  07/08/20 2009    Lab Status:  Final result Specimen:  Swab from Nasopharynx Updated:  07/08/20 2119     COVID19 Not Detected    Narrative:       Fact sheet for providers: https://www.fda.gov/media/072037/download     Fact sheet for patients: https://www.fda.gov/media/253696/download          Imaging Results (Last 24 Hours)     ** No results found for the last 24 hours. **          Results for orders placed during the hospital encounter of 07/07/20   Adult Transesophageal Echo (GABRIELA) W/ Cont if Necessary Per Protocol    Narrative · Estimated EF appears to be in the range of 66 - 70%  · No valvular vegetation seen on the study, no evidence of endocarditis  · There is a catheter which terminates in his SVC, no vegetations visible  · There is evidence of ascites          I have reviewed the medications:  Scheduled Meds:    aspirin 81 mg Oral Daily   bumetanide 4 mg Oral Daily   cefTAZidime 1 g Intravenous Once per day on Mon Wed Fri   hydrocortisone  Topical Q12H   lactulose 10 g Oral TID   magic mouthwash 5 mL Swish &  Spit 4x Daily   metoclopramide 5 mg Oral TID AC   midodrine 10 mg Oral 3 times per day on Sun Tue Thu Sat   midodrine 20 mg Oral 3 times per day on Mon Wed Fri   mirtazapine 15 mg Oral Nightly   Sally 2 patch Topical Once   pantoprazole 40 mg Oral BID AC   sertraline 25 mg Oral Daily   sodium chloride 10 mL Intravenous Q12H     Continuous Infusions:   PRN Meds:.•  acetaminophen  •  albumin human  •  albuterol  •  diphenhydrAMINE  •  heparin (porcine)  •  Morphine  •  ondansetron  •  oxyCODONE  •  phenylephrine-mineral oil-petrolatum  •  sodium chloride    Assessment/Plan   Assessment & Plan     Active Hospital Problems    Diagnosis  POA   • **Hypotension [I95.9]  Yes   • Liver cirrhosis w/ ascites  [K74.60, R18.8]  Yes   • Intractable nausea and vomiting [R11.2]  Yes   • Dehydration [E86.0]  Yes   • ARF likely 2* ATN  [N17.9]  Yes   • Chemotherapy-induced thrombocytopenia [D69.59, T45.1X5A]  Yes   • GIB w/ hematemesis [K92.0]  No   • Acute blood loss anemia [D62]  Yes   • Rectal bleeding [K62.5]  Yes   • Constipation due to opioid therapy [K59.03, T40.2X5A]  Yes   • Pain, cancer [G89.3]  Yes   • 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        Brief Hospital Course to date:  Oliver Mallory is a 55 y.o. male with relevant PMH of HTN, COPD, Pancreatitis, Cirrhosis, Tonsillar CA s/p tonsillectomy undergoing chemo and RT admitted 7/7/2020 with intractable nausea and vomiting for 4 days with no food intake.  Hospital course complicated by AUDREY, GIB, and hypotension.  He was admitted to ICU and did require pressors for some time. Ultimately stabilized after IV fluids, improvement in GIB and addition of midodrine. Transferred to floor 7/17. Renal function is continually worsening w/ NAL following. Patient lost iv access on 7/23/20, unable to obtain another IV (despite multiple attempts w/ IV, also attempted EJ access without success. Due to  progressive kidney failure w/ volume overload, tunneled dialysis cath & central line was placed by Dr. Gore of interventional radiology on 7/24/20 and dialysis was initiated. Had some post-procedural right neck/supraclavicular oozing/hematoma s/p platelets and FFP.      -Progressive AUDREY w/ volume overload, now s/p dialysis catheter placement receiving Hemodialysis  -Tunneled dialysis cath placed by Dr. Gore on 7/24/20  - removed on 8/3 with placement of temporary line  - echo 7/7/20: normal EF, normal valves  - nephrology following   - Increase to midodrine 20 mg TID on dialysis days   - Difficulty removing volume with cirrhosis   - GABRIELA in AM as ECHO concerning for possible vegetation, NPO at midnight     Fever   Enterobacteriaceae bacteremia   - BCx + 7/31   - Started merrem (8/1), changed to fortaz post HD on 8/5  - ID consulted  - Abd US with no acute findings     -right neck/supraclavicular hematoma around catheter site  - Coagulopathy   - s/p platelets and FFP and Vit K  - R IJ removed 8/1     -s/p gi bleed (due to esophagitis and portal colopathy related to cirrhosis)  -7/9/20: EGD: mild esophagitis, grade 1 esophageal varices, lower 1/3 esophaguts, portal htn gastropathy noted  - 7/10/20: C-scope: rectal oozing consistent w/ portal colopathy, sigmoid diverticulosis noted  - Continue ppi bid; follow up [facility] 4 weeks; repeat egd 1 year for varix surveillance     -EDUARDO cirrhosis (w/ esophageal varices, portal htn gastopathy, and portal colopathy  - continue lactulose, diuretics, midodrine   - repeat para 7/31   -Check hepatic function panel in a.m.  Patient does have elevated bilirubin.  -If necessary we need to consult GI     -Hx stage II HPV related oropharyngeal cancer   -formerly received chemotherapy & radiotherapy; not candidate currently for any therapies  -regarding tonsillar cancer, per oncology patient is not candidate for further therapies for tonsillar cancer (given his  reina)  -  thrombocytopenia due to splenomegaly from cirrhosis rather than malignancy; f/u Dr. Campbell as outpatient     -Cytopenias (thrombocytopenia & anemia)   -felt due to cirrhosis/hypersplenism per heme-onc         DVT Prophylaxis:  On hold secondary to liver failure, GI bleed      Disposition: I expect the patient to be discharged TBD    CODE STATUS:   Code Status and Medical Interventions:   Ordered at: 07/07/20 1721     Level Of Support Discussed With:    Patient     Code Status:    CPR     Medical Interventions (Level of Support Prior to Arrest):    Full         Electronically signed by Mustapha Ramires MD, 08/06/20, 17:39.

## 2020-08-07 NOTE — PLAN OF CARE
Problem: Hemodialysis (Adult)  Description  Prevent and manage potential problems includin. cardiovascular complications  2. electrolyte imbalance  3. fluid imbalance  4. hematologic complications  5. infection  6. nausea and vomiting  7. neurologic complications  8. process complications  9. situational response  10. vascular access complications  Goal: Signs and Symptoms of Listed Potential Problems Will be Absent, Minimized or Managed (Hemodialysis)  Description  Signs and symptoms of listed potential problems will be absent, minimized or managed by discharge/transition of care (reference Hemodialysis (Adult) [facility]).  Outcome: Ongoing (interventions implemented as appropriate)  Flowsheets (Taken 2020 1336)  Problems Assessed (Hemodialysis): all  Problems Present (Hemodialysis): electrolyte imbalance; fluid imbalance  Note:   HD today

## 2020-08-07 NOTE — PROGRESS NOTES
INFECTIOUS DISEASE Progress Note    Oliver Mallory  1965  [id number]    Date of consult: 8/1/20    Admit date: 7/7/2020    Evaluating physician: Dr. Joey Graham  Reason for Consultation: Citrobacter sepsis with bacteremia, 4 out of 4 bottles positive from 7/31/2020  Chief Complaint: Intractable nausea/vomiting and constipation, sepsis      Subjective   History of present illness:  Mr. Oliver Mallory 55 y.o.  Yr old male who is being evaluated for Enterobacter bacteremia.  He has a past medical history significant for hypertension, COPD, pancreatitis, cirrhosis, tonsillar cancer status post tonsillectomy undergoing chemo and radiation therapy.  He was initially admitted on 7/7/2020 with intractable nausea and vomiting x4 days.  He was initially admitted to the ICU and did require some pressors.  Hospital course was complicated by GI bleed and hypotension as well acute kidney injury.  Patient was stabilized and transferred to the floor on 7/17.  Due to progressive kidney failure with fluid volume overload, tunneled dialysis catheter and central line was placed by interventional radiology on 7/24/2020 and dialysis was initiated.  There was reported issues with bleeding from the central line and patient received platelets and FFP.  Patient was found to have IJ line pulled most the way out had to be discontinued on 8/1 morning. Dialysis catheter remains in place.  Patient underwent a CT-guided paracentesis on 7/31, 7/15 and 7/10.  Patient had a EGD on 7/9 that showed mild portal gastropathy, reflux esophagitis and grade 1 esophageal varices.  He received a colonoscopy on 7/10 which showed a few sigmoid diverticula.  There is also petechiae in the rectum with scant oozing.  It was felt, in the absence of history of radiation to this region, that this is suspected portal colopathy.  Patient also received an echo on 7/8/2020 that was normal.    Patient did develop a fever of 101.5 on 7/30 as well as some tachycardia up to  112.  Patient did remain without leukocytosis however due to the fever blood cultures were ordered which have turned positive in 4/4 bottles for gram-negative bacilli identified as Enterobacteriaceae by [employer] report.  Currently WBC is 5.56, H&H is 8.1/25.7 and platelets are 34.  AST is elevated at 70 and alkaline phosphatase is elevated 148 and total bilirubin is elevated 2.7.  7/31 CT of chest show clear lungs and cirrhosis with ascites.  CT of the abdomen on 8/1 showed cirrhosis with splenomegaly and ascites, surgical absence of gallbladder and diverticulosis.    Patient has no known antibiotic allergies and is currently receiving IV Zosyn.  ID has been consulted for further evaluation and treatment as well as antimicrobial therapy management on 8/1/2020.    Of note:  7/8 blood cultures were finalized no growth in 4/4 bottles  7/31 body fluid culture and stain-no organisms seen  7/15 body fluid culture and stain-no organisms seen    8/2/2020: History reviewed.  Patient sitting up on side of bed awake and alert upon arrival.  He remains with dialysis catheter in right chest.  He denies any events overnight.  He has been afebrile with some hypotension overnight but overall BP is trending up.  Without leukocytosis with a WBC of 6.11.  However lactic acid remains elevated at 3.0 as well as a CRP of 5.49.  Procalcitonin is also elevated 1.2.  Blood cultures have been identified as Citrobacter koseri by [employer] report.  Did receive an ultrasound of the  Right upper quadrant which showed free fluid identified throughout the upper abdomen, liver with increased echogenicity with some lobulation identified at the contour suggesting cirrhosis.  Gallbladder fossa was unremarkable and the common bile duct measured 8 mm.  TTE was performed on 8/1 and is currently pending interpretation.  Awaiting for catheter removal in a.m. after dialysis.    8/3/2020: Patient seen in HD today. 0 complaints overnight. Patient afebrile and  hemodynamically stable overnight.  Plan to remove Vipin cath today after dialysis.  Remains without leukocytosis.  No events to report overnight per RN.  Continues on IV meropenem for Citrobacter koseri sepsis.  Waiting for dialysis catheter removal.  No pain.  On IV meropenem until 8/17/2020.    8/4/2020 history reviewed.  No high fevers or chills.  On meropenem until 8/17, or longer depending upon GABRIELA.  8/1/2020 TTE with small mobile strand on the posterior leaflet of the mitral valve suggestive of a vegetation.  No pain.  Tunneled Vipin cath removed right chest, replaced by temporary dialysis catheter on 8/3/2020.    8/5/2020 history reviewed.  On meropenem but changing to post hemodialysis Fortaz until 8/17 depending on GABRIELA results.  TTE with possible mitral valve vegetation.  Tunneled Vipin cath removed on 8/3 with temporary placed.  Being treated for Citrobacter sepsis from blood cultures from 7/31.  Sensitive to levofloxacin, cefotaxime, trimethoprim sulfa.  Changing to post hemodialysis Fortaz.    8/6/20 hx rev.  On fortaz till 8/17 for Vipin cath infxn with septicemia w/ Citrobacter with a neg GABRIELA 8/4.  Vipin cath removed 8/3 w/ temp placed.      8/7/20 hx rev.  On fortaz till 8/17 (post HD M, W, F) for sepsis from Citrobacter w/ neg GABRIELA.  Source likely Vipin cath removed 8/3.  No fever.  No pain.      Past Medical History:   Diagnosis Date   • Arthritis    • Cancer (CMS/HCC)    • COPD (chronic obstructive pulmonary disease) (CMS/HCC)    • Fall 05/12/2020   • Hypertension    • Pancreatitis    • Tonsillar cancer (CMS/HCC)        Past Surgical History:   Procedure Laterality Date   • ANKLE TENDON REPAIR     • CARDIAC CATHETERIZATION N/A 5/31/2018    Procedure: Left Heart Cath;  Surgeon: Ray Farley MD;  Location:  [facility] CATH INVASIVE LOCATION;  Service: Cardiovascular   • CHOLECYSTECTOMY     • COLONOSCOPY N/A 7/10/2020    Procedure: COLONOSCOPY;  Surgeon: Brunner, Mark I, MD;  Location:  [facility] ENDOSCOPY;  Service:  Gastroenterology;  Laterality: N/A;   • CYST REMOVAL      coccyx   • ENDOSCOPY N/A 7/9/2020    Procedure: ESOPHAGOGASTRODUODENOSCOPY;  Surgeon: Brunner, Mark I, MD;  Location: [facility] ENDOSCOPY;  Service: Gastroenterology;  Laterality: N/A;   • HERNIA MESH REMOVAL     • HERNIA REPAIR     • KNEE SURGERY  1981   • TONSILLECTOMY         Pediatric History   Patient Guardian Status   • Not on file     Other Topics Concern   • Not on file   Social History Narrative    Patient ambulatory without assistive device, but has ordered       family history includes Heart disease in his father; Hypertension in his mother.    No Known Allergies    Immunization History   Administered Date(s) Administered   • Hepatitis A 07/11/2020   • Hepatitis B Vaccine Adult IM 07/11/2020       Medication:    Current Facility-Administered Medications:   •  acetaminophen (TYLENOL) tablet 650 mg, 650 mg, Oral, Q4H PRN, Abram Abraham MD, 650 mg at 07/31/20 2107  •  albumin human 25 % IV SOLN 25 g, 25 g, Intravenous, PRN, SaúlRickey MD, 50 g at 08/03/20 1024  •  albuterol (PROVENTIL) nebulizer solution 0.083% 2.5 mg/3mL, 2.5 mg, Nebulization, Q6H PRN, Abram Abraham MD, 2.5 mg at 07/21/20 1405  •  aspirin chewable tablet 81 mg, 81 mg, Oral, Daily, Abram Abraham MD, 81 mg at 08/06/20 0907  •  bumetanide (BUMEX) tablet 4 mg, 4 mg, Oral, Daily, Escobar Miles MD, 4 mg at 08/06/20 0907  •  cefTAZidime (FORTAZ) 1 g/100 mL 0.9% NS IVPB (mpb), 1 g, Intravenous, Once per day on Mon Wed Fri, Gino Nielsen IV, RPH, 1 g at 08/05/20 1217  •  diphenhydrAMINE (BENADRYL) capsule 25 mg, 25 mg, Oral, Q6H PRN, Escobar Miles MD  •  heparin (porcine) injection 1,600 Units, 1,600 Units, Intracatheter, PRN, Escobar Miles MD, 1,600 Units at 08/05/20 1106  •  hydrocortisone 1 % cream, , Topical, Q12H, Rupa Hawthorne, FARHAN  •  lactulose (CHRONULAC) 10 GM/15ML solution 10 g, 10 g, Oral, TID, Abram Abraham MD, 10 g at 08/06/20 2025  •   magic mouthwash oral supsension 5 mL, 5 mL, Swish & Spit, 4x Daily, Loreta Hemphill II, DO, 5 mL at 08/06/20 2152  •  metoclopramide (REGLAN) tablet 5 mg, 5 mg, Oral, TID Jarad ALCANTARA Yuri, MD, 5 mg at 08/06/20 1708  •  midodrine (PROAMATINE) tablet 10 mg, 10 mg, Oral, 3 times per day on Sun Tue Thu Sat, Ksenia Chaidez, , 10 mg at 08/06/20 1708  •  midodrine (PROAMATINE) tablet 20 mg, 20 mg, Oral, 3 times per day on Mon Wed Fri, Ksenia Chaidez DO, 20 mg at 08/05/20 1730  •  mirtazapine (REMERON) tablet 15 mg, 15 mg, Oral, Nightly, Abram Abraham MD, 15 mg at 08/06/20 2025  •  morphine injection 1 mg, 1 mg, Intravenous, Q4H PRN, Sherrill Garber DO, 1 mg at 08/06/20 2026  •  Sally patch 2 patch, 2 patch, Topical, Once, Zamzam Villa, APRN, Stopped at 07/31/20 2211  •  ondansetron (ZOFRAN) injection 4 mg, 4 mg, Intravenous, Q6H PRN, Abram Abraham MD, 4 mg at 07/31/20 0844  •  pantoprazole (PROTONIX) EC tablet 40 mg, 40 mg, Oral, BID MARICRUZ, Abram Abraham MD, 40 mg at 08/06/20 1708  •  phenylephrine-mineral oil-petrolatum (PREPARATION H) 0.25-14-74.9 % hemorhoidal ointment, , Rectal, TID PRN, Abram Abraham MD  •  sertraline (ZOLOFT) tablet 25 mg, 25 mg, Oral, Daily, Abram Abraham MD, 25 mg at 08/06/20 0908  •  sodium chloride 0.9 % flush 10 mL, 10 mL, Intravenous, Q12H, Abram Abraham MD, 10 mL at 08/06/20 2028  •  sodium chloride 0.9 % flush 10 mL, 10 mL, Intravenous, PRN, Abram Abraham MD, 10 mL at 07/10/20 9481    Please refer to the medical record for a full medication list    Review of Systems:    Constitutional--afebrile overnight, no fatigue  HEENT-- No new vision, hearing or throat complaints.  No epistaxis or oral sores.  Denies odynophagia or dysphagia.  No odynophagia or dysphagia. No headache, photophobia or neck stiffness.  CV-- No chest pain, palpitation or syncope  Resp-- No SOB/cough/Hemoptysis, or wheezing  GI- No nausea, vomiting, or diarrhea.  No hematochezia, melena, or  "hematemesis. Denies jaundice or chronic liver disease.  -- No dysuria, hematuria, or flank pain.  Denies hesitancy, urgency or flank pain.  Lymph- no swollen lymph nodes in neck/axilla or groin.   Heme-positive ecchymosis across chest.  MS-- no swelling or pain in the bones or joints of arms/legs.  No new back pain.  Neuro-- No acute focal weakness or numbness in the arms or legs.  No seizures.  Skin--positive for bruising, no pain right temporary dialysis catheter chest, no petechiae    Physical Exam:   Vital Signs   Heart Rate:  [] 105  Resp:  [18] 18  BP: (107-134)/(51-92) 118/63    No data recorded  BP  Min: 107/51  Max: 134/92  Pulse  Min: 95  Max: 105  Resp  Min: 18  Max: 18  No data recorded    Blood pressure 118/63, pulse 105, temperature 98.2 °F (36.8 °C), temperature source Oral, resp. rate 18, height 182.9 cm (72.01\"), weight 125 kg (276 lb 6.4 oz), SpO2 98 %.  GENERAL: Awake and alert, in minimal distress.  Resting in bed.  HEENT:  Normocephalic, atraumatic.  Ears externally normal, Nose externally normal.  EYES: No icterus.   LYMPHATICS:   HEART: Audible murmur noted 1/6 left sternal border.  RRR.  No JVD.  LUNGS: Clear to auscultation. No respiratory distress, no use of accessory muscles.  ABDOMEN: Soft, tender x4 quadrants, nondistended. Bowel sounds normal.  GENITAL: Without Abbasi cath.   SKIN: Warm and dry without cutaneous eruptions.  Ecchymosis noted across bilateral chest and scattered across bilateral upper extremities.  Line right chest ok.  PSYCHIATRIC: Mental status lucid. No confusion.  EXT:  No cellulitic change.  Normal range of motion.  NEURO: Oriented to name, CN 2 to 12 intact, nonfocal  LINES: Right chest temporary dialysis cath in place.  Ecchymosis noted around the site.  No      Results Review:       Results from last 7 days   Lab Units 08/06/20  0424 08/05/20  0518 08/04/20  0418   WBC 10*3/mm3 6.44 4.25 4.03   HEMOGLOBIN g/dL 9.3* 7.5* 7.0*   HEMATOCRIT % 28.4* 22.4* 21.4* "   PLATELETS 10*3/mm3 72* 52* 48*     Results from last 7 days   Lab Units 08/06/20  0424   SODIUM mmol/L 135*   POTASSIUM mmol/L 3.9   CHLORIDE mmol/L 102   CO2 mmol/L 22.0   BUN mg/dL 23*   CREATININE mg/dL 3.43*   GLUCOSE mg/dL 140*   CALCIUM mg/dL 8.6     Results from last 7 days   Lab Units 08/07/20  0422   ALK PHOS U/L 144*   BILIRUBIN mg/dL 3.0*   BILIRUBIN DIRECT mg/dL 1.4*   ALT (SGPT) U/L 21   AST (SGOT) U/L 83*     Results from last 7 days   Lab Units 08/02/20  0422   SED RATE mm/hr 4     Results from last 7 days   Lab Units 08/02/20  0422   CRP mg/dL 5.49*         Results from last 7 days   Lab Units 08/06/20  1158   LACTATE mmol/L 2.6*     Estimated Creatinine Clearance: 32.9 mL/min (A) (by C-G formula based on SCr of 3.43 mg/dL (H)).    Microbiology:  Microbiology Results (last 10 days)     Procedure Component Value - Date/Time    Blood Culture - Blood, Arm, Left [[id number]] Collected:  08/05/20 1547    Lab Status:  Preliminary result Specimen:  Blood from Arm, Left Updated:  08/06/20 1615     Blood Culture No growth at 24 hours    Blood Culture - Blood, Arm, Left [[id number]] Collected:  08/05/20 1204    Lab Status:  Preliminary result Specimen:  Blood from Arm, Left Updated:  08/06/20 1245     Blood Culture No growth at 24 hours    Anaerobic Culture - Body Fluid, Peritoneum [[id number]] Collected:  07/31/20 1422    Lab Status:  Final result Specimen:  Body Fluid from Peritoneum Updated:  08/05/20 1201     Anaerobic Culture No anaerobes isolated at 5 days    Body Fluid Culture - Body Fluid, Peritoneum [[id number]] Collected:  07/31/20 1422    Lab Status:  Final result Specimen:  Body Fluid from Peritoneum Updated:  08/03/20 0751     Body Fluid Culture No growth at 3 days     Gram Stain No organisms seen      Occasional WBCs per low power field    Blood Culture - Blood, Arm, Left [[id number]]  (Abnormal) Collected:  07/31/20 0832    Lab Status:  Final result Specimen:  Blood from Arm, Left Updated:   08/03/20 0649     Blood Culture Citrobacter koseri     Comment: Refer to previous blood culture collected on 7/31/2020 0830 for MICs.          Isolated from Aerobic and Anaerobic Bottles     Gram Stain Anaerobic Bottle Gram negative bacilli      Aerobic Bottle Gram negative bacilli    Blood Culture - Blood, Blood, PICC Line [[id number]]  (Abnormal)  (Susceptibility) Collected:  07/31/20 0830    Lab Status:  Final result Specimen:  Blood, PICC Line Updated:  08/03/20 0649     Blood Culture Citrobacter koseri     Isolated from Aerobic and Anaerobic Bottles     Gram Stain Anaerobic Bottle Gram negative bacilli      Aerobic Bottle Gram negative bacilli    Susceptibility      Citrobacter koseri     KALANI     Cefepime Susceptible     Ceftazidime Susceptible     Ceftriaxone Susceptible     Gentamicin Susceptible     Levofloxacin Susceptible     Piperacillin + Tazobactam Susceptible     Trimethoprim + Sulfamethoxazole Susceptible                Susceptibility Comments     Citrobacter koseri    Cefazolin sensitivity will not be reported for Enterobacteriaceae in non-urine isolates. If cefazolin is preferred, please call the microbiology lab to request an E-test.  With the exception of urinary-sourced infections, aminoglycosides should not be used as monotherapy.             Blood Culture ID, PCR - Blood, Blood, PICC Line [[id number]]  (Abnormal) Collected:  07/31/20 0830    Lab Status:  Final result Specimen:  Blood, PICC Line Updated:  08/01/20 0022     BCID, PCR Enterobacteriaceae Group. Identification by BCID PCR.            Radiology:  Imaging Results (Last 72 Hours)     ** No results found for the last 72 hours. **          IMPRESSION:     1. Sepsis with Enterobacteriaceae group bacteremia identified as Citrobacter koseri by [employer] report-blood cultures positive in 4/4 bottles 7/31/2020.  Etiology is unclear at this time but could likely be a dialysis/line infection. Prior echo negative on 7/8.  May possibly be an occult  intra-abdominal process, or elsewhere but not obvious on CT scan of the chest abdomen and pelvis from 7/31/2020.  However due to the high-grade bacteremia and intravascular infection is suspected/likely.  8/1 TTE was performed with possible mitral valve vegetation.  GABRIELA pending 8/4/2020.  8/1 right upper quadrant ultrasound has been performed with no evidence of biliary duct/gallstones disease.  Repeat cx 8/5 neg.  2. TTE from 8/1 with possible mitral valve vegetation.  GABRIELA neg 8/4/20.  3. Liver cirrhosis with ascites status post multiple CT-guided paracentesis.  4. 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  5. Acute kidney injury likely secondary to ATN however could also be hepatorenal syndrome.  Creatinine 3.43.  6. Intractable nausea and vomiting. Resolved.  7. Thrombocytopenia- could be secondary to chemotherapy or hepatic disease.  8. Elevated bilirubin- 1.4 likely secondary to hepatic disease, negative ultrasound.  Elevated AST- 83.  9. Elevated alkaline phosphatase 144, continues.  10. Hypocalcemia, resolved.   11. Anemia, chronic disease, continues.  12. Hyponatremia 135.  13. Lactic acid 2.6.  Not toxic.      Plan:    1. Diagnostically continue to follow patient's physical exam, follow labs include CBC, CMP, CRP.  Follow repeat cx 8/5.  2. Therapeutically, previously discontinued on 8/1 IV Zosyn and started Merrem then changed to Fortaz for post HD. Dialysis catheter removal explanted on 8/3 after dialysis.  Temporary dialysis catheter placed right chest 8/3.  Continue Fortaz 1 g post each hemodialysis (M, W, F) to continue until 8/17.    3. Continue supportive care.    Our group would be pleased to follow this patient over the course of their hospitalization and assist with outpatient antimicrobial therapy, as indicated.  Further recommendations depend on the results of the cultures and clinical course.  Side effects of medications were discussed.  Patient is at increased risk for adverse drug  reactions, recurrent infection, readmission.  Discussed with the hospitalist service.    Case management orders:  Please arrange for post HD Fortaz 1 g IV post each HD until 8/17/20.  Check cbc, cmp, crp weekly while on IV abx.  FAX orders to [phone], and call [phone] with final arrangements.  Arrange for f/u with me in 1 week post discharge.    Joey Graham MD  8/7/2020

## 2020-08-07 NOTE — PLAN OF CARE
Problem: Fall Risk (Adult)  Goal: Absence of Fall  Flowsheets (Taken 8/7/2020 0309)  Absence of Fall: achieves outcome     Problem: Patient Care Overview  Goal: Plan of Care Review  Flowsheets  Taken 8/7/2020 0309  Progress: improving  Plan of Care Reviewed With: patient  Taken 8/6/2020 0447  Outcome Summary: VSS, pt c/o neck pain, pain medication given prn, pt sleeping throughout the night, no new concerns. will continue to monitor.     Problem: Hemodialysis (Adult)  Goal: Signs and Symptoms of Listed Potential Problems Will be Absent, Minimized or Managed (Hemodialysis)  Flowsheets  Taken 8/7/2020 0309 by Archana Shafer RN  Problems Assessed (Hemodialysis): all  Taken 8/5/2020 0732 by Amber Harper RN  Problems Present (Hemodialysis): electrolyte imbalance;fluid imbalance

## 2020-08-07 NOTE — PROGRESS NOTES
Clinical Nutrition   Reason For Visit: Follow-up protocol  Patient Name: Oliver Mallory  YOB: 1965  MRN: [id number]  Date of Encounter: 08/07/20 09:59  Admission date: 7/7/2020      Nutrition Assessment     Admission Problem List:  Intractable N/V, improved  Constipation  AUDREY  Hypotension  Cirrhosis with ascites  Rectal bleeding  S/p EGD (7/9)- mild portal gastropathy, LA grade A reflux esophagitis, and 3 columns small Grade I esophageal varices.  S/p paracentesis (7/10)- 4 liters removed  S/p colonoscopy (7/10)- a few sigmoid diverticula. There are petechiae in the rectum with scant oozing. In absence of a history of radiation to this region, suspect portal colopathy.  Postprandial nausea  S/p paracentesis (7/15) - 5700 ml removed  S/p placement of hemodialysis catheter and central line, initiation of HD (7/24)  S/p paracentesis (7/31) - 7.7 L removed  (8/3) s/p removal of tunneled dialysis catheter and placement of triple lumen      Applicable PMH:  HTN  COPD  Pancreatitis  Tonsillar cancer s/p tonsillectomy (4/9/20), chemotherapy, and radiation (last radiation treatment 6/24/20)  S/p CCY  S/p hernia repair  S/p coccyx cyst removal      Reported/Observed/Food/Nutrition Related History       Anthropometrics   Height: 73 in  Weight: 276 lbs (bed scale weight 8/7 per nsg doc)  BMI: 37.5  BMI classification: Obese Class II: 35-39.9kg/m2   IBW: 184 lbs    Per RD note (7/8):  UBW: 390 lbs (a little over 1 year ago per patient); more recently 324 lbs (4/16/20 MDOV per EMR)  Weight change: Combination of intentional and unintentional weight loss of 110 lbs (28.2%) within a little more than 1 year (based on current weight and stated UBW) - RD unable to verify. Can confirm that patient has lost 44 lbs (13.6%) within the past 3 months, but unsure if 100% of this amount is unintentional (or partially intentional).    Labs reviewed   Labs reviewed: Yes    Medications reviewed   Medications reviewed: Yes  Pertinent:  bumex, lactulose, reglan, remeron, protonix, magic mouthwash  PRN: Albumin    Current Nutrition Prescription   PO: Diet Regular; Cardiac, Renal, Daily Fluid Restriction; 1500 mL Fluid Per Day  Snack: daily bedtime snack    Intake: 80% / 5 meals    Nutrition Diagnosis     7/8, updated (7/13, 7/26, 7/29, 8/3)  Problem Inadequate oral intake   Etiology Food preferences   Signs/Symptoms PO intake: 50% / 5 meals   Status: resolved 8/7    Intervention   Intervention: Follow treatment progress, Care plan reviewed    Goal:   General: Nutrition support treatment  PO: Continue positive trend    Monitoring/Evaluation:   Monitoring/Evaluation: Per protocol, I&O, PO intake, Pertinent labs, Weight, GI status, Symptoms, POC/GOC     Will continue to follow per protocol    Melba Leonard RD  Time Spent: 15 minutes

## 2020-08-07 NOTE — PLAN OF CARE
Problem: Fall Risk (Adult)  Goal: Absence of Fall  Outcome: Ongoing (interventions implemented as appropriate)  Flowsheets (Taken 8/7/2020 0309 by Archana Shafer RN)  Absence of Fall: achieves outcome     Problem: Skin Injury Risk (Adult)  Goal: Skin Health and Integrity  Outcome: Ongoing (interventions implemented as appropriate)  Flowsheets (Taken 7/30/2020 1510 by Emy Sandoval RN)  Skin Health and Integrity: making progress toward outcome     Problem: Patient Care Overview  Goal: Plan of Care Review  Outcome: Ongoing (interventions implemented as appropriate)  Flowsheets  Taken 8/7/2020 0309 by Archana Shafer RN  Progress: improving  Plan of Care Reviewed With: patient  Taken 8/7/2020 1543 by Anna Jane RN  Outcome Summary: VSS, pt medicated with PRN for pain. Back from dialysis. CM working on dc plan. Will continue to monitor.     Problem: Pain, Chronic (Adult)  Goal: Acceptable Pain/Comfort Level and Functional Ability  Outcome: Ongoing (interventions implemented as appropriate)  Flowsheets (Taken 8/6/2020 0447 by Archana Shafer RN)  Acceptable Pain/Comfort Level and Functional Ability: making progress toward outcome     Problem: Renal Failure/Kidney Injury, Acute (Adult)  Goal: Signs and Symptoms of Listed Potential Problems Will be Absent, Minimized or Managed (Renal Failure/Kidney Injury, Acute)  Outcome: Ongoing (interventions implemented as appropriate)  Flowsheets  Taken 7/30/2020 1510 by Emy Sandoval RN  Problems Assessed (Acute Renal Failure/Kidney Injury): all  Taken 7/16/2020 0417 by Maverick Huerta RN  Problems Present (ARF/Kidney Injury): electrolyte imbalance;fluid imbalance     Problem: Palliative Care (Adult)  Goal: Maximized Comfort  Outcome: Ongoing (interventions implemented as appropriate)  Flowsheets (Taken 7/30/2020 1510 by Emy Sandoval RN)  Maximized Comfort: making progress toward outcome  Goal: Enhanced Quality of Life  Outcome: Ongoing (interventions  implemented as appropriate)  Flowsheets (Taken 7/30/2020 1510 by Emy Sandoval, RN)  Enhanced Quality of Life: making progress toward outcome     Problem: Hemodialysis (Adult)  Goal: Signs and Symptoms of Listed Potential Problems Will be Absent, Minimized or Managed (Hemodialysis)  Outcome: Ongoing (interventions implemented as appropriate)  Flowsheets (Taken 8/7/2020 0850 by Rupa Garcia, RN)  Problems Assessed (Hemodialysis): all  Problems Present (Hemodialysis): electrolyte imbalance;fluid imbalance

## 2020-08-07 NOTE — PROGRESS NOTES
Continued Stay Note  [facility]     Patient Name: Oliver Mallory  MRN: [id number]  Today's Date: 8/7/2020    Admit Date: 7/7/2020    Discharge Plan     Row Name 08/07/20 1555       Plan    Plan  discharge plan    Plan Comments Pt plans to stay with daughter in [place] at discharge . Per discussion in multidisciplinary rounds, pt will be here through weekend.  Spoke with Luis Manuel at [facility] and pt has a chair time on [facility] chair time at 215pm.  1st chair time will be Wednesday, 8/12 at 215.  Per Luis Manuel they received the orders for [facility] and labs and will be able to accommodate. Pt unsure if he will need HH at this time.  CM will F/U Monday.                                                                                       Final Discharge Disposition Code  30 - still a patient        Discharge Codes    No documentation.       Expected Discharge Date and Time     Expected Discharge Date Expected Discharge Time    Aug 8, 2020             Alis Pinto RN

## 2020-08-07 NOTE — PROGRESS NOTES
"   LOS: 31 days      Reason For Visit:  F/U AUDREY  Subjective    Patient seen on dialysis.  Complains of shortness of breath.  Review of Systems:   Patient shortness of breath, edema        Objective       aspirin 81 mg Oral Daily   bumetanide 4 mg Oral Daily   cefTAZidime 1 g Intravenous Once per day on Mon Wed Fri   hydrocortisone  Topical Q12H   lactulose 10 g Oral TID   magic mouthwash 5 mL Swish & Spit 4x Daily   metoclopramide 5 mg Oral TID AC   midodrine 10 mg Oral 3 times per day on Sun Tue Thu Sat   midodrine 20 mg Oral 3 times per day on Mon Wed Fri   mirtazapine 15 mg Oral Nightly   Sally 2 patch Topical Once   pantoprazole 40 mg Oral BID AC   sertraline 25 mg Oral Daily   sodium chloride 10 mL Intravenous Q12H            Vital Signs:  Blood pressure 118/63, pulse 105, temperature 98.2 °F (36.8 °C), temperature source Oral, resp. rate 18, height 182.9 cm (72.01\"), weight 125 kg (276 lb 6.4 oz), SpO2 98 %.    Flowsheet Rows      First Filed Value   Admission Height  185.4 cm (73\") Documented at 07/07/2020 1640   Admission Weight  127 kg (280 lb) Documented at 07/07/2020 1640          08/06 0701 - 08/07 0700  In: 1298 [P.O.:1298]  Out: 25 [Urine:25]    Physical Exam:    General Appearance: Awake alert oriented.  Mild distress  Eyes: PER, conjunctivae and sclerae normal, no icterus  Lungs: Clear on auscultation. No wheezing.   Heart/CV: regular rhythm, bilateral lower extremity edema.  Abdomen:  Distended , soft, non-tender,  bowel sounds present  Skin: No rash, Warm and dry ecchymosis chest wall.  Neurologically grossly intact.    Temp dialysis access right IJ    Radiology:        Labs:  Results from last 7 days   Lab Units 08/06/20  0424 08/05/20  0518 08/04/20  0418   WBC 10*3/mm3 6.44 4.25 4.03   HEMOGLOBIN g/dL 9.3* 7.5* 7.0*   HEMATOCRIT % 28.4* 22.4* 21.4*   PLATELETS 10*3/mm3 72* 52* 48*     Results from last 7 days   Lab Units 08/07/20  0422 08/06/20  0424 08/05/20  0518 08/04/20  0418 " 08/03/20  0459   SODIUM mmol/L  --  135* 137 138 136   POTASSIUM mmol/L  --  3.9 3.6 3.6 3.7   CHLORIDE mmol/L  --  102 102 103 100   CO2 mmol/L  --  22.0 23.0 26.0 23.0   BUN mg/dL  --  23* 38* 29* 44*   CREATININE mg/dL  --  3.43* 5.03* 4.79* 5.53*   CALCIUM mg/dL  --  8.6 8.2* 8.3* 8.3*   PHOSPHORUS mg/dL  --  2.3* 3.1 2.4*  --    ALBUMIN g/dL 2.80* 3.20* 2.70* 2.80* 2.80*     Results from last 7 days   Lab Units 08/06/20  0424   GLUCOSE mg/dL 140*       Results from last 7 days   Lab Units 08/07/20  0422   ALK PHOS U/L 144*   BILIRUBIN mg/dL 3.0*   BILIRUBIN DIRECT mg/dL 1.4*   ALT (SGPT) U/L 21   AST (SGOT) U/L 83*      Assessment       Hypotension    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  Non oliguric AUDREY: Thought to be ATN gradual worsening of renal function in the setting of chronic liver disease w cirrhosis. No response to albumin and midodrine.  Started on HD on 7/24/20. For solute clearance and optimization of volume status  Hypokalemia: Management with HD   2.  Anemia: Transfuse PRN no LISA to be given due to cancer  3.  Dependent edema: Optimization with HD patient has high fluid intake.  4.  Decompensated liver disease:  5.  Intradialytic hypotension: midodrine 20 mg TID  6. Bacteremia - Enterobacteriacea       Recommendations:   Dialysis in progress at this time.  Next dialysis Monday.  Case discussed with RN with ultrafiltration at least 3 L.  Monitor for renal recovery.  Avoid nephrotoxic medications.  Keep systolic blood pressure greater than 100.   Adjust medication for the new GFR.  Case discussed with the medical staff taking care of the patient.    Oliva Pérez MD  08/07/20  08:39

## 2020-08-07 NOTE — PROGRESS NOTES
[facility] Medicine Services  PROGRESS NOTE    Patient Name: Oliver Mallory  : 1965  MRN: [id number]    Date of Admission: 2020  Primary Care Physician: Raymundo Salgado MD    Subjective   Subjective     CC:  AUDREY, bacteremia, cirrhosis    HPI:  Had dialysis earlier today.  Resting in bed in no acute distress and tells me he is comfortable.  Denies any fever or chills.  No chest pain or palpitation.    Review of Systems  Gen- No fevers, chills  CV- No chest pain, palpitations  Resp- No cough, dyspnea  GI- No N/V/D, abd pain       Objective   Objective     Vital Signs:   Temp:  [97.9 °F (36.6 °C)-98.4 °F (36.9 °C)] 97.9 °F (36.6 °C)  Heart Rate:  [] 104  Resp:  [18-20] 18  BP: (102-140)/(51-98) 140/91        Physical Exam:  Constitutional: No acute distress  HENT: NCAT, mucous membranes moist  Respiratory: Clear to auscultation bilaterally, respiratory effort normal   Cardiovascular: RRR, no murmurs, temporary dialysis catheter  Gastrointestinal: Very obese distended abdomen positive bowel sounds, soft, nontender, evidence of ascites  Musculoskeletal: Severe edema of lower extremities bilaterally.  Psychiatric: Appropriate affect, cooperative  Neurologic: Oriented x 3, speech clear  Skin: No rashes    Results Reviewed:  Results from last 7 days   Lab Units 20  0424 20  0518 20  0418 20  0459 20  0422 20  0355  20   WBC 10*3/mm3 6.44 4.25 4.03 5.18 6.11 5.56   < >  --    HEMOGLOBIN g/dL 9.3* 7.5* 7.0* 8.5* 8.1* 8.1*   < >  --    HEMATOCRIT % 28.4* 22.4* 21.4* 25.1* 25.0* 25.7*   < >  --    PLATELETS 10*3/mm3 72* 52* 48* 42* 37* 34*   < >  --    INR   --   --   --  1.94*  --  2.38*  --  2.30*   PROCALCITONIN ng/mL  --   --   --   --  1.20*  --   --   --     < > = values in this interval not displayed.     Results from last 7 days   Lab Units 20  0422 20  0424 20  0518 20  0418 20  0459   SODIUM  mmol/L  --  135* 137 138 136   POTASSIUM mmol/L  --  3.9 3.6 3.6 3.7   CHLORIDE mmol/L  --  102 102 103 100   CO2 mmol/L  --  22.0 23.0 26.0 23.0   BUN mg/dL  --  23* 38* 29* 44*   CREATININE mg/dL  --  3.43* 5.03* 4.79* 5.53*   GLUCOSE mg/dL  --  140* 118* 127* 111*   CALCIUM mg/dL  --  8.6 8.2* 8.3* 8.3*   ALT (SGPT) U/L 21 28  --   --  36   AST (SGOT) U/L 83* 102*  --   --  85*     Estimated Creatinine Clearance: 32.9 mL/min (A) (by C-G formula based on SCr of 3.43 mg/dL (H)).    Microbiology Results Abnormal     Procedure Component Value - Date/Time    Blood Culture - Blood, Arm, Left [[id number]] Collected:  08/05/20 1547    Lab Status:  Preliminary result Specimen:  Blood from Arm, Left Updated:  08/07/20 1615     Blood Culture No growth at 2 days    Blood Culture - Blood, Arm, Left [[id number]] Collected:  08/05/20 1204    Lab Status:  Preliminary result Specimen:  Blood from Arm, Left Updated:  08/07/20 1245     Blood Culture No growth at 2 days    Anaerobic Culture - Body Fluid, Peritoneum [[id number]] Collected:  07/31/20 1422    Lab Status:  Final result Specimen:  Body Fluid from Peritoneum Updated:  08/05/20 1201     Anaerobic Culture No anaerobes isolated at 5 days    Body Fluid Culture - Body Fluid, Peritoneum [[id number]] Collected:  07/31/20 1422    Lab Status:  Final result Specimen:  Body Fluid from Peritoneum Updated:  08/03/20 0751     Body Fluid Culture No growth at 3 days     Gram Stain No organisms seen      Occasional WBCs per low power field    Blood Culture - Blood, Arm, Left [[id number]]  (Abnormal) Collected:  07/31/20 0832    Lab Status:  Final result Specimen:  Blood from Arm, Left Updated:  08/03/20 0649     Blood Culture Citrobacter koseri     Comment: Refer to previous blood culture collected on 7/31/2020 0830 for MICs.          Isolated from Aerobic and Anaerobic Bottles     Gram Stain Anaerobic Bottle Gram negative bacilli      Aerobic Bottle Gram negative bacilli    Blood Culture -  Blood, Blood, PICC Line [[id number]]  (Abnormal)  (Susceptibility) Collected:  07/31/20 0830    Lab Status:  Final result Specimen:  Blood, PICC Line Updated:  08/03/20 0649     Blood Culture Citrobacter koseri     Isolated from Aerobic and Anaerobic Bottles     Gram Stain Anaerobic Bottle Gram negative bacilli      Aerobic Bottle Gram negative bacilli    Susceptibility      Citrobacter koseri     KALANI     Cefepime Susceptible     Ceftazidime Susceptible     Ceftriaxone Susceptible     Gentamicin Susceptible     Levofloxacin Susceptible     Piperacillin + Tazobactam Susceptible     Trimethoprim + Sulfamethoxazole Susceptible                Susceptibility Comments     Citrobacter koseri    Cefazolin sensitivity will not be reported for Enterobacteriaceae in non-urine isolates. If cefazolin is preferred, please call the microbiology lab to request an E-test.  With the exception of urinary-sourced infections, aminoglycosides should not be used as monotherapy.             Blood Culture ID, PCR - Blood, Blood, PICC Line [[id number]]  (Abnormal) Collected:  07/31/20 0830    Lab Status:  Final result Specimen:  Blood, PICC Line Updated:  08/01/20 0022     BCID, PCR Enterobacteriaceae Group. Identification by BCID PCR.    Body Fluid Culture - Body Fluid, Peritoneum [[id number]] Collected:  07/15/20 1038    Lab Status:  Final result Specimen:  Body Fluid from Peritoneum Updated:  07/18/20 0710     Body Fluid Culture No growth at 3 days     Gram Stain Few (2+) WBCs seen      No organisms seen    Blood Culture - Blood, Arm, Right [[id number]] Collected:  07/08/20 1748    Lab Status:  Final result Specimen:  Blood from Arm, Right Updated:  07/13/20 1800     Blood Culture No growth at 5 days    Blood Culture - Blood, Hand, Left [[id number]] Collected:  07/08/20 1744    Lab Status:  Final result Specimen:  Blood from Hand, Left Updated:  07/13/20 1800     Blood Culture No growth at 5 days    Body Fluid Culture - Body Fluid, Peritoneum  [[id number]] Collected:  07/10/20 0916    Lab Status:  Final result Specimen:  Body Fluid from Peritoneum Updated:  07/13/20 0700     Body Fluid Culture No growth at 3 days     Gram Stain Moderate (3+) WBCs seen      No organisms seen    COVID PRE-OP / PRE-PROCEDURE SCREENING ORDER (NO ISOLATION) - Swab, Nasopharynx [[id number]] Collected:  07/08/20 2009    Lab Status:  Final result Specimen:  Swab from Nasopharynx Updated:  07/08/20 2119    Narrative:       The following orders were created for panel order COVID PRE-OP / PRE-PROCEDURE SCREENING ORDER (NO ISOLATION) - Swab, Nasopharynx.  Procedure                               Abnormality         Status                     ---------                               -----------         ------                     COVID-19,CEPHEID,CLAUDIA IN-...[[id number]]  Normal              Final result                 Please view results for these tests on the individual orders.    COVID-19,CEPHEID,CLAUDIA IN-HOUSE(OR EMERGENT/ADD-ON),NP SWAB IN TRANSPORT MEDIA 3-4 HR TAT - Swab, Nasopharynx [[id number]]  (Normal) Collected:  07/08/20 2009    Lab Status:  Final result Specimen:  Swab from Nasopharynx Updated:  07/08/20 2119     COVID19 Not Detected    Narrative:       Fact sheet for providers: https://www.fda.gov/media/795851/download     Fact sheet for patients: https://www.fda.gov/media/289124/download          Imaging Results (Last 24 Hours)     ** No results found for the last 24 hours. **          Results for orders placed during the hospital encounter of 07/07/20   Adult Transesophageal Echo (GABRIELA) W/ Cont if Necessary Per Protocol    Narrative · Estimated EF appears to be in the range of 66 - 70%  · No valvular vegetation seen on the study, no evidence of endocarditis  · There is a catheter which terminates in his SVC, no vegetations visible  · There is evidence of ascites          I have reviewed the medications:  Scheduled Meds:    aspirin 81 mg Oral Daily   bumetanide 4 mg Oral Daily    cefTAZidime 1 g Intravenous Once per day on Mon Wed Fri   hydrocortisone  Topical Q12H   lactulose 10 g Oral TID   magic mouthwash 5 mL Swish & Spit 4x Daily   metoclopramide 5 mg Oral TID AC   midodrine 10 mg Oral 3 times per day on Sun Tue Thu Sat   midodrine 20 mg Oral 3 times per day on Mon Wed Fri   mirtazapine 15 mg Oral Nightly   Sally 2 patch Topical Once   pantoprazole 40 mg Oral BID AC   sertraline 25 mg Oral Daily   sodium chloride 10 mL Intravenous Q12H     Continuous Infusions:   PRN Meds:.•  acetaminophen  •  albumin human  •  albuterol  •  diphenhydrAMINE  •  heparin (porcine)  •  Morphine  •  ondansetron  •  oxyCODONE  •  phenylephrine-mineral oil-petrolatum  •  sodium chloride    Assessment/Plan   Assessment & Plan     Active Hospital Problems    Diagnosis  POA   • **Hypotension [I95.9]  Yes   • Liver cirrhosis w/ ascites  [K74.60, R18.8]  Yes   • Intractable nausea and vomiting [R11.2]  Yes   • Dehydration [E86.0]  Yes   • ARF likely 2* ATN  [N17.9]  Yes   • Chemotherapy-induced thrombocytopenia [D69.59, T45.1X5A]  Yes   • GIB w/ hematemesis [K92.0]  No   • Acute blood loss anemia [D62]  Yes   • Rectal bleeding [K62.5]  Yes   • Constipation due to opioid therapy [K59.03, T40.2X5A]  Yes   • Pain, cancer [G89.3]  Yes   • 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        Brief Hospital Course to date:  Oliver Mallory is a 55 y.o. male with relevant PMH of HTN, COPD, Pancreatitis, Cirrhosis, Tonsillar CA s/p tonsillectomy undergoing chemo and RT admitted 7/7/2020 with intractable nausea and vomiting for 4 days with no food intake.  Hospital course complicated by AUDREY, GIB, and hypotension.  He was admitted to ICU and did require pressors for some time. Ultimately stabilized after IV fluids, improvement in GIB and addition of midodrine. Transferred to floor 7/17. Renal function is continually worsening w/ NAL following.  Patient lost iv access on 7/23/20, unable to obtain another IV (despite multiple attempts w/ IV, also attempted EJ access without success. Due to progressive kidney failure w/ volume overload, tunneled dialysis cath & central line was placed by Dr. Gore of interventional radiology on 7/24/20 and dialysis was initiated. Had some post-procedural right neck/supraclavicular oozing/hematoma s/p platelets and FFP.      -Progressive AUDREY w/ volume overload, now s/p dialysis catheter placement receiving Hemodialysis  -Tunneled dialysis cath placed by Dr. Gore on 7/24/20  - removed on 8/3 with placement of temporary line  - echo 7/7/20: normal EF, normal valves  - nephrology following   - Increase to midodrine 20 mg TID on dialysis days   - Difficulty removing volume with cirrhosis   - GABRIELA in AM as ECHO concerning for possible vegetation, NPO at midnight     Fever   Enterobacteriaceae bacteremia   - BCx + 7/31   - Started merrem (8/1), changed to fortaz post HD on 8/5  - ID consulted  - Abd US with no acute findings     -right neck/supraclavicular hematoma around catheter site  - Coagulopathy   - s/p platelets and FFP and Vit K  - R IJ removed 8/1     -s/p gi bleed (due to esophagitis and portal colopathy related to cirrhosis)  -7/9/20: EGD: mild esophagitis, grade 1 esophageal varices, lower 1/3 esophaguts, portal htn gastropathy noted  - 7/10/20: C-scope: rectal oozing consistent w/ portal colopathy, sigmoid diverticulosis noted  - Continue ppi bid; follow up [facility] 4 weeks; repeat egd 1 year for varix surveillance     -EDUARDO cirrhosis (w/ esophageal varices, portal htn gastopathy, and portal colopathy  - continue lactulose, diuretics, midodrine   - repeat para 7/31   -Check hepatic function panel in a.m.  Patient does have elevated bilirubin.  -If necessary we need to consult GI     -Hx stage II HPV related oropharyngeal cancer   -formerly received chemotherapy & radiotherapy; not candidate currently for any  therapies  -regarding tonsillar cancer, per oncology patient is not candidate for further therapies for tonsillar cancer (given his reina)  -  thrombocytopenia due to splenomegaly from cirrhosis rather than malignancy; f/u Dr. Campbell as outpatient     -Cytopenias (thrombocytopenia & anemia)   -felt due to cirrhosis/hypersplenism per heme-onc          DVT Prophylaxis:  On hold secondary to liver failure, GI bleed      Disposition: I expect the patient to be discharged TBD    CODE STATUS:   Code Status and Medical Interventions:   Ordered at: 07/07/20 1721     Level Of Support Discussed With:    Patient     Code Status:    CPR     Medical Interventions (Level of Support Prior to Arrest):    Full         Electronically signed by Mustapha Ramires MD, 08/07/20, 17:31.

## 2020-08-08 NOTE — PLAN OF CARE
Problem: Patient Care Overview  Goal: Plan of Care Review  Outcome: Ongoing (interventions implemented as appropriate)  Flowsheets (Taken 8/8/2020 8080)  Progress: improving  Outcome Summary: VSS. Pain controlled with PRN medication. No other concerns this shift. Will continue to monitor.

## 2020-08-08 NOTE — PROGRESS NOTES
[facility] Medicine Services  PROGRESS NOTE    Patient Name: Oliver Mallory  : 1965  MRN: [id number]    Date of Admission: 2020  Primary Care Physician: Raymundo Salgado MD    Subjective   Subjective     CC:  Follow up Bacteremia, AUDREY    HPI:  Patient seen resting in bed in no apparent distress. No acute events overnight per nursing. Asking to work with PT/OT.  He slept well overnight and feels well today. No complaints at this time.     Review of Systems  Gen- No fevers, chills  CV- No chest pain, palpitations  Resp- No cough, dyspnea  GI- No N/V/D, abd pain    Objective   Objective     Vital Signs:   Temp:  [97.9 °F (36.6 °C)-99.1 °F (37.3 °C)] 98.4 °F (36.9 °C)  Heart Rate:  [] 90  Resp:  [14-18] 14  BP: (102-140)/(54-91) 126/65        Physical Exam:  Constitutional: No acute distress, awake, alert, obese  HENT: NCAT, mucous membranes moist  Respiratory: Clear to auscultation bilaterally, respiratory effort normal   Cardiovascular: RRR, no murmurs, rubs, or gallops, palpable pedal pulses bilaterally  Gastrointestinal: Positive bowel sounds, soft, nontender, nondistended  Musculoskeletal: 2+ BLE edema  Psychiatric: Appropriate affect, cooperative  Neurologic: Oriented x 3, moves all extremities, speech clear  Skin: warm, dry, no visible rash    Results Reviewed:  Results from last 7 days   Lab Units 20  0517 20  0424 20  0518  20  0459 20  0422   WBC 10*3/mm3 4.72 6.44 4.25   < > 5.18 6.11   HEMOGLOBIN g/dL 8.1* 9.3* 7.5*   < > 8.5* 8.1*   HEMATOCRIT % 24.5* 28.4* 22.4*   < > 25.1* 25.0*   PLATELETS 10*3/mm3 63* 72* 52*   < > 42* 37*   INR   --   --   --   --  1.94*  --    PROCALCITONIN ng/mL  --   --   --   --   --  1.20*    < > = values in this interval not displayed.     Results from last 7 days   Lab Units 20  0517 20  0422 20  0424 20  0518   SODIUM mmol/L 134*  --  135* 137   POTASSIUM mmol/L 3.7  --  3.9 3.6    CHLORIDE mmol/L 102  --  102 102   CO2 mmol/L 23.0  --  22.0 23.0   BUN mg/dL 24*  --  23* 38*   CREATININE mg/dL 3.19*  --  3.43* 5.03*   GLUCOSE mg/dL 134*  --  140* 118*   CALCIUM mg/dL 8.6  --  8.6 8.2*   ALT (SGPT) U/L 17 21 28  --    AST (SGOT) U/L 72* 83* 102*  --      Estimated Creatinine Clearance: 35.3 mL/min (A) (by C-G formula based on SCr of 3.19 mg/dL (H)).    Microbiology Results Abnormal     Procedure Component Value - Date/Time    Blood Culture - Blood, Arm, Left [[id number]] Collected:  08/05/20 1547    Lab Status:  Preliminary result Specimen:  Blood from Arm, Left Updated:  08/07/20 1615     Blood Culture No growth at 2 days    Blood Culture - Blood, Arm, Left [[id number]] Collected:  08/05/20 1204    Lab Status:  Preliminary result Specimen:  Blood from Arm, Left Updated:  08/07/20 1245     Blood Culture No growth at 2 days    Anaerobic Culture - Body Fluid, Peritoneum [[id number]] Collected:  07/31/20 1422    Lab Status:  Final result Specimen:  Body Fluid from Peritoneum Updated:  08/05/20 1201     Anaerobic Culture No anaerobes isolated at 5 days    Body Fluid Culture - Body Fluid, Peritoneum [[id number]] Collected:  07/31/20 1422    Lab Status:  Final result Specimen:  Body Fluid from Peritoneum Updated:  08/03/20 0751     Body Fluid Culture No growth at 3 days     Gram Stain No organisms seen      Occasional WBCs per low power field    Blood Culture - Blood, Arm, Left [[id number]]  (Abnormal) Collected:  07/31/20 0832    Lab Status:  Final result Specimen:  Blood from Arm, Left Updated:  08/03/20 0649     Blood Culture Citrobacter koseri     Comment: Refer to previous blood culture collected on 7/31/2020 0830 for MICs.          Isolated from Aerobic and Anaerobic Bottles     Gram Stain Anaerobic Bottle Gram negative bacilli      Aerobic Bottle Gram negative bacilli    Blood Culture - Blood, Blood, PICC Line [[id number]]  (Abnormal)  (Susceptibility) Collected:  07/31/20 0830    Lab Status:   Final result Specimen:  Blood, PICC Line Updated:  08/03/20 0649     Blood Culture Citrobacter koseri     Isolated from Aerobic and Anaerobic Bottles     Gram Stain Anaerobic Bottle Gram negative bacilli      Aerobic Bottle Gram negative bacilli    Susceptibility      Citrobacter koseri     KALANI     Cefepime Susceptible     Ceftazidime Susceptible     Ceftriaxone Susceptible     Gentamicin Susceptible     Levofloxacin Susceptible     Piperacillin + Tazobactam Susceptible     Trimethoprim + Sulfamethoxazole Susceptible                Susceptibility Comments     Citrobacter koseri    Cefazolin sensitivity will not be reported for Enterobacteriaceae in non-urine isolates. If cefazolin is preferred, please call the microbiology lab to request an E-test.  With the exception of urinary-sourced infections, aminoglycosides should not be used as monotherapy.             Blood Culture ID, PCR - Blood, Blood, PICC Line [[id number]]  (Abnormal) Collected:  07/31/20 0830    Lab Status:  Final result Specimen:  Blood, PICC Line Updated:  08/01/20 0022     BCID, PCR Enterobacteriaceae Group. Identification by BCID PCR.    Body Fluid Culture - Body Fluid, Peritoneum [[id number]] Collected:  07/15/20 1038    Lab Status:  Final result Specimen:  Body Fluid from Peritoneum Updated:  07/18/20 0710     Body Fluid Culture No growth at 3 days     Gram Stain Few (2+) WBCs seen      No organisms seen    Blood Culture - Blood, Arm, Right [[id number]] Collected:  07/08/20 1748    Lab Status:  Final result Specimen:  Blood from Arm, Right Updated:  07/13/20 1800     Blood Culture No growth at 5 days    Blood Culture - Blood, Hand, Left [[id number]] Collected:  07/08/20 1744    Lab Status:  Final result Specimen:  Blood from Hand, Left Updated:  07/13/20 1800     Blood Culture No growth at 5 days    Body Fluid Culture - Body Fluid, Peritoneum [[id number]] Collected:  07/10/20 0916    Lab Status:  Final result Specimen:  Body Fluid from Peritoneum  Updated:  07/13/20 0700     Body Fluid Culture No growth at 3 days     Gram Stain Moderate (3+) WBCs seen      No organisms seen    COVID PRE-OP / PRE-PROCEDURE SCREENING ORDER (NO ISOLATION) - Swab, Nasopharynx [[id number]] Collected:  07/08/20 2009    Lab Status:  Final result Specimen:  Swab from Nasopharynx Updated:  07/08/20 2119    Narrative:       The following orders were created for panel order COVID PRE-OP / PRE-PROCEDURE SCREENING ORDER (NO ISOLATION) - Swab, Nasopharynx.  Procedure                               Abnormality         Status                     ---------                               -----------         ------                     COVID-19,CEPHEID,CLAUDIA IN-...[[id number]]  Normal              Final result                 Please view results for these tests on the individual orders.    COVID-19,CEPHEID,CLAUDIA IN-HOUSE(OR EMERGENT/ADD-ON),NP SWAB IN TRANSPORT MEDIA 3-4 HR TAT - Swab, Nasopharynx [[id number]]  (Normal) Collected:  07/08/20 2009    Lab Status:  Final result Specimen:  Swab from Nasopharynx Updated:  07/08/20 2119     COVID19 Not Detected    Narrative:       Fact sheet for providers: https://www.fda.gov/media/568791/download     Fact sheet for patients: https://www.fda.gov/media/888982/download          Imaging Results (Last 24 Hours)     ** No results found for the last 24 hours. **          Results for orders placed during the hospital encounter of 07/07/20   Adult Transesophageal Echo (GABRIELA) W/ Cont if Necessary Per Protocol    Narrative · Estimated EF appears to be in the range of 66 - 70%  · No valvular vegetation seen on the study, no evidence of endocarditis  · There is a catheter which terminates in his SVC, no vegetations visible  · There is evidence of ascites          I have reviewed the medications:  Scheduled Meds:  aspirin 81 mg Oral Daily   bumetanide 4 mg Oral Daily   cefTAZidime 1 g Intravenous Once per day on Mon Wed Fri   hydrocortisone  Topical Q12H   lactulose 10 g  Oral TID   magic mouthwash 5 mL Swish & Spit 4x Daily   metoclopramide 5 mg Oral TID AC   midodrine 10 mg Oral 3 times per day on Sun Tue Thu Sat   midodrine 20 mg Oral 3 times per day on Mon Wed Fri   mirtazapine 15 mg Oral Nightly   Sally 2 patch Topical Once   pantoprazole 40 mg Oral BID AC   sertraline 25 mg Oral Daily   sodium chloride 10 mL Intravenous Q12H     Continuous Infusions:   PRN Meds:.•  acetaminophen  •  albumin human  •  albuterol  •  diphenhydrAMINE  •  heparin (porcine)  •  Morphine  •  ondansetron  •  oxyCODONE  •  phenylephrine-mineral oil-petrolatum  •  sodium chloride    Assessment/Plan   Assessment & Plan     Active Hospital Problems    Diagnosis  POA   • **Hypotension [I95.9]  Yes   • Liver cirrhosis w/ ascites  [K74.60, R18.8]  Yes   • Intractable nausea and vomiting [R11.2]  Yes   • Dehydration [E86.0]  Yes   • ARF likely 2* ATN  [N17.9]  Yes   • Chemotherapy-induced thrombocytopenia [D69.59, T45.1X5A]  Yes   • GIB w/ hematemesis [K92.0]  No   • Acute blood loss anemia [D62]  Yes   • Rectal bleeding [K62.5]  Yes   • Constipation due to opioid therapy [K59.03, T40.2X5A]  Yes   • Pain, cancer [G89.3]  Yes   • 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        Brief Hospital Course to date:  Oliver Mallory is a 55 y.o. male with relevant PMH of HTN, COPD, Pancreatitis, Cirrhosis, Tonsillar CA s/p tonsillectomy undergoing chemo and RT admitted 7/7/2020 with intractable nausea and vomiting for 4 days with no food intake.  Hospital course complicated by AUDREY, GIB, and hypotension.  He was admitted to ICU and did require pressors for some time. Ultimately stabilized after IV fluids, improvement in GIB and addition of midodrine. Transferred to floor 7/17. Renal function is continually worsening w/ NAL following. Patient lost iv access on 7/23/20, unable to obtain another IV (despite multiple attempts w/ IV, also  attempted EJ access without success. Due to progressive kidney failure w/ volume overload, tunneled dialysis cath & central line was placed by Dr. Gore of interventional radiology on 7/24/20 and dialysis was initiated. Had some post-procedural right neck/supraclavicular oozing/hematoma s/p platelets and FFP.      This patient's problems and plans were partially entered by my partner and updated as appropriate by me 08/08/20.    -Progressive AUDREY w/ volume overload, now s/p dialysis catheter placement receiving Hemodialysis  -Tunneled dialysis cath placed by Dr. Gore on 7/24/20  - removed on 8/3 with placement of temporary line  - echo 7/7/20: normal EF, normal valves  - nephrology following   - Increase Midodrine to 20 mg TID on dialysis days   - Difficulty removing volume with cirrhosis   - GABRIELA with no valvular vegetation seen, no evidence of endocarditis     Fever   Enterobacteriaceae bacteremia   - BCx + 7/31   - Started merrem (8/1), changed to fortaz post HD on 8/5  - ID following  - Abd US with no acute findings      -right neck/supraclavicular hematoma around catheter site  - Coagulopathy   - s/p platelets and FFP and Vit K  - R IJ removed 8/1     -s/p gi bleed (due to esophagitis and portal colopathy related to cirrhosis)  -7/9/20: EGD: mild esophagitis, grade 1 esophageal varices, lower 1/3 esophaguts, portal htn gastropathy noted  - 7/10/20: C-scope: rectal oozing consistent w/ portal colopathy, sigmoid diverticulosis noted  - Continue ppi bid; follow up [facility] 4 weeks; repeat egd 1 year for varix surveillance  -Hgb 8.1 today  -CBC in AM      -EDUARDO cirrhosis (w/ esophageal varices, portal htn gastopathy, and portal colopathy  - continue lactulose, diuretics, midodrine   - repeat para 7/31   -CMP in AM  -If necessary we need to consult GI     -Hx stage II HPV related oropharyngeal cancer   -formerly received chemotherapy & radiotherapy; not candidate currently for any therapies  -regarding tonsillar  cancer, per oncology patient is not candidate for further therapies for tonsillar cancer (given his reina)  -  thrombocytopenia due to splenomegaly from cirrhosis rather than malignancy; f/u Dr. Campbell as outpatient     -Cytopenias (thrombocytopenia & anemia)   -felt due to cirrhosis/hypersplenism per heme-onc  -CBC in AM     Weakness   -PT/OT consult     DVT Prophylaxis:  On hold secondary to liver failure, GI bleed     Disposition: I expect the patient to be discharged TBD       CODE STATUS:   Code Status and Medical Interventions:   Ordered at: 07/07/20 1721     Level Of Support Discussed With:    Patient     Code Status:    CPR     Medical Interventions (Level of Support Prior to Arrest):    Full         Electronically signed by FARHAN Villarreal, 08/08/20, 11:44.

## 2020-08-08 NOTE — PLAN OF CARE
Problem: Fall Risk (Adult)  Goal: Absence of Fall  Outcome: Ongoing (interventions implemented as appropriate)     Problem: Skin Injury Risk (Adult)  Goal: Skin Health and Integrity  Outcome: Ongoing (interventions implemented as appropriate)     Problem: Patient Care Overview  Goal: Plan of Care Review  Outcome: Ongoing (interventions implemented as appropriate)  Goal: Individualization and Mutuality  Outcome: Ongoing (interventions implemented as appropriate)  Goal: Discharge Needs Assessment  Outcome: Ongoing (interventions implemented as appropriate)  Goal: Interprofessional Rounds/Family Conf  Outcome: Ongoing (interventions implemented as appropriate)

## 2020-08-08 NOTE — PROGRESS NOTES
"   LOS: 32 days      Reason For Visit:  F/U AUDREY  Subjective    No new events no added distress  Review of Systems:   Patient denies shortness of breath, chest pain, dysuria, hematuria, nausea, vomiting.          Objective       aspirin 81 mg Oral Daily   bumetanide 4 mg Oral Daily   cefTAZidime 1 g Intravenous Once per day on Mon Wed Fri   hydrocortisone  Topical Q12H   lactulose 10 g Oral TID   magic mouthwash 5 mL Swish & Spit 4x Daily   metoclopramide 5 mg Oral TID AC   midodrine 10 mg Oral 3 times per day on Sun Tue Thu Sat   midodrine 20 mg Oral 3 times per day on Mon Wed Fri   mirtazapine 15 mg Oral Nightly   Sally 2 patch Topical Once   pantoprazole 40 mg Oral BID AC   sertraline 25 mg Oral Daily   sodium chloride 10 mL Intravenous Q12H            Vital Signs:  Blood pressure 126/65, pulse 90, temperature 98.4 °F (36.9 °C), temperature source Oral, resp. rate 14, height 182.9 cm (72.01\"), weight 125 kg (276 lb 3.2 oz), SpO2 95 %.    Flowsheet Rows      First Filed Value   Admission Height  185.4 cm (73\") Documented at 07/07/2020 1640   Admission Weight  127 kg (280 lb) Documented at 07/07/2020 1640          08/07 0701 - 08/08 0700  In: 510 [I.V.:310]  Out: 3530 [Urine:100]    Physical Exam:    General Appearance: Alert oriented sitting in chair no obvious distress.  Eyes: PER, conjunctivae and sclerae normal, no icterus  Lungs: Air auscultation equal chest movement no rales or rhonchi's.  Heart/CV: regular rhythm, bilateral lower extremity edema.  Abdomen:  Distended , soft, non-tender,  bowel sounds present, morbid obesity  Skin: No rash, Warm and dry.  Ecchymosis chest wall.  Neurologically grossly intact.    Temp dialysis access right IJ    Radiology:        Labs:  Results from last 7 days   Lab Units 08/08/20  0517 08/06/20  0424 08/05/20  0518   WBC 10*3/mm3 4.72 6.44 4.25   HEMOGLOBIN g/dL 8.1* 9.3* 7.5*   HEMATOCRIT % 24.5* 28.4* 22.4*   PLATELETS 10*3/mm3 63* 72* 52*     Results from last 7 days "   Lab Units 08/08/20  0517 08/07/20  0422 08/06/20  0424 08/05/20  0518 08/04/20  0418   SODIUM mmol/L 134*  --  135* 137 138   POTASSIUM mmol/L 3.7  --  3.9 3.6 3.6   CHLORIDE mmol/L 102  --  102 102 103   CO2 mmol/L 23.0  --  22.0 23.0 26.0   BUN mg/dL 24*  --  23* 38* 29*   CREATININE mg/dL 3.19*  --  3.43* 5.03* 4.79*   CALCIUM mg/dL 8.6  --  8.6 8.2* 8.3*   PHOSPHORUS mg/dL  --   --  2.3* 3.1 2.4*   ALBUMIN g/dL 3.10* 2.80* 3.20* 2.70* 2.80*     Results from last 7 days   Lab Units 08/08/20  0517   GLUCOSE mg/dL 134*       Results from last 7 days   Lab Units 08/08/20  0517 08/07/20  0422   ALK PHOS U/L 137* 144*   BILIRUBIN mg/dL 3.0* 3.0*   BILIRUBIN DIRECT mg/dL  --  1.4*   ALT (SGPT) U/L 17 21   AST (SGOT) U/L 72* 83*      Assessment       Hypotension    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  Non oliguric AUDREY: Thought to be ATN gradual worsening of renal function in the setting of chronic liver disease w cirrhosis. No response to albumin and midodrine.  Started on HD on 7/24/20. For solute clearance and optimization of volume status  Hypokalemia: Management with HD   2.  Anemia: Transfuse PRN no LISA to be given due to cancer  3.  Dependent edema: Optimization with HD patient has high fluid intake.  4.  Decompensated liver disease:  5.  Intradialytic hypotension: midodrine 20 mg TID  6. Bacteremia - Enterobacteriacea       Recommendations:     Next dialysis Monday.  Next week we may need tunnel catheter for patient to be outpatient.  Case discussed with his son in the room  Monitor for renal recovery.  Avoid nephrotoxic medications.  Keep systolic blood pressure greater than 100.   Adjust medication for the new  GFR.  Case discussed with the medical staff taking care of the patient.    Oliva Pérez MD  08/08/20  15:36

## 2020-08-09 NOTE — PROGRESS NOTES
[facility] Medicine Services  PROGRESS NOTE    Patient Name: Oliver Mallory  : 1965  MRN: [id number]    Date of Admission: 2020  Primary Care Physician: Raymundo Salgado MD    Subjective   Subjective     CC:  Follow up bacteremia, AUDREY    HPI:  Patient resting in bed in no apparent distress. No acute events overnight per nursing. Slept well overnight. No complaints at this time.     Review of Systems   Gen- No fevers, chills  CV- No chest pain, palpitations  Resp- No cough, dyspnea  GI- No N/V/D, abd pain    Objective   Objective     Vital Signs:   Temp:  [97.8 °F (36.6 °C)-98.3 °F (36.8 °C)] 97.8 °F (36.6 °C)  Heart Rate:  [] 88  Resp:  [14-19] 19  BP: (115-123)/(62-64) 115/62        Physical Exam:  Constitutional: No acute distress, awake, alert  HENT: NCAT, mucous membranes moist  Respiratory: Clear to auscultation bilaterally, respiratory effort normal   Cardiovascular: RRR, no murmurs, rubs, or gallops, palpable pedal pulses bilaterally  Gastrointestinal: Positive bowel sounds, soft, nontender, nondistended  Musculoskeletal: 2+ bilateral ankle edema  Psychiatric: Appropriate affect, cooperative  Neurologic: Oriented x 3, moves all extremities, speech clear  Skin: warm, dry, no visible rash    Results Reviewed:  Results from last 7 days   Lab Units 20  0522 20  0517 20  0424  20  0459   WBC 10*3/mm3 5.43 4.72 6.44   < > 5.18   HEMOGLOBIN g/dL 8.8* 8.1* 9.3*   < > 8.5*   HEMATOCRIT % 26.4* 24.5* 28.4*   < > 25.1*   PLATELETS 10*3/mm3 73* 63* 72*   < > 42*   INR   --   --   --   --  1.94*    < > = values in this interval not displayed.     Results from last 7 days   Lab Units 20  0522 20  0517 20  0422 20  0424   SODIUM mmol/L 136 134*  --  135*   POTASSIUM mmol/L 4.3 3.7  --  3.9   CHLORIDE mmol/L 102 102  --  102   CO2 mmol/L 23.0 23.0  --  22.0   BUN mg/dL 28* 24*  --  23*   CREATININE mg/dL 4.35* 3.19*  --  3.43*    GLUCOSE mg/dL 129* 134*  --  140*   CALCIUM mg/dL 9.0 8.6  --  8.6   ALT (SGPT) U/L 19 17 21 28   AST (SGOT) U/L 85* 72* 83* 102*     Estimated Creatinine Clearance: 26 mL/min (A) (by C-G formula based on SCr of 4.35 mg/dL (H)).    Microbiology Results Abnormal     Procedure Component Value - Date/Time    Blood Culture - Blood, Arm, Left [[id number]] Collected:  08/05/20 1204    Lab Status:  Preliminary result Specimen:  Blood from Arm, Left Updated:  08/09/20 1245     Blood Culture No growth at 4 days    Blood Culture - Blood, Arm, Left [[id number]] Collected:  08/05/20 1547    Lab Status:  Preliminary result Specimen:  Blood from Arm, Left Updated:  08/08/20 1615     Blood Culture No growth at 3 days    Anaerobic Culture - Body Fluid, Peritoneum [[id number]] Collected:  07/31/20 1422    Lab Status:  Final result Specimen:  Body Fluid from Peritoneum Updated:  08/05/20 1201     Anaerobic Culture No anaerobes isolated at 5 days    Body Fluid Culture - Body Fluid, Peritoneum [[id number]] Collected:  07/31/20 1422    Lab Status:  Final result Specimen:  Body Fluid from Peritoneum Updated:  08/03/20 0751     Body Fluid Culture No growth at 3 days     Gram Stain No organisms seen      Occasional WBCs per low power field    Blood Culture - Blood, Arm, Left [[id number]]  (Abnormal) Collected:  07/31/20 0832    Lab Status:  Final result Specimen:  Blood from Arm, Left Updated:  08/03/20 0649     Blood Culture Citrobacter koseri     Comment: Refer to previous blood culture collected on 7/31/2020 0830 for MICs.          Isolated from Aerobic and Anaerobic Bottles     Gram Stain Anaerobic Bottle Gram negative bacilli      Aerobic Bottle Gram negative bacilli    Blood Culture - Blood, Blood, PICC Line [[id number]]  (Abnormal)  (Susceptibility) Collected:  07/31/20 0830    Lab Status:  Final result Specimen:  Blood, PICC Line Updated:  08/03/20 0649     Blood Culture Citrobacter koseri     Isolated from Aerobic and Anaerobic  Bottles     Gram Stain Anaerobic Bottle Gram negative bacilli      Aerobic Bottle Gram negative bacilli    Susceptibility      Citrobacter koseri     KALANI     Cefepime Susceptible     Ceftazidime Susceptible     Ceftriaxone Susceptible     Gentamicin Susceptible     Levofloxacin Susceptible     Piperacillin + Tazobactam Susceptible     Trimethoprim + Sulfamethoxazole Susceptible                Susceptibility Comments     Citrobacter koseri    Cefazolin sensitivity will not be reported for Enterobacteriaceae in non-urine isolates. If cefazolin is preferred, please call the microbiology lab to request an E-test.  With the exception of urinary-sourced infections, aminoglycosides should not be used as monotherapy.             Blood Culture ID, PCR - Blood, Blood, PICC Line [[id number]]  (Abnormal) Collected:  07/31/20 0830    Lab Status:  Final result Specimen:  Blood, PICC Line Updated:  08/01/20 0022     BCID, PCR Enterobacteriaceae Group. Identification by BCID PCR.    Body Fluid Culture - Body Fluid, Peritoneum [[id number]] Collected:  07/15/20 1038    Lab Status:  Final result Specimen:  Body Fluid from Peritoneum Updated:  07/18/20 0710     Body Fluid Culture No growth at 3 days     Gram Stain Few (2+) WBCs seen      No organisms seen    Blood Culture - Blood, Arm, Right [[id number]] Collected:  07/08/20 1748    Lab Status:  Final result Specimen:  Blood from Arm, Right Updated:  07/13/20 1800     Blood Culture No growth at 5 days    Blood Culture - Blood, Hand, Left [[id number]] Collected:  07/08/20 1744    Lab Status:  Final result Specimen:  Blood from Hand, Left Updated:  07/13/20 1800     Blood Culture No growth at 5 days    Body Fluid Culture - Body Fluid, Peritoneum [[id number]] Collected:  07/10/20 0916    Lab Status:  Final result Specimen:  Body Fluid from Peritoneum Updated:  07/13/20 0700     Body Fluid Culture No growth at 3 days     Gram Stain Moderate (3+) WBCs seen      No organisms seen    COVID PRE-OP  / PRE-PROCEDURE SCREENING ORDER (NO ISOLATION) - Swab, Nasopharynx [[id number]] Collected:  07/08/20 2009    Lab Status:  Final result Specimen:  Swab from Nasopharynx Updated:  07/08/20 2119    Narrative:       The following orders were created for panel order COVID PRE-OP / PRE-PROCEDURE SCREENING ORDER (NO ISOLATION) - Swab, Nasopharynx.  Procedure                               Abnormality         Status                     ---------                               -----------         ------                     COVID-19,CEPHEID,CLAUDIA IN-...[[id number]]  Normal              Final result                 Please view results for these tests on the individual orders.    COVID-19,CEPHEID,CLAUDIA IN-HOUSE(OR EMERGENT/ADD-ON),NP SWAB IN TRANSPORT MEDIA 3-4 HR TAT - Swab, Nasopharynx [[id number]]  (Normal) Collected:  07/08/20 2009    Lab Status:  Final result Specimen:  Swab from Nasopharynx Updated:  07/08/20 2119     COVID19 Not Detected    Narrative:       Fact sheet for providers: https://www.fda.gov/media/368662/download     Fact sheet for patients: https://www.fda.gov/media/553307/download          Imaging Results (Last 24 Hours)     ** No results found for the last 24 hours. **          Results for orders placed during the hospital encounter of 07/07/20   Adult Transesophageal Echo (GABRIELA) W/ Cont if Necessary Per Protocol    Narrative · Estimated EF appears to be in the range of 66 - 70%  · No valvular vegetation seen on the study, no evidence of endocarditis  · There is a catheter which terminates in his SVC, no vegetations visible  · There is evidence of ascites          I have reviewed the medications:  Scheduled Meds:  aspirin 81 mg Oral Daily   bumetanide 4 mg Oral Daily   cefTAZidime 1 g Intravenous Once per day on Mon Wed Fri   hydrocortisone  Topical Q12H   lactulose 10 g Oral TID   magic mouthwash 5 mL Swish & Spit 4x Daily   metoclopramide 5 mg Oral TID AC   midodrine 10 mg Oral 3 times per day on Sun Tue Thu Sat    midodrine 20 mg Oral 3 times per day on Mon Wed Fri   mirtazapine 15 mg Oral Nightly   Sally 2 patch Topical Once   pantoprazole 40 mg Oral BID AC   sertraline 25 mg Oral Daily   sodium chloride 10 mL Intravenous Q12H     Continuous Infusions:   PRN Meds:.•  acetaminophen  •  albumin human  •  albuterol  •  diphenhydrAMINE  •  heparin (porcine)  •  Morphine  •  ondansetron  •  oxyCODONE  •  phenylephrine-mineral oil-petrolatum  •  sodium chloride    Assessment/Plan   Assessment & Plan     Active Hospital Problems    Diagnosis  POA   • **Hypotension [I95.9]  Yes   • Liver cirrhosis w/ ascites  [K74.60, R18.8]  Yes   • Intractable nausea and vomiting [R11.2]  Yes   • Dehydration [E86.0]  Yes   • ARF likely 2* ATN  [N17.9]  Yes   • Chemotherapy-induced thrombocytopenia [D69.59, T45.1X5A]  Yes   • GIB w/ hematemesis [K92.0]  No   • Acute blood loss anemia [D62]  Yes   • Rectal bleeding [K62.5]  Yes   • Constipation due to opioid therapy [K59.03, T40.2X5A]  Yes   • Pain, cancer [G89.3]  Yes   • 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        Brief Hospital Course to date:  Oliver Mallory is a 55 y.o. male with relevant PMH of HTN, COPD, Pancreatitis, Cirrhosis, Tonsillar CA s/p tonsillectomy undergoing chemo and RT admitted 7/7/2020 with intractable nausea and vomiting for 4 days with no food intake.  Hospital course complicated by AUDREY, GIB, and hypotension.  He was admitted to ICU and did require pressors for some time. Ultimately stabilized after IV fluids, improvement in GIB and addition of midodrine. Transferred to floor 7/17. Renal function is continually worsening w/ NAL following. Patient lost iv access on 7/23/20, unable to obtain another IV (despite multiple attempts w/ IV, also attempted EJ access without success. Due to progressive kidney failure w/ volume overload, tunneled dialysis cath & central line was placed by Dr. Gore  of interventional radiology on 7/24/20 and dialysis was initiated. Had some post-procedural right neck/supraclavicular oozing/hematoma s/p platelets and FFP.      This patient's problems and plans were partially entered by my partner and updated as appropriate by me 08/09/20.     -Progressive AUDREY w/ volume overload, now s/p dialysis catheter placement receiving Hemodialysis  -Tunneled dialysis cath placed by Dr. Gore on 7/24/20  - removed on 8/3 with placement of temporary line  - echo 7/7/20: normal EF, normal valves  - nephrology following   - Increase Midodrine to 20 mg TID on dialysis days   - Difficulty removing volume with cirrhosis   - GABRIELA with no valvular vegetation seen, no evidence of endocarditis     Fever   Enterobacteriaceae bacteremia   - BCx + 7/31   - Started merrem (8/1), changed to fortaz post HD on 8/5  - ID following  - Abd US with no acute findings      -right neck/supraclavicular hematoma around catheter site  - Coagulopathy   - s/p platelets and FFP and Vit K  - R IJ removed 8/1     -s/p gi bleed (due to esophagitis and portal colopathy related to cirrhosis)  -7/9/20: EGD: mild esophagitis, grade 1 esophageal varices, lower 1/3 esophaguts, portal htn gastropathy noted  - 7/10/20: C-scope: rectal oozing consistent w/ portal colopathy, sigmoid diverticulosis noted  - Continue ppi bid; follow up [facility] 4 weeks; repeat egd 1 year for varix surveillance  -Hgb 8.8 today  -CBC in AM      -EDUARDO cirrhosis (w/ esophageal varices, portal htn gastopathy, and portal colopathy  - continue lactulose, diuretics, midodrine   - repeat para 7/31   -CMP in AM  -If necessary we may need to consult GI     -Hx stage II HPV related oropharyngeal cancer   -formerly received chemotherapy & radiotherapy; not candidate currently for any therapies  -regarding tonsillar cancer, per oncology patient is not candidate for further therapies for tonsillar cancer (given his audrey)  -  thrombocytopenia due to splenomegaly from  cirrhosis rather than malignancy; f/u Dr. Campbell as outpatient     -Cytopenias (thrombocytopenia & anemia)   -felt due to cirrhosis/hypersplenism per heme-onc  -CBC in AM      Weakness   -PT/OT consult     DVT Prophylaxis:  On hold secondary to liver failure, GI bleed     Disposition: I expect the patient to be discharged home with daughter in [place] in 1-2 days. CM has arranged outpatient dialysis at [facility]. His first chair time will be Wednesday 8/12 at 1415.    CODE STATUS:   Code Status and Medical Interventions:   Ordered at: 07/07/20 1721     Level Of Support Discussed With:    Patient     Code Status:    CPR     Medical Interventions (Level of Support Prior to Arrest):    Full         Electronically signed by FARHAN Villarreal, 08/09/20, 13:01.

## 2020-08-09 NOTE — THERAPY DISCHARGE NOTE
Acute Care - Occupational Therapy Treatment Note/Discharge  [facility]     Patient Name: Oliver aMllory  : 1965  MRN: [id number]  Today's Date: 2020  Onset of Illness/Injury or Date of Surgery: 20  Date of Referral to OT: 20  Referring Physician: FARHAN Morrissey      Admit Date: 2020    Visit Dx:     ICD-10-CM ICD-9-CM   1. Hypotension due to hypovolemia I95.89 458.8    E86.1 276.52   2. Gastrointestinal hemorrhage with hematemesis K92.0 578.0   3. Acute blood loss anemia D62 285.1   4. Rectal bleeding K62.5 569.3   5. Impaired mobility and ADLs Z74.09 V49.89    Z78.9    6. 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    • Pain medication agreement signed   • Pain, cancer   • Throat pain in adult   • Closed fracture of one rib of right side   • Rib pain on right side   • Urinary hesitancy   • Constipation due to opioid therapy   • Suicidal ideation   • Cough   • Stressful life event affecting family   • Dependent for transportation   • Xerosis of skin   • Dehydration   • Intractable nausea and vomiting   • Hypotension   • History of essential hypertension   • Unintentional weight loss   • ARF likely 2* ATN    • Elevated LFTs   • Hyperbilirubinemia   • Chemotherapy-induced thrombocytopenia   • GIB w/ hematemesis   • Liver cirrhosis w/ ascites    • Acute blood loss anemia   • Rectal bleeding       Therapy Treatment    Rehabilitation Treatment Summary     Row Name                Wound 20 Right posterior elbow Skin Tear    Wound - Properties Group Date first assessed: 20 [CB] Time first assessed:  [CB] Side: Right [CB] Orientation: posterior [CB] Location: elbow [CB] Primary Wound Type: Skin tear [CB] Stage, Pressure Injury: other (see comments) [CB], RICARDO  Recorded by:  [CB] Shantel Cha RN 20      User Key  (r) =  Recorded By, (t) = Taken By, (c) = Cosigned By    Initials Name Effective Dates Discipline    CB Shantel Cha RN 02/12/20 -  Nurse        Wound 07/23/20 2048 Right posterior elbow Skin Tear (Active)   Dressing Appearance open to air 8/8/2020  8:41 PM   Closure None 8/8/2020  8:41 PM   Base dry;pink 8/8/2020  8:41 PM       Rehab Goal Summary     Row Name 08/09/20 0921             Occupational Therapy Goals    Strength Goal Selection (OT)  strength, OT goal 1  -JR         Strength Goal 1 (OT)    Strength Goal 1 (OT)  Pt to verbalize understanding of B UE red theraband HEP  -JR      Time Frame (Strength Goal 1, OT)  long term goal (LTG);1 day  -JR      Progress/Outcome (Strength Goal 1, OT)  goal met  -JR        User Key  (r) = Recorded By, (t) = Taken By, (c) = Cosigned By    Initials Name Provider Type Discipline    JR Megan Morgan OT Occupational Therapist OT          Occupational Therapy Education                 Title: PT OT SLP Therapies (In Progress)     Topic: Occupational Therapy (In Progress)     Point: ADL training (In Progress)     Description:   Instruct learner(s) on proper safety adaptation and remediation techniques during self care or transfers.   Instruct in proper use of assistive devices.              Learning Progress Summary           Patient Acceptance, TB, NR by PS at 7/30/2020 1236    Acceptance, TB, VU by PS at 7/30/2020 1235    Acceptance, E,TB, VU by AC at 7/21/2020 1345    Comment:  use of reacher and LH sponge    Acceptance, E, NR by CL at 7/14/2020 1411    Acceptance, E, NR by JR at 7/11/2020 0900    Acceptance, E, VU by AC at 7/8/2020 0745    Comment:  benefits of activity, role of OT, d/c plan   Family Acceptance, E, NR by JR at 7/11/2020 0900                   Point: Home exercise program (Done)     Description:   Instruct learner(s) on appropriate technique for monitoring, assisting and/or progressing therapeutic exercises/activities.              Learning Progress Summary            Patient Acceptance, E,TB,D,H, VU by JR at 8/9/2020 0921    Comment:  Educated pt regarding red theraband HEP    Acceptance, TB, NR by PS at 7/30/2020 1236    Acceptance, TB, VU by PS at 7/30/2020 1235    Acceptance, E, NR by CL at 7/16/2020 1402                   Point: Precautions (In Progress)     Description:   Instruct learner(s) on prescribed precautions during self-care and functional transfers.              Learning Progress Summary           Patient Acceptance, TB, NR by PS at 7/30/2020 1236    Acceptance, TB, VU by PS at 7/30/2020 1235    Acceptance, E, NR by CL at 7/16/2020 1402    Acceptance, E, NR by CL at 7/14/2020 1411                   Point: Body mechanics (In Progress)     Description:   Instruct learner(s) on proper positioning and spine alignment during self-care, functional mobility activities and/or exercises.              Learning Progress Summary           Patient Acceptance, TB, NR by PS at 7/30/2020 1236    Acceptance, TB, VU by PS at 7/30/2020 1235    Acceptance, E, NR by CL at 7/14/2020 1411                               User Key     Initials Effective Dates Name Provider Type Discipline    AC 06/23/15 -  Rupa Chanel, OT Occupational Therapist OT    JR 06/22/15 -  Megan Morgan, OT Occupational Therapist OT    CL 04/03/18 -  Alissa Sr, OT Occupational Therapist OT    PS 01/16/19 -  Emy Sandoval RN Registered Nurse Nurse                OT Recommendation and Plan  Outcome Summary/Treatment Plan (OT)  Anticipated Discharge Disposition (OT): home with assist  Reason for Discharge (OT Discharge Summary): patient met all goals and outcomes  Planned Therapy Interventions (OT Eval): strengthening exercise  Therapy Frequency (OT Eval): daily  Plan of Care Review  Plan of Care Reviewed With: patient  Plan of Care Reviewed With: patient  Outcome Summary: OT re-eval completed this date. Pt requesting UE strengthening exercises to complete on his own. OT provided and educated pt  regarding red theraband HEP. No further skilled OT services indicated at this time. Pt reports he is expected to d/c in the next couple days.    Outcome Measures     Row Name 08/09/20 0921             How much help from another is currently needed...    Putting on and taking off regular lower body clothing?  4  -JR      Bathing (including washing, rinsing, and drying)  4  -JR      Toileting (which includes using toilet bed pan or urinal)  4  -JR      Putting on and taking off regular upper body clothing  4  -JR      Taking care of personal grooming (such as brushing teeth)  4  -JR      Eating meals  4  -JR      AM-PAC 6 Clicks Score (OT)  24  -JR         Functional Assessment    Outcome Measure Options  AM-PAC 6 Clicks Daily Activity (OT)  -JR        User Key  (r) = Recorded By, (t) = Taken By, (c) = Cosigned By    Initials Name Provider Type    Megan Avlia, OT Occupational Therapist           Time Calculation:    Time Calculation- OT     Row Name 08/09/20 0921             Time Calculation- OT    OT Start Time  0921  -      Total Timed Code Minutes- OT  15 minute(s)  -JR      OT Received On  08/09/20  -         Timed Charges    76025 - OT Therapeutic Activity Minutes  15  -JR        User Key  (r) = Recorded By, (t) = Taken By, (c) = Cosigned By    Initials Name Provider Type    Megan Avila, OT Occupational Therapist        Therapy Suggested Charges     Code   Minutes Charges    55305 (CPT®) Hc Ot Neuromusc Re Education Ea 15 Min      95122 (CPT®) Hc Ot Ther Proc Ea 15 Min      67540 (CPT®) Hc Ot Therapeutic Act Ea 15 Min 15 1    01829 (CPT®) Hc Ot Manual Therapy Ea 15 Min      62748 (CPT®) Hc Ot Iontophoresis Ea 15 Min      15313 (CPT®) Hc Ot Elec Stim Ea-Per 15 Min      73894 (CPT®) Hc Ot Ultrasound Ea 15 Min      86287 (CPT®) Hc Ot Self Care/Mgmt/Train Ea 15 Min      Total  15 1        Therapy Charges for Today     Code Description Service Date Service Provider Modifiers Qty    [id number]   OT THERAPEUTIC ACT EA 15 MIN 8/9/2020 Megan Morgan, OT GO 1    [id number]  OT RE-EVAL 2 8/9/2020 Megan Morgan OT GO 1               OT Discharge Summary  Anticipated Discharge Disposition (OT): home with assist  Reason for Discharge: All goals achieved  Outcomes Achieved: Refer to plan of care for updates on goals achieved  Discharge Destination: Home with assist    Megan Morgan, OT  8/9/2020

## 2020-08-09 NOTE — THERAPY DISCHARGE NOTE
Patient Name: Oliver Mallory  : 1965    MRN: [id number]                              Today's Date: 2020       Admit Date: 2020    Visit Dx:     ICD-10-CM ICD-9-CM   1. Hypotension due to hypovolemia I95.89 458.8    E86.1 276.52   2. Gastrointestinal hemorrhage with hematemesis K92.0 578.0   3. Acute blood loss anemia D62 285.1   4. Rectal bleeding K62.5 569.3   5. Impaired mobility and ADLs Z74.09 V49.89    Z78.9    6. 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    • Pain medication agreement signed   • Pain, cancer   • Throat pain in adult   • Closed fracture of one rib of right side   • Rib pain on right side   • Urinary hesitancy   • Constipation due to opioid therapy   • Suicidal ideation   • Cough   • Stressful life event affecting family   • Dependent for transportation   • Xerosis of skin   • Dehydration   • Intractable nausea and vomiting   • Hypotension   • History of essential hypertension   • Unintentional weight loss   • ARF likely 2* ATN    • Elevated LFTs   • Hyperbilirubinemia   • Chemotherapy-induced thrombocytopenia   • GIB w/ hematemesis   • Liver cirrhosis w/ ascites    • Acute blood loss anemia   • Rectal bleeding     Past Medical History:   Diagnosis Date   • Arthritis    • Cancer (CMS/HCC)    • COPD (chronic obstructive pulmonary disease) (CMS/HCC)    • Fall 2020   • Hypertension    • Pancreatitis    • Tonsillar cancer (CMS/HCC)      Past Surgical History:   Procedure Laterality Date   • ANKLE TENDON REPAIR     • CARDIAC CATHETERIZATION N/A 2018    Procedure: Left Heart Cath;  Surgeon: Ray Farley MD;  Location:  [facility] CATH INVASIVE LOCATION;  Service: Cardiovascular   • CHOLECYSTECTOMY     • COLONOSCOPY N/A 7/10/2020    Procedure: COLONOSCOPY;  Surgeon: Brunner, Mark I, MD;  Location:  [facility] ENDOSCOPY;  Service:  Gastroenterology;  Laterality: N/A;   • CYST REMOVAL      coccyx   • ENDOSCOPY N/A 7/9/2020    Procedure: ESOPHAGOGASTRODUODENOSCOPY;  Surgeon: Brunner, Mark I, MD;  Location: [facility] ENDOSCOPY;  Service: Gastroenterology;  Laterality: N/A;   • HERNIA MESH REMOVAL     • HERNIA REPAIR     • KNEE SURGERY  1981   • TONSILLECTOMY       General Information     Row Name 08/09/20 1353          PT Evaluation Time/Intention    Document Type  re-evaluation;discharge treatment  -LM     Mode of Treatment  individual therapy;physical therapy  -LM     Row Name 08/09/20 1355          General Information    Patient Profile Reviewed?  yes  -LM     Prior Level of Function  -- Please refer to initial eval  -LM     Existing Precautions/Restrictions  no known precautions/restrictions  -LM     Barriers to Rehab  medically complex  -LM     Row Name 08/09/20 1352          Cognitive Assessment/Intervention- PT/OT    Orientation Status (Cognition)  oriented x 4  -LM       User Key  (r) = Recorded By, (t) = Taken By, (c) = Cosigned By    Initials Name Provider Type    LM Negrita Jaffe, PT Physical Therapist        Mobility     Row Name 08/09/20 135          Bed Mobility Assessment/Treatment    Comment (Bed Mobility)  Pt sitting EOB at initial and end of tx.  -LM     Row Name 08/09/20 1352          Sit-Stand Transfer    Sit-Stand Neon (Transfers)  independent  -LM     Assistive Device (Sit-Stand Transfers)  walker, front-wheeled  -LM     Row Name 08/09/20 6299          Gait/Stairs Assessment/Training    Gait/Stairs Assessment/Training  gait/ambulation independence  -LM     Neon Level (Gait)  independent  -LM     Assistive Device (Gait)  walker, front-wheeled  -LM     Distance in Feet (Gait)  200 feet  -LM     Deviations/Abnormal Patterns (Gait)  bilateral deviations;marie decreased  -LM     Bilateral Gait Deviations  forward flexed posture;heel strike decreased  -LM     Comment (Gait/Stairs)  No unsteadiness or LOB noted.   Vc's for upright posture and staying inside walker.  Pt reports he has no concerns and feels ready to go home in the next few days.  -LM       User Key  (r) = Recorded By, (t) = Taken By, (c) = Cosigned By    Initials Name Provider Type    Negrita Houston, PT Physical Therapist        Obj/Interventions     Row Name 08/09/20 1359          General ROM    GENERAL ROM COMMENTS  BLE AROM WFL  -LM     Row Name 08/09/20 1359          MMT (Manual Muscle Testing)    General MMT Comments  BLEs grossly 4/5 throughout  -LM     Row Name 08/09/20 1359          Therapeutic Exercise    Comment (Therapeutic Exercise)  Pt reports he lost his old HEP.  New HEP issued.  Pt educated on each exercise and shown progressions from supine to sitting to standing.  Pt verbalizes understanding.  -LM     Row Name 08/09/20 [facility]9          Static Sitting Balance    Level of [place] (Unsupported Sitting, Static Balance)  independent  -LM     Sitting Position (Unsupported Sitting, Static Balance)  sitting on edge of bed  -LM     Time Able to Maintain Position (Unsupported Sitting, Static Balance)  more than 5 minutes  -LM     Row Name 08/09/20 [facility]          Static Standing Balance    Level of [place] (Supported Standing, Static Balance)  conditional independence  -LM     Time Able to Maintain Position (Supported Standing, Static Balance)  less than 15 seconds  -LM     Assistive Device Utilized (Supported Standing, Static Balance)  walker, rolling  -LM     Row Name 08/09/20 [facility]          Dynamic Standing Balance    Level of [place], Reaches Outside Midline (Standing, Dynamic Balance)  conditional independence  -LM     Time Able to Maintain Position, Reaches Outside Midline (Standing, Dynamic Balance)  1 to 2 minutes  -LM     Assistive Device Utilized (Supported Standing, Dynamic Balance)  walker, rolling  -LM       User Key  (r) = Recorded By, (t) = Taken By, (c) = Cosigned By    Initials Name Provider Type    Negrita Houston, PT  Physical Therapist        Goals/Plan    No documentation.       Clinical Impression     Row Name 08/09/20 1358          Pain Assessment    Additional Documentation  Pain Scale: Numbers Pre/Post-Treatment (Group)  -LM     Row Name 08/09/20 1359          Pain Scale: Numbers Pre/Post-Treatment    Pain Scale: Numbers, Pretreatment  0/10 - no pain  -LM     Pain Scale: Numbers, Post-Treatment  0/10 - no pain  -LM     Row Name 08/09/20 1350          Plan of Care Review    Plan of Care Reviewed With  patient  -LM     Row Name 08/09/20 135          Vital Signs    Pretreatment Heart Rate (beats/min)  108  -LM     Intratreatment Heart Rate (beats/min)  145 during 200 feet of gait  -LM     Posttreatment Heart Rate (beats/min)  111  -LM     Pre Patient Position  Sitting  -LM     Intra Patient Position  Standing  -LM     Post Patient Position  Sitting  -LM     Row Name 08/09/20 [facility]0          Positioning and Restraints    Pre-Treatment Position  in bed  -LM     Post Treatment Position  bed  -LM     In Bed  sitting EOB;call light within reach;encouraged to call for assist;notified nsg  -LM       User Key  (r) = Recorded By, (t) = Taken By, (c) = Cosigned By    Initials Name Provider Type    LM Negrita Jaffe, PT Physical Therapist        Outcome Measures     Row Name 08/09/20 9219          How much help from another person do you currently need...    Turning from your back to your side while in flat bed without using bedrails?  4  -LM     Moving from lying on back to sitting on the side of a flat bed without bedrails?  4  -LM     Moving to and from a bed to a chair (including a wheelchair)?  4  -LM     Standing up from a chair using your arms (e.g., wheelchair, bedside chair)?  4  -LM     Climbing 3-5 steps with a railing?  3  -LM     To walk in hospital room?  4  -LM     AM-PAC 6 Clicks Score (PT)  23  -LM     Row Name 08/09/20 1356          Functional Assessment    Outcome Measure Options  AM-PAC 6 Clicks Basic Mobility (PT)  -LM        User Key  (r) = Recorded By, (t) = Taken By, (c) = Cosigned By    Initials Name Provider Type    eNgrita Houston PT Physical Therapist        Physical Therapy Education                 Title: PT OT SLP Therapies (In Progress)     Topic: Physical Therapy (Done)     Point: Mobility training (Done)     Description:   Instruct learner(s) on safety and technique for assisting patient out of bed, chair or wheelchair.  Instruct in the proper use of assistive devices, such as walker, crutches, cane or brace.              Patient Friendly Description:   It's important to get you on your feet again, but we need to do so in a way that is safe for you. Falling has serious consequences, and your personal safety is the most important thing of all.        When it's time to get out of bed, one of us or a family member will sit next to you on the bed to give you support.     If your doctor or nurse tells you to use a walker, crutches, a cane, or a brace, be sure you use it every time you get out of bed, even if you think you don't need it.    Learning Progress Summary           Patient Acceptance, E, VU by LM at 8/9/2020 1432    Acceptance, TB, NR by PS at 7/30/2020 1236    Acceptance, TB, VU by PS at 7/30/2020 1235    Acceptance, E, VU,DU by LM at 7/21/2020 1027    Acceptance, E, NR by AS at 7/19/2020 1119    Acceptance, E,D, VU,NR by LS at 7/16/2020 1057    Acceptance, E,D, VU,NR by LS at 7/15/2020 1048    Acceptance, E,D, NR,VU by LS at 7/13/2020 1444    Acceptance, E,D, VU,DU by  at 7/11/2020 0936                   Point: Home exercise program (Done)     Description:   Instruct learner(s) on appropriate technique for monitoring, assisting and/or progressing patient with therapeutic exercises and activities.              Learning Progress Summary           Patient Acceptance, E, VU by LM at 8/9/2020 1432    Acceptance, TB, NR by PS at 7/30/2020 1236    Acceptance, TB, VU by PS at 7/30/2020 1235    Acceptance, E, VU,DU  by LM at 7/21/2020 1027    Acceptance, E, NR by AS at 7/19/2020 1119    Acceptance, E,D, VU,NR by LS at 7/16/2020 1057    Acceptance, E,D, VU,NR by LS at 7/15/2020 1048    Acceptance, E,D, NR,VU by LS at 7/13/2020 1444    Acceptance, E,D, VU,DU by  at 7/11/2020 0936                   Point: Body mechanics (Done)     Description:   Instruct learner(s) on proper positioning and spine alignment for patient and/or caregiver during mobility tasks and/or exercises.              Learning Progress Summary           Patient Acceptance, E, VU by LM at 8/9/2020 1432    Acceptance, TB, NR by PS at 7/30/2020 1236    Acceptance, TB, VU by PS at 7/30/2020 1235    Acceptance, E, VU,DU by LM at 7/21/2020 1027    Acceptance, E, NR by AS at 7/19/2020 1119    Acceptance, E,D, VU,NR by LS at 7/16/2020 1057    Acceptance, E,D, VU,NR by LS at 7/15/2020 1048    Acceptance, E,D, NR,VU by LS at 7/13/2020 1444    Acceptance, E,D, VU,DU by  at 7/11/2020 0936                   Point: Precautions (Done)     Description:   Instruct learner(s) on prescribed precautions during mobility and gait tasks              Learning Progress Summary           Patient Acceptance, E, VU by LM at 8/9/2020 1432    Acceptance, TB, NR by PS at 7/30/2020 1236    Acceptance, TB, VU by PS at 7/30/2020 1235    Acceptance, E, VU,DU by LM at 7/21/2020 1027    Acceptance, E, NR by AS at 7/19/2020 1119    Acceptance, E,D, VU,NR by LS at 7/16/2020 1057    Acceptance, E,D, VU,NR by LS at 7/15/2020 1048    Acceptance, E,D, NR,VU by LS at 7/13/2020 1444    Acceptance, E,D, VU,DU by  at 7/11/2020 0936                               User Key     Initials Effective Dates Name Provider Type Discipline    SJ 06/19/15 -  Vivian Hu PT Physical Therapist PT    AS 06/22/15 -  Dori Amin, PTA Physical Therapy Assistant PT    LS 06/19/15 -  Kaylah Macias, PT Physical Therapist PT    LM 07/24/19 -  Negrita Jaffe, PT Physical Therapist PT    PS 01/16/19 -  Lori,  Emy, RN Registered Nurse Nurse              PT Recommendation and Plan     Outcome Summary/Treatment Plan (PT)  Anticipated Discharge Disposition (PT): home with assist  Plan of Care Reviewed With: patient  Progress: improving  Outcome Summary: PT re-evaluation completed on this date.  Pt continues to demonstrate conditional independence with sit<-->stands and ambulating 200 feet using rw.  Pt reports he lost his old exercises - therefore new HEP issued.  PT reviewed each exercise from supine progressing to sitting to standing.  Pt verbalized understanding.  Pt reports he has no concerns re: mobility or safety and is ready to go home within the next few days.  PT will sign off.     Time Calculation:   PT Charges     Row Name 08/09/20 1359             Time Calculation    Start Time  1359  -LM      PT Received On  08/09/20  -LM      PT Goal Re-Cert Due Date  08/19/20  -LM         Timed Charges    25399 - Gait Training Minutes   8  -LM        User Key  (r) = Recorded By, (t) = Taken By, (c) = Cosigned By    Initials Name Provider Type     Negrita Jaffe, PT Physical Therapist        Therapy Charges for Today     Code Description Service Date Service Provider Modifiers Qty    62421444364 HC GAIT TRAINING EA 15 MIN 8/9/2020 Negrita Jaffe, PT GP 1    19625469883 HC PT RE-EVAL ESTABLISHED PLAN 2 8/9/2020 Negrita Jaffe, PT GP 1          PT G-Codes  Outcome Measure Options: AM-PAC 6 Clicks Basic Mobility (PT)  AM-PAC 6 Clicks Score (PT): 23  AM-PAC 6 Clicks Score (OT): 24    PT Discharge Summary  Anticipated Discharge Disposition (PT): home with assist    Negrita Jaffe PT  8/9/2020

## 2020-08-09 NOTE — PROGRESS NOTES
"   LOS: 33 days      Reason For Visit:  F/U AUDREY  Subjective    No new events no added distress  Review of Systems:   Patient denies shortness of breath, chest pain, dysuria, hematuria, nausea, vomiting.  Planes of lower extremity edema.        Objective       aspirin 81 mg Oral Daily   bumetanide 4 mg Oral Daily   cefTAZidime 1 g Intravenous Once per day on Mon Wed Fri   hydrocortisone  Topical Q12H   lactulose 10 g Oral TID   magic mouthwash 5 mL Swish & Spit 4x Daily   metoclopramide 5 mg Oral TID AC   midodrine 10 mg Oral 3 times per day on Sun Tue Thu Sat   midodrine 20 mg Oral 3 times per day on Mon Wed Fri   mirtazapine 15 mg Oral Nightly   Sally 2 patch Topical Once   pantoprazole 40 mg Oral BID AC   sertraline 25 mg Oral Daily   sodium chloride 10 mL Intravenous Q12H            Vital Signs:  Blood pressure 115/62, pulse 88, temperature 97.8 °F (36.6 °C), temperature source Oral, resp. rate 19, height 182.9 cm (72.01\"), weight 126 kg (278 lb 6.4 oz), SpO2 91 %.    Flowsheet Rows      First Filed Value   Admission Height  185.4 cm (73\") Documented at 07/07/2020 1640   Admission Weight  127 kg (280 lb) Documented at 07/07/2020 1640          08/08 0701 - 08/09 0700  In: 360 [P.O.:360]  Out: -     Physical Exam:    General Appearance: Really obese gentleman no obvious distress.  Eyes: PER, conjunctivae and sclerae normal, no icterus  Lungs: Air auscultation equal chest movement no rales or rhonchi's.  Heart/CV: regular rhythm, bilateral lower extremity edema 1-2+.  Abdomen:  Distended , soft, non-tender,  bowel sounds present, morbid obesity  Skin: No rash, Warm and dry.  Ecchymosis chest wall.  Neurologically grossly intact.    Temp dialysis access right IJ    Radiology:        Labs:  Results from last 7 days   Lab Units 08/09/20  0522 08/08/20  0517 08/06/20  0424   WBC 10*3/mm3 5.43 4.72 6.44   HEMOGLOBIN g/dL 8.8* 8.1* 9.3*   HEMATOCRIT % 26.4* 24.5* 28.4*   PLATELETS 10*3/mm3 73* 63* 72*     Results from " last 7 days   Lab Units 08/09/20  0522 08/08/20  0517 08/07/20  0422 08/06/20  0424 08/05/20  0518 08/04/20  0418   SODIUM mmol/L 136 134*  --  135* 137 138   POTASSIUM mmol/L 4.3 3.7  --  3.9 3.6 3.6   CHLORIDE mmol/L 102 102  --  102 102 103   CO2 mmol/L 23.0 23.0  --  22.0 23.0 26.0   BUN mg/dL 28* 24*  --  23* 38* 29*   CREATININE mg/dL 4.35* 3.19*  --  3.43* 5.03* 4.79*   CALCIUM mg/dL 9.0 8.6  --  8.6 8.2* 8.3*   PHOSPHORUS mg/dL  --   --   --  2.3* 3.1 2.4*   ALBUMIN g/dL 3.00* 3.10* 2.80* 3.20* 2.70* 2.80*     Results from last 7 days   Lab Units 08/09/20 0522   GLUCOSE mg/dL 129*       Results from last 7 days   Lab Units 08/09/20  0522  08/07/20 0422   ALK PHOS U/L 162*   < > 144*   BILIRUBIN mg/dL 3.1*   < > 3.0*   BILIRUBIN DIRECT mg/dL  --   --  1.4*   ALT (SGPT) U/L 19   < > 21   AST (SGOT) U/L 85*   < > 83*    < > = values in this interval not displayed.      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  Non oliguric AUDREY: Thought to be ATN gradual worsening of renal function in the setting of chronic liver disease w cirrhosis. No response to albumin and midodrine.  Started on HD on 7/24/20. For solute clearance and optimization of volume status  Hypokalemia: Management with HD   2.  Anemia: Transfuse PRN no LISA to be given due to cancer  3.  Dependent edema: Optimization with HD patient has high fluid intake.  4.  Decompensated liver disease:  5.  Intradialytic hypotension: midodrine 20 mg TID  6. Bacteremia - Enterobacteriacea       Recommendations:   Alysis orders for tomorrow written.  He will need ultrafiltration.  Next week we may need tunnel catheter for patient to be outpatient.   Case discussed with his son in the room  Monitor for renal recovery start to make some urine..  Avoid nephrotoxic medications.  Keep systolic blood pressure greater than 100.   Adjust medication for the new GFR.  Case discussed with the medical staff taking care of the patient.    Oliva Pérez MD  08/09/20  13:09

## 2020-08-09 NOTE — PLAN OF CARE
Problem: Patient Care Overview  Goal: Plan of Care Review  Outcome: Ongoing (interventions implemented as appropriate)  Flowsheets (Taken 8/9/2020 0921)  Outcome Summary: OT re-eval completed this date. Pt requesting UE strengthening exercises to complete on his own. OT provided and educated pt regarding red theraband HEP. No further skilled OT services indicated at this time. Pt reports he is expected to d/c in the next couple days.

## 2020-08-09 NOTE — PLAN OF CARE
Problem: Patient Care Overview  Goal: Plan of Care Review  Outcome: Ongoing (interventions implemented as appropriate)  Flowsheets (Taken 8/9/2020 7309)  Outcome Summary: PT re-evaluation completed on this date.  Pt continues to demonstrate conditional independence with sit<-->stands and ambulating 200 feet using rw.  Pt reports he lost his old exercises - therefore new HEP issued.  PT reviewed each exercise from supine progressing to sitting to standing.  Pt verbalized understanding.  Pt reports he has no concerns re: mobility or safety and is ready to go home within the next few days.  PT will sign off.

## 2020-08-10 NOTE — PLAN OF CARE
HD in progress.     Problem: Hemodialysis (Adult)  Goal: Signs and Symptoms of Listed Potential Problems Will be Absent, Minimized or Managed (Hemodialysis)  Flowsheets (Taken 8/10/2020 8133)  Problems Assessed (Hemodialysis): all  Problems Present (Hemodialysis): electrolyte imbalance; fluid imbalance

## 2020-08-10 NOTE — PROGRESS NOTES
INFECTIOUS DISEASE Progress Note    Oliver Mallory  1965  [id number]    Date of consult: 8/1/20    Admit date: 7/7/2020    Evaluating physician: Dr. Joey Graham  Reason for Consultation: Citrobacter sepsis with bacteremia, 4 out of 4 bottles positive from 7/31/2020  Chief Complaint: Intractable nausea/vomiting and constipation, sepsis      Subjective   History of present illness:  Mr. Oliver Mallory 55 y.o.  Yr old male who is being evaluated for Enterobacter bacteremia.  He has a past medical history significant for hypertension, COPD, pancreatitis, cirrhosis, tonsillar cancer status post tonsillectomy undergoing chemo and radiation therapy.  He was initially admitted on 7/7/2020 with intractable nausea and vomiting x4 days.  He was initially admitted to the ICU and did require some pressors.  Hospital course was complicated by GI bleed and hypotension as well acute kidney injury.  Patient was stabilized and transferred to the floor on 7/17.  Due to progressive kidney failure with fluid volume overload, tunneled dialysis catheter and central line was placed by interventional radiology on 7/24/2020 and dialysis was initiated.  There was reported issues with bleeding from the central line and patient received platelets and FFP.  Patient was found to have IJ line pulled most the way out had to be discontinued on 8/1 morning. Dialysis catheter remains in place.  Patient underwent a CT-guided paracentesis on 7/31, 7/15 and 7/10.  Patient had a EGD on 7/9 that showed mild portal gastropathy, reflux esophagitis and grade 1 esophageal varices.  He received a colonoscopy on 7/10 which showed a few sigmoid diverticula.  There is also petechiae in the rectum with scant oozing.  It was felt, in the absence of history of radiation to this region, that this is suspected portal colopathy.  Patient also received an echo on 7/8/2020 that was normal.    Patient did develop a fever of 101.5 on 7/30 as well as some tachycardia up to  112.  Patient did remain without leukocytosis however due to the fever blood cultures were ordered which have turned positive in 4/4 bottles for gram-negative bacilli identified as Enterobacteriaceae by [employer] report.  Currently WBC is 5.56, H&H is 8.1/25.7 and platelets are 34.  AST is elevated at 70 and alkaline phosphatase is elevated 148 and total bilirubin is elevated 2.7.  7/31 CT of chest show clear lungs and cirrhosis with ascites.  CT of the abdomen on 8/1 showed cirrhosis with splenomegaly and ascites, surgical absence of gallbladder and diverticulosis.    Patient has no known antibiotic allergies and is currently receiving IV Zosyn.  ID has been consulted for further evaluation and treatment as well as antimicrobial therapy management on 8/1/2020.    Of note:  7/8 blood cultures were finalized no growth in 4/4 bottles  7/31 body fluid culture and stain-no organisms seen  7/15 body fluid culture and stain-no organisms seen    8/2/2020: History reviewed.  Patient sitting up on side of bed awake and alert upon arrival.  He remains with dialysis catheter in right chest.  He denies any events overnight.  He has been afebrile with some hypotension overnight but overall BP is trending up.  Without leukocytosis with a WBC of 6.11.  However lactic acid remains elevated at 3.0 as well as a CRP of 5.49.  Procalcitonin is also elevated 1.2.  Blood cultures have been identified as Citrobacter koseri by [employer] report.  Did receive an ultrasound of the  Right upper quadrant which showed free fluid identified throughout the upper abdomen, liver with increased echogenicity with some lobulation identified at the contour suggesting cirrhosis.  Gallbladder fossa was unremarkable and the common bile duct measured 8 mm.  TTE was performed on 8/1 and is currently pending interpretation.  Awaiting for catheter removal in a.m. after dialysis.    8/3/2020: Patient seen in HD today. 0 complaints overnight. Patient afebrile and  hemodynamically stable overnight.  Plan to remove Vipin cath today after dialysis.  Remains without leukocytosis.  No events to report overnight per RN.  Continues on IV meropenem for Citrobacter koseri sepsis.  Waiting for dialysis catheter removal.  No pain.  On IV meropenem until 8/17/2020.    8/4/2020 history reviewed.  No high fevers or chills.  On meropenem until 8/17, or longer depending upon GABRIELA.  8/1/2020 TTE with small mobile strand on the posterior leaflet of the mitral valve suggestive of a vegetation.  No pain.  Tunneled Vipin cath removed right chest, replaced by temporary dialysis catheter on 8/3/2020.    8/5/2020 history reviewed.  On meropenem but changing to post hemodialysis Fortaz until 8/17 depending on GABRIELA results.  TTE with possible mitral valve vegetation.  Tunneled Vipin cath removed on 8/3 with temporary placed.  Being treated for Citrobacter sepsis from blood cultures from 7/31.  Sensitive to levofloxacin, cefotaxime, trimethoprim sulfa.  Changing to post hemodialysis Fortaz.    8/6/20 hx rev.  On fortaz till 8/17 for Vipin cath infxn with septicemia w/ Citrobacter with a neg GABRIELA 8/4.  Vipin cath removed 8/3 w/ temp placed.      8/7/20 hx rev.  On fortaz till 8/17 (post HD M, W, F) for sepsis from Citrobacter w/ neg GABRIELA.  Source likely Vipin cath removed 8/3.  No fever.  No pain.    8/10/2020 history reviewed.  Continues on Fortaz till 8/17 after hemodialysis for Citrobacter sepsis.  No high fevers.    Past Medical History:   Diagnosis Date   • Arthritis    • Cancer (CMS/HCC)    • COPD (chronic obstructive pulmonary disease) (CMS/HCC)    • Fall 05/12/2020   • Hypertension    • Pancreatitis    • Tonsillar cancer (CMS/HCC)        Past Surgical History:   Procedure Laterality Date   • ANKLE TENDON REPAIR     • CARDIAC CATHETERIZATION N/A 5/31/2018    Procedure: Left Heart Cath;  Surgeon: Ray Farley MD;  Location: [facility] CATH INVASIVE LOCATION;  Service: Cardiovascular   • CHOLECYSTECTOMY     •  COLONOSCOPY N/A 7/10/2020    Procedure: COLONOSCOPY;  Surgeon: Brunner, Mark I, MD;  Location:  [facility];  Service: Gastroenterology;  Laterality: N/A;   • CYST REMOVAL      coccyx   • ENDOSCOPY N/A 7/9/2020    Procedure: ESOPHAGOGASTRODUODENOSCOPY;  Surgeon: Brunner, Mark I, MD;  Location:  [facility];  Service: Gastroenterology;  Laterality: N/A;   • HERNIA MESH REMOVAL     • HERNIA REPAIR     • KNEE SURGERY  1981   • TONSILLECTOMY         Pediatric History   Patient Guardian Status   • Not on file     Other Topics Concern   • Not on file   Social History Narrative    Patient ambulatory without assistive device, but has ordered       family history includes Heart disease in his father; Hypertension in his mother.    No Known Allergies    Immunization History   Administered Date(s) Administered   • Hepatitis A 07/11/2020   • Hepatitis B Vaccine Adult IM 07/11/2020       Medication:    Current Facility-Administered Medications:   •  acetaminophen (TYLENOL) tablet 650 mg, 650 mg, Oral, Q4H PRN, Abram Abraham MD, 650 mg at 08/08/20 1642  •  albumin human 25 % IV SOLN 12.5 g, 12.5 g, Intravenous, PRN, Oliva Pérez MD  •  albumin human 25 % IV SOLN 25 g, 25 g, Intravenous, PRN, SaúlRickey MD, 25 g at 08/07/20 0910  •  albuterol (PROVENTIL) nebulizer solution 0.083% 2.5 mg/3mL, 2.5 mg, Nebulization, Q6H PRN, Abram Abraham MD, 2.5 mg at 07/21/20 1405  •  aspirin chewable tablet 81 mg, 81 mg, Oral, Daily, Abram Abraham MD, 81 mg at 08/09/20 0852  •  bumetanide (BUMEX) tablet 4 mg, 4 mg, Oral, Daily, Escobar Miles MD, 4 mg at 08/09/20 0853  •  cefTAZidime (FORTAZ) 1 g/100 mL 0.9% NS IVPB (mpb), 1 g, Intravenous, Once per day on Mon Wed Fri, Gino Nielsen IV, RPH, 1 g at 08/07/20 1453  •  diphenhydrAMINE (BENADRYL) capsule 25 mg, 25 mg, Oral, Q6H PRN, Escobar Miles MD  •  heparin (porcine) injection 1,600 Units, 1,600 Units, Intracatheter, PRN, Escobar Miles MD,  1,600 Units at 08/07/20 1224  •  hydrocortisone 1 % cream, , Topical, Q12H, Rupa Hawthorne, APRN  •  lactulose (CHRONULAC) 10 GM/15ML solution 10 g, 10 g, Oral, TID, Abram Abraham MD, 10 g at 08/09/20 1953  •  magic mouthwash oral supsension 5 mL, 5 mL, Swish & Spit, 4x Daily, Loreta Hemphill II, DO, 5 mL at 08/09/20 1750  •  metoclopramide (REGLAN) tablet 5 mg, 5 mg, Oral, TID AC, Abram Abraham MD, 5 mg at 08/09/20 1750  •  midodrine (PROAMATINE) tablet 10 mg, 10 mg, Oral, 3 times per day on Sun Tue Thu Sat, Ksenia Chaidez DO, 10 mg at 08/09/20 1749  •  midodrine (PROAMATINE) tablet 20 mg, 20 mg, Oral, 3 times per day on Mon Wed Fri, Ksenia Chaidez DO, 20 mg at 08/07/20 0910  •  mirtazapine (REMERON) tablet 15 mg, 15 mg, Oral, Nightly, Abram Abraham MD, 15 mg at 08/09/20 1953  •  morphine injection 1 mg, 1 mg, Intravenous, Q4H PRN, Sherrill Garber, DO, 1 mg at 08/08/20 1636  •  Sally patch 2 patch, 2 patch, Topical, Once, Zamzam Villa, APRN, Stopped at 07/31/20 2211  •  ondansetron (ZOFRAN) injection 4 mg, 4 mg, Intravenous, Q6H PRN, Abram Abraham MD, 4 mg at 08/08/20 2045  •  oxyCODONE (ROXICODONE) immediate release tablet 10 mg, 10 mg, Oral, Q6H PRN, Mustapha Ramires MD, 10 mg at 08/10/20 0534  •  pantoprazole (PROTONIX) EC tablet 40 mg, 40 mg, Oral, BID AC, Abram Abraham MD, 40 mg at 08/09/20 1750  •  phenylephrine-mineral oil-petrolatum (PREPARATION H) 0.25-14-74.9 % hemorhoidal ointment, , Rectal, TID PRN, Abram Abraham MD  •  sertraline (ZOLOFT) tablet 25 mg, 25 mg, Oral, Daily, Abram Abraham MD, 25 mg at 08/09/20 0852  •  sodium chloride 0.9 % flush 10 mL, 10 mL, Intravenous, Q12H, Abram Abraham MD, 10 mL at 08/09/20 0853  •  sodium chloride 0.9 % flush 10 mL, 10 mL, Intravenous, PRN, Abram Abraham MD, 10 mL at 07/10/20 0759    Please refer to the medical record for a full medication list    Review of Systems:    Constitutional--afebrile overnight, no fatigue  HEENT-- No new  "vision, hearing or throat complaints.  No epistaxis or oral sores.  Denies odynophagia or dysphagia.  No odynophagia or dysphagia. No headache, photophobia or neck stiffness.  CV-- No chest pain, palpitation or syncope  Resp-- No SOB/cough/Hemoptysis, or wheezing  GI- No nausea, vomiting, or diarrhea.  No hematochezia, melena, or hematemesis. Denies jaundice or chronic liver disease.  -- No dysuria, hematuria, or flank pain.  Denies hesitancy, urgency or flank pain.  Lymph- no swollen lymph nodes in neck/axilla or groin.   Heme-positive ecchymosis across chest.  MS-- no swelling or pain in the bones or joints of arms/legs.  No new back pain.  Neuro-- No acute focal weakness or numbness in the arms or legs.  No seizures.  Skin--positive for bruising, no pain right temporary dialysis catheter chest, no petechiae, no nodules    Physical Exam:   Vital Signs   Temp:  [98.2 °F (36.8 °C)-98.9 °F (37.2 °C)] 98.2 °F (36.8 °C)  Heart Rate:  [] 89  Resp:  [17-18] 17  BP: (107-137)/(65-79) 107/65    Temp  Min: 98.2 °F (36.8 °C)  Max: 98.9 °F (37.2 °C)  BP  Min: 107/65  Max: 137/79  Pulse  Min: 88  Max: 111  Resp  Min: 17  Max: 18  SpO2  Min: 93 %  Max: 93 %    Blood pressure 107/65, pulse 89, temperature 98.2 °F (36.8 °C), temperature source Oral, resp. rate 17, height 182.9 cm (72.01\"), weight 127 kg (280 lb 9.6 oz), SpO2 93 %.  GENERAL: Awake and alert, in minimal distress.  Resting in bed.  HEENT:  Normocephalic, atraumatic.  Ears externally normal, Nose externally normal.  EYES: No icterus.   LYMPHATICS:   HEART: No obvious murmur.  RRR.  No JVD.  LUNGS: Clear to auscultation. No respiratory distress, no use of accessory muscles.  ABDOMEN: Soft, tender x4 quadrants, nondistended. Bowel sounds normal.  Obese.  SKIN: Warm and dry without cutaneous eruptions.  Ecchymosis noted across bilateral chest and scattered across bilateral upper extremities.  Line right chest ok.  PSYCHIATRIC: Mental status lucid. No " confusion.  EXT:  No cellulitic change.  Normal range of motion.  NEURO: Oriented to name, nonfocal  LINES: Right chest dialysis cath in place.  Ecchymosis noted around the site.  No      Results Review:       Results from last 7 days   Lab Units 08/09/20 0522 08/08/20 0517 08/06/20 0424   WBC 10*3/mm3 5.43 4.72 6.44   HEMOGLOBIN g/dL 8.8* 8.1* 9.3*   HEMATOCRIT % 26.4* 24.5* 28.4*   PLATELETS 10*3/mm3 73* 63* 72*     Results from last 7 days   Lab Units 08/10/20  0449   SODIUM mmol/L 134*   POTASSIUM mmol/L 4.2   CHLORIDE mmol/L 101   CO2 mmol/L 22.0   BUN mg/dL 37*   CREATININE mg/dL 4.89*   GLUCOSE mg/dL 134*   CALCIUM mg/dL 8.9     Results from last 7 days   Lab Units 08/10/20  0449  08/07/20  0422   ALK PHOS U/L 158*   < > 144*   BILIRUBIN mg/dL 3.1*   < > 3.0*   BILIRUBIN DIRECT mg/dL  --   --  1.4*   ALT (SGPT) U/L 21   < > 21   AST (SGOT) U/L 86*   < > 83*    < > = values in this interval not displayed.                 Results from last 7 days   Lab Units 08/08/20 0517   LACTATE mmol/L 2.5*     Estimated Creatinine Clearance: 23.2 mL/min (A) (by C-G formula based on SCr of 4.89 mg/dL (H)).    Microbiology:  Microbiology Results (last 10 days)     Procedure Component Value - Date/Time    Blood Culture - Blood, Arm, Left [[id number]] Collected:  08/05/20 1547    Lab Status:  Preliminary result Specimen:  Blood from Arm, Left Updated:  08/09/20 1615     Blood Culture No growth at 4 days    Blood Culture - Blood, Arm, Left [[id number]] Collected:  08/05/20 1204    Lab Status:  Preliminary result Specimen:  Blood from Arm, Left Updated:  08/09/20 1245     Blood Culture No growth at 4 days    Anaerobic Culture - Body Fluid, Peritoneum [[id number]] Collected:  07/31/20 1422    Lab Status:  Final result Specimen:  Body Fluid from Peritoneum Updated:  08/05/20 1201     Anaerobic Culture No anaerobes isolated at 5 days    Body Fluid Culture - Body Fluid, Peritoneum [[id number]] Collected:  07/31/20 1422    Lab Status:   Final result Specimen:  Body Fluid from Peritoneum Updated:  08/03/20 0751     Body Fluid Culture No growth at 3 days     Gram Stain No organisms seen      Occasional WBCs per low power field    Blood Culture - Blood, Arm, Left [[id number]]  (Abnormal) Collected:  07/31/20 0832    Lab Status:  Final result Specimen:  Blood from Arm, Left Updated:  08/03/20 0649     Blood Culture Citrobacter koseri     Comment: Refer to previous blood culture collected on 7/31/2020 0830 for MICs.          Isolated from Aerobic and Anaerobic Bottles     Gram Stain Anaerobic Bottle Gram negative bacilli      Aerobic Bottle Gram negative bacilli    Blood Culture - Blood, Blood, PICC Line [[id number]]  (Abnormal)  (Susceptibility) Collected:  07/31/20 0830    Lab Status:  Final result Specimen:  Blood, PICC Line Updated:  08/03/20 0649     Blood Culture Citrobacter koseri     Isolated from Aerobic and Anaerobic Bottles     Gram Stain Anaerobic Bottle Gram negative bacilli      Aerobic Bottle Gram negative bacilli    Susceptibility      Citrobacter koseri     KALANI     Cefepime Susceptible     Ceftazidime Susceptible     Ceftriaxone Susceptible     Gentamicin Susceptible     Levofloxacin Susceptible     Piperacillin + Tazobactam Susceptible     Trimethoprim + Sulfamethoxazole Susceptible                Susceptibility Comments     Citrobacter koseri    Cefazolin sensitivity will not be reported for Enterobacteriaceae in non-urine isolates. If cefazolin is preferred, please call the microbiology lab to request an E-test.  With the exception of urinary-sourced infections, aminoglycosides should not be used as monotherapy.             Blood Culture ID, PCR - Blood, Blood, PICC Line [[id number]]  (Abnormal) Collected:  07/31/20 0830    Lab Status:  Final result Specimen:  Blood, PICC Line Updated:  08/01/20 0022     BCID, PCR Enterobacteriaceae Group. Identification by BCID PCR.            Radiology:  Imaging Results (Last 72 Hours)     ** No  results found for the last 72 hours. **          IMPRESSION:     1. Sepsis with Enterobacteriaceae group bacteremia identified as Citrobacter koseri by [employer] report-blood cultures positive in 4/4 bottles 7/31/2020.  Etiology is unclear at this time but could likely be a dialysis/line infection. Prior echo negative on 7/8.  May possibly be an occult intra-abdominal process, or elsewhere but not obvious on CT scan of the chest abdomen and pelvis from 7/31/2020.  However due to the high-grade bacteremia and intravascular infection is suspected/likely.  8/1 TTE was performed with possible mitral valve vegetation.  GABRIELA pending 8/4/2020.  8/1 right upper quadrant ultrasound has been performed with no evidence of biliary duct/gallstones disease.  Repeat cx 8/5 neg.  2. TTE from 8/1 with possible mitral valve vegetation.  GABRIELA neg 8/4/20.  3. Liver cirrhosis with ascites status post multiple CT-guided paracentesis.  4. 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  5. Acute kidney injury likely secondary to ATN however could also be hepatorenal syndrome.  Creatinine 4.89, worse.  6. Intractable nausea and vomiting. Resolved.  7. Thrombocytopenia- could be secondary to chemotherapy or hepatic disease.  8. Elevated bilirubin- 3.1, worse likely secondary to hepatic disease, negative ultrasound.  Elevated AST- 86, worse.  9. Elevated alkaline phosphatase 144, continues.  10. Hypocalcemia, resolved.   11. Anemia, chronic disease, continues.  12. Hyponatremia 134, worse.  13. Lactic acid 2.6.  Not toxic.    Moving forward on current regimen.    Plan:    1. Diagnostically continue to follow patient's physical exam, follow labs include CBC, CMP, CRP.  Follow repeat cx 8/5.  2. Therapeutically, previously discontinued on 8/1 IV Zosyn and started Merrem then changed to Fortaz for post HD. Dialysis catheter removal explanted on 8/3 after dialysis.  Temporary dialysis catheter placed right chest 8/3.  Continue Fortaz 1 g post each  hemodialysis (M, W, F) to continue until 8/17.    3. Continue supportive care.    Our group would be pleased to follow this patient over the course of their hospitalization and assist with outpatient antimicrobial therapy, as indicated.  Further recommendations depend on the results of the cultures and clinical course.  Side effects of medications were discussed.  Patient is at increased risk for adverse drug reactions, recurrent infection, readmission.  Discussed with the hospitalist service.    Case management orders:  Please arrange for post HD Fortaz 1 g IV post each HD until 8/17/20.  Check cbc, cmp, crp weekly while on IV abx.  FAX orders to [phone], and call [phone] with final arrangements.  Arrange for f/u with me in 1 week post discharge.    Joey Graham MD  8/10/2020

## 2020-08-10 NOTE — PROGRESS NOTES
[facility] Medicine Services  PROGRESS NOTE    Patient Name: Oliver Mallory  : 1965  MRN: [id number]    Date of Admission: 2020  Length of Stay: 34  Primary Care Physician: Raymundo Salgado MD    Subjective   Subjective     CC:  Follow-up bacteremia, AUDREY    HPI:  Pt resting in bed, NAD. Pt had a 14.5 Fr tunneled dialysis catheter placed by Dr Gore today.     Review of Systems  Gen- No fevers, chills  CV- No chest pain, palpitations  Resp- No cough, dyspnea  GI- No N/V/D, abd pain  All 14 systems reviewed and are neg, except those mentioned in HPI.    Objective   Objective     Vital Signs:   Temp:  [97 °F (36.1 °C)-98.9 °F (37.2 °C)] 97.2 °F (36.2 °C)  Heart Rate:  [] 104  Resp:  [17-20] 20  BP: (102-137)/(53-79) 115/75        Physical Exam:  Constitutional: Awake, alert  Eyes: PERRLA, sclerae anicteric, no conjunctival injection  HENT: NCAT, mucous membranes moist  Neck: Supple, no thyromegaly, no lymphadenopathy, trachea midline  Respiratory: Clear to auscultation bilaterally, nonlabored respirations   Cardiovascular: RRR, no murmurs, rubs, or gallops, palpable pedal pulses bilaterally  Gastrointestinal: Positive bowel sounds, soft, nontender, nondistended  Musculoskeletal: +2 bilateral lower extremity edema, no clubbing or cyanosis to extremities  Psychiatric: Appropriate affect, cooperative  Neurologic: Oriented x 3, strength symmetric in all extremities, Cranial Nerves grossly intact to confrontation, speech clear  Skin: No rashes    Results Reviewed:    Results from last 7 days   Lab Units 08/10/20  0449 08/09/20  0522 08/08/20  0517   WBC 10*3/mm3 5.99 5.43 4.72   HEMOGLOBIN g/dL 8.5* 8.8* 8.1*   HEMATOCRIT % 25.8* 26.4* 24.5*   PLATELETS 10*3/mm3 38* 73* 63*     Results from last 7 days   Lab Units 08/10/20  0449 08/09/20  0522 08/08/20  0517   SODIUM mmol/L 134* 136 134*   POTASSIUM mmol/L 4.2 4.3 3.7   CHLORIDE mmol/L 101 102 102   CO2 mmol/L 22.0 23.0 23.0    BUN mg/dL 37* 28* 24*   CREATININE mg/dL 4.89* 4.35* 3.19*   GLUCOSE mg/dL 134* 129* 134*   CALCIUM mg/dL 8.9 9.0 8.6   ALT (SGPT) U/L 21 19 17   AST (SGOT) U/L 86* 85* 72*     Estimated Creatinine Clearance: 23.2 mL/min (A) (by C-G formula based on SCr of 4.89 mg/dL (H)).    Microbiology Results Abnormal     Procedure Component Value - Date/Time    Blood Culture - Blood, Arm, Left [[id number]] Collected:  08/05/20 1204    Lab Status:  Final result Specimen:  Blood from Arm, Left Updated:  08/10/20 1245     Blood Culture No growth at 5 days    Blood Culture - Blood, Arm, Left [[id number]] Collected:  08/05/20 1547    Lab Status:  Preliminary result Specimen:  Blood from Arm, Left Updated:  08/09/20 1615     Blood Culture No growth at 4 days    Anaerobic Culture - Body Fluid, Peritoneum [[id number]] Collected:  07/31/20 1422    Lab Status:  Final result Specimen:  Body Fluid from Peritoneum Updated:  08/05/20 1201     Anaerobic Culture No anaerobes isolated at 5 days    Body Fluid Culture - Body Fluid, Peritoneum [[id number]] Collected:  07/31/20 1422    Lab Status:  Final result Specimen:  Body Fluid from Peritoneum Updated:  08/03/20 0751     Body Fluid Culture No growth at 3 days     Gram Stain No organisms seen      Occasional WBCs per low power field    Blood Culture - Blood, Arm, Left [[id number]]  (Abnormal) Collected:  07/31/20 0832    Lab Status:  Final result Specimen:  Blood from Arm, Left Updated:  08/03/20 0649     Blood Culture Citrobacter koseri     Comment: Refer to previous blood culture collected on 7/31/2020 0830 for MICs.          Isolated from Aerobic and Anaerobic Bottles     Gram Stain Anaerobic Bottle Gram negative bacilli      Aerobic Bottle Gram negative bacilli    Blood Culture - Blood, Blood, PICC Line [[id number]]  (Abnormal)  (Susceptibility) Collected:  07/31/20 0830    Lab Status:  Final result Specimen:  Blood, PICC Line Updated:  08/03/20 0649     Blood Culture Citrobacter koseri      Isolated from Aerobic and Anaerobic Bottles     Gram Stain Anaerobic Bottle Gram negative bacilli      Aerobic Bottle Gram negative bacilli    Susceptibility      Citrobacter koseri     KALANI     Cefepime Susceptible     Ceftazidime Susceptible     Ceftriaxone Susceptible     Gentamicin Susceptible     Levofloxacin Susceptible     Piperacillin + Tazobactam Susceptible     Trimethoprim + Sulfamethoxazole Susceptible                Susceptibility Comments     Citrobacter koseri    Cefazolin sensitivity will not be reported for Enterobacteriaceae in non-urine isolates. If cefazolin is preferred, please call the microbiology lab to request an E-test.  With the exception of urinary-sourced infections, aminoglycosides should not be used as monotherapy.             Blood Culture ID, PCR - Blood, Blood, PICC Line [[id number]]  (Abnormal) Collected:  07/31/20 0830    Lab Status:  Final result Specimen:  Blood, PICC Line Updated:  08/01/20 0022     BCID, PCR Enterobacteriaceae Group. Identification by BCID PCR.    Body Fluid Culture - Body Fluid, Peritoneum [[id number]] Collected:  07/15/20 1038    Lab Status:  Final result Specimen:  Body Fluid from Peritoneum Updated:  07/18/20 0710     Body Fluid Culture No growth at 3 days     Gram Stain Few (2+) WBCs seen      No organisms seen    Blood Culture - Blood, Arm, Right [[id number]] Collected:  07/08/20 1748    Lab Status:  Final result Specimen:  Blood from Arm, Right Updated:  07/13/20 1800     Blood Culture No growth at 5 days    Blood Culture - Blood, Hand, Left [[id number]] Collected:  07/08/20 1744    Lab Status:  Final result Specimen:  Blood from Hand, Left Updated:  07/13/20 1800     Blood Culture No growth at 5 days    Body Fluid Culture - Body Fluid, Peritoneum [[id number]] Collected:  07/10/20 0916    Lab Status:  Final result Specimen:  Body Fluid from Peritoneum Updated:  07/13/20 0700     Body Fluid Culture No growth at 3 days     Gram Stain Moderate (3+) WBCs seen       No organisms seen    COVID PRE-OP / PRE-PROCEDURE SCREENING ORDER (NO ISOLATION) - Swab, Nasopharynx [[id number]] Collected:  07/08/20 2009    Lab Status:  Final result Specimen:  Swab from Nasopharynx Updated:  07/08/20 2119    Narrative:       The following orders were created for panel order COVID PRE-OP / PRE-PROCEDURE SCREENING ORDER (NO ISOLATION) - Swab, Nasopharynx.  Procedure                               Abnormality         Status                     ---------                               -----------         ------                     COVID-19,CEPHEID,CLAUDIA IN-...[[id number]]  Normal              Final result                 Please view results for these tests on the individual orders.    COVID-19,CEPHEID,CLAUDIA IN-HOUSE(OR EMERGENT/ADD-ON),NP SWAB IN TRANSPORT MEDIA 3-4 HR TAT - Swab, Nasopharynx [[id number]]  (Normal) Collected:  07/08/20 2009    Lab Status:  Final result Specimen:  Swab from Nasopharynx Updated:  07/08/20 2119     COVID19 Not Detected    Narrative:       Fact sheet for providers: https://www.fda.gov/media/436592/download     Fact sheet for patients: https://www.fda.gov/media/107103/download          Imaging Results (Last 24 Hours)     Procedure Component Value Units Date/Time    IR Tunneled Catheter [[id number]] Resulted:  08/10/20 1340     Updated:  08/10/20 1340          Results for orders placed during the hospital encounter of 07/07/20   Adult Transesophageal Echo (GABRIELA) W/ Cont if Necessary Per Protocol    Narrative · Estimated EF appears to be in the range of 66 - 70%  · No valvular vegetation seen on the study, no evidence of endocarditis  · There is a catheter which terminates in his SVC, no vegetations visible  · There is evidence of ascites          I have reviewed the medications:  Scheduled Meds:  aspirin 81 mg Oral Daily   bumetanide 4 mg Oral Daily   cefTAZidime 1 g Intravenous Once per day on Mon Wed Fri   fentaNYL citrate (PF)      heparin (porcine)      heparin (porcine)       hydrocortisone  Topical Q12H   lactulose 10 g Oral TID   lidocaine PF 1%      magic mouthwash 5 mL Swish & Spit 4x Daily   metoclopramide 5 mg Oral TID AC   midazolam      midodrine 10 mg Oral 3 times per day on Sun Tue Thu Sat   midodrine 20 mg Oral 3 times per day on Mon Wed Fri   mirtazapine 15 mg Oral Nightly   Sally 2 patch Topical Once   sertraline 25 mg Oral Daily   sodium chloride 10 mL Intravenous Q12H     Continuous Infusions:   PRN Meds:.•  acetaminophen  •  albumin human  •  albumin human  •  albuterol  •  diphenhydrAMINE  •  heparin (porcine)  •  Morphine  •  ondansetron  •  oxyCODONE  •  phenylephrine-mineral oil-petrolatum  •  sodium chloride      Assessment/Plan   Assessment / Plan     Active Hospital Problems    Diagnosis  POA   • **Hypotension [I95.9]  Yes   • Liver cirrhosis w/ ascites  [K74.60, R18.8]  Yes   • Intractable nausea and vomiting [R11.2]  Yes   • Dehydration [E86.0]  Yes   • ARF likely 2* ATN  [N17.9]  Yes   • Chemotherapy-induced thrombocytopenia [D69.59, T45.1X5A]  Yes   • GIB w/ hematemesis [K92.0]  No   • Acute blood loss anemia [D62]  Yes   • Rectal bleeding [K62.5]  Yes   • Constipation due to opioid therapy [K59.03, T40.2X5A]  Yes   • Pain, cancer [G89.3]  Yes   • 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        Brief Hospital Course to date:  Oliver Mallory is a 55 y.o. male PMH of HTN, COPD, Pancreatitis, Cirrhosis, Tonsillar CA s/p tonsillectomy undergoing chemo and RT admitted 7/7/2020 with intractable nausea and vomiting for 4 days with no food intake.  Hospital course complicated by AUDREY, GIB, and hypotension.  He was admitted to ICU and did require pressors for some time. Ultimately stabilized after IV fluids, improvement in GIB and addition of midodrine. Transferred to floor 7/17. Renal function is continually worsening w/ NAL following. Patient lost iv access on 7/23/20, unable to obtain  another IV (despite multiple attempts w/ IV, also attempted EJ access without success. Due to progressive kidney failure w/ volume overload, tunneled dialysis cath & central line was placed by Dr. Gore of interventional radiology on 7/24/20 and dialysis was initiated. Had some post-procedural right neck/supraclavicular oozing/hematoma s/p platelets and FFP. 08/03 tunneled dialysis cath removed and replaced with temporary line.      This patient's problems and plans were partially entered by my partner and updated as appropriate by me 08/10/20.    -Progressive AUDREY w/ volume overload, now s/p dialysis catheter placement receiving Hemodialysis  -Tunneled dialysis cath placed by Dr. Gore on 7/24/20  - removed on 8/3 with placement of temporary line  - 08/10 14.5 Fr tunneled dialysis catheter placed by Dr Gore   - echo 7/7/20: normal EF, normal valves  - nephrology following   - Increase Midodrine to 20 mg TID on dialysis days   - Difficulty removing volume with cirrhosis   - GABRIELA with no valvular vegetation seen, no evidence of endocarditis     Fever   Enterobacteriaceae bacteremia   - BCx + 7/31   - Started merrem (8/1), changed to fortaz post HD on 8/5  - ID following  - Abd US with no acute findings      -right neck/supraclavicular hematoma around catheter site  - Coagulopathy   - s/p platelets and FFP and Vit K  - R IJ removed 8/1     -s/p gi bleed (due to esophagitis and portal colopathy related to cirrhosis)  -7/9/20: EGD: mild esophagitis, grade 1 esophageal varices, lower 1/3 esophaguts, portal htn gastropathy noted  - 7/10/20: C-scope: rectal oozing consistent w/ portal colopathy, sigmoid diverticulosis noted  - Continue ppi bid; follow up [facility] 4 weeks; repeat egd 1 year for varix surveillance  -Hgb 8.5 today  -CBC in AM      -EDUARDO cirrhosis (w/ esophageal varices, portal htn gastopathy, and portal colopathy  - continue lactulose, diuretics, midodrine   - repeat para 7/31   -CMP in AM  -If necessary  we may need to consult GI     -Hx stage II HPV related oropharyngeal cancer   -formerly received chemotherapy & radiotherapy; not candidate currently for any therapies  -regarding tonsillar cancer, per oncology patient is not candidate for further therapies for tonsillar cancer (given his reina)  -  thrombocytopenia due to splenomegaly from cirrhosis rather than malignancy; f/u Dr. Campbell as outpatient     -Cytopenias (thrombocytopenia & anemia)   -felt due to cirrhosis/hypersplenism per heme-onc  -CBC in AM      Weakness   -PT/OT consult     DVT Prophylaxis:  On hold secondary to liver failure, GI bleed     Disposition: I expect the patient to be discharged home with daughter in [place] in 1-2 days. CM has arranged outpatient dialysis at [facility]. His first chair time will be Wednesday 8/12 at 1415.    DVT Prophylaxis:      Disposition: I expect the patient to be discharged     CODE STATUS:   Code Status and Medical Interventions:   Ordered at: 07/07/20 1721     Level Of Support Discussed With:    Patient     Code Status:    CPR     Medical Interventions (Level of Support Prior to Arrest):    Full         Electronically signed by FARHAN Mccarthy, 08/10/20, 14:12.

## 2020-08-10 NOTE — PROGRESS NOTES
Continued Stay Note  [facility]     Patient Name: Oliver Mallory  MRN: [id number]  Today's Date: 8/10/2020    Admit Date: 7/7/2020    Discharge Plan     Row Name 08/10/20 1539       Plan    Plan  discharge plan    Patient/Family in Agreement with Plan  yes    Plan Comments  Pt off the floor for dialysis.  Plan is home with daughter in [place] and HD at [facility] on MWF with 1st chair time, Wednesday, Aug 8/12 at 215pm(arriving by 145pm). Pt's family will transport.  Spoke with Luis Manuel(admissions at [facility]) and if pt is not discharged tomorrow, CM will need to call [facility] on Wednesday morning at [phone] . CM will cont to follow.                                                    Final Discharge Disposition Code  01 - home or self-care        Discharge Codes    No documentation.       Expected Discharge Date and Time     Expected Discharge Date Expected Discharge Time    Aug 11, 2020             Alis Pinto RN

## 2020-08-10 NOTE — POST-PROCEDURE NOTE
Interventional Radiology Operative Note    Date: 08/10/20     Time: 15:11     Pre-op Diagnosis:  Post-op Diagnosis:    Procedure:    Surgeon: ABELARDO Gore M.D.  Assistants: None    Sedation:     Estimated Blood Loss (EBL): Trace     Urine Output (UOP): N/A (short procedure)    IVF: N/A (short procedure)    Findings:    Specimens:    Complications:    Disposition:

## 2020-08-10 NOTE — H&P (VIEW-ONLY)
"   LOS: 34 days      Reason For Visit:  F/U AUDREY  Subjective    No new events no added distress  Review of Systems:   Patient denies shortness of breath, chest pain, dysuria, hematuria, nausea, vomiting.  Planes of lower extremity edema.        Objective       aspirin 81 mg Oral Daily   bumetanide 4 mg Oral Daily   cefTAZidime 1 g Intravenous Once per day on Mon Wed Fri   hydrocortisone  Topical Q12H   lactulose 10 g Oral TID   magic mouthwash 5 mL Swish & Spit 4x Daily   metoclopramide 5 mg Oral TID AC   midodrine 10 mg Oral 3 times per day on Sun Tue Thu Sat   midodrine 20 mg Oral 3 times per day on Mon Wed Fri   mirtazapine 15 mg Oral Nightly   Sally 2 patch Topical Once   sertraline 25 mg Oral Daily   sodium chloride 10 mL Intravenous Q12H            Vital Signs:  Blood pressure 135/77, pulse 115, temperature 97 °F (36.1 °C), temperature source Temporal, resp. rate 18, height 182.9 cm (72.01\"), weight 127 kg (280 lb 9.6 oz), SpO2 94 %.    Flowsheet Rows      First Filed Value   Admission Height  185.4 cm (73\") Documented at 07/07/2020 1640   Admission Weight  127 kg (280 lb) Documented at 07/07/2020 1640          08/09 0701 - 08/10 0700  In: 840 [P.O.:840]  Out: -     Physical Exam:    General Appearance: Really obese gentleman no obvious distress.  Eyes: PER, conjunctivae and sclerae normal, no icterus  Lungs: Air auscultation equal chest movement no rales or rhonchi's.  Heart/CV: regular rhythm, bilateral lower extremity edema 1-2+.  Abdomen:  Distended , soft, non-tender,  bowel sounds present, morbid obesity  Skin: No rash, Warm and dry.  Ecchymosis chest wall.  Neurologically grossly intact.    Temp dialysis access right IJ    Radiology:        Labs:  Results from last 7 days   Lab Units 08/10/20  0449 08/09/20  0522 08/08/20  0517   WBC 10*3/mm3 5.99 5.43 4.72   HEMOGLOBIN g/dL 8.5* 8.8* 8.1*   HEMATOCRIT % 25.8* 26.4* 24.5*   PLATELETS 10*3/mm3 38* 73* 63*     Results from last 7 days   Lab Units " 08/10/20  0449 08/09/20  0522 08/08/20  0517 08/07/20  0422 08/06/20  0424 08/05/20  0518 08/04/20  0418   SODIUM mmol/L 134* 136 134*  --  135* 137 138   POTASSIUM mmol/L 4.2 4.3 3.7  --  3.9 3.6 3.6   CHLORIDE mmol/L 101 102 102  --  102 102 103   CO2 mmol/L 22.0 23.0 23.0  --  22.0 23.0 26.0   BUN mg/dL 37* 28* 24*  --  23* 38* 29*   CREATININE mg/dL 4.89* 4.35* 3.19*  --  3.43* 5.03* 4.79*   CALCIUM mg/dL 8.9 9.0 8.6  --  8.6 8.2* 8.3*   PHOSPHORUS mg/dL  --   --   --   --  2.3* 3.1 2.4*   ALBUMIN g/dL 3.00* 3.00* 3.10* 2.80* 3.20* 2.70* 2.80*     Results from last 7 days   Lab Units 08/10/20  0449   GLUCOSE mg/dL 134*       Results from last 7 days   Lab Units 08/10/20  0449  08/07/20  0422   ALK PHOS U/L 158*   < > 144*   BILIRUBIN mg/dL 3.1*   < > 3.0*   BILIRUBIN DIRECT mg/dL  --   --  1.4*   ALT (SGPT) U/L 21   < > 21   AST (SGOT) U/L 86*   < > 83*    < > = values in this interval not displayed.      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  Non oliguric AUDREY: Thought to be ATN gradual worsening of renal function in the setting of chronic liver disease w cirrhosis.  Patient has received cis-platinum June 1, 2020.  No response to albumin and midodrine.  Started on HD on 7/24/20. For solute clearance and optimization of volume status  Hypokalemia: Management with HD   2.  Anemia: Transfuse PRN no LISA to be given due to cancer  3.  Dependent edema: Optimization with HD patient has high fluid intake.  4.  Cirrhosis of liver.  Decompensated liver disease:  5.  Intradialytic hypotension: midodrine 20 mg TID  6. Bacteremia - Enterobacteriacea   7.  GI bleed.  8.  Cytopenias: Has received  chemotherapy in the past, also have cirrhosis of liver and hypersplenism.  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 CARROLL  10.  In regards to AV fistula will wait for any renal recovery.  He will need a tunneled catheter.    Recommendations:   Dialysis in progress Case discussed with his son in the room  Monitor for renal recovery start to make some urine..  Avoid nephrotoxic medications.  Keep systolic blood pressure greater than 100.   Adjust medication for the new GFR.  Case discussed with the medical staff taking care of the patient.    Oliva Pérez MD  08/10/20  09:47

## 2020-08-10 NOTE — PLAN OF CARE
Problem: Fall Risk (Adult)  Goal: Absence of Fall  Outcome: Ongoing (interventions implemented as appropriate)  Flowsheets (Taken 8/10/2020 0400)  Absence of Fall: achieves outcome     Problem: Patient Care Overview  Goal: Plan of Care Review  Outcome: Ongoing (interventions implemented as appropriate)  Flowsheets  Taken 8/10/2020 0400  Progress: improving  Outcome Summary: VSS. Pt has had some pain tonight, controlled with prns. No complaints of nausea. Pt now resting well. Will continue to monitor.  Taken 8/9/2020 1953  Plan of Care Reviewed With: patient

## 2020-08-11 NOTE — PROGRESS NOTES
"   LOS: 35 days      Reason For Visit:  F/U AUDREY  Subjective    No new events.  New tunnel catheter placed.  Review of Systems:   Patient denies shortness of breath, chest pain, dysuria, hematuria, nausea, vomiting.           Objective       aspirin 81 mg Oral Daily   bumetanide 4 mg Oral Daily   cefTAZidime 1 g Intravenous Once per day on Mon Wed Fri   hydrocortisone  Topical Q12H   lactulose 10 g Oral TID   magic mouthwash 5 mL Swish & Spit 4x Daily   metoclopramide 5 mg Oral TID AC   midodrine 10 mg Oral 3 times per day on Sun Tue Thu Sat   midodrine 20 mg Oral 3 times per day on Mon Wed Fri   mirtazapine 15 mg Oral Nightly   Sally 2 patch Topical Once   sertraline 25 mg Oral Daily   sodium chloride 10 mL Intravenous Q12H            Vital Signs:  Blood pressure 115/74, pulse 96, temperature 97.6 °F (36.4 °C), temperature source Oral, resp. rate 16, height 182.9 cm (72.01\"), weight 126 kg (278 lb), SpO2 95 %.    Flowsheet Rows      First Filed Value   Admission Height  185.4 cm (73\") Documented at 07/07/2020 1640   Admission Weight  127 kg (280 lb) Documented at 07/07/2020 1640          08/10 0701 - 08/11 0700  In: 340 [P.O.:240]  Out: 3380     Physical Exam:    General Appearance: Awake alert oriented.  No distress.  Eyes: PER, conjunctivae and sclerae normal, no icterus  Lungs: Air auscultation equal chest movement no rales or rhonchi's.  Heart/CV: regular rhythm, bilateral lower extremity edema 1-2+.  Abdomen:  Distended , soft, non-tender,  bowel sounds present, morbid obesity  Skin: No rash, Warm and dry.  Ecchymosis chest wall.  Neurologically grossly intact.    Right IJ    Radiology:        Labs:  Results from last 7 days   Lab Units 08/11/20  0436 08/10/20  0449 08/09/20  0522   WBC 10*3/mm3 6.64 5.99 5.43   HEMOGLOBIN g/dL 7.9* 8.5* 8.8*   HEMATOCRIT % 24.1* 25.8* 26.4*   PLATELETS 10*3/mm3 78* 38* 73*     Results from last 7 days   Lab Units 08/11/20  0644 08/10/20  0449 08/09/20  0522 08/08/20  0517  " 08/06/20  0424 08/05/20  0518   SODIUM mmol/L 133* 134* 136 134*  --  135* 137   POTASSIUM mmol/L 4.2 4.2 4.3 3.7  --  3.9 3.6   CHLORIDE mmol/L 101 101 102 102  --  102 102   CO2 mmol/L 23.0 22.0 23.0 23.0  --  22.0 23.0   BUN mg/dL 32* 37* 28* 24*  --  23* 38*   CREATININE mg/dL 3.70* 4.89* 4.35* 3.19*  --  3.43* 5.03*   CALCIUM mg/dL 8.5* 8.9 9.0 8.6  --  8.6 8.2*   PHOSPHORUS mg/dL  --   --   --   --   --  2.3* 3.1   ALBUMIN g/dL 2.90* 3.00* 3.00* 3.10*   < > 3.20* 2.70*    < > = values in this interval not displayed.     Results from last 7 days   Lab Units 08/11/20  0644   GLUCOSE mg/dL 120*       Results from last 7 days   Lab Units 08/11/20  0644  08/07/20  0422   ALK PHOS U/L 153*   < > 144*   BILIRUBIN mg/dL 3.1*   < > 3.0*   BILIRUBIN DIRECT mg/dL  --   --  1.4*   ALT (SGPT) U/L 19   < > 21   AST (SGOT) U/L 78*   < > 83*    < > = values in this interval not displayed.      Assessment      Impression:   1.  Non oliguric AUDREY: Thought to be ATN gradual worsening of renal function in the setting of chronic liver disease w cirrhosis.  Patient has received cis-platinum June 1, 2020.  No response to albumin and midodrine.  Started on HD on 7/24/20. For solute clearance and optimization of volume status  Hypokalemia: Management with HD   2.  Anemia: Transfuse PRN no LISA to be given due to cancer  3.  Dependent edema: Optimization with HD patient has high fluid intake.  4.  Cirrhosis of liver.  Decompensated liver disease:  5.  Intradialytic hypotension: midodrine 20 mg TID  6. Bacteremia - Enterobacteriacea   7.  GI bleed.  8.  Cytopenias: Has received chemotherapy in the past, also have cirrhosis of liver and hypersplenism.  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 CARROLL  10.  In regards to AV fistula will wait for any renal recovery.  He will need a tunneled catheter.    Recommendations:    Dialysis ordered for tomorrow.  Monitor for renal recovery start to make some urine..  Avoid nephrotoxic medications.  Keep systolic blood pressure greater than 100.   Adjust medication for the new GFR.  Case discussed with the medical staff taking care of the patient.    Oliva Pérez MD  08/11/20  16:56

## 2020-08-11 NOTE — PROGRESS NOTES
Continued Stay Note  [facility]     Patient Name: Oliver Mallory  MRN: [id number]  Today's Date: 8/11/2020    Admit Date: 7/7/2020    Discharge Plan     Row Name 08/11/20 1459       Plan    Plan  discharge plan    Patient/Family in Agreement with Plan  yes    Plan Comments  Pt possibly being discharged tomorrow. CM will need to updated [facility] in [place] in am at [phone]. Spoke with pt in room and he report his family will transport him home.  Pt has an order for a rolling walker and has no preference to  DME company.. Referral made to [facility] and spoke with Gregg.  [employer] will deliver rolling walker to pt;'s room today. Pt denies needing HH.  CM will follow up tomorrow.     Final Discharge Disposition Code  01 - home or self-care        Discharge Codes    No documentation.       Expected Discharge Date and Time     Expected Discharge Date Expected Discharge Time    Aug 11, 2020             Alis Pinto RN

## 2020-08-11 NOTE — PLAN OF CARE
Problem: Patient Care Overview  Goal: Plan of Care Review  Outcome: Ongoing (interventions implemented as appropriate)  Flowsheets (Taken 8/11/2020 4578)  Plan of Care Reviewed With: patient  Note:   VSS. Patient resting comfortably. Likely discharge tomorrow after dialysis.

## 2020-08-11 NOTE — PLAN OF CARE
Problem: Patient Care Overview  Goal: Plan of Care Review  Outcome: Ongoing (interventions implemented as appropriate)  Flowsheets  Taken 8/11/2020 0427  Progress: improving  Taken 8/10/2020 2000  Plan of Care Reviewed With: patient  Note:   Pt is in bed resting eyes closed. No s/sx of distress. VSS. C/o pain x1 thus far. Hemo dialysis cath monitored for bleeding. No s/sx of bleeding at site. Pt does complain that the site is sore. Continues on room air. Pt up ab phuc takes self to bed side commode. Will continue to monitor.

## 2020-08-11 NOTE — PROGRESS NOTES
[facility] Medicine Services  PROGRESS NOTE    Patient Name: Oliver Mallory  : 1965  MRN: [id number]    Date of Admission: 2020  Length of Stay: 35  Primary Care Physician: Raymundo Salgado MD    Subjective   Subjective   CC:  Follow-up bacteremia, AUDREY    HPI:  Patient lying in bed in NAD.  When asked to is ready to be discharged home, he states that he did not feel very well.  Abdomin full of fluid.  Concerned about getting to dialysis chair tomorrow, so will be dialyzed here tomorrow and will be sent Friday. +BM.  No family in the room.    Review of Systems  Gen- No fevers, chills  CV- No chest pain, palpitations  Resp- No cough, dyspnea  GI- No N/V/D, abd pain; +abdominal swelling  All other systems have been reviewed and the pertinent positives and negatives are listed above in the HPI and ROS    Objective   Objective   Vital Signs:   Temp:  [97.6 °F (36.4 °C)-98.8 °F (37.1 °C)] 97.6 °F (36.4 °C)  Heart Rate:  [] 96  Resp:  [16] 16  BP: ()/(55-77) 107/67  Physical Exam:  Constitutional: Alert, WD, WN male in NAD  Eyes: EOMI, sclerae anicteric, no conjunctival injection  Head: NCAT  ENT: [place], moist mucous membranes   Respiratory: Non-labored, symmetrical chest expansion, CTAB  Cardiovascular: RRR, no M/R/G, +DP pulses bilaterally  Gastrointestinal: Soft, NT, ND +BS, major ascites  Musculoskeletal: KIRK; +2 LE edema bilaterally  Neurologic: Oriented x4, strength symmetric in all extremities, follows all commands, speech clear  Skin: No rashes on exposed skin; pale  Psychiatric: Pleasant and cooperative; normal affect    Results Reviewed:  Results from last 7 days   Lab Units 20  0436 08/10/20  0449 20  0522   WBC 10*3/mm3 6.64 5.99 5.43   HEMOGLOBIN g/dL 7.9* 8.5* 8.8*   HEMATOCRIT % 24.1* 25.8* 26.4*   PLATELETS 10*3/mm3 78* 38* 73*     Results from last 7 days   Lab Units 20  0644 08/10/20  0449 20  0522   SODIUM mmol/L 133* 134* 136    POTASSIUM mmol/L 4.2 4.2 4.3   CHLORIDE mmol/L 101 101 102   CO2 mmol/L 23.0 22.0 23.0   BUN mg/dL 32* 37* 28*   CREATININE mg/dL 3.70* 4.89* 4.35*   GLUCOSE mg/dL 120* 134* 129*   CALCIUM mg/dL 8.5* 8.9 9.0   ALT (SGPT) U/L 19 21 19   AST (SGOT) U/L 78* 86* 85*     Estimated Creatinine Clearance: 30.6 mL/min (A) (by C-G formula based on SCr of 3.7 mg/dL (H)).    Microbiology Results Abnormal     Procedure Component Value - Date/Time    Blood Culture - Blood, Arm, Left [[id number]] Collected:  08/05/20 1547    Lab Status:  Final result Specimen:  Blood from Arm, Left Updated:  08/10/20 1615     Blood Culture No growth at 5 days    Blood Culture - Blood, Arm, Left [[id number]] Collected:  08/05/20 1204    Lab Status:  Final result Specimen:  Blood from Arm, Left Updated:  08/10/20 1245     Blood Culture No growth at 5 days    Anaerobic Culture - Body Fluid, Peritoneum [[id number]] Collected:  07/31/20 1422    Lab Status:  Final result Specimen:  Body Fluid from Peritoneum Updated:  08/05/20 1201     Anaerobic Culture No anaerobes isolated at 5 days    Body Fluid Culture - Body Fluid, Peritoneum [[id number]] Collected:  07/31/20 1422    Lab Status:  Final result Specimen:  Body Fluid from Peritoneum Updated:  08/03/20 0751     Body Fluid Culture No growth at 3 days     Gram Stain No organisms seen      Occasional WBCs per low power field    Blood Culture - Blood, Arm, Left [[id number]]  (Abnormal) Collected:  07/31/20 0832    Lab Status:  Final result Specimen:  Blood from Arm, Left Updated:  08/03/20 0649     Blood Culture Citrobacter koseri     Comment: Refer to previous blood culture collected on 7/31/2020 0830 for MICs.          Isolated from Aerobic and Anaerobic Bottles     Gram Stain Anaerobic Bottle Gram negative bacilli      Aerobic Bottle Gram negative bacilli    Blood Culture - Blood, Blood, PICC Line [[id number]]  (Abnormal)  (Susceptibility) Collected:  07/31/20 0830    Lab Status:  Final result Specimen:   Blood, PICC Line Updated:  08/03/20 0649     Blood Culture Citrobacter koseri     Isolated from Aerobic and Anaerobic Bottles     Gram Stain Anaerobic Bottle Gram negative bacilli      Aerobic Bottle Gram negative bacilli    Susceptibility      Citrobacter koseri     KALANI     Cefepime Susceptible     Ceftazidime Susceptible     Ceftriaxone Susceptible     Gentamicin Susceptible     Levofloxacin Susceptible     Piperacillin + Tazobactam Susceptible     Trimethoprim + Sulfamethoxazole Susceptible                Susceptibility Comments     Citrobacter koseri    Cefazolin sensitivity will not be reported for Enterobacteriaceae in non-urine isolates. If cefazolin is preferred, please call the microbiology lab to request an E-test.  With the exception of urinary-sourced infections, aminoglycosides should not be used as monotherapy.             Blood Culture ID, PCR - Blood, Blood, PICC Line [[id number]]  (Abnormal) Collected:  07/31/20 0830    Lab Status:  Final result Specimen:  Blood, PICC Line Updated:  08/01/20 0022     BCID, PCR Enterobacteriaceae Group. Identification by BCID PCR.    Body Fluid Culture - Body Fluid, Peritoneum [[id number]] Collected:  07/15/20 1038    Lab Status:  Final result Specimen:  Body Fluid from Peritoneum Updated:  07/18/20 0710     Body Fluid Culture No growth at 3 days     Gram Stain Few (2+) WBCs seen      No organisms seen    Blood Culture - Blood, Arm, Right [[id number]] Collected:  07/08/20 1748    Lab Status:  Final result Specimen:  Blood from Arm, Right Updated:  07/13/20 1800     Blood Culture No growth at 5 days    Blood Culture - Blood, Hand, Left [[id number]] Collected:  07/08/20 1744    Lab Status:  Final result Specimen:  Blood from Hand, Left Updated:  07/13/20 1800     Blood Culture No growth at 5 days    Body Fluid Culture - Body Fluid, Peritoneum [[id number]] Collected:  07/10/20 0916    Lab Status:  Final result Specimen:  Body Fluid from Peritoneum Updated:  07/13/20 0700      Body Fluid Culture No growth at 3 days     Gram Stain Moderate (3+) WBCs seen      No organisms seen    COVID PRE-OP / PRE-PROCEDURE SCREENING ORDER (NO ISOLATION) - Swab, Nasopharynx [[id number]] Collected:  07/08/20 2009    Lab Status:  Final result Specimen:  Swab from Nasopharynx Updated:  07/08/20 2119    Narrative:       The following orders were created for panel order COVID PRE-OP / PRE-PROCEDURE SCREENING ORDER (NO ISOLATION) - Swab, Nasopharynx.  Procedure                               Abnormality         Status                     ---------                               -----------         ------                     COVID-19,CEPHEID,CLAUDIA IN-...[[id number]]  Normal              Final result                 Please view results for these tests on the individual orders.    COVID-19,CEPHEID,CLAUDIA IN-HOUSE(OR EMERGENT/ADD-ON),NP SWAB IN TRANSPORT MEDIA 3-4 HR TAT - Swab, Nasopharynx [[id number]]  (Normal) Collected:  07/08/20 2009    Lab Status:  Final result Specimen:  Swab from Nasopharynx Updated:  07/08/20 2119     COVID19 Not Detected    Narrative:       Fact sheet for providers: https://www.fda.gov/media/231304/download     Fact sheet for patients: https://www.fda.gov/media/087273/download          Imaging Results (Last 24 Hours)     Procedure Component Value Units Date/Time    IR Tunneled Catheter [[id number]] Collected:  08/10/20 1511     Updated:  08/10/20 1531    Narrative:       PROCEDURE: Tunneled dialysis catheter placement      Procedural Personnel  Attending physician(s): ABELARDO Gore M.D.  Fellow physician(s): None  Resident physician(s): None  Advanced practice provider(s): None     Pre-procedure diagnosis: Renal failure requiring hemodialysis  Post-procedure diagnosis: Same  Indication (QCDR): Previous tunneled hemodialysis catheter infected and  removed. Temporary placed. Patient returns to tunneled hemodialysis  catheter.  Additional clinical history: None     Complications: No immediate  complications.       Impression:          Insertion of right-sided tunneled dialysis catheter, with tip in the  expected location of the cavoatrial junction.     Plan:      The catheter may be used immediately.  _______________________________________________________________     PROCEDURE SUMMARY:  - Venous access with ultrasound guidance  - Tunneled dialysis catheter insertion with fluoroscopic guidance  - Additional procedure(s): None     PROCEDURE DETAILS:     Pre-procedure  Consent: Informed consent for the procedure including risks, benefits  and alternatives was obtained and time-out was performed prior to the  procedure.  Preparation (MIPS): The site was prepared and draped using all elements  of maximal sterile barrier technique including sterile gloves, sterile  gown, cap, mask, large sterile sheet, sterile ultrasound probe cover,  hand hygiene and cutaneous antisepsis with 2% chlorhexidine.   Medical reason for site preparation exception (MIPS): Not applicable     Anesthesia/sedation  Level of anesthesia/sedation: Moderate sedation (conscious sedation)  Anesthesia/sedation administered by: Independent trained observer under  attending supervision with continuous monitoring of the patient?s level  of consciousness and physiologic status  Total intra-service sedation time (minutes): 27     Access  Local anesthesia was administered. The vessel was sonographically  evaluated and determined to be patent. Real time ultrasound was used to  visualize needle entry into the vessel and a permanent image obtained.  Vein accessed (QCDR): Right internal jugular vein  Internal jugular vein patency (QCDR): Patent  Access technique: Micropuncture technique under ultrasound guidance     Venography  Indication for venography: Not applicable  Catheter tip position for venography: Not applicable  Findings: Not applicable     Catheter placement  An incision was made near the venous access site and the catheter was  tunneled  subcutaneously to the venous access site. The catheter was  advanced via a peel-away sheath into the vein under fluoroscopic  guidance. Catheter tip location was fluoroscopically verified and a  permanent image was stored.  Catheter placed: 14.5 [employer] SI Silver Ion Catheter.  Lot No. [id number]  Catheter cuff-to-tip length (cm): 19 (initially a 23 cm catheter placed,  this was replaced with a 19)  Catheter flush: Heparin (1000 units per mL)     Closure  A sterile dressing was applied.  Access site closure technique: Tissue adhesive  Catheter securement technique: Non-absorbable suture     Contrast  Contrast agent: None  Contrast volume (mL): 0     Radiation Dose  Fluoroscopy time: 0.5 minutes   Dose: 17.8 mGy     Additional Details  Additional description of procedure: None  Equipment details: None  Specimens removed: None  Estimated blood loss (mL): Less than 10  Standardized report: TunneledDialysisCatheter     Attestation  I was present and scrubbed for the entire procedure. Imaging reviewed.  Agree with final report as written.     This report was finalized on 8/10/2020 3:22 PM by Rojas Gore.             Results for orders placed during the hospital encounter of 07/07/20   Adult Transesophageal Echo (GABRIELA) W/ Cont if Necessary Per Protocol    Narrative · Estimated EF appears to be in the range of 66 - 70%  · No valvular vegetation seen on the study, no evidence of endocarditis  · There is a catheter which terminates in his SVC, no vegetations visible  · There is evidence of ascites          I have reviewed the medications:  Scheduled Meds:    aspirin 81 mg Oral Daily   bumetanide 4 mg Oral Daily   cefTAZidime 1 g Intravenous Once per day on Mon Wed Fri   hydrocortisone  Topical Q12H   lactulose 10 g Oral TID   magic mouthwash 5 mL Swish & Spit 4x Daily   metoclopramide 5 mg Oral TID AC   midodrine 10 mg Oral 3 times per day on Sun Tue Thu Sat   midodrine 20 mg Oral 3 times per day on Mon  Wed Fri   mirtazapine 15 mg Oral Nightly   Sally 2 patch Topical Once   sertraline 25 mg Oral Daily   sodium chloride 10 mL Intravenous Q12H     Continuous Infusions:   PRN Meds:.•  acetaminophen  •  albumin human  •  albuterol  •  diphenhydrAMINE  •  heparin (porcine)  •  Morphine  •  ondansetron  •  oxyCODONE  •  phenylephrine-mineral oil-petrolatum  •  sodium chloride      Assessment/Plan   Assessment / Plan     Active Hospital Problems    Diagnosis  POA   • **Hypotension [I95.9]  Yes   • Liver cirrhosis w/ ascites  [K74.60, R18.8]  Yes   • Intractable nausea and vomiting [R11.2]  Yes   • Dehydration [E86.0]  Yes   • ARF likely 2* ATN  [N17.9]  Yes   • Chemotherapy-induced thrombocytopenia [D69.59, T45.1X5A]  Yes   • GIB w/ hematemesis [K92.0]  No   • Acute blood loss anemia [D62]  Yes   • Rectal bleeding [K62.5]  Yes   • Constipation due to opioid therapy [K59.03, T40.2X5A]  Yes   • Pain, cancer [G89.3]  Yes   • 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        Brief Hospital Course to date:  Oliver Mallory is a 55 y.o. male PMH of HTN, COPD, Pancreatitis, Cirrhosis, Tonsillar CA s/p tonsillectomy undergoing chemo and RT admitted 7/7/2020 with intractable nausea and vomiting for 4 days with no food intake.  Hospital course complicated by AUDREY, GIB, and hypotension.  He was admitted to ICU and did require pressors for some time. Ultimately stabilized after IV fluids, improvement in GIB and addition of midodrine. Transferred to floor 7/17. Renal function is continually worsening w/ NAL following. Patient lost iv access on 7/23/20, unable to obtain another IV (despite multiple attempts w/ IV, also attempted EJ access without success. Due to progressive kidney failure w/ volume overload, tunneled dialysis cath & central line was placed by Dr. Gore of interventional radiology on 7/24/20 and dialysis was initiated. Had some  post-procedural right neck/supraclavicular oozing/hematoma s/p platelets and FFP. 08/03 tunneled dialysis cath removed and replaced with temporary line.      These problems are new to me today    This patient's problems and plans were partially entered by my partner and updated as appropriate by me 08/11/20.    -Progressive AUDREY w/ volume overload, now s/p dialysis catheter placement receiving Hemodialysis  -Tunneled dialysis cath placed by Dr. Gore on 7/24/20  - removed on 8/3 with placement of temporary line  - 08/10 14.5 Fr tunneled dialysis catheter placed by Dr Gore   - echo 7/7/20: normal EF, normal valves  - nephrology following   - Increase Midodrine to 20 mg TID on dialysis days   - Difficulty removing volume with cirrhosis   - GABRIELA with no valvular vegetation seen, no evidence of endocarditis     Fever   Enterobacteriaceae bacteremia   - BCx +citrobacter koseri 7/31 x4 bottles  - Started merrem (8/1), changed to fortaz post HD on 8/5  - ID following  - Abd US with no acute findings      -right neck/supraclavicular hematoma around catheter site  - Coagulopathy   - s/p platelets and FFP and Vit K  - R IJ removed 8/1     -s/p GI bleed (due to esophagitis and portal colopathy related to cirrhosis)  -7/9/20: EGD: mild esophagitis, grade 1 esophageal varices, lower 1/3 esophaguts, portal htn gastropathy noted  - 7/10/20: C-scope: rectal oozing consistent w/ portal colopathy, sigmoid diverticulosis noted  - Continue PPI BID  -- follow up [facility] 4 weeks; repeat egd 1 year for varix surveillance  --Hgb 7.9  -CBC in AM      -EDUARDO cirrhosis (w/ esophageal varices, portal htn gastopathy, and portal colopathy  - continue lactulose, diuretics, midodrine   - repeat paracentesis 7/31   -CMP in AM  -If necessary we may need to consult GI     -Hx stage II HPV related oropharyngeal cancer   -formerly received chemotherapy & radiotherapy; not candidate currently for any therapies  -regarding tonsillar cancer, per  oncology patient is not candidate for further therapies for tonsillar cancer (given his reina)  --thrombocytopenia due to splenomegaly from cirrhosis rather than malignancy  --f/u Dr. Campbell as outpatient     -Cytopenias (thrombocytopenia & anemia)   -felt due to cirrhosis/hypersplenism per heme-onc  -CBC in AM      Weakness   -PT/OT consult     DVT Prophylaxis:  On hold secondary to liver failure, GI bleed     Disposition: I expect the patient to be discharged home with daughter in [place] in 1-2 days. CM has arranged outpatient dialysis at [facility]. His first chair time will be Wednesday 8/12 at 1415.    DVT Prophylaxis:      Disposition: I expect the patient to be discharged tomorrow with dialysis scheduled for MWF    CODE STATUS:   Code Status and Medical Interventions:   Ordered at: 07/07/20 1721     Level Of Support Discussed With:    Patient     Code Status:    CPR     Medical Interventions (Level of Support Prior to Arrest):    Full     Electronically signed by FARHAN Bruce, 08/11/20, 15:05.

## 2020-08-11 NOTE — PROGRESS NOTES
INFECTIOUS DISEASE Progress Note    Oliver Mallory  1965  [id number]    Date of consult: 8/1/20    Admit date: 7/7/2020    Evaluating physician: Dr. Joey Graham  Reason for Consultation: Citrobacter sepsis with bacteremia, 4 out of 4 bottles positive from 7/31/2020  Chief Complaint: Intractable nausea/vomiting and constipation, sepsis      Subjective   History of present illness:  Mr. Oliver Mallory 55 y.o.  Yr old male who is being evaluated for Enterobacter bacteremia.  He has a past medical history significant for hypertension, COPD, pancreatitis, cirrhosis, tonsillar cancer status post tonsillectomy undergoing chemo and radiation therapy.  He was initially admitted on 7/7/2020 with intractable nausea and vomiting x4 days.  He was initially admitted to the ICU and did require some pressors.  Hospital course was complicated by GI bleed and hypotension as well acute kidney injury.  Patient was stabilized and transferred to the floor on 7/17.  Due to progressive kidney failure with fluid volume overload, tunneled dialysis catheter and central line was placed by interventional radiology on 7/24/2020 and dialysis was initiated.  There was reported issues with bleeding from the central line and patient received platelets and FFP.  Patient was found to have IJ line pulled most the way out had to be discontinued on 8/1 morning. Dialysis catheter remains in place.  Patient underwent a CT-guided paracentesis on 7/31, 7/15 and 7/10.  Patient had a EGD on 7/9 that showed mild portal gastropathy, reflux esophagitis and grade 1 esophageal varices.  He received a colonoscopy on 7/10 which showed a few sigmoid diverticula.  There is also petechiae in the rectum with scant oozing.  It was felt, in the absence of history of radiation to this region, that this is suspected portal colopathy.  Patient also received an echo on 7/8/2020 that was normal.    Patient did develop a fever of 101.5 on 7/30 as well as some tachycardia up to  112.  Patient did remain without leukocytosis however due to the fever blood cultures were ordered which have turned positive in 4/4 bottles for gram-negative bacilli identified as Enterobacteriaceae by [employer] report.  Currently WBC is 5.56, H&H is 8.1/25.7 and platelets are 34.  AST is elevated at 70 and alkaline phosphatase is elevated 148 and total bilirubin is elevated 2.7.  7/31 CT of chest show clear lungs and cirrhosis with ascites.  CT of the abdomen on 8/1 showed cirrhosis with splenomegaly and ascites, surgical absence of gallbladder and diverticulosis.    Patient has no known antibiotic allergies and is currently receiving IV Zosyn.  ID has been consulted for further evaluation and treatment as well as antimicrobial therapy management on 8/1/2020.    Of note:  7/8 blood cultures were finalized no growth in 4/4 bottles  7/31 body fluid culture and stain-no organisms seen  7/15 body fluid culture and stain-no organisms seen    8/2/2020: History reviewed.  Patient sitting up on side of bed awake and alert upon arrival.  He remains with dialysis catheter in right chest.  He denies any events overnight.  He has been afebrile with some hypotension overnight but overall BP is trending up.  Without leukocytosis with a WBC of 6.11.  However lactic acid remains elevated at 3.0 as well as a CRP of 5.49.  Procalcitonin is also elevated 1.2.  Blood cultures have been identified as Citrobacter koseri by [employer] report.  Did receive an ultrasound of the  Right upper quadrant which showed free fluid identified throughout the upper abdomen, liver with increased echogenicity with some lobulation identified at the contour suggesting cirrhosis.  Gallbladder fossa was unremarkable and the common bile duct measured 8 mm.  TTE was performed on 8/1 and is currently pending interpretation.  Awaiting for catheter removal in a.m. after dialysis.    8/3/2020: Patient seen in HD today. 0 complaints overnight. Patient afebrile and  hemodynamically stable overnight.  Plan to remove Vipin cath today after dialysis.  Remains without leukocytosis.  No events to report overnight per RN.  Continues on IV meropenem for Citrobacter koseri sepsis.  Waiting for dialysis catheter removal.  No pain.  On IV meropenem until 8/17/2020.    8/4/2020 history reviewed.  No high fevers or chills.  On meropenem until 8/17, or longer depending upon GABRIELA.  8/1/2020 TTE with small mobile strand on the posterior leaflet of the mitral valve suggestive of a vegetation.  No pain.  Tunneled Vipin cath removed right chest, replaced by temporary dialysis catheter on 8/3/2020.    8/5/2020 history reviewed.  On meropenem but changing to post hemodialysis Fortaz until 8/17 depending on GABRIELA results.  TTE with possible mitral valve vegetation.  Tunneled Vipin cath removed on 8/3 with temporary placed.  Being treated for Citrobacter sepsis from blood cultures from 7/31.  Sensitive to levofloxacin, cefotaxime, trimethoprim sulfa.  Changing to post hemodialysis Fortaz.    8/6/20 hx rev.  On fortaz till 8/17 for Vipin cath infxn with septicemia w/ Citrobacter with a neg GABRIELA 8/4.  Vipin cath removed 8/3 w/ temp placed.      8/7/20 hx rev.  On fortaz till 8/17 (post HD M, W, F) for sepsis from Citrobacter w/ neg GABRIELA.  Source likely Vipin cath removed 8/3.  No fever.  No pain.    8/10/2020 history reviewed.  Continues on Fortaz till 8/17 after hemodialysis for Citrobacter sepsis.  No high fevers.    8/11/2020 history reviewed.  On Fortaz until 8/17 being given after hemodialysis for Citrobacter sepsis.  No fevers.  No pain.    Past Medical History:   Diagnosis Date   • Arthritis    • Cancer (CMS/HCC)    • COPD (chronic obstructive pulmonary disease) (CMS/HCC)    • Fall 05/12/2020   • Hypertension    • Pancreatitis    • Tonsillar cancer (CMS/HCC)        Past Surgical History:   Procedure Laterality Date   • ANKLE TENDON REPAIR     • CARDIAC CATHETERIZATION N/A 5/31/2018    Procedure: Left Heart  Cath;  Surgeon: Ray Farley MD;  Location:  [facility];  Service: Cardiovascular   • CHOLECYSTECTOMY     • COLONOSCOPY N/A 7/10/2020    Procedure: COLONOSCOPY;  Surgeon: Brunner, Mark I, MD;  Location:  [facility];  Service: Gastroenterology;  Laterality: N/A;   • CYST REMOVAL      coccyx   • ENDOSCOPY N/A 7/9/2020    Procedure: ESOPHAGOGASTRODUODENOSCOPY;  Surgeon: Brunner, Mark I, MD;  Location:  [facility];  Service: Gastroenterology;  Laterality: N/A;   • HERNIA MESH REMOVAL     • HERNIA REPAIR     • KNEE SURGERY  1981   • TONSILLECTOMY         Pediatric History   Patient Guardian Status   • Not on file     Other Topics Concern   • Not on file   Social History Narrative    Patient ambulatory without assistive device, but has ordered       family history includes Heart disease in his father; Hypertension in his mother.    No Known Allergies    Immunization History   Administered Date(s) Administered   • Hepatitis A 07/11/2020   • Hepatitis B Vaccine Adult IM 07/11/2020       Medication:    Current Facility-Administered Medications:   •  acetaminophen (TYLENOL) tablet 650 mg, 650 mg, Oral, Q4H PRN, Abram Abraham MD, 650 mg at 08/08/20 1642  •  albumin human 25 % IV SOLN 25 g, 25 g, Intravenous, PRN, Saúl, Rickey Rush MD, 25 g at 08/07/20 0910  •  albuterol (PROVENTIL) nebulizer solution 0.083% 2.5 mg/3mL, 2.5 mg, Nebulization, Q6H PRN, Abram Abraham MD, 2.5 mg at 07/21/20 1405  •  aspirin chewable tablet 81 mg, 81 mg, Oral, Daily, Abram Abraham MD, 81 mg at 08/11/20 0823  •  bumetanide (BUMEX) tablet 4 mg, 4 mg, Oral, Daily, Escobar Miles MD, 4 mg at 08/11/20 0823  •  cefTAZidime (FORTAZ) 1 g/100 mL 0.9% NS IVPB (mpb), 1 g, Intravenous, Once per day on Mon Wed Fri, Gino Nielsen IV, PharmD, 1 g at 08/10/20 1825  •  diphenhydrAMINE (BENADRYL) capsule 25 mg, 25 mg, Oral, Q6H PRN, Escobar Miles MD  •  heparin (porcine) injection 1,600 Units, 1,600 Units,  Intracatheter, PRN, Escobar Miles MD, 1,600 Units at 08/10/20 1215  •  hydrocortisone 1 % cream, , Topical, Q12H, Rupa Hawthorne, APRN  •  lactulose (CHRONULAC) 10 GM/15ML solution 10 g, 10 g, Oral, TID, Abram Abraham MD, 10 g at 08/11/20 1253  •  magic mouthwash oral supsension 5 mL, 5 mL, Swish & Spit, 4x Daily, Loreta Hemphill II, DO, 5 mL at 08/10/20 2034  •  metoclopramide (REGLAN) tablet 5 mg, 5 mg, Oral, TID AC, Abram Abraham MD, 5 mg at 08/11/20 1253  •  midodrine (PROAMATINE) tablet 10 mg, 10 mg, Oral, 3 times per day on Sun Tue Thu Sat, Ksenia Chaidez, DO, 10 mg at 08/11/20 1253  •  midodrine (PROAMATINE) tablet 20 mg, 20 mg, Oral, 3 times per day on Mon Wed Fri, Ksenia Chaidez DO, 20 mg at 08/10/20 1813  •  mirtazapine (REMERON) tablet 15 mg, 15 mg, Oral, Nightly, Abram Abraham MD, 15 mg at 08/10/20 2034  •  morphine injection 1 mg, 1 mg, Intravenous, Q4H PRN, Sherrill Garber, DO, 1 mg at 08/08/20 1636  •  Sally patch 2 patch, 2 patch, Topical, Once, Zamzam Villa, APRN, Stopped at 07/31/20 2211  •  ondansetron (ZOFRAN) injection 4 mg, 4 mg, Intravenous, Q6H PRN, Abram Abraham MD, 4 mg at 08/08/20 2045  •  oxyCODONE (ROXICODONE) immediate release tablet 10 mg, 10 mg, Oral, Q6H PRN, Mustapha Ramires MD, 10 mg at 08/11/20 0823  •  phenylephrine-mineral oil-petrolatum (PREPARATION H) 0.25-14-74.9 % hemorhoidal ointment, , Rectal, TID PRN, Abram Abraham MD  •  sertraline (ZOLOFT) tablet 25 mg, 25 mg, Oral, Daily, Abram Abraham MD, 25 mg at 08/11/20 0822  •  sodium chloride 0.9 % flush 10 mL, 10 mL, Intravenous, Q12H, Abram Abraham MD, 10 mL at 08/11/20 0823  •  sodium chloride 0.9 % flush 10 mL, 10 mL, Intravenous, PRN, Abram Abraham MD, 10 mL at 07/10/20 0759    Please refer to the medical record for a full medication list    Review of Systems:    Constitutional--afebrile overnight, no fatigue  HEENT-- No new vision, hearing or throat complaints.  No epistaxis or oral  "sores.  Denies odynophagia or dysphagia.  No odynophagia or dysphagia. No headache, photophobia or neck stiffness.  CV-- No chest pain, palpitation or syncope  Resp-- No SOB/cough/Hemoptysis, or wheezing  GI- No nausea, vomiting, or diarrhea.  No hematochezia, melena, or hematemesis. Denies jaundice or chronic liver disease.  -- No dysuria, hematuria, or flank pain.  Denies hesitancy, urgency or flank pain.  Lymph- no swollen lymph nodes in neck/axilla or groin.   Heme-positive ecchymosis across chest.  MS-- no swelling or pain in the bones or joints of arms/legs.  No new back pain.  Neuro-- No acute focal weakness or numbness in the arms or legs.  No seizures.  Skin--positive for bruising, no pain right temporary dialysis catheter chest, no petechiae    Physical Exam:   Vital Signs   Temp:  [97.6 °F (36.4 °C)-98.8 °F (37.1 °C)] 97.6 °F (36.4 °C)  Heart Rate:  [] 96  Resp:  [16-20] 16  BP: ()/(55-95) 107/67    Temp  Min: 97.6 °F (36.4 °C)  Max: 98.8 °F (37.1 °C)  BP  Min: 99/63  Max: 118/77  Pulse  Min: 85  Max: 108  Resp  Min: 16  Max: 20  SpO2  Min: 90 %  Max: 100 %    Blood pressure 107/67, pulse 96, temperature 97.6 °F (36.4 °C), temperature source Oral, resp. rate 16, height 182.9 cm (72.01\"), weight 126 kg (278 lb), SpO2 92 %.  GENERAL: Awake and alert, in minimal distress.  Resting in bed.  HEENT:  Normocephalic, atraumatic.  Ears externally normal, Nose externally normal.  EYES: No icterus.   LYMPHATICS:   HEART: No obvious murmur.  RRR.  No JVD.  LUNGS: Clear to auscultation. No respiratory distress, no use of accessory muscles.  No wheezes.  ABDOMEN: Soft, tender x4 quadrants, nondistended. Bowel sounds normal.  Obese.  SKIN: Warm and dry without cutaneous eruptions.  Ecchymosis noted across bilateral chest and scattered across bilateral upper extremities.  Line right chest ok.  PSYCHIATRIC: Mental status lucid. No confusion.  EXT:  No cellulitic change.  Normal range of motion.  NEURO: " Oriented to name, nonfocal  LINES: Right chest dialysis cath in place.  Ecchymosis noted around the site.  Dressing in place.      Results Review:       Results from last 7 days   Lab Units 08/11/20  0436 08/10/20  0449 08/09/20  0522   WBC 10*3/mm3 6.64 5.99 5.43   HEMOGLOBIN g/dL 7.9* 8.5* 8.8*   HEMATOCRIT % 24.1* 25.8* 26.4*   PLATELETS 10*3/mm3 78* 38* 73*     Results from last 7 days   Lab Units 08/11/20  0644   SODIUM mmol/L 133*   POTASSIUM mmol/L 4.2   CHLORIDE mmol/L 101   CO2 mmol/L 23.0   BUN mg/dL 32*   CREATININE mg/dL 3.70*   GLUCOSE mg/dL 120*   CALCIUM mg/dL 8.5*     Results from last 7 days   Lab Units 08/11/20  0644  08/07/20  0422   ALK PHOS U/L 153*   < > 144*   BILIRUBIN mg/dL 3.1*   < > 3.0*   BILIRUBIN DIRECT mg/dL  --   --  1.4*   ALT (SGPT) U/L 19   < > 21   AST (SGOT) U/L 78*   < > 83*    < > = values in this interval not displayed.                 Results from last 7 days   Lab Units 08/08/20  0517   LACTATE mmol/L 2.5*     Estimated Creatinine Clearance: 30.6 mL/min (A) (by C-G formula based on SCr of 3.7 mg/dL (H)).    Microbiology:  Microbiology Results (last 10 days)     Procedure Component Value - Date/Time    Blood Culture - Blood, Arm, Left [[id number]] Collected:  08/05/20 1547    Lab Status:  Final result Specimen:  Blood from Arm, Left Updated:  08/10/20 1615     Blood Culture No growth at 5 days    Blood Culture - Blood, Arm, Left [[id number]] Collected:  08/05/20 1204    Lab Status:  Final result Specimen:  Blood from Arm, Left Updated:  08/10/20 1245     Blood Culture No growth at 5 days            Radiology:  Imaging Results (Last 72 Hours)     Procedure Component Value Units Date/Time    IR Tunneled Catheter [[id number]] Collected:  08/10/20 1511     Updated:  08/10/20 1531    Narrative:       PROCEDURE: Tunneled dialysis catheter placement      Procedural Personnel  Attending physician(s): ABELARDO Gore M.D.  Fellow physician(s): None  Resident physician(s): None  Advanced  practice provider(s): None     Pre-procedure diagnosis: Renal failure requiring hemodialysis  Post-procedure diagnosis: Same  Indication (QCDR): Previous tunneled hemodialysis catheter infected and  removed. Temporary placed. Patient returns to tunneled hemodialysis  catheter.  Additional clinical history: None     Complications: No immediate complications.       Impression:          Insertion of right-sided tunneled dialysis catheter, with tip in the  expected location of the cavoatrial junction.     Plan:      The catheter may be used immediately.  _______________________________________________________________     PROCEDURE SUMMARY:  - Venous access with ultrasound guidance  - Tunneled dialysis catheter insertion with fluoroscopic guidance  - Additional procedure(s): None     PROCEDURE DETAILS:     Pre-procedure  Consent: Informed consent for the procedure including risks, benefits  and alternatives was obtained and time-out was performed prior to the  procedure.  Preparation (MIPS): The site was prepared and draped using all elements  of maximal sterile barrier technique including sterile gloves, sterile  gown, cap, mask, large sterile sheet, sterile ultrasound probe cover,  hand hygiene and cutaneous antisepsis with 2% chlorhexidine.   Medical reason for site preparation exception (MIPS): Not applicable     Anesthesia/sedation  Level of anesthesia/sedation: Moderate sedation (conscious sedation)  Anesthesia/sedation administered by: Independent trained observer under  attending supervision with continuous monitoring of the patient?s level  of consciousness and physiologic status  Total intra-service sedation time (minutes): 27     Access  Local anesthesia was administered. The vessel was sonographically  evaluated and determined to be patent. Real time ultrasound was used to  visualize needle entry into the vessel and a permanent image obtained.  Vein accessed (QCDR): Right internal jugular vein  Internal  jugular vein patency (QCDR): Patent  Access technique: Micropuncture technique under ultrasound guidance     Venography  Indication for venography: Not applicable  Catheter tip position for venography: Not applicable  Findings: Not applicable     Catheter placement  An incision was made near the venous access site and the catheter was  tunneled subcutaneously to the venous access site. The catheter was  advanced via a peel-away sheath into the vein under fluoroscopic  guidance. Catheter tip location was fluoroscopically verified and a  permanent image was stored.  Catheter placed: 14.5 German Rent[employer] SI Silver Ion Catheter.  Lot No. [id number]  Catheter cuff-to-tip length (cm): 19 (initially a 23 cm catheter placed,  this was replaced with a 19)  Catheter flush: Heparin (1000 units per mL)     Closure  A sterile dressing was applied.  Access site closure technique: Tissue adhesive  Catheter securement technique: Non-absorbable suture     Contrast  Contrast agent: None  Contrast volume (mL): 0     Radiation Dose  Fluoroscopy time: 0.5 minutes   Dose: 17.8 mGy     Additional Details  Additional description of procedure: None  Equipment details: None  Specimens removed: None  Estimated blood loss (mL): Less than 10  Standardized report: TunneledDialysisCatheter     Attestation  I was present and scrubbed for the entire procedure. Imaging reviewed.  Agree with final report as written.     This report was finalized on 8/10/2020 3:22 PM by Rojas Gore.             IMPRESSION:     1. Sepsis with Enterobacteriaceae group bacteremia identified as Citrobacter koseri by [employer] report-blood cultures positive in 4/4 bottles 7/31/2020.  Etiology is unclear at this time but could likely be a dialysis/line infection. Prior echo negative on 7/8.  May possibly be an occult intra-abdominal process, or elsewhere but not obvious on CT scan of the chest abdomen and pelvis from 7/31/2020.  However due to the high-grade  bacteremia and intravascular infection is suspected/likely.  8/1 TTE was performed with possible mitral valve vegetation.  GABRIELA pending 8/4/2020.  8/1 right upper quadrant ultrasound has been performed with no evidence of biliary duct/gallstones disease.  Repeat cx 8/5 neg.  2. TTE from 8/1 with possible mitral valve vegetation.  GABRIELA neg 8/4/20.  3. Liver cirrhosis with ascites status post multiple CT-guided paracentesis.  4. 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  5. Acute kidney injury likely secondary to ATN however could also be hepatorenal syndrome.  Creatinine 3.70.  6. Intractable nausea and vomiting. Resolved.  7. Thrombocytopenia- could be secondary to chemotherapy or hepatic disease.  8. Elevated bilirubin- 3.1, ongoing likely secondary to hepatic disease, negative ultrasound.  Elevated AST- 78.  9. Elevated alkaline phosphatase 153, worse.  10. Hypocalcemia, 8.5, worse.   11. Anemia, chronic disease, worse.  12. Hyponatremia 133, worse.  13. Lactic acid 2.6.  Not toxic.    Tolerating current regimen.    Plan:    1. Diagnostically continue to follow patient's physical exam, follow labs include CBC, CMP, CRP.  Follow repeat cx 8/5.  2. Therapeutically, previously discontinued on 8/1 IV Zosyn and started Merrem then changed to Fortaz for post HD. Dialysis catheter removal explanted on 8/3 after dialysis.  Temporary dialysis catheter placed right chest 8/3.  Continue Fortaz 1 g post each hemodialysis (M, W, F) to continue until 8/17.    3. Continue supportive care.    Our group would be pleased to follow this patient over the course of their hospitalization and assist with outpatient antimicrobial therapy, as indicated.  Further recommendations depend on the results of the cultures and clinical course.  Side effects of medications were discussed.  Patient is at increased risk for adverse drug reactions, recurrent infection, readmission.  Discussed with the hospitalist service.    Case management  orders:  Please arrange for post HD Fortaz 1 g IV post each HD until 8/17/20.  Check cbc, cmp, crp weekly while on IV abx.  FAX orders to [phone], and call [phone] with final arrangements.  Arrange for f/u with me in 1 week post discharge.    Joey Graham MD  8/11/2020

## 2020-08-12 PROBLEM — R11.2 INTRACTABLE NAUSEA AND VOMITING: Status: RESOLVED | Noted: 2020-01-01 | Resolved: 2020-01-01

## 2020-08-12 PROBLEM — E86.0 DEHYDRATION: Status: RESOLVED | Noted: 2020-01-01 | Resolved: 2020-01-01

## 2020-08-12 NOTE — DISCHARGE INSTR - OTHER ORDERS
Diaylsis at [facility] in [place] ([address], [facility] [address]) with first dialysis chair on Friday, 8/14. Patient needs to arrive by 2pm for 230 pm dialysis chair.

## 2020-08-12 NOTE — DISCHARGE SUMMARY
[facility] Medicine Services  DISCHARGE SUMMARY    Patient Name: Oliver Mallory  : 1965  MRN: [id number]    Date of Admission: 2020  4:39 PM  Date of Discharge: 2020  Primary Care Physician: Raymundo Salgado MD    Consults     Date and Time Order Name Status Description    8/10/2020 0952 Inpatient Diagnostic Radiology Consult      2020 0801 Inpatient Infectious Diseases Consult Completed     2020 0758 Inpatient Palliative Care MD Consult Completed     2020 1126 Inpatient Hematology & Oncology Consult      2020 1158 Inpatient Hematology & Oncology Consult Completed     2020 1156 Inpatient Gastroenterology Consult Completed     2020 0030 Inpatient Nephrology Consult Completed     2020 1722 Inpatient Palliative Care MD Consult Completed         Hospital Course   Presenting Problem:   Acute renal failure (ARF) (CMS/HCC) [N17.9]  Hypotension [I95.9]    Active Hospital Problems    Diagnosis  POA   • **Hypotension [I95.9]  Yes   • Liver cirrhosis w/ ascites  [K74.60, R18.8]  Yes   • ARF likely 2* ATN  [N17.9]  Yes   • Chemotherapy-induced thrombocytopenia [D69.59, T45.1X5A]  Yes   • GIB w/ hematemesis [K92.0]  No   • Acute blood loss anemia [D62]  Yes   • Rectal bleeding [K62.5]  Yes   • Constipation due to opioid therapy [K59.03, T40.2X5A]  Yes   • Pain, cancer [G89.3]  Yes   • 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 male with pmh significant for HTN, COPD, pancreatitis, tonsillar cancer s/p tonsillectomy undergoing chemo and radiation that presents from palliative office for intractable n/v x 4 days. Patient states not keeping much fluid down. Has been able to take medications over past 4 days but no food intake. Having some  weight loss in past few mos, but not sure amount. Endorses weeks of constipation without adequate BM, increasing fatigue/ weakness, dizziness, decreased urination in past 4 days. In th ED, found to have creatinine of 7.46 (last 3.85 on 6/24), lactate 2.7, bili 2.8 and elevated LFTs. Patient to be admitted for further evaluation and  treatment.    Progressive AUDREY w/ volume overload, now s/p dialysis catheter placement receiving Hemodialysis  -Tunneled dialysis cath placed by Dr. Gore on 7/24/20  - removed on 8/3 with placement of temporary line  - 08/10 14.5 Fr tunneled dialysis catheter placed by Dr Gore   - echo 7/7/20: normal EF, normal valves  - nephrology following   - Increase Midodrine to 20 mg TID on dialysis days   - Difficulty removing volume with cirrhosis   - GABRIELA with no valvular vegetation seen, no evidence of endocarditis     Fever   Enterobacteriaceae bacteremia   - BCx +citrobacter koseri 7/31 x4 bottles  - Started merrem (8/1), changed to fortaz post HD on 8/5 and to receive in dialysis MWF through 8/17/2020  - ID follow up in 1 week  - Abd US with no acute findings      Right neck/supraclavicular hematoma around catheter site  Coagulopathy   - s/p platelets and FFP and Vit K  - R IJ removed 8/1     S/p GI bleed (due to esophagitis and portal colopathy related to cirrhosis)  -7/9/20: EGD: mild esophagitis, grade 1 esophageal varices, lower 1/3 esophaguts, portal htn gastropathy noted  - 7/10/20: C-scope: rectal oozing consistent w/ portal colopathy, sigmoid diverticulosis noted  - Continue PPI BID  -- follow up [facility] 4 weeks; repeat egd 1 year for varix surveillance  --Hgb 7.9  -CBC in AM      EDUARDO cirrhosis (w/ esophageal varices, portal htn gastopathy, and portal colopathy  - continue lactulose, diuretics, midodrine   - repeat paracentesis 7/31   -CMP in AM  -If necessary we may need to consult GI     Hx stage II HPV related oropharyngeal cancer   -formerly received chemotherapy  & radiotherapy; not candidate currently for any therapies  -regarding tonsillar cancer, per oncology patient is not candidate for further therapies for tonsillar cancer (given his reina)  --thrombocytopenia due to splenomegaly from cirrhosis rather than malignancy  --f/u Dr. Campbell as outpatient     Cytopenias (thrombocytopenia & anemia)   -felt due to cirrhosis/hypersplenism per heme-onc  -CBC in AM      Weakness   -PT/OT consult    Patient states that he is feeling much better and ready for discharge home.  Patient still having episodes of hypotension and has a lot of ascites.  Palliative spoke with him during this hospitalization about goals of care making him a DNR, but patient want to remain a full code.  Patient will be discharged home with dialysis scheduled on Monday Wednesday Friday.  Patient had dialysis today prior to discharge and will need to be at the dialysis center on Friday, which he is aware.  Discharge follow-up recommendations and appointments are listed below.    Discharge Follow Up Recommendations for outpatient labs/diagnostics:  --Follow-up with your family doctor in 1 week  --Follow-up with NAL in dialysis  --Follow-up with Dr. Graham in 1 week  --Continue your IV Fortaz 1 g after dialysis on Monday, Wednesday, Friday through 8/17/2020  --Take Midodrine as ordered; notice different dose on dialysis days  Day of Discharge   HPI:   Patient lying in dialysis bed in NAD.  Patient states he is ready to be discharged home and knows he needs to go to dialysis center on Friday. +BM.  No adverse events overnight.  No family in the room.    Review of Systems  Gen- No fevers, chills  CV- No chest pain, palpitations  Resp- No cough, dyspnea  GI- No N/V/D, abd pain  All other systems have been reviewed and the pertinent positives and negatives are listed above in the HPI and ROS    Vital Signs:   Temp:  [98.1 °F (36.7 °C)-98.5 °F (36.9 °C)] 98.5 °F (36.9 °C)  Heart Rate:  [] 98  Resp:  [16] 16  BP:  ()/(53-87) 127/81   Physical Exam:  Constitutional: Alert, WD, obese ill-appearing male in NAD  Eyes: EOMI, sclerae anicteric, no conjunctival injection  Head: NCAT  ENT: [place], moist mucous membranes  Respiratory: Non-labored, symmetrical chest expansion, CTAB  Cardiovascular: RRR, no M/R/G, +DP pulses bilaterally; dialysis catheter in right chest  Gastrointestinal: Obese, Soft, NT, major distension with ascites,  +BS  Musculoskeletal: KIRK; +2 LE edema bilaterally  Neurologic: Oriented x4, strength symmetric in all extremities, follows all commands, speech clear  Skin: No rashes on exposed skin  Psychiatric: Pleasant and cooperative; normal affect  Pertinent  and/or Most Recent Results     Results from last 7 days   Lab Units 08/12/20 0445 08/11/20  0644 08/11/20  0436 08/10/20  0449 08/09/20  0522 08/08/20  0517 08/06/20  0424   WBC 10*3/mm3 5.80  --  6.64 5.99 5.43 4.72 6.44   HEMOGLOBIN g/dL 7.9*  --  7.9* 8.5* 8.8* 8.1* 9.3*   HEMATOCRIT % 23.8*  --  24.1* 25.8* 26.4* 24.5* 28.4*   PLATELETS 10*3/mm3 72*  --  78* 38* 73* 63* 72*   SODIUM mmol/L 134* 133*  --  134* 136 134* 135*   POTASSIUM mmol/L 4.7 4.2  --  4.2 4.3 3.7 3.9   CHLORIDE mmol/L 100 101  --  101 102 102 102   CO2 mmol/L 25.0 23.0  --  22.0 23.0 23.0 22.0   BUN mg/dL 34* 32*  --  37* 28* 24* 23*   CREATININE mg/dL 4.35* 3.70*  --  4.89* 4.35* 3.19* 3.43*   GLUCOSE mg/dL 118* 120*  --  134* 129* 134* 140*   CALCIUM mg/dL 8.8 8.5*  --  8.9 9.0 8.6 8.6     Results from last 7 days   Lab Units 08/12/20 0445 08/11/20  0644 08/10/20  0449 08/09/20  0522 08/08/20  0517 08/07/20  0422 08/06/20  0424   BILIRUBIN mg/dL 3.3* 3.1* 3.1* 3.1* 3.0* 3.0* 3.9*   ALK PHOS U/L 153* 153* 158* 162* 137* 144* 175*   ALT (SGPT) U/L 18 19 21 19 17 21 28   AST (SGOT) U/L 71* 78* 86* 85* 72* 83* 102*           Invalid input(s): TG, LDLCALC, LDLREALC  Results from last 7 days   Lab Units 08/08/20  0517 08/06/20  1158 08/06/20  0443   LACTATE mmol/L 2.5* 2.6* 3.7*        Brief Urine Lab Results  (Last result in the past 365 days)      Color   Clarity   Blood   Leuk Est   Nitrite   Protein   CREAT   Urine HCG        07/07/20 2247             140.1       07/07/20 2247 Yellow Clear Negative Negative Negative Negative               Microbiology Results Abnormal     Procedure Component Value - Date/Time    Blood Culture - Blood, Arm, Left [[id number]] Collected:  08/05/20 1547    Lab Status:  Final result Specimen:  Blood from Arm, Left Updated:  08/10/20 1615     Blood Culture No growth at 5 days    Blood Culture - Blood, Arm, Left [[id number]] Collected:  08/05/20 1204    Lab Status:  Final result Specimen:  Blood from Arm, Left Updated:  08/10/20 1245     Blood Culture No growth at 5 days    Anaerobic Culture - Body Fluid, Peritoneum [[id number]] Collected:  07/31/20 1422    Lab Status:  Final result Specimen:  Body Fluid from Peritoneum Updated:  08/05/20 1201     Anaerobic Culture No anaerobes isolated at 5 days    Body Fluid Culture - Body Fluid, Peritoneum [[id number]] Collected:  07/31/20 1422    Lab Status:  Final result Specimen:  Body Fluid from Peritoneum Updated:  08/03/20 0751     Body Fluid Culture No growth at 3 days     Gram Stain No organisms seen      Occasional WBCs per low power field    Blood Culture - Blood, Arm, Left [[id number]]  (Abnormal) Collected:  07/31/20 0832    Lab Status:  Final result Specimen:  Blood from Arm, Left Updated:  08/03/20 0649     Blood Culture Citrobacter koseri     Comment: Refer to previous blood culture collected on 7/31/2020 0830 for MICs.          Isolated from Aerobic and Anaerobic Bottles     Gram Stain Anaerobic Bottle Gram negative bacilli      Aerobic Bottle Gram negative bacilli    Blood Culture - Blood, Blood, PICC Line [[id number]]  (Abnormal)  (Susceptibility) Collected:  07/31/20 0830    Lab Status:  Final result Specimen:  Blood, PICC Line Updated:  08/03/20 0649     Blood Culture Citrobacter koseri     Isolated from  Aerobic and Anaerobic Bottles     Gram Stain Anaerobic Bottle Gram negative bacilli      Aerobic Bottle Gram negative bacilli    Susceptibility      Citrobacter koseri     KALANI     Cefepime Susceptible     Ceftazidime Susceptible     Ceftriaxone Susceptible     Gentamicin Susceptible     Levofloxacin Susceptible     Piperacillin + Tazobactam Susceptible     Trimethoprim + Sulfamethoxazole Susceptible                Susceptibility Comments     Citrobacter koseri    Cefazolin sensitivity will not be reported for Enterobacteriaceae in non-urine isolates. If cefazolin is preferred, please call the microbiology lab to request an E-test.  With the exception of urinary-sourced infections, aminoglycosides should not be used as monotherapy.             Blood Culture ID, PCR - Blood, Blood, PICC Line [[id number]]  (Abnormal) Collected:  07/31/20 0830    Lab Status:  Final result Specimen:  Blood, PICC Line Updated:  08/01/20 0022     BCID, PCR Enterobacteriaceae Group. Identification by BCID PCR.    Body Fluid Culture - Body Fluid, Peritoneum [[id number]] Collected:  07/15/20 1038    Lab Status:  Final result Specimen:  Body Fluid from Peritoneum Updated:  07/18/20 0710     Body Fluid Culture No growth at 3 days     Gram Stain Few (2+) WBCs seen      No organisms seen    Blood Culture - Blood, Arm, Right [[id number]] Collected:  07/08/20 1748    Lab Status:  Final result Specimen:  Blood from Arm, Right Updated:  07/13/20 1800     Blood Culture No growth at 5 days    Blood Culture - Blood, Hand, Left [[id number]] Collected:  07/08/20 1744    Lab Status:  Final result Specimen:  Blood from Hand, Left Updated:  07/13/20 1800     Blood Culture No growth at 5 days    Body Fluid Culture - Body Fluid, Peritoneum [[id number]] Collected:  07/10/20 0916    Lab Status:  Final result Specimen:  Body Fluid from Peritoneum Updated:  07/13/20 0700     Body Fluid Culture No growth at 3 days     Gram Stain Moderate (3+) WBCs seen      No organisms  seen    COVID PRE-OP / PRE-PROCEDURE SCREENING ORDER (NO ISOLATION) - Swab, Nasopharynx [[id number]] Collected:  07/08/20 2009    Lab Status:  Final result Specimen:  Swab from Nasopharynx Updated:  07/08/20 2119    Narrative:       The following orders were created for panel order COVID PRE-OP / PRE-PROCEDURE SCREENING ORDER (NO ISOLATION) - Swab, Nasopharynx.  Procedure                               Abnormality         Status                     ---------                               -----------         ------                     COVID-19,CEPHEID,CLAUDIA IN-...[[id number]]  Normal              Final result                 Please view results for these tests on the individual orders.    COVID-19,CEPHEID,CLAUDIA IN-HOUSE(OR EMERGENT/ADD-ON),NP SWAB IN TRANSPORT MEDIA 3-4 HR TAT - Swab, Nasopharynx [[id number]]  (Normal) Collected:  07/08/20 2009    Lab Status:  Final result Specimen:  Swab from Nasopharynx Updated:  07/08/20 2119     COVID19 Not Detected    Narrative:       Fact sheet for providers: https://www.fda.gov/media/421319/download     Fact sheet for patients: https://www.fda.gov/media/005601/download          Imaging Results (All)     Procedure Component Value Units Date/Time    IR Tunneled Catheter [[id number]] Collected:  08/10/20 1511     Updated:  08/10/20 1531    Narrative:       PROCEDURE: Tunneled dialysis catheter placement      Procedural Personnel  Attending physician(s): ABELARDO Gore M.D.  Fellow physician(s): None  Resident physician(s): None  Advanced practice provider(s): None     Pre-procedure diagnosis: Renal failure requiring hemodialysis  Post-procedure diagnosis: Same  Indication (QCDR): Previous tunneled hemodialysis catheter infected and  removed. Temporary placed. Patient returns to tunneled hemodialysis  catheter.  Additional clinical history: None     Complications: No immediate complications.       Impression:          Insertion of right-sided tunneled dialysis catheter, with tip in  the  expected location of the cavoatrial junction.     Plan:      The catheter may be used immediately.  _______________________________________________________________     PROCEDURE SUMMARY:  - Venous access with ultrasound guidance  - Tunneled dialysis catheter insertion with fluoroscopic guidance  - Additional procedure(s): None     PROCEDURE DETAILS:     Pre-procedure  Consent: Informed consent for the procedure including risks, benefits  and alternatives was obtained and time-out was performed prior to the  procedure.  Preparation (MIPS): The site was prepared and draped using all elements  of maximal sterile barrier technique including sterile gloves, sterile  gown, cap, mask, large sterile sheet, sterile ultrasound probe cover,  hand hygiene and cutaneous antisepsis with 2% chlorhexidine.   Medical reason for site preparation exception (MIPS): Not applicable     Anesthesia/sedation  Level of anesthesia/sedation: Moderate sedation (conscious sedation)  Anesthesia/sedation administered by: Independent trained observer under  attending supervision with continuous monitoring of the patient?s level  of consciousness and physiologic status  Total intra-service sedation time (minutes): 27     Access  Local anesthesia was administered. The vessel was sonographically  evaluated and determined to be patent. Real time ultrasound was used to  visualize needle entry into the vessel and a permanent image obtained.  Vein accessed (QCDR): Right internal jugular vein  Internal jugular vein patency (QCDR): Patent  Access technique: Micropuncture technique under ultrasound guidance     Venography  Indication for venography: Not applicable  Catheter tip position for venography: Not applicable  Findings: Not applicable     Catheter placement  An incision was made near the venous access site and the catheter was  tunneled subcutaneously to the venous access site. The catheter was  advanced via a peel-away sheath into the vein  under fluoroscopic  guidance. Catheter tip location was fluoroscopically verified and a  permanent image was stored.  Catheter placed: 14.5 [employer] SI Silver Ion Catheter.  Lot No. [id number]  Catheter cuff-to-tip length (cm): 19 (initially a 23 cm catheter placed,  this was replaced with a 19)  Catheter flush: Heparin (1000 units per mL)     Closure  A sterile dressing was applied.  Access site closure technique: Tissue adhesive  Catheter securement technique: Non-absorbable suture     Contrast  Contrast agent: None  Contrast volume (mL): 0     Radiation Dose  Fluoroscopy time: 0.5 minutes   Dose: 17.8 mGy     Additional Details  Additional description of procedure: None  Equipment details: None  Specimens removed: None  Estimated blood loss (mL): Less than 10  Standardized report: TunneledDialysisCatheter     Attestation  I was present and scrubbed for the entire procedure. Imaging reviewed.  Agree with final report as written.     This report was finalized on 8/10/2020 3:22 PM by Rojas Gore.       IR Tunneled Catheter Removal [[id number]] Collected:  08/03/20 1657     Updated:  08/03/20 1711    Narrative:       PROCEDURE:   Non-tunneled central venous catheter placement  Tunneled catheter removal     Procedural Personnel  Attending physician(s): ABELARDO Gore M.D.  Fellow physician(s): None  Resident physician(s): None  Advanced practice provider(s): None     Pre-procedure diagnosis: Enterobacteriaceae bacteremia   Post-procedure diagnosis: Same  Indication: Therapeutic  Additional clinical history: None     Complications: No immediate complications.       Impression:          Insertion of right-sided non-tunneled triple-lumen hemodialysis  catheter, with tip in the expected location of the cavoatrial junction.  Removal of tunneled hemodialysis catheter     Plan:      The catheter may be used immediately.  _______________________________________________________________     PROCEDURE SUMMARY:  -  Venous access with ultrasound guidance  - Non-tunneled central venous catheter insertion with fluoroscopic  guidance  - Additional procedure(s): None     PROCEDURE DETAILS:     Pre-procedure  Consent: Informed consent for the procedure including risks, benefits  and alternatives was obtained and time-out was performed prior to the  procedure.  Preparation (MIPS): The site was prepared and draped using all elements  of maximal sterile barrier technique including sterile gloves, sterile  gown, cap, mask, large sterile sheet, sterile ultrasound probe cover,  hand hygiene and cutaneous antisepsis with 2% chlorhexidine.   Medical reason for site preparation exception (MIPS): Not applicable     Anesthesia/sedation  Level of anesthesia/sedation: Moderate sedation (conscious sedation)  Anesthesia/sedation administered by: Independent trained observer under  attending supervision with continuous monitoring of the patient?s level  of consciousness and physiologic status  Total intra-service sedation time (minutes): 18     Access  Local anesthesia was administered. The vessel was sonographically  evaluated and determined to be patent. Real time ultrasound was used to  visualize needle entry into the vessel and a permanent image obtained.  Vein accessed: Right internal jugular vein  Access technique: Micropuncture technique under ultrasound guidance     Catheter placement  The access site was dilated and the catheter was placed into the vein  over a wire  under fluoroscopic guidance.  The catheter tip location was  fluoroscopically verified and a permanent image was stored.. A sterile  dressing was applied.  Catheter placed: [employer] Acute Triple Lumen Catheter. Lot No  [id number].  Catheter size (Swedish): 12  Catheter length (cm): 16  Catheter flush: Dialysis lumens flushed with heparin (1000 units per  mL). Venous access lumen flushed with heparin (100 units per mL)  Catheter securement technique: 2-0 silk suture      Tunneled catheter removal  Local anesthesia was administered. The catheter was removed with  traction.     Contrast  Contrast agent: None  Contrast volume (mL): 0     Radiation Dose  Fluoroscopy time: 0.1 minutes  Dose: 1.7 mGy     Additional Details  Additional description of procedure: None  Equipment details: None  Specimens removed: None  Estimated blood loss (mL): Less than 10  Standardized report: CVA_NonTunneledCatheter     Attestation  I was present and scrubbed for entire procedure. Imaging reviewed. Agree  with final report as written..     This report was finalized on 8/3/2020 5:08 PM by Rojas Gore.       IR insert non-tunneled CVC 5+ [[id number]] Collected:  08/03/20 1657     Updated:  08/03/20 1711    Narrative:       PROCEDURE:   Non-tunneled central venous catheter placement  Tunneled catheter removal     Procedural Personnel  Attending physician(s): ABELARDO Gore M.D.  Fellow physician(s): None  Resident physician(s): None  Advanced practice provider(s): None     Pre-procedure diagnosis: Enterobacteriaceae bacteremia   Post-procedure diagnosis: Same  Indication: Therapeutic  Additional clinical history: None     Complications: No immediate complications.       Impression:          Insertion of right-sided non-tunneled triple-lumen hemodialysis  catheter, with tip in the expected location of the cavoatrial junction.  Removal of tunneled hemodialysis catheter     Plan:      The catheter may be used immediately.  _______________________________________________________________     PROCEDURE SUMMARY:  - Venous access with ultrasound guidance  - Non-tunneled central venous catheter insertion with fluoroscopic  guidance  - Additional procedure(s): None     PROCEDURE DETAILS:     Pre-procedure  Consent: Informed consent for the procedure including risks, benefits  and alternatives was obtained and time-out was performed prior to the  procedure.  Preparation (MIPS): The site was prepared and draped using  all elements  of maximal sterile barrier technique including sterile gloves, sterile  gown, cap, mask, large sterile sheet, sterile ultrasound probe cover,  hand hygiene and cutaneous antisepsis with 2% chlorhexidine.   Medical reason for site preparation exception (MIPS): Not applicable     Anesthesia/sedation  Level of anesthesia/sedation: Moderate sedation (conscious sedation)  Anesthesia/sedation administered by: Independent trained observer under  attending supervision with continuous monitoring of the patient?s level  of consciousness and physiologic status  Total intra-service sedation time (minutes): 18     Access  Local anesthesia was administered. The vessel was sonographically  evaluated and determined to be patent. Real time ultrasound was used to  visualize needle entry into the vessel and a permanent image obtained.  Vein accessed: Right internal jugular vein  Access technique: Micropuncture technique under ultrasound guidance     Catheter placement  The access site was dilated and the catheter was placed into the vein  over a wire  under fluoroscopic guidance.  The catheter tip location was  fluoroscopically verified and a permanent image was stored.. A sterile  dressing was applied.  Catheter placed: [employer] Acute Triple Lumen Catheter. Lot No  [id number].  Catheter size (Hungarian): 12  Catheter length (cm): 16  Catheter flush: Dialysis lumens flushed with heparin (1000 units per  mL). Venous access lumen flushed with heparin (100 units per mL)  Catheter securement technique: 2-0 silk suture     Tunneled catheter removal  Local anesthesia was administered. The catheter was removed with  traction.     Contrast  Contrast agent: None  Contrast volume (mL): 0     Radiation Dose  Fluoroscopy time: 0.1 minutes  Dose: 1.7 mGy     Additional Details  Additional description of procedure: None  Equipment details: None  Specimens removed: None  Estimated blood loss (mL): Less than 10  Standardized  report: CVA_NonTunneledCatheter     Attestation  I was present and scrubbed for entire procedure. Imaging reviewed. Agree  with final report as written..     This report was finalized on 8/3/2020 5:08 PM by Rojas Gore.       US Abdomen Limited [[id number]] Collected:  08/02/20 1010     Updated:  08/03/20 1237    Narrative:       EXAMINATION: US ABDOMEN LIMITED - 08/02/2020     INDICATION:  I95.89-Other hypotension; E86.1-Hypovolemia;  K92.0-Hematemesis; D62-Acute posthemorrhagic anemia; K62.5-Hemorrhage of  anus and rectum; Z74.09-Other reduced mobility; Z78.9-Other specified  health status; Z74.09-Other reduced mobility. Upper abdominal pain.     TECHNIQUE: Sonographic imaging is obtained of the right upper quadrant  in both the sagittal and transverse planes.     COMPARISON: NONE     FINDINGS: Pancreas is not visualized.. There is free fluid identified  throughout the upper abdomen. The liver is increased in echogenicity  with some lobulation identified of the contour suggesting cirrhosis. The  gallbladder fossa is unremarkable. The common bile duct measures 8 mm.  The right kidney is normal in size, configuration, and texture measuring  in length from pole to pole 11.1 cm. No solid cortical mass or renal  cortical cysts seen within the right kidney. No hydronephrosis or  nephrolithiasis.       Impression:       Cirrhotic appearance of the liver with no underlying mass.  Free fluid identified throughout the upper abdomen. The pancreas is not  seen. The remainder the upper abdomen is unremarkable.      DICTATED:   08/02/2020  EDITED/ls :   08/02/2020      This report was finalized on 8/3/2020 12:34 PM by Dr. Janeth Atkins MD.       CT Abdomen Pelvis Without Contrast [[id number]] Collected:  08/01/20 0002     Updated:  08/01/20 0005    Narrative:       CT Abdomen Pelvis WO    INDICATION:   Bacteremia and hypotension. Status post paracentesis today.    TECHNIQUE:   CT of the abdomen and pelvis without IV  contrast. Coronal and sagittal reconstructions were obtained.  Radiation dose reduction techniques included automated exposure control or exposure modulation based on body size. Count of known CT and cardiac nuc  med studies performed in previous 12 months: 6.     COMPARISON:   7/7/2020    FINDINGS:  Abdomen: Normal caliber aorta. The spleen is enlarged. The liver is cirrhotic and there is a small volume of ascites in the abdomen and pelvis. Negative adrenal glands. Atrophic pancreas and surgical absence of the gallbladder. No focal hepatic mass  although study sensitivity is limited. Nonobstructed kidneys. No adenopathy.    Pelvis: Negative bladder and prostate gland. Diverticulosis. Appendix not clearly demonstrated but no distinct evidence of acute appendicitis. Fat-containing left inguinal hernia. No suspicious bone lesion. Chronic antegrade listhesis of L4 on L5 grade  1/2.      Impression:         1. Cirrhosis with splenomegaly and ascites.  2. Surgical absence of the gallbladder.  3. Diverticulosis.          Signer Name: Clark Smyth MD   Signed: 8/1/2020 12:02 AM   Workstation Name: [id number]    [facility]    CT Chest Without Contrast [[id number]] Collected:  07/31/20 2359     Updated:  08/01/20 0001    Narrative:       CT Chest WO    INDICATION:   Bacteremia. Hypertension. Status post paracentesis today.    TECHNIQUE:   CT of the thorax without IV contrast. Coronal and sagittal reconstructions were obtained.  Radiation dose reduction techniques included automated exposure control or exposure modulation based on body size. Count of known CT and cardiac nuc med studies  performed in previous 12 months: 6.     COMPARISON:   7/7/2020    FINDINGS:  Lungs are essentially clear. Old healed granulomatous disease no pleural effusion. No pericardial effusion. There is an enlarged anterior pericardial lymph node measuring up to 15 mm. Included upper abdomen demonstrates cirrhosis and  ascites with  probable splenomegaly. The abdomen CT has been dictated separately. No suspicious bone lesion.      Impression:         1. The lungs are clear. Mildly enlarged anterior pericardial lymph node, indeterminate.  2. Cirrhosis with ascites.    Signer Name: Clark Smyth MD   Signed: 7/31/2020 11:59 PM   Workstation Name: [email or web address]    [facility]    CT Guided Paracentesis [[id number]] Collected:  07/31/20 2149     Updated:  07/31/20 2154    Narrative:       PROCEDURE: CT-guided paracentesis     Procedural Personnel  Attending physician(s): ABELARDO Gore M.D.  Fellow physician(s): None  Resident physician(s): None  Advanced practice provider(s): None     Pre-procedure diagnosis: Liver cirrhosis; recurrent large volume  symptomatic ascites?  Post-procedure diagnosis: Same  Indication: Diagnostic/therapeutic  Additional clinical history: None     Complications: No immediate complications.       Impression:          CT-guided paracentesis with [place] of 58636 mL of serous fluid.  Portion sent for requested laboratory analysis     Plan:      Resume care by clinical team.  _______________________________________________________________     PROCEDURE SUMMARY:  - Limited CT  - CT-guided paracentesis  - Additional procedure(s): None     PROCEDURE DETAILS:     Pre-procedure  Consent: Informed consent for the procedure including risks, benefits  and alternatives was obtained and time-out was performed prior to the  procedure.  Preparation: The site was prepared and draped using maximal sterile  barrier technique including cutaneous antisepsis.     Anesthesia/sedation  Level of anesthesia/sedation: No sedation     Initial abdominal CT  Initial abdominal CT was performed.  Findings: Large volume ascites. A safe window for paracentesis was  identified.     Paracentesis  Local anesthesia was administered. The peritoneal cavity was accessed  and needle position confirmed by CT. Ascites was drained.  The catheter  was then removed, and a sterile bandage was applied.  Paracentesis access technique: Trocar  Catheter placed: 8.5 Georgian  Post-drainage CT: Near resolution of ascites.     Radiation Dose  CT dose length product (mGy-cm): 865      Additional Details  Additional description of procedure: None  Equipment details: None  Specimens removed: Abdominal fluid  Estimated blood loss (mL): Less than 10  Standardized report: Paracentesis     Attestation  I was present and scrubbed for the entire procedure. Imaging reviewed.  Agree with final report as written.     This report was finalized on 7/31/2020 9:51 PM by Rojas Gore.       XR Chest 1 View [[id number]] Collected:  07/31/20 2035     Updated:  07/31/20 2037    Narrative:       CR Chest 1 Vw    INDICATION:   IJ placement     COMPARISON:    Chest x-ray 5/13/2020.    FINDINGS:  Single portable AP view(s) of the chest.    There are 2 central venous catheters from a right IJ approach. Small caliber central venous catheter terminates distal SVC. The large caliber central venous catheter terminates distal SVC right atrial junction level. There is no pneumothorax. Both are  new.    Cardiac silhouette is normal in size. There is borderline central vascular congestion.. Please correlate for evidence for mild volume overload. There is no pleural effusion. There is no acute infiltrate.       Impression:         1. No pneumothorax.  2. Right IJ approach central venous catheter terminates distal SVC level. Second right IJ approach large caliber introducer type catheter terminates distal SVC/right atrial junction level.  3. Suspect borderline vascular congestion    Signer Name: Caitlin Talavera MD   Signed: 7/31/2020 8:35 PM   Workstation Name: [email or web address]    [facility]    IR Tunneled Catheter [[id number]] Collected:  07/24/20 1430     Updated:  07/24/20 1529    Narrative:       PROCEDURE: Non-tunneled central venous catheter placement     Procedural  Personnel  Attending physician(s): ABELARDO Gore M.D.  Fellow physician(s): None  Resident physician(s): None  Advanced practice provider(s): None     Pre-procedure diagnosis: Renal function continues to deteriorate;  difficult IV access  Post-procedure diagnosis: Same  Indication: Initiate hemodialysis, venous access for therapy  Additional clinical history: None     Complications: No immediate complications.       Impression:          Insertion of right-sided non-tunneled triple-lumen central line, with  tip in the expected location of the cavoatrial junction.  Insertion of right-sided tunneled dual-lumen hemodialysis catheter with  tip in the expected location of the cavoatrial junction     Plan:      The catheters may be used immediately.  _______________________________________________________________     PROCEDURE SUMMARY:  - Venous access with ultrasound guidance  - Non-tunneled central venous catheter insertion with ultrasound  fluoroscopic guidance  - Additional procedure(s): Tunneled central venous catheter placement  with ultrasound and fluoroscopic      PROCEDURE DETAILS:     Pre-procedure  Consent: Informed consent for the procedure including risks, benefits  and alternatives was obtained and time-out was performed prior to the  procedure.  Preparation (MIPS): The site was prepared and draped using all elements  of maximal sterile barrier technique including sterile gloves, sterile  gown, cap, mask, large sterile sheet, sterile ultrasound probe cover,  hand hygiene and cutaneous antisepsis with 2% chlorhexidine.   Medical reason for site preparation exception (MIPS): Not applicable     Attempts were made to access right or left external jugular vein.  Difficult access with patient body habitus and respiratory motion.  Discussed with Dr. Miles to place central line.     Central line  Access  Local anesthesia was administered. The vessel was sonographically  evaluated and determined to be patent. Real time  ultrasound was used to  visualize needle entry into the vessel and a permanent image obtained.  Vein accessed: Right internal jugular vein  Access technique: Micropuncture technique under ultrasound guidance     Catheter placement  The access site was dilated and the catheter was placed into the vein  over a wire  under fluoroscopic guidance.  The catheter tip location was  fluoroscopically verified and a permanent image was stored.. A sterile  dressing was applied.  Catheter placed: Vantex - Cental Venous Catheter. Antimicrobial with  Oligon Technology  Catheter size (Kenyan): 7  Catheter length (cm): 20  Catheter flush: Heparin (100 units per mL)  Catheter securement technique: 3-0 silk suture     Now with venous access established, sedation started for tunneled  hemodialysis catheter placement     Tunneled hemodialysis catheter  Anesthesia/sedation  Level of anesthesia/sedation: Moderate sedation (conscious sedation)  Anesthesia/sedation administered by: Independent trained observer under  attending supervision with continuous monitoring of the patient?s level  of consciousness and physiologic status  Total intra-service sedation time (minutes): 23     Access  Local anesthesia was administered. The vessel was sonographically  evaluated and determined to be patent. Real time ultrasound was used to  visualize needle entry into the vessel and a permanent image obtained.  Vein accessed (QCDR): Right internal jugular vein  Internal jugular vein patency (QCDR): Patent  Access technique: Micropuncture technique under ultrasound guidance     Catheter placement  An incision was made near the venous access site and the catheter was  tunneled subcutaneously to the venous access site. The catheter was  advanced via a peel-away sheath into the vein under fluoroscopic  guidance. Catheter tip location was fluoroscopically verified and a  permanent image was stored.  Catheter placed: 14.5 Kenyan [employer] SI Silver Ion  Catheter  Catheter cuff-to-tip length (cm): 19  Catheter flush: Heparin (1000 units per mL)     Closure  A sterile dressing was applied.  Access site closure technique: Tissue adhesive  Catheter securement technique: Non-absorbable suture     Contrast  Contrast agent: None  Contrast volume (mL): 0     Radiation Dose  Fluoroscopy time: 0.5 minutes   Dose: 17.8 mGy      Additional Details  Additional description of procedure: None  Equipment details: None  Specimens removed: None  Estimated blood loss (mL): Less than 10  Standardized report: CVA_NonTunneledCatheter     Attestation  I was present and scrubbed for entire procedure. Imaging reviewed. Agree  with final report as written.     This report was finalized on 7/24/2020 3:26 PM by Rojas Gore.       IR insert non-tunneled CVC 5+ [[id number]] Collected:  07/24/20 1430     Updated:  07/24/20 1529    Narrative:       PROCEDURE: Non-tunneled central venous catheter placement     Procedural Personnel  Attending physician(s): ABELARDO Gore M.D.  Fellow physician(s): None  Resident physician(s): None  Advanced practice provider(s): None     Pre-procedure diagnosis: Renal function continues to deteriorate;  difficult IV access  Post-procedure diagnosis: Same  Indication: Initiate hemodialysis, venous access for therapy  Additional clinical history: None     Complications: No immediate complications.       Impression:          Insertion of right-sided non-tunneled triple-lumen central line, with  tip in the expected location of the cavoatrial junction.  Insertion of right-sided tunneled dual-lumen hemodialysis catheter with  tip in the expected location of the cavoatrial junction     Plan:      The catheters may be used immediately.  _______________________________________________________________     PROCEDURE SUMMARY:  - Venous access with ultrasound guidance  - Non-tunneled central venous catheter insertion with ultrasound  fluoroscopic guidance  - Additional  procedure(s): Tunneled central venous catheter placement  with ultrasound and fluoroscopic      PROCEDURE DETAILS:     Pre-procedure  Consent: Informed consent for the procedure including risks, benefits  and alternatives was obtained and time-out was performed prior to the  procedure.  Preparation (MIPS): The site was prepared and draped using all elements  of maximal sterile barrier technique including sterile gloves, sterile  gown, cap, mask, large sterile sheet, sterile ultrasound probe cover,  hand hygiene and cutaneous antisepsis with 2% chlorhexidine.   Medical reason for site preparation exception (MIPS): Not applicable     Attempts were made to access right or left external jugular vein.  Difficult access with patient body habitus and respiratory motion.  Discussed with Dr. Miles to place central line.     Central line  Access  Local anesthesia was administered. The vessel was sonographically  evaluated and determined to be patent. Real time ultrasound was used to  visualize needle entry into the vessel and a permanent image obtained.  Vein accessed: Right internal jugular vein  Access technique: Micropuncture technique under ultrasound guidance     Catheter placement  The access site was dilated and the catheter was placed into the vein  over a wire  under fluoroscopic guidance.  The catheter tip location was  fluoroscopically verified and a permanent image was stored.. A sterile  dressing was applied.  Catheter placed: Vantex - Cental Venous Catheter. Antimicrobial with  Oligon Technology  Catheter size (Honduran): 7  Catheter length (cm): 20  Catheter flush: Heparin (100 units per mL)  Catheter securement technique: 3-0 silk suture     Now with venous access established, sedation started for tunneled  hemodialysis catheter placement     Tunneled hemodialysis catheter  Anesthesia/sedation  Level of anesthesia/sedation: Moderate sedation (conscious sedation)  Anesthesia/sedation administered by: Independent  trained observer under  attending supervision with continuous monitoring of the patient?s level  of consciousness and physiologic status  Total intra-service sedation time (minutes): 23     Access  Local anesthesia was administered. The vessel was sonographically  evaluated and determined to be patent. Real time ultrasound was used to  visualize needle entry into the vessel and a permanent image obtained.  Vein accessed (QCDR): Right internal jugular vein  Internal jugular vein patency (QCDR): Patent  Access technique: Micropuncture technique under ultrasound guidance     Catheter placement  An incision was made near the venous access site and the catheter was  tunneled subcutaneously to the venous access site. The catheter was  advanced via a peel-away sheath into the vein under fluoroscopic  guidance. Catheter tip location was fluoroscopically verified and a  permanent image was stored.  Catheter placed: 14.5 [place] [employer] SI Silver Ion Catheter  Catheter cuff-to-tip length (cm): 19  Catheter flush: Heparin (1000 units per mL)     Closure  A sterile dressing was applied.  Access site closure technique: Tissue adhesive  Catheter securement technique: Non-absorbable suture     Contrast  Contrast agent: None  Contrast volume (mL): 0     Radiation Dose  Fluoroscopy time: 0.5 minutes   Dose: 17.8 mGy      Additional Details  Additional description of procedure: None  Equipment details: None  Specimens removed: None  Estimated blood loss (mL): Less than 10  Standardized report: CVA_NonTunneledCatheter     Attestation  I was present and scrubbed for entire procedure. Imaging reviewed. Agree  with final report as written.     This report was finalized on 7/24/2020 3:26 PM by Rojas Gore.       CT Guided Paracentesis [[id number]] Collected:  07/15/20 1049     Updated:  07/15/20 1151    Narrative:       PROCEDURE: CT-guided paracentesis     Procedural Personnel  Attending physician(s): ABELARDO Gore M.D.  Fellow  physician(s): None  Resident physician(s): None  Advanced practice provider(s): None     Pre-procedure diagnosis: Liver cirrhosis; recurrent large volume  symptomatic ascites?  Post-procedure diagnosis: Same  Indication: Diagnostic/therapeutic  Additional clinical history: None     Complications: No immediate complications.       Impression:          CT-guided paracentesis with drainage of 5700 mL of serous fluid. Portion  sent for requested laboratory analysis     Plan:      Resume care by clinical team.  _______________________________________________________________     PROCEDURE SUMMARY:  - Limited CT  - CT-guided paracentesis  - Additional procedure(s): None     PROCEDURE DETAILS:     Pre-procedure  Consent: Informed consent for the procedure including risks, benefits  and alternatives was obtained and time-out was performed prior to the  procedure.  Preparation: The site was prepared and draped using maximal sterile  barrier technique including cutaneous antisepsis.     Anesthesia/sedation  Level of anesthesia/sedation: No sedation     Initial abdominal CT  Initial abdominal CT was performed.  Findings: Large volume ascites. A safe window for paracentesis was  identified.     Paracentesis  Local anesthesia was administered. The peritoneal cavity was accessed  and needle position confirmed by CT. Ascites was drained. The catheter  was then removed, and a sterile bandage was applied.  Paracentesis access technique: Trocar  Catheter placed: 8.5 Romanian  Post-drainage CT: Near resolution of ascites.     Radiation Dose  CT dose length product (mGy-cm): 693      Additional Details  Additional description of procedure: None  Equipment details: None  Specimens removed: Abdominal fluid  Estimated blood loss (mL): Less than 10  Standardized report: Paracentesis     Attestation  I was present and scrubbed for the entire procedure. Imaging reviewed.  Agree with final report as written.     This report was finalized on  7/15/2020 11:47 AM by Rojas Gore.       CT Guided Paracentesis [[id number]] Collected:  07/10/20 1208     Updated:  07/10/20 1214    Narrative:       PROCEDURE: CT-guided paracentesis     Procedural Personnel  Attending physician(s): ABELARDO Gore M.D.  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  Post-procedure diagnosis: Same  Indication: Diagnostic/therapeutic  Additional clinical history: None     Complications: No immediate complications.       Impression:          CT-guided paracentesis with drainage of 4000 mL of serous fluid. Portion  sent for requested laboratory analysis     Plan:      Resume care by clinical team.  _______________________________________________________________     PROCEDURE SUMMARY:  - Limited CT  - CT-guided paracentesis  - Additional procedure(s): None     PROCEDURE DETAILS:     Pre-procedure  Consent: Informed consent for the procedure including risks, benefits  and alternatives was obtained and time-out was performed prior to the  procedure.  Preparation: The site was prepared and draped using maximal sterile  barrier technique including cutaneous antisepsis.     Anesthesia/sedation  Level of anesthesia/sedation: No sedation     Initial abdominal CT  Initial abdominal CT was performed.  Findings: Large volume ascites. A safe window for paracentesis was  identified.     Paracentesis  Local anesthesia was administered. The peritoneal cavity was accessed  and needle position confirmed by CT. Ascites was drained. The catheter  was then removed, and a sterile bandage was applied.  Paracentesis access technique: Trocar  Catheter placed: 8.5 Syrian  Post-drainage CT: Near resolution of ascites.     Radiation Dose  CT dose length product (mGy-cm): 1431      Additional Details  Additional description of  procedure: None  Equipment details: None  Specimens removed: Abdominal fluid  Estimated blood loss (mL): Less than 10  Standardized report: Paracentesis     Attestation  I was present and scrubbed for the entire procedure. Imaging reviewed.  Agree with final report as written.     This report was finalized on 7/10/2020 12:10 PM by Rojas Gore.        Renal Limited [[id number]] Collected:  07/08/20 1517     Updated:  07/09/20 0755    Narrative:       EXAMINATION: US RENAL LIMITED- 07/08/2020     INDICATION: AUDREY     TECHNIQUE: Ultrasound kidneys and urinary bladder     COMPARISON: NONE     FINDINGS:      Right kidney measures 11.2 cm in length without hydronephrosis or  contour deforming mass with small 5 mm echogenic focus of likely  nonobstructing nephrolithiasis.     Left kidney measures 12.1 cm in length without hydronephrosis, contour  deforming mass or obvious calculi.     Urinary bladder appears within normal limits. Enlargement of the  adjacent prostate.     Ascites visualized.       Impression:       No acute sonographic findings within the visualized kidneys  specifically no hydronephrosis. Ascites visualized.     D:  07/08/2020  E:  07/08/2020         This report was finalized on 7/9/2020 7:52 AM by Dr. Jacobo Cain.       CT Abdomen Without Contrast [[id number]] Collected:  07/07/20 2227     Updated:  07/07/20 2229    Narrative:       CT Abdomen WO    INDICATION:   Intractable nausea and vomiting with elevated bilirubin. The renal failure.    TECHNIQUE:   CT of the abdomen without IV contrast. Coronal and sagittal reconstructions were obtained.  Radiation dose reduction techniques included automated exposure control or exposure modulation based on body size. Count of known CT and cardiac nuc med studies  performed in previous 12 months: 0.     COMPARISON:   None available.    FINDINGS:  There is a moderate amount ascites in the abdomen. Liver has a prominent left lobe suggesting possible cirrhosis. No  focal lesions are identified. The spleen is normal. The pancreas, adrenal glands and kidneys are normal. The gallbladder is been removed  there is no bowel distention or obstruction suggested. Aorta is normal in size. There is no adenopathy. Bones are unremarkable.      Impression:       Moderate amount ascites with morphologic changes of the liver suggesting cirrhosis.    Otherwise normal          Signer Name: Berry Sr MD   Signed: 7/7/2020 10:27 PM   Workstation Name: [facility]    [facility]    CT Chest Without Contrast [[id number]] Collected:  07/07/20 2225     Updated:  07/07/20 2228    Narrative:       CT Chest WO    INDICATION:   Dyspnea today    TECHNIQUE:   CT of the thorax without IV contrast. Coronal and sagittal reconstructions were obtained.  Radiation dose reduction techniques included automated exposure control or exposure modulation based on body size. Count of known CT and cardiac nuc med studies  performed in previous 12 months: 0.     COMPARISON:   None available.    FINDINGS:  the lungs are clear. Thyroid gland is normal. Aorta is normal in size. There is no mediastinal or hilar adenopathy. Bones are unremarkable      Impression:       Negative CT of the chest.    Signer Name: Berry Sr MD   Signed: 7/7/2020 10:25 PM   [facility]          Results for orders placed during the hospital encounter of 07/07/20   Adult Transesophageal Echo (GABRIELA) W/ Cont if Necessary Per Protocol    Narrative · Estimated EF appears to be in the range of 66 - 70%  · No valvular vegetation seen on the study, no evidence of endocarditis  · There is a catheter which terminates in his SVC, no vegetations visible  · There is evidence of ascites          Plan for Follow-up of Pending Labs/Results: None    Discharge Details        Discharge Medications      New Medications      Instructions Start Date   bumetanide 2 MG tablet  Commonly known  as:  BUMEX   4 mg, Oral, Daily   Start Date:  August 13, 2020     cefTAZidime  Commonly known as:  FORTAZ   1 g, Intravenous, See Admin Instructions, Daily Mon Wed Fri in dialysis at 1300, For 10 days (through 8/15/2020)      diphenhydrAMINE 25 mg capsule  Commonly known as:  BENADRYL   25 mg, Oral, Every 6 Hours PRN      lactulose 10 GM/15ML solution  Commonly known as:  CHRONULAC   10 g, Oral, 3 Times Daily      metoclopramide 5 MG tablet  Commonly known as:  REGLAN   5 mg, Oral, 3 Times Daily Before Meals      midodrine 10 MG tablet  Commonly known as:  PROAMATINE   Take 20mg 3 times per day on Mon Wed Fri dialysis days      midodrine 10 MG tablet  Commonly known as:  PROAMATINE   Take 10 mg 3 times per day on Sun Tue Thu Sat)      phenylephrine-mineral oil-petrolatum 0.25-14-74.9 % ointment hemorrhoidal ointment  Commonly known as:  PREPARATION H   1 application, Rectal, 3 Times Daily PRN      sertraline 25 MG tablet  Commonly known as:  ZOLOFT   25 mg, Oral, Daily   Start Date:  August 13, 2020        Continue These Medications      Instructions Start Date   albuterol sulfate  (90 Base) MCG/ACT inhaler  Commonly known as:  PROVENTIL HFA;VENTOLIN HFA;PROAIR HFA   2 puffs, Inhalation, Every 4 Hours PRN      aspirin 81 MG chewable tablet   81 mg, Oral, Daily      dexamethasone 4 MG tablet  Commonly known as:  DECADRON   8 mg, Oral, Daily      docusate sodium 100 MG capsule  Commonly known as:  COLACE   100 mg, Oral, Daily      Magic mouthwash Radonc   10 mL, Swish & Swallow, 4 Times Daily Before Meals & Nightly      mirtazapine 15 MG tablet  Commonly known as:  REMERON   15-30 mg, Oral, Every Night at Bedtime      Naloxegol Oxalate 12.5 MG tablet  Commonly known as:  MOVANTIK   12.5 mg, Oral, Every Morning, For constipation      Narcan 4 MG/0.1ML nasal spray  Generic drug:  naloxone   1 spray, Nasal, As Needed      OLANZapine zydis 5 MG disintegrating tablet  Commonly known as:  zyPREXA   5 mg,  Translingual, Nightly      ondansetron 8 MG tablet  Commonly known as:  ZOFRAN   8 mg, Oral, 3 Times Daily PRN      oxyCODONE 10 MG tablet  Commonly known as:  ROXICODONE   10 mg, Oral, Every 6 Hours PRN      pantoprazole 40 MG EC tablet  Commonly known as:  PROTONIX   40 mg, Oral, Daily      potassium chloride 20 MEQ CR tablet  Commonly known as:  K-DUR,KLOR-CON   20 mEq, Oral, 2 Times Daily      sennosides-docusate 8.6-50 MG per tablet  Commonly known as:  PERICOLACE   2 tablets, Oral, 2 times daily         Stop These Medications    lisinopril-hydrochlorothiazide 20-25 MG per tablet  Commonly known as:  PRINZIDE,ZESTORETIC     metoprolol succinate XL 25 MG 24 hr tablet  Commonly known as:  TOPROL-XL            No Known Allergies      Discharge Disposition:  Home or Self Care    Diet:  Hospital:  Diet Order   Procedures   • Diet Regular; Cardiac, Renal, Daily Fluid Restriction; 1500 mL Fluid Per Day       Activity:  Activity Instructions     Activity as Tolerated          Restrictions or Other Recommendations:  None       CODE STATUS:    Code Status and Medical Interventions:   Ordered at: 07/07/20 1721     Level Of Support Discussed With:    Patient     Code Status:    CPR     Medical Interventions (Level of Support Prior to Arrest):    Full       Additional Instructions for the Follow-ups that You Need to Schedule     Discharge Follow-up with PCP   As directed       Currently Documented PCP:    Raymundo Salgado MD    PCP Phone Number:    [phone]     Follow Up Details:  1 week; please schedule appointment prior to patient's discharge         Discharge Follow-up with Specified Provider: Dr Graham; 1 week; please schedule appointment prior to patient's discharge   As directed      To:  Dr Graham; 1 week; please schedule appointment prior to patient's discharge         Discharge Follow-up with Specified Provider: STEPHANIE in dialysis   As directed      To:  STEPHANIE in dialysis                 Electronically signed  by FARHAN Bruce, 08/12/20, 1:03 PM.    Time Spent on Discharge:  I spent  40  minutes on this discharge activity which included: face-to-face encounter with the patient, reviewing the data in the system, coordination of the care with the nursing staff as well as consultants, documentation, and entering orders.

## 2020-08-12 NOTE — DISCHARGE PLACEMENT REQUEST
"Danielle Mallory (55 y.o. Male)     [place] [facility] [place]  From [facility]() [phone]    Date of Birth Social Security Number Address Home Phone MRN    1965  [address] [facility] [address] [phone] [id number]    [employer] Marital Status          Yazidi        Admission Date Admission Type Admitting Provider Attending Provider Department, Room/Bed    20 Emergency Mustapha Ramires MD  [address], S573/1    Discharge Date Discharge Disposition Discharge Destination        2020 Home or Self Care              Attending Provider:  (none)   Allergies:  No Known Allergies    Isolation:  None   Infection:  None   Code Status:  CPR    Ht:  182.9 cm (72.01\")   Wt:  126 kg (276 lb 14.4 oz)    Admission Cmt:  None   Principal Problem:  Hypotension [I95.9]                 Active Insurance as of 2020     Primary Coverage     Payor Plan Insurance Group Employer/Plan Group    [place] MEDICAID [facility] MEDICAID [id number]     Payor Plan Address Payor Plan Phone Number Payor Plan Fax Number Effective Dates    [address] [address] [phone]  2018 - None Entered    [facility] [address]       Subscriber Name Subscriber Birth Date Member ID       DANIELLE MALLORY 1965 [id number]                 Emergency Contacts      (Rel.) Home Phone Work Phone Mobile Phone    MELISSA MALLORY (Daughter) -- -- [phone]    Alfonso Mallory (Son) -- -- [phone]               Discharge Summary      Kendra Rodriguez APRN at 20 1302              [facility]  DISCHARGE SUMMARY    Patient Name: Danielle Mallory  : 1965  MRN: [id number]    Date of Admission: 2020  4:39 PM  Date of Discharge: 2020  Primary Care Physician: Raymundo Salgado MD    Consults     Date and Time Order Name Status Description    8/10/2020 0952 Inpatient Diagnostic Radiology Consult      2020 0801 Inpatient Infectious Diseases Consult Completed  "    7/29/2020 0758 Inpatient Palliative Care MD Consult Completed     7/13/2020 1126 Inpatient Hematology & Oncology Consult      7/8/2020 1158 Inpatient Hematology & Oncology Consult Completed     7/8/2020 1156 Inpatient Gastroenterology Consult Completed     7/8/2020 0030 Inpatient Nephrology Consult Completed     7/7/2020 1722 Inpatient Palliative Care MD Consult Completed         Hospital Course   Presenting Problem:   Acute renal failure (ARF) (CMS/HCC) [N17.9]  Hypotension [I95.9]    Active Hospital Problems    Diagnosis  POA   • **Hypotension [I95.9]  Yes   • Liver cirrhosis w/ ascites  [K74.60, R18.8]  Yes   • ARF likely 2* ATN  [N17.9]  Yes   • Chemotherapy-induced thrombocytopenia [D69.59, T45.1X5A]  Yes   • GIB w/ hematemesis [K92.0]  No   • Acute blood loss anemia [D62]  Yes   • Rectal bleeding [K62.5]  Yes   • Constipation due to opioid therapy [K59.03, T40.2X5A]  Yes   • Pain, cancer [G89.3]  Yes   • 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 male with pmh significant for HTN, COPD, pancreatitis, tonsillar cancer s/p tonsillectomy undergoing chemo and radiation that presents from palliative office for intractable n/v x 4 days. Patient states not keeping much fluid down. Has been able to take medications over past 4 days but no food intake. Having some weight loss in past few mos, but not sure amount. Endorses weeks of constipation without adequate BM, increasing fatigue/ weakness, dizziness, decreased urination in past 4 days. In th ED, found to have creatinine of 7.46 (last 3.85 on 6/24), lactate 2.7, bili 2.8 and elevated LFTs. Patient to be admitted for further evaluation and  treatment.    Progressive AUDREY w/ volume overload, now s/p dialysis catheter placement receiving  Hemodialysis  -Tunneled dialysis cath placed by Dr. Gore on 7/24/20  - removed on 8/3 with placement of temporary line  - 08/10 14.5 Fr tunneled dialysis catheter placed by Dr Gore   - echo 7/7/20: normal EF, normal valves  - nephrology following   - Increase Midodrine to 20 mg TID on dialysis days   - Difficulty removing volume with cirrhosis   - GABRIELA with no valvular vegetation seen, no evidence of endocarditis     Fever   Enterobacteriaceae bacteremia   - BCx +citrobacter koseri 7/31 x4 bottles  - Started merrem (8/1), changed to fortaz post HD on 8/5 and to receive in dialysis MWF through 8/17/2020  - ID follow up in 1 week  - Abd US with no acute findings      Right neck/supraclavicular hematoma around catheter site  Coagulopathy   - s/p platelets and FFP and Vit K  - R IJ removed 8/1     S/p GI bleed (due to esophagitis and portal colopathy related to cirrhosis)  -7/9/20: EGD: mild esophagitis, grade 1 esophageal varices, lower 1/3 esophaguts, portal htn gastropathy noted  - 7/10/20: C-scope: rectal oozing consistent w/ portal colopathy, sigmoid diverticulosis noted  - Continue PPI BID  -- follow up [facility] 4 weeks; repeat egd 1 year for varix surveillance  --Hgb 7.9  -CBC in AM      EDUARDO cirrhosis (w/ esophageal varices, portal htn gastopathy, and portal colopathy  - continue lactulose, diuretics, midodrine   - repeat paracentesis 7/31   -CMP in AM  -If necessary we may need to consult GI     Hx stage II HPV related oropharyngeal cancer   -formerly received chemotherapy & radiotherapy; not candidate currently for any therapies  -regarding tonsillar cancer, per oncology patient is not candidate for further therapies for tonsillar cancer (given his reina)  --thrombocytopenia due to splenomegaly from cirrhosis rather than malignancy  --f/u Dr. Campbell as outpatient     Cytopenias (thrombocytopenia & anemia)   -felt due to cirrhosis/hypersplenism per heme-onc  -CBC in AM      Weakness   -PT/OT consult    Patient  states that he is feeling much better and ready for discharge home.  Patient still having episodes of hypotension and has a lot of ascites.  Palliative spoke with him during this hospitalization about goals of care making him a DNR, but patient want to remain a full code.  Patient will be discharged home with dialysis scheduled on Monday Wednesday Friday.  Patient had dialysis today prior to discharge and will need to be at the dialysis center on Friday, which he is aware.  Discharge follow-up recommendations and appointments are listed below.    Discharge Follow Up Recommendations for outpatient labs/diagnostics:  --Follow-up with your family doctor in 1 week  --Follow-up with NAL in dialysis  --Follow-up with Dr. Graham in 1 week  --Continue your IV Fortaz 1 g after dialysis on Monday, Wednesday, Friday through 8/17/2020  --Take Midodrine as ordered; notice different dose on dialysis days  Day of Discharge   HPI:   Patient lying in dialysis bed in NAD.  Patient states he is ready to be discharged home and knows he needs to go to dialysis center on Friday. +BM.  No adverse events overnight.  No family in the room.    Review of Systems  Gen- No fevers, chills  CV- No chest pain, palpitations  Resp- No cough, dyspnea  GI- No N/V/D, abd pain  All other systems have been reviewed and the pertinent positives and negatives are listed above in the HPI and ROS    Vital Signs:   Temp:  [98.1 °F (36.7 °C)-98.5 °F (36.9 °C)] 98.5 °F (36.9 °C)  Heart Rate:  [] 98  Resp:  [16] 16  BP: ()/(53-87) 127/81   Physical Exam:  Constitutional: Alert, WD, obese ill-appearing male in NAD  Eyes: EOMI, sclerae anicteric, no conjunctival injection  Head: NCAT  ENT: [place], moist mucous membranes  Respiratory: Non-labored, symmetrical chest expansion, CTAB  Cardiovascular: RRR, no M/R/G, +DP pulses bilaterally; dialysis catheter in right chest  Gastrointestinal: Obese, Soft, NT, major distension with ascites,   +BS  Musculoskeletal: KIRK; +2 LE edema bilaterally  Neurologic: Oriented x4, strength symmetric in all extremities, follows all commands, speech clear  Skin: No rashes on exposed skin  Psychiatric: Pleasant and cooperative; normal affect  Pertinent  and/or Most Recent Results     Results from last 7 days   Lab Units 08/12/20 0445 08/11/20 0644 08/11/20  0436 08/10/20  0449 08/09/20  0522 08/08/20  0517 08/06/20  0424   WBC 10*3/mm3 5.80  --  6.64 5.99 5.43 4.72 6.44   HEMOGLOBIN g/dL 7.9*  --  7.9* 8.5* 8.8* 8.1* 9.3*   HEMATOCRIT % 23.8*  --  24.1* 25.8* 26.4* 24.5* 28.4*   PLATELETS 10*3/mm3 72*  --  78* 38* 73* 63* 72*   SODIUM mmol/L 134* 133*  --  134* 136 134* 135*   POTASSIUM mmol/L 4.7 4.2  --  4.2 4.3 3.7 3.9   CHLORIDE mmol/L 100 101  --  101 102 102 102   CO2 mmol/L 25.0 23.0  --  22.0 23.0 23.0 22.0   BUN mg/dL 34* 32*  --  37* 28* 24* 23*   CREATININE mg/dL 4.35* 3.70*  --  4.89* 4.35* 3.19* 3.43*   GLUCOSE mg/dL 118* 120*  --  134* 129* 134* 140*   CALCIUM mg/dL 8.8 8.5*  --  8.9 9.0 8.6 8.6     Results from last 7 days   Lab Units 08/12/20  0445 08/11/20  0644 08/10/20  0449 08/09/20  0522 08/08/20 0517 08/07/20 0422 08/06/20  0424   BILIRUBIN mg/dL 3.3* 3.1* 3.1* 3.1* 3.0* 3.0* 3.9*   ALK PHOS U/L 153* 153* 158* 162* 137* 144* 175*   ALT (SGPT) U/L 18 19 21 19 17 21 28   AST (SGOT) U/L 71* 78* 86* 85* 72* 83* 102*           Invalid input(s): TG, LDLCALC, LDLREALC  Results from last 7 days   Lab Units 08/08/20  0517 08/06/20  1158 08/06/20  0443   LACTATE mmol/L 2.5* 2.6* 3.7*       Brief Urine Lab Results  (Last result in the past 365 days)      Color   Clarity   Blood   Leuk Est   Nitrite   Protein   CREAT   Urine HCG        07/07/20 2247             140.1       07/07/20 2247 Yellow Clear Negative Negative Negative Negative               Microbiology Results Abnormal     Procedure Component Value - Date/Time    Blood Culture - Blood, Arm, Left [[id number]] Collected:  08/05/20 1547    Lab Status:   Final result Specimen:  Blood from Arm, Left Updated:  08/10/20 1615     Blood Culture No growth at 5 days    Blood Culture - Blood, Arm, Left [[id number]] Collected:  08/05/20 1204    Lab Status:  Final result Specimen:  Blood from Arm, Left Updated:  08/10/20 1245     Blood Culture No growth at 5 days    Anaerobic Culture - Body Fluid, Peritoneum [[id number]] Collected:  07/31/20 1422    Lab Status:  Final result Specimen:  Body Fluid from Peritoneum Updated:  08/05/20 1201     Anaerobic Culture No anaerobes isolated at 5 days    Body Fluid Culture - Body Fluid, Peritoneum [[id number]] Collected:  07/31/20 1422    Lab Status:  Final result Specimen:  Body Fluid from Peritoneum Updated:  08/03/20 0751     Body Fluid Culture No growth at 3 days     Gram Stain No organisms seen      Occasional WBCs per low power field    Blood Culture - Blood, Arm, Left [[id number]]  (Abnormal) Collected:  07/31/20 0832    Lab Status:  Final result Specimen:  Blood from Arm, Left Updated:  08/03/20 0649     Blood Culture Citrobacter koseri     Comment: Refer to previous blood culture collected on 7/31/2020 0830 for MICs.          Isolated from Aerobic and Anaerobic Bottles     Gram Stain Anaerobic Bottle Gram negative bacilli      Aerobic Bottle Gram negative bacilli    Blood Culture - Blood, Blood, PICC Line [[id number]]  (Abnormal)  (Susceptibility) Collected:  07/31/20 0830    Lab Status:  Final result Specimen:  Blood, PICC Line Updated:  08/03/20 0649     Blood Culture Citrobacter koseri     Isolated from Aerobic and Anaerobic Bottles     Gram Stain Anaerobic Bottle Gram negative bacilli      Aerobic Bottle Gram negative bacilli    Susceptibility      Citrobacter koseri     KALANI     Cefepime Susceptible     Ceftazidime Susceptible     Ceftriaxone Susceptible     Gentamicin Susceptible     Levofloxacin Susceptible     Piperacillin + Tazobactam Susceptible     Trimethoprim + Sulfamethoxazole Susceptible                Susceptibility  Comments     Citrobacter koseri    Cefazolin sensitivity will not be reported for Enterobacteriaceae in non-urine isolates. If cefazolin is preferred, please call the microbiology lab to request an E-test.  With the exception of urinary-sourced infections, aminoglycosides should not be used as monotherapy.             Blood Culture ID, PCR - Blood, Blood, PICC Line [[id number]]  (Abnormal) Collected:  07/31/20 0830    Lab Status:  Final result Specimen:  Blood, PICC Line Updated:  08/01/20 0022     BCID, PCR Enterobacteriaceae Group. Identification by BCID PCR.    Body Fluid Culture - Body Fluid, Peritoneum [[id number]] Collected:  07/15/20 1038    Lab Status:  Final result Specimen:  Body Fluid from Peritoneum Updated:  07/18/20 0710     Body Fluid Culture No growth at 3 days     Gram Stain Few (2+) WBCs seen      No organisms seen    Blood Culture - Blood, Arm, Right [[id number]] Collected:  07/08/20 1748    Lab Status:  Final result Specimen:  Blood from Arm, Right Updated:  07/13/20 1800     Blood Culture No growth at 5 days    Blood Culture - Blood, Hand, Left [[id number]] Collected:  07/08/20 1744    Lab Status:  Final result Specimen:  Blood from Hand, Left Updated:  07/13/20 1800     Blood Culture No growth at 5 days    Body Fluid Culture - Body Fluid, Peritoneum [[id number]] Collected:  07/10/20 0916    Lab Status:  Final result Specimen:  Body Fluid from Peritoneum Updated:  07/13/20 0700     Body Fluid Culture No growth at 3 days     Gram Stain Moderate (3+) WBCs seen      No organisms seen    COVID PRE-OP / PRE-PROCEDURE SCREENING ORDER (NO ISOLATION) - Swab, Nasopharynx [[id number]] Collected:  07/08/20 2009    Lab Status:  Final result Specimen:  Swab from Nasopharynx Updated:  07/08/20 2119    Narrative:       The following orders were created for panel order COVID PRE-OP / PRE-PROCEDURE SCREENING ORDER (NO ISOLATION) - Swab, Nasopharynx.  Procedure                               Abnormality         Status                      ---------                               -----------         ------                     COVID-19,CEPHEID,CLAUDIA IN-...[[id number]]  Normal              Final result                 Please view results for these tests on the individual orders.    COVID-19,CEPHEID,CLAUDIA IN-HOUSE(OR EMERGENT/ADD-ON),NP SWAB IN TRANSPORT MEDIA 3-4 HR TAT - Swab, Nasopharynx [[id number]]  (Normal) Collected:  07/08/20 2009    Lab Status:  Final result Specimen:  Swab from Nasopharynx Updated:  07/08/20 2119     COVID19 Not Detected    Narrative:       Fact sheet for providers: https://www.fda.gov/media/938583/download     Fact sheet for patients: https://www.fda.gov/media/097732/download          Imaging Results (All)     Procedure Component Value Units Date/Time    IR Tunneled Catheter [[id number]] Collected:  08/10/20 1511     Updated:  08/10/20 1531    Narrative:       PROCEDURE: Tunneled dialysis catheter placement      Procedural Personnel  Attending physician(s): ABELARDO Gore M.D.  Fellow physician(s): None  Resident physician(s): None  Advanced practice provider(s): None     Pre-procedure diagnosis: Renal failure requiring hemodialysis  Post-procedure diagnosis: Same  Indication (QCDR): Previous tunneled hemodialysis catheter infected and  removed. Temporary placed. Patient returns to tunneled hemodialysis  catheter.  Additional clinical history: None     Complications: No immediate complications.       Impression:          Insertion of right-sided tunneled dialysis catheter, with tip in the  expected location of the cavoatrial junction.     Plan:      The catheter may be used immediately.  _______________________________________________________________     PROCEDURE SUMMARY:  - Venous access with ultrasound guidance  - Tunneled dialysis catheter insertion with fluoroscopic guidance  - Additional procedure(s): None     PROCEDURE DETAILS:     Pre-procedure  Consent: Informed consent for the procedure including risks,  benefits  and alternatives was obtained and time-out was performed prior to the  procedure.  Preparation (MIPS): The site was prepared and draped using all elements  of maximal sterile barrier technique including sterile gloves, sterile  gown, cap, mask, large sterile sheet, sterile ultrasound probe cover,  hand hygiene and cutaneous antisepsis with 2% chlorhexidine.   Medical reason for site preparation exception (MIPS): Not applicable     Anesthesia/sedation  Level of anesthesia/sedation: Moderate sedation (conscious sedation)  Anesthesia/sedation administered by: Independent trained observer under  attending supervision with continuous monitoring of the patient?s level  of consciousness and physiologic status  Total intra-service sedation time (minutes): 27     Access  Local anesthesia was administered. The vessel was sonographically  evaluated and determined to be patent. Real time ultrasound was used to  visualize needle entry into the vessel and a permanent image obtained.  Vein accessed (QCDR): Right internal jugular vein  Internal jugular vein patency (QCDR): Patent  Access technique: Micropuncture technique under ultrasound guidance     Venography  Indication for venography: Not applicable  Catheter tip position for venography: Not applicable  Findings: Not applicable     Catheter placement  An incision was made near the venous access site and the catheter was  tunneled subcutaneously to the venous access site. The catheter was  advanced via a peel-away sheath into the vein under fluoroscopic  guidance. Catheter tip location was fluoroscopically verified and a  permanent image was stored.  Catheter placed: 14.5 [place] SlideJar SI Silver Ion Catheter.  Lot No. [id number]  Catheter cuff-to-tip length (cm): 19 (initially a 23 cm catheter placed,  this was replaced with a 19)  Catheter flush: Heparin (1000 units per mL)     Closure  A sterile dressing was applied.  Access site closure technique:  Tissue adhesive  Catheter securement technique: Non-absorbable suture     Contrast  Contrast agent: None  Contrast volume (mL): 0     Radiation Dose  Fluoroscopy time: 0.5 minutes   Dose: 17.8 mGy     Additional Details  Additional description of procedure: None  Equipment details: None  Specimens removed: None  Estimated blood loss (mL): Less than 10  Standardized report: TunneledDialysisCatheter     Attestation  I was present and scrubbed for the entire procedure. Imaging reviewed.  Agree with final report as written.     This report was finalized on 8/10/2020 3:22 PM by Rojas Gore.       IR Tunneled Catheter Removal [[id number]] Collected:  08/03/20 1657     Updated:  08/03/20 1711    Narrative:       PROCEDURE:   Non-tunneled central venous catheter placement  Tunneled catheter removal     Procedural Personnel  Attending physician(s): ABELARDO Gore M.D.  Fellow physician(s): None  Resident physician(s): None  Advanced practice provider(s): None     Pre-procedure diagnosis: Enterobacteriaceae bacteremia   Post-procedure diagnosis: Same  Indication: Therapeutic  Additional clinical history: None     Complications: No immediate complications.       Impression:          Insertion of right-sided non-tunneled triple-lumen hemodialysis  catheter, with tip in the expected location of the cavoatrial junction.  Removal of tunneled hemodialysis catheter     Plan:      The catheter may be used immediately.  _______________________________________________________________     PROCEDURE SUMMARY:  - Venous access with ultrasound guidance  - Non-tunneled central venous catheter insertion with fluoroscopic  guidance  - Additional procedure(s): None     PROCEDURE DETAILS:     Pre-procedure  Consent: Informed consent for the procedure including risks, benefits  and alternatives was obtained and time-out was performed prior to the  procedure.  Preparation (MIPS): The site was prepared and draped using all elements  of maximal  sterile barrier technique including sterile gloves, sterile  gown, cap, mask, large sterile sheet, sterile ultrasound probe cover,  hand hygiene and cutaneous antisepsis with 2% chlorhexidine.   Medical reason for site preparation exception (MIPS): Not applicable     Anesthesia/sedation  Level of anesthesia/sedation: Moderate sedation (conscious sedation)  Anesthesia/sedation administered by: Independent trained observer under  attending supervision with continuous monitoring of the patient?s level  of consciousness and physiologic status  Total intra-service sedation time (minutes): 18     Access  Local anesthesia was administered. The vessel was sonographically  evaluated and determined to be patent. Real time ultrasound was used to  visualize needle entry into the vessel and a permanent image obtained.  Vein accessed: Right internal jugular vein  Access technique: Micropuncture technique under ultrasound guidance     Catheter placement  The access site was dilated and the catheter was placed into the vein  over a wire  under fluoroscopic guidance.  The catheter tip location was  fluoroscopically verified and a permanent image was stored.. A sterile  dressing was applied.  Catheter placed: Telerad [employer]kar Acute Triple Lumen Catheter. Lot No  [id number].  Catheter size (Persian): 12  Catheter length (cm): 16  Catheter flush: Dialysis lumens flushed with heparin (1000 units per  mL). Venous access lumen flushed with heparin (100 units per mL)  Catheter securement technique: 2-0 silk suture     Tunneled catheter removal  Local anesthesia was administered. The catheter was removed with  traction.     Contrast  Contrast agent: None  Contrast volume (mL): 0     Radiation Dose  Fluoroscopy time: 0.1 minutes  Dose: 1.7 mGy     Additional Details  Additional description of procedure: None  Equipment details: None  Specimens removed: None  Estimated blood loss (mL): Less than 10  Standardized report:  CVA_NonTunneledCatheter     Attestation  I was present and scrubbed for entire procedure. Imaging reviewed. Agree  with final report as written..     This report was finalized on 8/3/2020 5:08 PM by Rojas Gore.       IR insert non-tunneled CVC 5+ [[id number]] Collected:  08/03/20 1657     Updated:  08/03/20 1711    Narrative:       PROCEDURE:   Non-tunneled central venous catheter placement  Tunneled catheter removal     Procedural Personnel  Attending physician(s): ABELARDO Gore M.D.  Fellow physician(s): None  Resident physician(s): None  Advanced practice provider(s): None     Pre-procedure diagnosis: Enterobacteriaceae bacteremia   Post-procedure diagnosis: Same  Indication: Therapeutic  Additional clinical history: None     Complications: No immediate complications.       Impression:          Insertion of right-sided non-tunneled triple-lumen hemodialysis  catheter, with tip in the expected location of the cavoatrial junction.  Removal of tunneled hemodialysis catheter     Plan:      The catheter may be used immediately.  _______________________________________________________________     PROCEDURE SUMMARY:  - Venous access with ultrasound guidance  - Non-tunneled central venous catheter insertion with fluoroscopic  guidance  - Additional procedure(s): None     PROCEDURE DETAILS:     Pre-procedure  Consent: Informed consent for the procedure including risks, benefits  and alternatives was obtained and time-out was performed prior to the  procedure.  Preparation (MIPS): The site was prepared and draped using all elements  of maximal sterile barrier technique including sterile gloves, sterile  gown, cap, mask, large sterile sheet, sterile ultrasound probe cover,  hand hygiene and cutaneous antisepsis with 2% chlorhexidine.   Medical reason for site preparation exception (MIPS): Not applicable     Anesthesia/sedation  Level of anesthesia/sedation: Moderate sedation (conscious sedation)  Anesthesia/sedation  administered by: Independent trained observer under  attending supervision with continuous monitoring of the patient?s level  of consciousness and physiologic status  Total intra-service sedation time (minutes): 18     Access  Local anesthesia was administered. The vessel was sonographically  evaluated and determined to be patent. Real time ultrasound was used to  visualize needle entry into the vessel and a permanent image obtained.  Vein accessed: Right internal jugular vein  Access technique: Micropuncture technique under ultrasound guidance     Catheter placement  The access site was dilated and the catheter was placed into the vein  over a wire  under fluoroscopic guidance.  The catheter tip location was  fluoroscopically verified and a permanent image was stored.. A sterile  dressing was applied.  Catheter placed: NTQ-Data Acute Triple Lumen Catheter. Lot No  [id number].  Catheter size (Romanian): 12  Catheter length (cm): 16  Catheter flush: Dialysis lumens flushed with heparin (1000 units per  mL). Venous access lumen flushed with heparin (100 units per mL)  Catheter securement technique: 2-0 silk suture     Tunneled catheter removal  Local anesthesia was administered. The catheter was removed with  traction.     Contrast  Contrast agent: None  Contrast volume (mL): 0     Radiation Dose  Fluoroscopy time: 0.1 minutes  Dose: 1.7 mGy     Additional Details  Additional description of procedure: None  Equipment details: None  Specimens removed: None  Estimated blood loss (mL): Less than 10  Standardized report: CVA_NonTunneledCatheter     Attestation  I was present and scrubbed for entire procedure. Imaging reviewed. Agree  with final report as written..     This report was finalized on 8/3/2020 5:08 PM by Rojas Gore.       US Abdomen Limited [[id number]] Collected:  08/02/20 1010     Updated:  08/03/20 1237    Narrative:       EXAMINATION: US ABDOMEN LIMITED - 08/02/2020     INDICATION:  I95.89-Other  hypotension; E86.1-Hypovolemia;  K92.0-Hematemesis; D62-Acute posthemorrhagic anemia; K62.5-Hemorrhage of  anus and rectum; Z74.09-Other reduced mobility; Z78.9-Other specified  health status; Z74.09-Other reduced mobility. Upper abdominal pain.     TECHNIQUE: Sonographic imaging is obtained of the right upper quadrant  in both the sagittal and transverse planes.     COMPARISON: NONE     FINDINGS: Pancreas is not visualized.. There is free fluid identified  throughout the upper abdomen. The liver is increased in echogenicity  with some lobulation identified of the contour suggesting cirrhosis. The  gallbladder fossa is unremarkable. The common bile duct measures 8 mm.  The right kidney is normal in size, configuration, and texture measuring  in length from pole to pole 11.1 cm. No solid cortical mass or renal  cortical cysts seen within the right kidney. No hydronephrosis or  nephrolithiasis.       Impression:       Cirrhotic appearance of the liver with no underlying mass.  Free fluid identified throughout the upper abdomen. The pancreas is not  seen. The remainder the upper abdomen is unremarkable.      DICTATED:   08/02/2020  EDITED/ls :   08/02/2020      This report was finalized on 8/3/2020 12:34 PM by Dr. Janeth Atkins MD.       CT Abdomen Pelvis Without Contrast [[id number]] Collected:  08/01/20 0002     Updated:  08/01/20 0005    Narrative:       CT Abdomen Pelvis WO    INDICATION:   Bacteremia and hypotension. Status post paracentesis today.    TECHNIQUE:   CT of the abdomen and pelvis without IV contrast. Coronal and sagittal reconstructions were obtained.  Radiation dose reduction techniques included automated exposure control or exposure modulation based on body size. Count of known CT and cardiac nuc  med studies performed in previous 12 months: 6.     COMPARISON:   7/7/2020    FINDINGS:  Abdomen: Normal caliber aorta. The spleen is enlarged. The liver is cirrhotic and there is a small volume of  ascites in the abdomen and pelvis. Negative adrenal glands. Atrophic pancreas and surgical absence of the gallbladder. No focal hepatic mass  although study sensitivity is limited. Nonobstructed kidneys. No adenopathy.    Pelvis: Negative bladder and prostate gland. Diverticulosis. Appendix not clearly demonstrated but no distinct evidence of acute appendicitis. Fat-containing left inguinal hernia. No suspicious bone lesion. Chronic antegrade listhesis of L4 on L5 grade  1/2.      Impression:         1. Cirrhosis with splenomegaly and ascites.  2. Surgical absence of the gallbladder.  3. Diverticulosis.          Signer Name: Clark Smyth MD   Signed: 8/1/2020 12:02 AM   Workstation Name: [facility]    Radiology [facility]    CT Chest Without Contrast [[id number]] Collected:  07/31/20 2359     Updated:  08/01/20 0001    Narrative:       CT Chest WO    INDICATION:   Bacteremia. Hypertension. Status post paracentesis today.    TECHNIQUE:   CT of the thorax without IV contrast. Coronal and sagittal reconstructions were obtained.  Radiation dose reduction techniques included automated exposure control or exposure modulation based on body size. Count of known CT and cardiac nuc med studies  performed in previous 12 months: 6.     COMPARISON:   7/7/2020    FINDINGS:  Lungs are essentially clear. Old healed granulomatous disease no pleural effusion. No pericardial effusion. There is an enlarged anterior pericardial lymph node measuring up to 15 mm. Included upper abdomen demonstrates cirrhosis and ascites with  probable splenomegaly. The abdomen CT has been dictated separately. No suspicious bone lesion.      Impression:         1. The lungs are clear. Mildly enlarged anterior pericardial lymph node, indeterminate.  2. Cirrhosis with ascites.    Signer Name: Clark Smyth MD   Signed: 7/31/2020 11:59 PM   Workstation Name: [facility]    Radiology [facility]    CT Guided Paracentesis  [[id number]] Collected:  07/31/20 2149     Updated:  07/31/20 2154    Narrative:       PROCEDURE: CT-guided paracentesis     Procedural Personnel  Attending physician(s): ABELARDO Gore M.D.  Fellow physician(s): None  Resident physician(s): None  Advanced practice provider(s): None     Pre-procedure diagnosis: Liver cirrhosis; recurrent large volume  symptomatic ascites?  Post-procedure diagnosis: Same  Indication: Diagnostic/therapeutic  Additional clinical history: None     Complications: No immediate complications.       Impression:          CT-guided paracentesis with [place] of 67246 mL of serous fluid.  Portion sent for requested laboratory analysis     Plan:      Resume care by clinical team.  _______________________________________________________________     PROCEDURE SUMMARY:  - Limited CT  - CT-guided paracentesis  - Additional procedure(s): None     PROCEDURE DETAILS:     Pre-procedure  Consent: Informed consent for the procedure including risks, benefits  and alternatives was obtained and time-out was performed prior to the  procedure.  Preparation: The site was prepared and draped using maximal sterile  barrier technique including cutaneous antisepsis.     Anesthesia/sedation  Level of anesthesia/sedation: No sedation     Initial abdominal CT  Initial abdominal CT was performed.  Findings: Large volume ascites. A safe window for paracentesis was  identified.     Paracentesis  Local anesthesia was administered. The peritoneal cavity was accessed  and needle position confirmed by CT. Ascites was drained. The catheter  was then removed, and a sterile bandage was applied.  Paracentesis access technique: Trocar  Catheter placed: 8.5 Mauritian  Post-drainage CT: Near resolution of ascites.     Radiation Dose  CT dose length product (mGy-cm): 865      Additional Details  Additional description of procedure: None  Equipment details: None  Specimens removed: Abdominal fluid  Estimated blood loss (mL): Less than  10  Standardized report: Paracentesis     Attestation  I was present and scrubbed for the entire procedure. Imaging reviewed.  Agree with final report as written.     This report was finalized on 7/31/2020 9:51 PM by Rojas Gore.       XR Chest 1 View [[id number]] Collected:  07/31/20 2035     Updated:  07/31/20 2037    Narrative:       CR Chest 1 Vw    INDICATION:   IJ placement     COMPARISON:    Chest x-ray 5/13/2020.    FINDINGS:  Single portable AP view(s) of the chest.    There are 2 central venous catheters from a right IJ approach. Small caliber central venous catheter terminates distal SVC. The large caliber central venous catheter terminates distal SVC right atrial junction level. There is no pneumothorax. Both are  new.    Cardiac silhouette is normal in size. There is borderline central vascular congestion.. Please correlate for evidence for mild volume overload. There is no pleural effusion. There is no acute infiltrate.       Impression:         1. No pneumothorax.  2. Right IJ approach central venous catheter terminates distal SVC level. Second right IJ approach large caliber introducer type catheter terminates distal SVC/right atrial junction level.  3. Suspect borderline vascular congestion    Signer Name: Caitlin Talavera MD   Signed: 7/31/2020 8:35 PM   Workstation Name: [email or web address]    [facility]    IR Tunneled Catheter [[id number]] Collected:  07/24/20 1430     Updated:  07/24/20 1529    Narrative:       PROCEDURE: Non-tunneled central venous catheter placement     Procedural Personnel  Attending physician(s): ABELARDO Gore M.D.  Fellow physician(s): None  Resident physician(s): None  Advanced practice provider(s): None     Pre-procedure diagnosis: Renal function continues to deteriorate;  difficult IV access  Post-procedure diagnosis: Same  Indication: Initiate hemodialysis, venous access for therapy  Additional clinical history: None     Complications: No immediate  complications.       Impression:          Insertion of right-sided non-tunneled triple-lumen central line, with  tip in the expected location of the cavoatrial junction.  Insertion of right-sided tunneled dual-lumen hemodialysis catheter with  tip in the expected location of the cavoatrial junction     Plan:      The catheters may be used immediately.  _______________________________________________________________     PROCEDURE SUMMARY:  - Venous access with ultrasound guidance  - Non-tunneled central venous catheter insertion with ultrasound  fluoroscopic guidance  - Additional procedure(s): Tunneled central venous catheter placement  with ultrasound and fluoroscopic      PROCEDURE DETAILS:     Pre-procedure  Consent: Informed consent for the procedure including risks, benefits  and alternatives was obtained and time-out was performed prior to the  procedure.  Preparation (MIPS): The site was prepared and draped using all elements  of maximal sterile barrier technique including sterile gloves, sterile  gown, cap, mask, large sterile sheet, sterile ultrasound probe cover,  hand hygiene and cutaneous antisepsis with 2% chlorhexidine.   Medical reason for site preparation exception (MIPS): Not applicable     Attempts were made to access right or left external jugular vein.  Difficult access with patient body habitus and respiratory motion.  Discussed with Dr. Miles to place central line.     Central line  Access  Local anesthesia was administered. The vessel was sonographically  evaluated and determined to be patent. Real time ultrasound was used to  visualize needle entry into the vessel and a permanent image obtained.  Vein accessed: Right internal jugular vein  Access technique: Micropuncture technique under ultrasound guidance     Catheter placement  The access site was dilated and the catheter was placed into the vein  over a wire  under fluoroscopic guidance.  The catheter tip location was  fluoroscopically  verified and a permanent image was stored.. A sterile  dressing was applied.  Catheter placed: Vantex - Cental Venous Catheter. Antimicrobial with  Oligon Technology  Catheter size (Serbian): 7  Catheter length (cm): 20  Catheter flush: Heparin (100 units per mL)  Catheter securement technique: 3-0 silk suture     Now with venous access established, sedation started for tunneled  hemodialysis catheter placement     Tunneled hemodialysis catheter  Anesthesia/sedation  Level of anesthesia/sedation: Moderate sedation (conscious sedation)  Anesthesia/sedation administered by: Independent trained observer under  attending supervision with continuous monitoring of the patient?s level  of consciousness and physiologic status  Total intra-service sedation time (minutes): 23     Access  Local anesthesia was administered. The vessel was sonographically  evaluated and determined to be patent. Real time ultrasound was used to  visualize needle entry into the vessel and a permanent image obtained.  Vein accessed (QCDR): Right internal jugular vein  Internal jugular vein patency (QCDR): Patent  Access technique: Micropuncture technique under ultrasound guidance     Catheter placement  An incision was made near the venous access site and the catheter was  tunneled subcutaneously to the venous access site. The catheter was  advanced via a peel-away sheath into the vein under fluoroscopic  guidance. Catheter tip location was fluoroscopically verified and a  permanent image was stored.  Catheter placed: 14.5 Serbian [employer] SI Silver Ion Catheter  Catheter cuff-to-tip length (cm): 19  Catheter flush: Heparin (1000 units per mL)     Closure  A sterile dressing was applied.  Access site closure technique: Tissue adhesive  Catheter securement technique: Non-absorbable suture     Contrast  Contrast agent: None  Contrast volume (mL): 0     Radiation Dose  Fluoroscopy time: 0.5 minutes   Dose: 17.8 mGy      Additional  Details  Additional description of procedure: None  Equipment details: None  Specimens removed: None  Estimated blood loss (mL): Less than 10  Standardized report: CVA_NonTunneledCatheter     Attestation  I was present and scrubbed for entire procedure. Imaging reviewed. Agree  with final report as written.     This report was finalized on 7/24/2020 3:26 PM by Rojas Gore.       IR insert non-tunneled CVC 5+ [[id number]] Collected:  07/24/20 1430     Updated:  07/24/20 1529    Narrative:       PROCEDURE: Non-tunneled central venous catheter placement     Procedural Personnel  Attending physician(s): ABELARDO Gore M.D.  Fellow physician(s): None  Resident physician(s): None  Advanced practice provider(s): None     Pre-procedure diagnosis: Renal function continues to deteriorate;  difficult IV access  Post-procedure diagnosis: Same  Indication: Initiate hemodialysis, venous access for therapy  Additional clinical history: None     Complications: No immediate complications.       Impression:          Insertion of right-sided non-tunneled triple-lumen central line, with  tip in the expected location of the cavoatrial junction.  Insertion of right-sided tunneled dual-lumen hemodialysis catheter with  tip in the expected location of the cavoatrial junction     Plan:      The catheters may be used immediately.  _______________________________________________________________     PROCEDURE SUMMARY:  - Venous access with ultrasound guidance  - Non-tunneled central venous catheter insertion with ultrasound  fluoroscopic guidance  - Additional procedure(s): Tunneled central venous catheter placement  with ultrasound and fluoroscopic      PROCEDURE DETAILS:     Pre-procedure  Consent: Informed consent for the procedure including risks, benefits  and alternatives was obtained and time-out was performed prior to the  procedure.  Preparation (MIPS): The site was prepared and draped using all elements  of maximal sterile barrier  technique including sterile gloves, sterile  gown, cap, mask, large sterile sheet, sterile ultrasound probe cover,  hand hygiene and cutaneous antisepsis with 2% chlorhexidine.   Medical reason for site preparation exception (MIPS): Not applicable     Attempts were made to access right or left external jugular vein.  Difficult access with patient body habitus and respiratory motion.  Discussed with Dr. Miles to place central line.     Central line  Access  Local anesthesia was administered. The vessel was sonographically  evaluated and determined to be patent. Real time ultrasound was used to  visualize needle entry into the vessel and a permanent image obtained.  Vein accessed: Right internal jugular vein  Access technique: Micropuncture technique under ultrasound guidance     Catheter placement  The access site was dilated and the catheter was placed into the vein  over a wire  under fluoroscopic guidance.  The catheter tip location was  fluoroscopically verified and a permanent image was stored.. A sterile  dressing was applied.  Catheter placed: Vantex - Cental Venous Catheter. Antimicrobial with  Oligon Technology  Catheter size (Australian): 7  Catheter length (cm): 20  Catheter flush: Heparin (100 units per mL)  Catheter securement technique: 3-0 silk suture     Now with venous access established, sedation started for tunneled  hemodialysis catheter placement     Tunneled hemodialysis catheter  Anesthesia/sedation  Level of anesthesia/sedation: Moderate sedation (conscious sedation)  Anesthesia/sedation administered by: Independent trained observer under  attending supervision with continuous monitoring of the patient?s level  of consciousness and physiologic status  Total intra-service sedation time (minutes): 23     Access  Local anesthesia was administered. The vessel was sonographically  evaluated and determined to be patent. Real time ultrasound was used to  visualize needle entry into the vessel and a  permanent image obtained.  Vein accessed (QCDR): Right internal jugular vein  Internal jugular vein patency (QCDR): Patent  Access technique: Micropuncture technique under ultrasound guidance     Catheter placement  An incision was made near the venous access site and the catheter was  tunneled subcutaneously to the venous access site. The catheter was  advanced via a peel-away sheath into the vein under fluoroscopic  guidance. Catheter tip location was fluoroscopically verified and a  permanent image was stored.  Catheter placed: 14.5 Romanian [employer] SI Silver Ion Catheter  Catheter cuff-to-tip length (cm): 19  Catheter flush: Heparin (1000 units per mL)     Closure  A sterile dressing was applied.  Access site closure technique: Tissue adhesive  Catheter securement technique: Non-absorbable suture     Contrast  Contrast agent: None  Contrast volume (mL): 0     Radiation Dose  Fluoroscopy time: 0.5 minutes   Dose: 17.8 mGy      Additional Details  Additional description of procedure: None  Equipment details: None  Specimens removed: None  Estimated blood loss (mL): Less than 10  Standardized report: CVA_NonTunneledCatheter     Attestation  I was present and scrubbed for entire procedure. Imaging reviewed. Agree  with final report as written.     This report was finalized on 7/24/2020 3:26 PM by Rojas Gore.       CT Guided Paracentesis [[id number]] Collected:  07/15/20 1049     Updated:  07/15/20 1151    Narrative:       PROCEDURE: CT-guided paracentesis     Procedural Personnel  Attending physician(s): ABELARDO Gore M.D.  Fellow physician(s): None  Resident physician(s): None  Advanced practice provider(s): None     Pre-procedure diagnosis: Liver cirrhosis; recurrent large volume  symptomatic ascites?  Post-procedure diagnosis: Same  Indication: Diagnostic/therapeutic  Additional clinical history: None     Complications: No immediate complications.       Impression:          CT-guided paracentesis with  drainage of 5700 mL of serous fluid. Portion  sent for requested laboratory analysis     Plan:      Resume care by clinical team.  _______________________________________________________________     PROCEDURE SUMMARY:  - Limited CT  - CT-guided paracentesis  - Additional procedure(s): None     PROCEDURE DETAILS:     Pre-procedure  Consent: Informed consent for the procedure including risks, benefits  and alternatives was obtained and time-out was performed prior to the  procedure.  Preparation: The site was prepared and draped using maximal sterile  barrier technique including cutaneous antisepsis.     Anesthesia/sedation  Level of anesthesia/sedation: No sedation     Initial abdominal CT  Initial abdominal CT was performed.  Findings: Large volume ascites. A safe window for paracentesis was  identified.     Paracentesis  Local anesthesia was administered. The peritoneal cavity was accessed  and needle position confirmed by CT. Ascites was drained. The catheter  was then removed, and a sterile bandage was applied.  Paracentesis access technique: Trocar  Catheter placed: 8.5 Cypriot  Post-drainage CT: Near resolution of ascites.     Radiation Dose  CT dose length product (mGy-cm): 693      Additional Details  Additional description of procedure: None  Equipment details: None  Specimens removed: Abdominal fluid  Estimated blood loss (mL): Less than 10  Standardized report: Paracentesis     Attestation  I was present and scrubbed for the entire procedure. Imaging reviewed.  Agree with final report as written.     This report was finalized on 7/15/2020 11:47 AM by Rojas Gore.       CT Guided Paracentesis [[id number]] Collected:  07/10/20 1208     Updated:  07/10/20 1214    Narrative:       PROCEDURE: CT-guided paracentesis     Procedural Personnel  Attending physician(s): ABELARDO Gore M.D.  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  Post-procedure diagnosis: Same  Indication: Diagnostic/therapeutic  Additional clinical history: None     Complications: No immediate complications.       Impression:          CT-guided paracentesis with drainage of 4000 mL of serous fluid. Portion  sent for requested laboratory analysis     Plan:      Resume care by clinical team.  _______________________________________________________________     PROCEDURE SUMMARY:  - Limited CT  - CT-guided paracentesis  - Additional procedure(s): None     PROCEDURE DETAILS:     Pre-procedure  Consent: Informed consent for the procedure including risks, benefits  and alternatives was obtained and time-out was performed prior to the  procedure.  Preparation: The site was prepared and draped using maximal sterile  barrier technique including cutaneous antisepsis.     Anesthesia/sedation  Level of anesthesia/sedation: No sedation     Initial abdominal CT  Initial abdominal CT was performed.  Findings: Large volume ascites. A safe window for paracentesis was  identified.     Paracentesis  Local anesthesia was administered. The peritoneal cavity was accessed  and needle position confirmed by CT. Ascites was drained. The catheter  was then removed, and a sterile bandage was applied.  Paracentesis access technique: Trocar  Catheter placed: 8.5 Bulgarian  Post-drainage CT: Near resolution of ascites.     Radiation Dose  CT dose length product (mGy-cm): 1431      Additional Details  Additional description of procedure: None  Equipment details: None  Specimens removed: Abdominal fluid  Estimated blood loss (mL): Less than 10  Standardized report: Paracentesis     Attestation  I was present and scrubbed for the entire procedure. Imaging reviewed.  Agree with final report as written.     This report was finalized on 7/10/2020 12:10 PM by Rojas Gore.       US Renal Limited [[id number]]  Collected:  07/08/20 1517     Updated:  07/09/20 0755    Narrative:       EXAMINATION: US RENAL LIMITED- 07/08/2020     INDICATION: AUDREY     TECHNIQUE: Ultrasound kidneys and urinary bladder     COMPARISON: NONE     FINDINGS:      Right kidney measures 11.2 cm in length without hydronephrosis or  contour deforming mass with small 5 mm echogenic focus of likely  nonobstructing nephrolithiasis.     Left kidney measures 12.1 cm in length without hydronephrosis, contour  deforming mass or obvious calculi.     Urinary bladder appears within normal limits. Enlargement of the  adjacent prostate.     Ascites visualized.       Impression:       No acute sonographic findings within the visualized kidneys  specifically no hydronephrosis. Ascites visualized.     D:  07/08/2020  E:  07/08/2020         This report was finalized on 7/9/2020 7:52 AM by Dr. Jacobo Cain.       CT Abdomen Without Contrast [[id number]] Collected:  07/07/20 2227     Updated:  07/07/20 2229    Narrative:       CT Abdomen WO    INDICATION:   Intractable nausea and vomiting with elevated bilirubin. The renal failure.    TECHNIQUE:   CT of the abdomen without IV contrast. Coronal and sagittal reconstructions were obtained.  Radiation dose reduction techniques included automated exposure control or exposure modulation based on body size. Count of known CT and cardiac nuc med studies  performed in previous 12 months: 0.     COMPARISON:   None available.    FINDINGS:  There is a moderate amount ascites in the abdomen. Liver has a prominent left lobe suggesting possible cirrhosis. No focal lesions are identified. The spleen is normal. The pancreas, adrenal glands and kidneys are normal. The gallbladder is been removed  there is no bowel distention or obstruction suggested. Aorta is normal in size. There is no adenopathy. Bones are unremarkable.      Impression:       Moderate amount ascites with morphologic changes of the liver suggesting cirrhosis.    Otherwise  normal          Signer Name: Berry Sr MD   Signed: 7/7/2020 10:27 PM   Workstation Name: [facility]    Radiology [facility]    CT Chest Without Contrast [[id number]] Collected:  07/07/20 2225     Updated:  07/07/20 2228    Narrative:       CT Chest WO    INDICATION:   Dyspnea today    TECHNIQUE:   CT of the thorax without IV contrast. Coronal and sagittal reconstructions were obtained.  Radiation dose reduction techniques included automated exposure control or exposure modulation based on body size. Count of known CT and cardiac nuc med studies  performed in previous 12 months: 0.     COMPARISON:   None available.    FINDINGS:  the lungs are clear. Thyroid gland is normal. Aorta is normal in size. There is no mediastinal or hilar adenopathy. Bones are unremarkable      Impression:       Negative CT of the chest.    Signer Name: Berry Sr MD   Signed: 7/7/2020 10:25 PM   Workstation Name: [facility]          Results for orders placed during the hospital encounter of 07/07/20   Adult Transesophageal Echo (GABRIELA) W/ Cont if Necessary Per Protocol    Narrative · Estimated EF appears to be in the range of 66 - 70%  · No valvular vegetation seen on the study, no evidence of endocarditis  · There is a catheter which terminates in his SVC, no vegetations visible  · There is evidence of ascites          Plan for Follow-up of Pending Labs/Results: None    Discharge Details        Discharge Medications      New Medications      Instructions Start Date   bumetanide 2 MG tablet  Commonly known as:  BUMEX   4 mg, Oral, Daily   Start Date:  August 13, 2020     cefTAZidime  Commonly known as:  FORTAZ   1 g, Intravenous, See Admin Instructions, Daily Mon Wed Fri in dialysis at 1300, For 10 days (through 8/15/2020)      diphenhydrAMINE 25 mg capsule  Commonly known as:  BENADRYL   25 mg, Oral, Every 6 Hours PRN      lactulose 10 GM/15ML solution  Commonly known as:  CHRONULAC    10 g, Oral, 3 Times Daily      metoclopramide 5 MG tablet  Commonly known as:  REGLAN   5 mg, Oral, 3 Times Daily Before Meals      midodrine 10 MG tablet  Commonly known as:  PROAMATINE   Take 20mg 3 times per day on Mon Wed Fri dialysis days      midodrine 10 MG tablet  Commonly known as:  PROAMATINE   Take 10 mg 3 times per day on Sun Tue Thu Sat)      phenylephrine-mineral oil-petrolatum 0.25-14-74.9 % ointment hemorrhoidal ointment  Commonly known as:  PREPARATION H   1 application, Rectal, 3 Times Daily PRN      sertraline 25 MG tablet  Commonly known as:  ZOLOFT   25 mg, Oral, Daily   Start Date:  August 13, 2020        Continue These Medications      Instructions Start Date   albuterol sulfate  (90 Base) MCG/ACT inhaler  Commonly known as:  PROVENTIL HFA;VENTOLIN HFA;PROAIR HFA   2 puffs, Inhalation, Every 4 Hours PRN      aspirin 81 MG chewable tablet   81 mg, Oral, Daily      dexamethasone 4 MG tablet  Commonly known as:  DECADRON   8 mg, Oral, Daily      docusate sodium 100 MG capsule  Commonly known as:  COLACE   100 mg, Oral, Daily      Magic mouthwash Radonc   10 mL, Swish & Swallow, 4 Times Daily Before Meals & Nightly      mirtazapine 15 MG tablet  Commonly known as:  REMERON   15-30 mg, Oral, Every Night at Bedtime      Naloxegol Oxalate 12.5 MG tablet  Commonly known as:  MOVANTIK   12.5 mg, Oral, Every Morning, For constipation      Narcan 4 MG/0.1ML nasal spray  Generic drug:  naloxone   1 spray, Nasal, As Needed      OLANZapine zydis 5 MG disintegrating tablet  Commonly known as:  zyPREXA   5 mg, Translingual, Nightly      ondansetron 8 MG tablet  Commonly known as:  ZOFRAN   8 mg, Oral, 3 Times Daily PRN      oxyCODONE 10 MG tablet  Commonly known as:  ROXICODONE   10 mg, Oral, Every 6 Hours PRN      pantoprazole 40 MG EC tablet  Commonly known as:  PROTONIX   40 mg, Oral, Daily      potassium chloride 20 MEQ CR tablet  Commonly known as:  K-DUR,KLOR-CON   20 mEq, Oral, 2 Times Daily       sennosides-docusate 8.6-50 MG per tablet  Commonly known as:  PERICOLACE   2 tablets, Oral, 2 times daily         Stop These Medications    lisinopril-hydrochlorothiazide 20-25 MG per tablet  Commonly known as:  PRINZIDE,ZESTORETIC     metoprolol succinate XL 25 MG 24 hr tablet  Commonly known as:  TOPROL-XL            No Known Allergies      Discharge Disposition:  Home or Self Care    Diet:  Hospital:  Diet Order   Procedures   • Diet Regular; Cardiac, Renal, Daily Fluid Restriction; 1500 mL Fluid Per Day       Activity:  Activity Instructions     Activity as Tolerated          Restrictions or Other Recommendations:  None       CODE STATUS:    Code Status and Medical Interventions:   Ordered at: 07/07/20 1721     Level Of Support Discussed With:    Patient     Code Status:    CPR     Medical Interventions (Level of Support Prior to Arrest):    Full       Additional Instructions for the Follow-ups that You Need to Schedule     Discharge Follow-up with PCP   As directed       Currently Documented PCP:    Raymundo Salgado MD    PCP Phone Number:    [phone]     Follow Up Details:  1 week; please schedule appointment prior to patient's discharge         Discharge Follow-up with Specified Provider: Dr Graham; 1 week; please schedule appointment prior to patient's discharge   As directed      To:  Dr Graham; 1 week; please schedule appointment prior to patient's discharge         Discharge Follow-up with Specified Provider: NAL in dialysis   As directed      To:  NAL in dialysis                 Electronically signed by FARHAN Bruce, 08/12/20, 1:03 PM.    Time Spent on Discharge:  I spent  40  minutes on this discharge activity which included: face-to-face encounter with the patient, reviewing the data in the system, coordination of the care with the nursing staff as well as consultants, documentation, and entering orders.              Electronically signed by Kendra Rodriguez APRN at 08/12/20 4553

## 2020-08-12 NOTE — PROGRESS NOTES
Case Management Discharge Note      Final Note: Plan is home with daughter at [address] in [facility]. Pt's first outpatient HD chair is Friday, Aug, 8/14 230 pm in [place]. Pt is to arrive at 2 pm and is aware.  Spoke with Luis Manuel (dialysis coordinator) and he received order for labs and Fortaz.  Pt has rolling walker in room delivered by [employer].  Pt had all medications,but Mov[employer] delivered by [facility]. Movantic requires a prior auth and was sent to pt's retail pharmacy in [place] as [facility] does not have it.  Pt aware that CM iniitiated prior auth and that it can take up to 5 business days.  Pt will check with his pharmacy on Friday.  Provider aware that [employer] required prior auth.  Pt eager to go home and has family member in room to transport.  Pt denies further discharge needs.          Destination      No service has been selected for the patient.      Durable Medical Equipment - Selection Complete      Service Provider Request Status Selected Services Address Phone Number Fax Number    [facility] Selected Durable Medical Equipment [address] [address] [phone]      Dialysis/Infusion - Selection Complete      Service Provider Request Status Selected Services Address Phone Number Fax Number    [facility] [address] [facility] [address] [phone]      Home Medical Care      No service has been selected for the patient.      Therapy      No service has been selected for the patient.      Community Resources      No service has been selected for the patient.             Final Discharge Disposition Code: 01 - home or self-care

## 2020-08-12 NOTE — PROGRESS NOTES
Continued Stay Note  [facility]     Patient Name: Oliver Mallory  MRN: [id number]  Today's Date: 8/12/2020    Admit Date: 7/7/2020    Discharge Plan     Row Name 08/12/20 1548       Plan    Plan Comments  Prior auth initiated on [employer].  Auth, number for cover my meds [id number].  Auth. can take up to 5 business days.      Final Discharge Disposition Code  01 - home or self-care        Discharge Codes    No documentation.       Expected Discharge Date and Time     Expected Discharge Date Expected Discharge Time    Aug 12, 2020             Aliyah Zambrano RN

## 2020-08-12 NOTE — PLAN OF CARE
Problem: Hemodialysis (Adult)  Goal: Signs and Symptoms of Listed Potential Problems Will be Absent, Minimized or Managed (Hemodialysis)  Outcome: Ongoing (interventions implemented as appropriate)  Flowsheets (Taken 8/10/2020 2544 by Amber Harper RN)  Problems Assessed (Hemodialysis): all  Problems Present (Hemodialysis): electrolyte imbalance;fluid imbalance  Note:   HD in progress.

## 2020-08-12 NOTE — PROGRESS NOTES
INFECTIOUS DISEASE Progress Note    Oliver Mallory  1965  [id number]    Date of consult: 8/1/20    Admit date: 7/7/2020    Evaluating physician: Dr. Joey Graham  Reason for Consultation: Citrobacter sepsis with bacteremia, 4 out of 4 bottles positive from 7/31/2020  Chief Complaint: Intractable nausea/vomiting and constipation, sepsis      Subjective   History of present illness:  Mr. Oliver Mallory 55 y.o.  Yr old male who is being evaluated for Enterobacter bacteremia.  He has a past medical history significant for hypertension, COPD, pancreatitis, cirrhosis, tonsillar cancer status post tonsillectomy undergoing chemo and radiation therapy.  He was initially admitted on 7/7/2020 with intractable nausea and vomiting x4 days.  He was initially admitted to the ICU and did require some pressors.  Hospital course was complicated by GI bleed and hypotension as well acute kidney injury.  Patient was stabilized and transferred to the floor on 7/17.  Due to progressive kidney failure with fluid volume overload, tunneled dialysis catheter and central line was placed by interventional radiology on 7/24/2020 and dialysis was initiated.  There was reported issues with bleeding from the central line and patient received platelets and FFP.  Patient was found to have IJ line pulled most the way out had to be discontinued on 8/1 morning. Dialysis catheter remains in place.  Patient underwent a CT-guided paracentesis on 7/31, 7/15 and 7/10.  Patient had a EGD on 7/9 that showed mild portal gastropathy, reflux esophagitis and grade 1 esophageal varices.  He received a colonoscopy on 7/10 which showed a few sigmoid diverticula.  There is also petechiae in the rectum with scant oozing.  It was felt, in the absence of history of radiation to this region, that this is suspected portal colopathy.  Patient also received an echo on 7/8/2020 that was normal.    Patient did develop a fever of 101.5 on 7/30 as well as some tachycardia up to  112.  Patient did remain without leukocytosis however due to the fever blood cultures were ordered which have turned positive in 4/4 bottles for gram-negative bacilli identified as Enterobacteriaceae by [employer] report.  Currently WBC is 5.56, H&H is 8.1/25.7 and platelets are 34.  AST is elevated at 70 and alkaline phosphatase is elevated 148 and total bilirubin is elevated 2.7.  7/31 CT of chest show clear lungs and cirrhosis with ascites.  CT of the abdomen on 8/1 showed cirrhosis with splenomegaly and ascites, surgical absence of gallbladder and diverticulosis.    Patient has no known antibiotic allergies and is currently receiving IV Zosyn.  ID has been consulted for further evaluation and treatment as well as antimicrobial therapy management on 8/1/2020.    Of note:  7/8 blood cultures were finalized no growth in 4/4 bottles  7/31 body fluid culture and stain-no organisms seen  7/15 body fluid culture and stain-no organisms seen    8/2/2020: History reviewed.  Patient sitting up on side of bed awake and alert upon arrival.  He remains with dialysis catheter in right chest.  He denies any events overnight.  He has been afebrile with some hypotension overnight but overall BP is trending up.  Without leukocytosis with a WBC of 6.11.  However lactic acid remains elevated at 3.0 as well as a CRP of 5.49.  Procalcitonin is also elevated 1.2.  Blood cultures have been identified as Citrobacter koseri by [employer] report.  Did receive an ultrasound of the  Right upper quadrant which showed free fluid identified throughout the upper abdomen, liver with increased echogenicity with some lobulation identified at the contour suggesting cirrhosis.  Gallbladder fossa was unremarkable and the common bile duct measured 8 mm.  TTE was performed on 8/1 and is currently pending interpretation.  Awaiting for catheter removal in a.m. after dialysis.    8/3/2020: Patient seen in HD today. 0 complaints overnight. Patient afebrile and  hemodynamically stable overnight.  Plan to remove Vipin cath today after dialysis.  Remains without leukocytosis.  No events to report overnight per RN.  Continues on IV meropenem for Citrobacter koseri sepsis.  Waiting for dialysis catheter removal.  No pain.  On IV meropenem until 8/17/2020.    8/4/2020 history reviewed.  No high fevers or chills.  On meropenem until 8/17, or longer depending upon GABRIELA.  8/1/2020 TTE with small mobile strand on the posterior leaflet of the mitral valve suggestive of a vegetation.  No pain.  Tunneled Vipin cath removed right chest, replaced by temporary dialysis catheter on 8/3/2020.    8/5/2020 history reviewed.  On meropenem but changing to post hemodialysis Fortaz until 8/17 depending on GABRIELA results.  TTE with possible mitral valve vegetation.  Tunneled Vipin cath removed on 8/3 with temporary placed.  Being treated for Citrobacter sepsis from blood cultures from 7/31.  Sensitive to levofloxacin, cefotaxime, trimethoprim sulfa.  Changing to post hemodialysis Fortaz.    8/6/20 hx rev.  On fortaz till 8/17 for Vipin cath infxn with septicemia w/ Citrobacter with a neg GABRIELA 8/4.  Vipin cath removed 8/3 w/ temp placed.      8/7/20 hx rev.  On fortaz till 8/17 (post HD M, W, F) for sepsis from Citrobacter w/ neg GABRIELA.  Source likely Vipin cath removed 8/3.  No fever.  No pain.    8/10/2020 history reviewed.  Continues on Fortaz till 8/17 after hemodialysis for Citrobacter sepsis.  No high fevers.    8/11/2020 history reviewed.  On Fortaz until 8/17 being given after hemodialysis for Citrobacter sepsis.  No fevers.  No pain.    8/12/2020 history reviewed.  Continues on Fortaz until 8/17 for Citrobacter sepsis.  Feeling better.  No fever.    Past Medical History:   Diagnosis Date   • Arthritis    • Cancer (CMS/HCC)    • COPD (chronic obstructive pulmonary disease) (CMS/HCC)    • Fall 05/12/2020   • Hypertension    • Pancreatitis    • Tonsillar cancer (CMS/HCC)        Past Surgical History:    Procedure Laterality Date   • ANKLE TENDON REPAIR     • CARDIAC CATHETERIZATION N/A 5/31/2018    Procedure: Left Heart Cath;  Surgeon: Ray Farley MD;  Location:  [facility];  Service: Cardiovascular   • CHOLECYSTECTOMY     • COLONOSCOPY N/A 7/10/2020    Procedure: COLONOSCOPY;  Surgeon: Brunner, Mark I, MD;  Location:  [facility];  Service: Gastroenterology;  Laterality: N/A;   • CYST REMOVAL      coccyx   • ENDOSCOPY N/A 7/9/2020    Procedure: ESOPHAGOGASTRODUODENOSCOPY;  Surgeon: Brunner, Mark I, MD;  Location:  [facility];  Service: Gastroenterology;  Laterality: N/A;   • HERNIA MESH REMOVAL     • HERNIA REPAIR     • KNEE SURGERY  1981   • TONSILLECTOMY         Pediatric History   Patient Guardian Status   • Not on file     Other Topics Concern   • Not on file   Social History Narrative    Patient ambulatory without assistive device, but has ordered       family history includes Heart disease in his father; Hypertension in his mother.    No Known Allergies    Immunization History   Administered Date(s) Administered   • Hepatitis A 07/11/2020   • Hepatitis B Vaccine Adult IM 07/11/2020       Medication:    Current Facility-Administered Medications:   •  acetaminophen (TYLENOL) tablet 650 mg, 650 mg, Oral, Q4H PRN, Abram Abraham MD, 650 mg at 08/08/20 1642  •  albumin human 25 % IV SOLN 12.5 g, 12.5 g, Intravenous, PRN, Oliva Pérez MD  •  albumin human 25 % IV SOLN 25 g, 25 g, Intravenous, PRN, Rickey Hays MD, 25 g at 08/12/20 0821  •  albuterol (PROVENTIL) nebulizer solution 0.083% 2.5 mg/3mL, 2.5 mg, Nebulization, Q6H PRN, Abram Abraham MD, 2.5 mg at 07/21/20 1405  •  aspirin chewable tablet 81 mg, 81 mg, Oral, Daily, Abram Abraham MD, 81 mg at 08/11/20 0823  •  bumetanide (BUMEX) tablet 4 mg, 4 mg, Oral, Daily, Escobar Miles MD, 4 mg at 08/11/20 0823  •  cefTAZidime (FORTAZ) 1 g/100 mL 0.9% NS IVPB (mpb), 1 g, Intravenous, Once per day on Mon Wed  Fri, Gino Nielsen IV, PharmD, 1 g at 08/10/20 1825  •  diphenhydrAMINE (BENADRYL) capsule 25 mg, 25 mg, Oral, Q6H PRN, Escobar Miles MD  •  heparin (porcine) injection 1,600 Units, 1,600 Units, Intracatheter, PRN, Escobar Miles MD, 1,600 Units at 08/10/20 1215  •  hydrocortisone 1 % cream, , Topical, Q12H, Rupa Hawthorne APRN  •  lactulose (CHRONULAC) 10 GM/15ML solution 10 g, 10 g, Oral, TID, Abram Abraham MD, 10 g at 08/11/20 2049  •  magic mouthwash oral supsension 5 mL, 5 mL, Swish & Spit, 4x Daily, Loreta Hemphill II, DO, 5 mL at 08/11/20 2050  •  metoclopramide (REGLAN) tablet 5 mg, 5 mg, Oral, TID AC, Abram Abraham MD, 5 mg at 08/11/20 1737  •  midodrine (PROAMATINE) tablet 10 mg, 10 mg, Oral, 3 times per day on Sun Tue Thu Sat, Ksenia Chaidez DO, 10 mg at 08/11/20 1737  •  midodrine (PROAMATINE) tablet 20 mg, 20 mg, Oral, 3 times per day on Mon Wed Fri, Ksenia Chaidez DO, 20 mg at 08/12/20 0821  •  mirtazapine (REMERON) tablet 15 mg, 15 mg, Oral, Nightly, Abram Abraham MD, 15 mg at 08/11/20 2050  •  Sally patch 2 patch, 2 patch, Topical, Once, Zamzam Villa APRN, Stopped at 07/31/20 2211  •  ondansetron (ZOFRAN) injection 4 mg, 4 mg, Intravenous, Q6H PRN, Abram Abraham MD, 4 mg at 08/08/20 2045  •  oxyCODONE (ROXICODONE) immediate release tablet 10 mg, 10 mg, Oral, Q6H PRN, Mustapha Ramires MD, 10 mg at 08/11/20 2204  •  phenylephrine-mineral oil-petrolatum (PREPARATION H) 0.25-14-74.9 % hemorhoidal ointment, , Rectal, TID PRN, Abram Abraham MD  •  sertraline (ZOLOFT) tablet 25 mg, 25 mg, Oral, Daily, Abram Abraham MD, 25 mg at 08/11/20 0822  •  sodium chloride 0.9 % flush 10 mL, 10 mL, Intravenous, Q12H, Abram Abraham MD, 10 mL at 08/11/20 2013  •  sodium chloride 0.9 % flush 10 mL, 10 mL, Intravenous, PRN, Abram Abraham MD, 10 mL at 07/10/20 8589    Please refer to the medical record for a full medication list    Review of  "Systems:    Constitutional--afebrile overnight, no fatigue  HEENT-- No new vision, hearing or throat complaints.  No epistaxis or oral sores.  Denies odynophagia or dysphagia.  No odynophagia or dysphagia. No headache, photophobia or neck stiffness.  CV-- No chest pain, palpitation or syncope  Resp-- No SOB/cough/Hemoptysis, or wheezing  GI- No nausea, vomiting, or diarrhea.  No hematochezia, melena, or hematemesis. Denies jaundice or chronic liver disease.  -- No dysuria, hematuria, or flank pain.  Denies hesitancy, urgency or flank pain.  Lymph- no swollen lymph nodes in neck/axilla or groin.   Heme-positive ecchymosis across chest.  MS-- no swelling or pain in the bones or joints of arms/legs.  No new back pain.  Neuro-- No acute focal weakness or numbness in the arms or legs.  No seizures.  Skin--positive for bruising, no nodules.    Physical Exam:   Vital Signs   Temp:  [98.1 °F (36.7 °C)] 98.1 °F (36.7 °C)  Heart Rate:  [89-95] 89  Resp:  [16] 16  BP: ()/(56-74) 96/62    Temp  Min: 98.1 °F (36.7 °C)  Max: 98.1 °F (36.7 °C)  BP  Min: 96/62  Max: 115/74  Pulse  Min: 89  Max: 95  Resp  Min: 16  Max: 16  SpO2  Min: 92 %  Max: 95 %    Blood pressure 96/62, pulse 89, temperature 98.1 °F (36.7 °C), temperature source Oral, resp. rate 16, height 182.9 cm (72.01\"), weight 126 kg (276 lb 14.4 oz), SpO2 95 %.  GENERAL: Awake and alert, in minor distress.  Resting in bed.  HEENT:  Normocephalic, atraumatic.  Ears externally normal, Nose externally normal.  EYES: No icterus.   LYMPHATICS: No cervical lymphadenopathy.  HEART: No obvious murmur.  RRR.  No JVD.  LUNGS: Clear to auscultation. No respiratory distress, no use of accessory muscles.  No wheezes.  ABDOMEN: Soft, tender x4 quadrants, nondistended. Bowel sounds normal.  Obese.  SKIN: Warm and dry without cutaneous eruptions.  Ecchymosis noted across bilateral chest and scattered across bilateral upper extremities.  Line right chest ok.  PSYCHIATRIC: Mental " status lucid. No confusion.  EXT:  No cellulitic change.  Normal range of motion.  NEURO: Oriented to name, nonfocal, cranial nerves II through XII intact  LINES: Right chest dialysis cath in place.  Minimal ecchymosis noted around the site.  Dressing in place.      Results Review:       Results from last 7 days   Lab Units 08/12/20  0445 08/11/20  0436 08/10/20  0449   WBC 10*3/mm3 5.80 6.64 5.99   HEMOGLOBIN g/dL 7.9* 7.9* 8.5*   HEMATOCRIT % 23.8* 24.1* 25.8*   PLATELETS 10*3/mm3 72* 78* 38*     Results from last 7 days   Lab Units 08/12/20  0445   SODIUM mmol/L 134*   POTASSIUM mmol/L 4.7   CHLORIDE mmol/L 100   CO2 mmol/L 25.0   BUN mg/dL 34*   CREATININE mg/dL 4.35*   GLUCOSE mg/dL 118*   CALCIUM mg/dL 8.8     Results from last 7 days   Lab Units 08/12/20  0445  08/07/20  0422   ALK PHOS U/L 153*   < > 144*   BILIRUBIN mg/dL 3.3*   < > 3.0*   BILIRUBIN DIRECT mg/dL  --   --  1.4*   ALT (SGPT) U/L 18   < > 21   AST (SGOT) U/L 71*   < > 83*    < > = values in this interval not displayed.                 Results from last 7 days   Lab Units 08/08/20  0517   LACTATE mmol/L 2.5*     Estimated Creatinine Clearance: 26 mL/min (A) (by C-G formula based on SCr of 4.35 mg/dL (H)).    Microbiology:  Microbiology Results (last 10 days)     Procedure Component Value - Date/Time    Blood Culture - Blood, Arm, Left [[id number]] Collected:  08/05/20 1547    Lab Status:  Final result Specimen:  Blood from Arm, Left Updated:  08/10/20 1615     Blood Culture No growth at 5 days    Blood Culture - Blood, Arm, Left [[id number]] Collected:  08/05/20 1204    Lab Status:  Final result Specimen:  Blood from Arm, Left Updated:  08/10/20 1245     Blood Culture No growth at 5 days            Radiology:  Imaging Results (Last 72 Hours)     Procedure Component Value Units Date/Time    IR Tunneled Catheter [[id number]] Collected:  08/10/20 1511     Updated:  08/10/20 1531    Narrative:       PROCEDURE: Tunneled dialysis catheter placement       Procedural Personnel  Attending physician(s): ABELARDO Gore M.D.  Fellow physician(s): None  Resident physician(s): None  Advanced practice provider(s): None     Pre-procedure diagnosis: Renal failure requiring hemodialysis  Post-procedure diagnosis: Same  Indication (QCDR): Previous tunneled hemodialysis catheter infected and  removed. Temporary placed. Patient returns to tunneled hemodialysis  catheter.  Additional clinical history: None     Complications: No immediate complications.       Impression:          Insertion of right-sided tunneled dialysis catheter, with tip in the  expected location of the cavoatrial junction.     Plan:      The catheter may be used immediately.  _______________________________________________________________     PROCEDURE SUMMARY:  - Venous access with ultrasound guidance  - Tunneled dialysis catheter insertion with fluoroscopic guidance  - Additional procedure(s): None     PROCEDURE DETAILS:     Pre-procedure  Consent: Informed consent for the procedure including risks, benefits  and alternatives was obtained and time-out was performed prior to the  procedure.  Preparation (MIPS): The site was prepared and draped using all elements  of maximal sterile barrier technique including sterile gloves, sterile  gown, cap, mask, large sterile sheet, sterile ultrasound probe cover,  hand hygiene and cutaneous antisepsis with 2% chlorhexidine.   Medical reason for site preparation exception (MIPS): Not applicable     Anesthesia/sedation  Level of anesthesia/sedation: Moderate sedation (conscious sedation)  Anesthesia/sedation administered by: Independent trained observer under  attending supervision with continuous monitoring of the patient?s level  of consciousness and physiologic status  Total intra-service sedation time (minutes): 27     Access  Local anesthesia was administered. The vessel was sonographically  evaluated and determined to be patent. Real time ultrasound was used  to  visualize needle entry into the vessel and a permanent image obtained.  Vein accessed (QCDR): Right internal jugular vein  Internal jugular vein patency (QCDR): Patent  Access technique: Micropuncture technique under ultrasound guidance     Venography  Indication for venography: Not applicable  Catheter tip position for venography: Not applicable  Findings: Not applicable     Catheter placement  An incision was made near the venous access site and the catheter was  tunneled subcutaneously to the venous access site. The catheter was  advanced via a peel-away sheath into the vein under fluoroscopic  guidance. Catheter tip location was fluoroscopically verified and a  permanent image was stored.  Catheter placed: 14.5 Spanish Hipvan SI Silver Ion Catheter.  Lot No. [id number]  Catheter cuff-to-tip length (cm): 19 (initially a 23 cm catheter placed,  this was replaced with a 19)  Catheter flush: Heparin (1000 units per mL)     Closure  A sterile dressing was applied.  Access site closure technique: Tissue adhesive  Catheter securement technique: Non-absorbable suture     Contrast  Contrast agent: None  Contrast volume (mL): 0     Radiation Dose  Fluoroscopy time: 0.5 minutes   Dose: 17.8 mGy     Additional Details  Additional description of procedure: None  Equipment details: None  Specimens removed: None  Estimated blood loss (mL): Less than 10  Standardized report: TunneledDialysisCatheter     Attestation  I was present and scrubbed for the entire procedure. Imaging reviewed.  Agree with final report as written.     This report was finalized on 8/10/2020 3:22 PM by Rojas Gore.             IMPRESSION:     1. Sepsis with Enterobacteriaceae group bacteremia identified as Citrobacter koseri by [employer] report-blood cultures positive in 4/4 bottles 7/31/2020.  Etiology is unclear at this time but could likely be a dialysis/line infection. Prior echo negative on 7/8.  May possibly be an occult  intra-abdominal process, or elsewhere but not obvious on CT scan of the chest abdomen and pelvis from 7/31/2020.  However due to the high-grade bacteremia and intravascular infection is suspected/likely.  8/1 TTE was performed with possible mitral valve vegetation.  GABRIELA pending 8/4/2020.  8/1 right upper quadrant ultrasound has been performed with no evidence of biliary duct/gallstones disease.  Repeat cx 8/5 neg. moving forward.  2. TTE from 8/1 with possible mitral valve vegetation.  GABRIELA neg 8/4/20.  3. Liver cirrhosis with ascites status post multiple CT-guided paracentesis.  Not a site of infection.  Increased risk for infection.  4. 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  5. Acute kidney injury likely secondary to ATN however could also be hepatorenal syndrome.  Creatinine 4.35, worse.  6. Intractable nausea and vomiting. Resolved.  7. Thrombocytopenia- could be secondary to chemotherapy or hepatic disease.  8. Elevated bilirubin- 3.3, worse, likely secondary to hepatic disease, negative ultrasound.  Elevated AST- 71.  9. Elevated alkaline phosphatase 153, worse.  10. Hypocalcemia, resolved.   11. Anemia, chronic disease, ongoing.  12. Hyponatremia 134.  13. Lactic acid 2.6.  Not toxic.    Tolerating current regimen.    Plan:    1. Diagnostically continue to follow patient's physical exam, follow labs include CBC, CMP, CRP.  Follow repeat cx 8/5.  2. Therapeutically, previously discontinued on 8/1 IV Zosyn and started Merrem then changed to Fortaz for post HD. Dialysis catheter removal explanted on 8/3 after dialysis.  Temporary dialysis catheter placed right chest 8/3.  Continue Fortaz 1 g post each hemodialysis (M, W, F) to continue until 8/17.    3. Continue supportive care.    Our group would be pleased to follow this patient over the course of their hospitalization and assist with outpatient antimicrobial therapy, as indicated.  Further recommendations depend on the results of the cultures and  clinical course.  Side effects of medications were discussed.  Patient is at increased risk for adverse drug reactions, recurrent infection, readmission.  Discussed with the hospitalist service.    Case management orders:  Please arrange for post HD Fortaz 1 g IV post each HD until 8/17/20.  Check cbc, cmp, crp weekly while on IV abx.  FAX orders to [phone], and call [phone] with final arrangements.  Arrange for f/u with me in 1 week post discharge.    Joey Graham MD  8/12/2020

## 2020-08-12 NOTE — PROGRESS NOTES
"   LOS: 36 days      Reason For Visit:  F/U AUDREY  Subjective    No new events. Patient seen on dialysis.   Review of Systems:   Patient denies shortness of breath, chest pain, dysuria, hematuria, nausea, vomiting.         Objective       aspirin 81 mg Oral Daily   bumetanide 4 mg Oral Daily   cefTAZidime 1 g Intravenous Once per day on Mon Wed Fri   hydrocortisone  Topical Q12H   lactulose 10 g Oral TID   magic mouthwash 5 mL Swish & Spit 4x Daily   metoclopramide 5 mg Oral TID AC   midodrine 10 mg Oral 3 times per day on Sun Tue Thu Sat   midodrine 20 mg Oral 3 times per day on Mon Wed Fri   mirtazapine 15 mg Oral Nightly   Sally 2 patch Topical Once   sertraline 25 mg Oral Daily   sodium chloride 10 mL Intravenous Q12H            Vital Signs:  Blood pressure 96/62, pulse 89, temperature 98.1 °F (36.7 °C), temperature source Oral, resp. rate 16, height 182.9 cm (72.01\"), weight 126 kg (276 lb 14.4 oz), SpO2 95 %.    Flowsheet Rows      First Filed Value   Admission Height  185.4 cm (73\") Documented at 07/07/2020 1640   Admission Weight  127 kg (280 lb) Documented at 07/07/2020 1640          08/11 0701 - 08/12 0700  In: 1211 [P.O.:1211]  Out: -     Physical Exam:    General Appearance: Awake alert oriented.  No distress.  Eyes: PER, conjunctivae and sclerae normal, no icterus  Lungs: Air auscultation equal chest movement no rales or rhonchi's.  Heart/CV: regular rhythm, bilateral lower extremity edema 1-2+.  Abdomen:  Distended , soft, non-tender,  bowel sounds present, morbid obesity  Skin: No rash, Warm and dry.  Ecchymosis chest wall.  Neurologically grossly intact.    Right IJ    Radiology:        Labs:  Results from last 7 days   Lab Units 08/12/20  0445 08/11/20  0436 08/10/20  0449   WBC 10*3/mm3 5.80 6.64 5.99   HEMOGLOBIN g/dL 7.9* 7.9* 8.5*   HEMATOCRIT % 23.8* 24.1* 25.8*   PLATELETS 10*3/mm3 72* 78* 38*     Results from last 7 days   Lab Units 08/12/20  0445 08/11/20  0644 08/10/20  0449 08/09/20  0522  " 08/06/20  0424   SODIUM mmol/L 134* 133* 134* 136   < > 135*   POTASSIUM mmol/L 4.7 4.2 4.2 4.3   < > 3.9   CHLORIDE mmol/L 100 101 101 102   < > 102   CO2 mmol/L 25.0 23.0 22.0 23.0   < > 22.0   BUN mg/dL 34* 32* 37* 28*   < > 23*   CREATININE mg/dL 4.35* 3.70* 4.89* 4.35*   < > 3.43*   CALCIUM mg/dL 8.8 8.5* 8.9 9.0   < > 8.6   PHOSPHORUS mg/dL  --   --   --   --   --  2.3*   ALBUMIN g/dL 2.80* 2.90* 3.00* 3.00*   < > 3.20*    < > = values in this interval not displayed.     Results from last 7 days   Lab Units 08/12/20  0445   GLUCOSE mg/dL 118*       Results from last 7 days   Lab Units 08/12/20  0445  08/07/20  0422   ALK PHOS U/L 153*   < > 144*   BILIRUBIN mg/dL 3.3*   < > 3.0*   BILIRUBIN DIRECT mg/dL  --   --  1.4*   ALT (SGPT) U/L 18   < > 21   AST (SGOT) U/L 71*   < > 83*    < > = values in this interval not displayed.      Assessment      Impression:   1.  Non oliguric AUDREY: Thought to be ATN gradual worsening of renal function in the setting of chronic liver disease w cirrhosis.  Patient has received cis-platinum June 1, 2020.  No response to albumin and midodrine.  Started on HD on 7/24/20. For solute clearance and optimization of volume status  Hypokalemia: Management with HD   2.  Anemia: Transfuse PRN no LISA to be given due to cancer  3.  Dependent edema: Optimization with HD patient has high fluid intake.  4.  Cirrhosis of liver.  Decompensated liver disease:  5.  Intradialytic hypotension: midodrine 20 mg TID  6. Bacteremia - Enterobacteriacea   7.  GI bleed.  8.  Cytopenias: Has received chemotherapy in the past, also have cirrhosis of liver and hypersplenism.  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 CARROLL      Recommendations:   Patient seen on dialysis.   Transfuse blood for Hgb less than 7.0   Avoid nephrotoxic medications.  Keep systolic blood pressure greater than 100.    Adjust medication for the new GFR.  Case discussed with FADI Hays MD  08/12/20  08:48

## 2020-08-12 NOTE — PLAN OF CARE
Problem: Patient Care Overview  Goal: Plan of Care Review  Outcome: Ongoing (interventions implemented as appropriate)  Flowsheets  Taken 8/12/2020 0425 by Emy Bravo, RN  Progress: no change  Taken 8/11/2020 2000 by Emy Bravo, RN  Plan of Care Reviewed With: patient  Taken 8/10/2020 0400 by Tresa Flanagan, RN  Outcome Summary: VSS. In bed resting eyes closed. No s/sx of distress. Pt has had c/o pain x1. PRN's given was effective. No c/o nausea. Continues on room air. No s/sx of bleeding. C/o dialysis cath insertion site still sore. Will continue to monitor.

## 2020-08-13 NOTE — OUTREACH NOTE
Prep Survey      Responses   [facility] patient discharged from?  [place]   Is LACE score < 7 ?  No   Eligibility  Readm Mgmt   Discharge diagnosis  Progressive AUDREY w/ volume overload, now s/p dialysis catheter placement receiving Hemodialysis   Does the patient have one of the following disease processes/diagnoses(primary or secondary)?  Sepsis   Does the patient have Home health ordered?  No   Is there a DME ordered?  Yes   What DME was ordered?  [facility]    Medication alerts for this patient  [employer]    Prep survey completed?  Yes          Holly Pacheco RN

## 2020-08-14 NOTE — TELEPHONE ENCOUNTER
Dtr called stating pt was finally released from the hospital and was at home. Discussed with dtr about utilizing home palliative option but right now they would like to stay with the clinic. No apts open til September so overbooked pt for hospital follow up on Thursday Aug 20th.  Dtr asking about med refill as hospital only supplied 3 day fill on discharge.  Script due tomorrow. Dtr asking if it can be sent to [facility] pharmacy in [place].  Informed would ask MD to fill supply enough to make to apt and then meds could be adjusted as needed. Dtr v/u.

## 2020-08-17 NOTE — OUTREACH NOTE
Sepsis Week 1 Survey      Responses   [facility] [place] patient discharged from?  [place]   COVID-19 Test Status  Rule out   Does the patient have one of the following disease processes/diagnoses(primary or secondary)?  Sepsis   Is there a successful TCM telephone encounter documented?  No   Week 1 attempt successful?  Yes   Call start time  1443   Call end time  1447   Discharge diagnosis  Progressive AUDREY w/ volume overload, now s/p dialysis catheter placement receiving Hemodialysis   Person spoke with today (if not patient) and relationship  daughterNataliia reviewed with patient/caregiver?  Yes   Is the patient having any side effects they believe may be caused by any medication additions or changes?  No   Does the patient have all medications related to this admission filled (includes all antibiotics, inhalers, nebulizers,steroids,etc.)  Yes   Is the patient taking all medications as directed (includes completed medication regime)?  Yes   Does the patient have a primary care provider?   Yes   Does the patient have an appointment with their PCP within 7 days of discharge?  Yes   Has the patient kept scheduled appointments due by today?  N/A   Has home health visited the patient within 72 hours of discharge?  N/A   Pulse Ox monitoring  None   Psychosocial issues?  No   What is the patient's perception of their health status since discharge?  Improving   Nursing interventions  Nurse provided patient education   Is the patient/caregiver able to teach back Sepsis?  S - Shivering,fever or very cold, E - Extreme pain or generalized discomfort (worst ever,especially abdomen), P - Pale or discolored skin, S - Sleepy, difficult to arouse,confused, I -   I feel like I might die-a feeling of hopelessness, S - Short of breath   Nursing interventions  Nurse provided reassurance to patient   Is patient/caregiver able to teach back steps to recovery at home?  Set small, achievable goals for return to baseline health,  Rest and regain strength, Eat a balanced diet   Is the patient/caregiver able to teach back signs and symptoms of worsening condition:  Fever, Rapid heart rate (>90), Altered mental status(confusion/coma), Hyperthermia, Shortness of breath/rapid respiratory rate, Edema   Is the patient/caregiver able to teach back the hierarchy of who to call/visit for symptoms/problems? PCP, Specialist, Home health nurse, Urgent Care, ED, 911  Yes   Week 1 call completed?  Yes          Xochitl Quiles RN

## 2020-08-20 NOTE — ANESTHESIA PREPROCEDURE EVALUATION
Anesthesia Evaluation     Patient summary reviewed and Nursing notes reviewed                Airway   Mallampati: II  TM distance: >3 FB  Neck ROM: full  No difficulty expected  Dental - normal exam     Pulmonary - normal exam   (+) COPD,   Cardiovascular - normal exam    (+) hypertension,     ROS comment: [facility] 5/18: normal EF, normal coronaries    Neuro/Psych- negative ROS  GI/Hepatic/Renal/Endo    (+) morbid obesity, GERD, GI bleeding (HCT 26/PLT 56) , liver disease (cirrhosis), renal disease (Cr 6.3) ARF and ESRD,     Musculoskeletal     Abdominal  - normal exam    Bowel sounds: normal.   Substance History - negative use     OB/GYN negative ob/gyn ROS         Other   arthritis,        Other Comment: INR 2.1    Oropharyngeal ca                Anesthesia Plan    ASA 4     general     intravenous induction     Anesthetic plan, all risks, benefits, and alternatives have been provided, discussed and informed consent has been obtained with: patient.    Plan discussed with CRNA.       I will send Rx for Ibuprofen 600 to her pharmacy.

## 2020-08-20 NOTE — TELEPHONE ENCOUNTER
Pt had apt at 9:30. Dtr Nataliia called and said they were on the here and pt fell in the yard. She said she and two friends were there and couldn't get him up. Inquired if he had any injuries and she said no they just couldn't get him up. Instructed her to call 911 and they could send out fire dept/ems to help get pt up. Will speak with MD and see if she wants to do phone visit later or when we can reschedule.

## 2020-08-20 NOTE — TELEPHONE ENCOUNTER
Called pt and gave instructions on how to set up mychart. Sent activation code to her email.  Will see on video at 3.  Informed would call back over lunch to make sure she was able to get everything set up.

## 2020-08-20 NOTE — PROGRESS NOTES
Med Counts  Medication Filled # Filled Count Used  # days RADHAMES   Oxy 10 8/15/20 24 8 16 5 3.2

## 2020-08-20 NOTE — PROGRESS NOTES
"MD is requesting that attempts be made to find pt a facility for inpt rehab before applying for home health.  Attempted to contact [facility] and JASON for Islam Liason to call back. Believe the VM to be for \"christine.\"  Left [facility] palliative number for call back   "

## 2020-08-20 NOTE — PATIENT INSTRUCTIONS
1.  Referral to [facility] Nav[facility]ators Home Palliative APRN visits (home vs Skilled Nursing Facility)  2.  Referral to Home Health wound care nurse, PT, and OT.  Evaluation for skilled rehab vs. [facility]  3.  Refill medications needs - send via Epic or call or message us.  4.  Video follow up in 2 weeks if home APRN cannot see you before then

## 2020-08-20 NOTE — PROGRESS NOTES
This provider note, based on phone call or other Telemedicine alternative, was created to either supplement or replace provision of in-person palliative care services by a provider (physician or nurse practitioner).  These services were not provided face-to-face due patient's inability to make it to clinic today.    Patient verbally consented to this telehealth encounter.    Subjective   Oliver Mallory is a 55 y.o. male.     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  Comorbid COPD, CAD, HTN, and morbid obesity.  Transferred cancer care to [facility] as he has relocated from [place] to [place] to be with his daughter.  Completed definitive radiation.  Was also treated with weekly Cisplatin.    Substance Use History: Quit smoking cigarettes, was never smoking daily.  Smokes marijuana most days of the week, at most 1 joint daily, for past 30 years.  Rare alcohol intake.     Interim history:  Hospitalized 7/7/20 from palliative clinic for hypotension and intractable n/v.  Found to have severe AURDEY with Cr 7.46.  Started hemodialysis for progressive AUDREY.  Hospitalization noted for enterobacter bacteremia, right supraclavicular hematoma around RIJ vascular access site from coagulopathy, GI bleed (due to esophagitis and portal colopathy), and diagnosed with ESLD with ascites.    Pain: low back pain with abdominal distension, recurrent left neck soreness    Medication management:  Oxycodone 10mg four times daily.    ANALGESIA:  managed  ADVERSE EFFECTS:  No sedation, no constipation (Lactulose and Movantik)  ACTIVITY:  Supervision with ambulation with rollator.  Fell today in front lawn on way to appt today, no injury.   Legs weak.  Assistance with toileting (BM)  AFFECT:  Adjustment reaction with anxiety and depression  ABERRANT BEHAVIORS:  None.  Reports #7 tabs of oxycodone (not using maximum allowed PRNs)    Symptoms: Deconditioning    Function: assistance with transfers and supervision with rollator.  Not making it to toilet for BM    Support/Strengths:  Lives with daughter Nataliia.  She has locked up guns and manages his medications for him.    Distress/Difficulties: Severe depression and anxiety.    ACP/Goals: Not addressed in depth given patient's symptom burden and still being evaluated for options of care.    The following portions of the patient's history were reviewed and updated as appropriate: allergies, current medications, past family history, past medical history, past social history, past surgical history and problem list.    Review of Systems  Otherwise negative except as below and as already detailed in HPI.    ESAS:  Flowsheet reviewed.  See Attached.    Medication Counts:  Reviewed.  See RN note. No overuse    COLIN:  Reviewed.  See scanned form in Media. No concerns.  Consistent with history.  Prescribers identified as members of care team.     CONTROLLED SUBSTANCE TRACKING 5/19/2020 6/2/2020 6/23/2020 8/20/2020   Last Colin 5/18/2020 6/1/2020 6/22/2020 8/19/2020   Report Number [id number]   Last UDS 5/19/2020 5/19/2020 5/19/2020 6/23/2020   Last Controlled Substance Agreement - 5/19/2020 5/19/2020 5/19/2020   ORT Initial Risk Score 1 1 1 1   Prior UDT result - Expected Expected Expected   Pill count Expected Expected Expected Expected   Diversion Concern No No No No   Disposal Education and Agreement [id number]   Naloxone - Yes Yes Yes       UDS:  THC, Screen, Urine   Date Value Ref Range Status   06/23/2020 Positive (A) Negative Final     Phencyclidine (PCP), Urine   Date Value Ref Range Status   06/23/2020 Negative Negative Final     Cocaine Screen, Urine   Date Value  Ref Range Status   06/23/2020 Negative Negative Final     Methamphetamine, Ur   Date Value Ref Range Status   06/23/2020 Negative Negative Final     Opiate Screen   Date Value Ref Range Status   06/23/2020 Negative Negative Final     Amphetamine Screen, Urine   Date Value Ref Range Status   06/23/2020 Negative Negative Final     Benzodiazepine Screen, Urine   Date Value Ref Range Status   06/23/2020 Negative Negative Final     Tricyclic Antidepressants Screen   Date Value Ref Range Status   06/23/2020 Negative Negative Final     Methadone Screen, Urine   Date Value Ref Range Status   06/23/2020 Negative Negative Final     Barbiturates Screen, Urine   Date Value Ref Range Status   06/23/2020 Negative Negative Final     Oxycodone Screen, Urine   Date Value Ref Range Status   06/23/2020 Positive (A) Negative Final     Propoxyphene Screen   Date Value Ref Range Status   06/23/2020 Negative Negative Final     Buprenorphine, Screen, Urine   Date Value Ref Range Status   06/23/2020 Negative Negative Final     Palliative Performance Scale  Palliative Performance Scale Score: 60%    [place] Symptom Assessment System Revised  Pain Score: 7   ESAS Tiredness Score: 9  ESAS Nausea Score: 4  ESAS Depression Score: Worst possible depression  ESAS Anxiety Score: Worst possible anxiety  ESAS Drowsiness Score: 8  ESAS Lack of Appetite Score: 6  ESAS Wellbeing Score: 7  ESAS Dyspnea Score: Worst possible shortness of breath  ESAS Source of Information: patient    TREV-7:  Flowsheet reviewed.  See Attached.       PHQ-9:  Flowsheet reviewed.  See Attached.  PHQ-2/PHQ-9 Depression Screening 7/7/2020   Little interest or pleasure in doing things 3   Feeling down, depressed, or hopeless 3   Trouble falling or staying asleep, or sleeping too much 2   Feeling tired or having little energy 3   Poor appetite or overeating 3   Feeling bad about yourself - or that you are a failure or have let yourself or your family down 3   Trouble  concentrating on things, such as reading the newspaper or watching television 2   Moving or speaking so slowly that other people could have noticed. Or the opposite - being so fidgety or restless that you have been moving around a lot more than usual 2   Thoughts that you would be better off dead, or of hurting yourself in some way 2   Total Score 23   If you checked off any problems, how difficult have these problems made it for you to do your work, take care of things at home, or get along with other people? Very difficult        ECOG: (3) Capable of limited self-care, confined to bed or chair > 50% of waking hours    Objective   Physical Exam   Constitutional: He is oriented to person, place, and time. He appears well-developed and well-nourished. No distress.   Lying in recliner.  Alert, NAD, on room air.  Longer hair and mustache noted.   Eyes: EOM are normal. No scleral icterus.   Pulmonary/Chest: Effort normal.   Abdominal:   Distention visualized   Musculoskeletal: He exhibits no edema.   Neurological: He is alert and oriented to person, place, and time.   Skin: He is not diaphoretic.   BLE wrinkling and xerosis noted.  Peeled cracked skin of feet with localized intense erythema without drainage at base of great toe   Psychiatric: He has a normal mood and affect. His behavior is normal. Judgment and thought content normal.   Nursing note and vitals reviewed.        Universal precautions:    ORT risk scores (ORT-OUD and/or COMM):  Low score 2 (cannabis and depression)  OME:  60mg  Naloxone prescribed:  no    Assessment/Plan   Oliver was seen today for appointment and follow-up.    Diagnoses and all orders for this visit:    Cirrhosis of liver with ascites, unspecified hepatic cirrhosis type (CMS/HCC)  -     Ambulatory Referral to Home Health  -     Ambulatory Referral to Palliative Care    Chronic bilateral low back pain with sciatica, sciatica laterality unspecified  -     oxyCODONE (ROXICODONE) 10 MG tablet;  Take 1 tablet by mouth Every 6 (Six) Hours As Needed for Moderate Pain  or Severe Pain  for up to 15 days.    Other ascites  -     oxyCODONE (ROXICODONE) 10 MG tablet; Take 1 tablet by mouth Every 6 (Six) Hours As Needed for Moderate Pain  or Severe Pain  for up to 15 days.               Advance care plan:    -  Healthcare decision making plan: LW on file.  Nataliia Mallory, daughter and primary caregiver, is HCS  -  MOST discussions thus far:  Last discussion during hospitalization with inpatient palliative team - Full Code      Patient Instructions of AVS:  1.  Referral to [facility] Navigators Home Palliative APRN visits (home vs Skilled Nursing Facility)  2.  Referral to Home Health wound care nurse, PT, and OT.  Evaluation for skilled rehab vs. [facility]  3.  Refill medications needs - send via Epic or call or message us.  4.  Video follow up in 2 weeks if home APRN cannot see you before then      Total time spent:  40 minutes.  Spent 10 minutes on Zoom with patient and daughter, then her phone .  She called back and we spoke for additional 30 minutes.  Greater than 50% time spent in counseling and discussion re:  Transitional needs, home support, refill needs, goals of care.  GOC to be determined after their consultation at [facility].  Pt with severe anxiety and depression from loss of mobility and home limited state.  Will focus on short term goals to support and improve physical function first.      Care coordination:   1.  He will be established with [facility] 20.  Evaluation for potential liver transplant appropriateness.  High risk given HD dependence and incomplete chemotherapy for cancer.    2.  Refer for home health RN (skin care), PT/OT.  Pt has goals for mobility and is desiring [facility] or SNF for rehab, even with pandemic visitor restrictions and COVID pre-screening expectations.  Will see if PT/OT eval will facilitate this.    3.  Pt continues HD MWF with Dr. Hays    4.  Refer  to [facility] or SNF Palliative program given home limited state and deconditioning.  Would benefit from ongoing GOC conversations and care coordination.    5.  Daughter looking for new primary care for patient.  He no longer lives in [place] but lives in [place] with his daughter Nataliia.    6.  LW on file - Nataliia is his HCS.    F/u 2 weeks if not established with community based palliative before then      Addendum DME attestation:  Pt has mobility limitations, requiring assistance with transfers and ambulation short distances within his home.  He is home limited now.    DME needs to improve quality of life and safety:  -bedside commode, extra large size  -shower chair, extra large size  -rolling walker with seat, extra large size

## 2020-08-21 NOTE — PROGRESS NOTES
Spoke with [facility] and they stated they accepted pt's insurance and dialysis was not an issue.  Will fax over information to be reviewed. Called and spoke with Dinah, our referral coordinator.  Asked her to fax the following to [phone]: face sheet, discharge summary from hospital stay, last office note, and script with Rehab order on it. Will await determination.

## 2020-08-24 NOTE — OUTREACH NOTE
Sepsis Week 2 Survey      Responses   [facility] patient discharged from?  [place]   COVID-19 Test Status  Rule out   Does the patient have one of the following disease processes/diagnoses(primary or secondary)?  Sepsis   Week 2 attempt successful?  Yes   Call start time  1215   Call end time  1221   Discharge diagnosis  Progressive AUDREY w/ volume overload, now s/p dialysis catheter placement receiving Hemodialysis   Is patient permission given to speak with other caregiver?  Yes   Person spoke with today (if not patient) and relationship  daughter, Nataliia   Medication alerts for this patient  [employer]    Meds reviewed with patient/caregiver?  Yes   Is the patient having any side effects they believe may be caused by any medication additions or changes?  No   Does the patient have all medications related to this admission filled (includes all antibiotics, inhalers, nebulizers,steroids,etc.)  Yes   Is the patient taking all medications as directed (includes completed medication regime)?  Yes   Does the patient have a primary care provider?   Yes   Does the patient have an appointment with their PCP within 7 days of discharge?  Yes   Has the patient kept scheduled appointments due by today?  Yes   What is the Home health agency?   [facility] ordered by Dr. Reddy-Palliative Care   What DME was ordered?  [facility]    Has all DME been delivered?  Yes   Pulse Ox monitoring  None   Psychosocial issues?  No   Did the patient receive a copy of their discharge instructions?  Yes   Nursing interventions  Reviewed instructions with patient   What is the patient's perception of their health status since discharge?  Improving   Nursing interventions  Nurse provided patient education   Is the patient/caregiver able to teach back Sepsis?  S - Shivering,fever or very cold, E - Extreme pain or generalized discomfort (worst ever,especially abdomen), P - Pale or discolored skin, S - Sleepy, difficult to arouse,confused, I -   I  feel like I might die-a feeling of hopelessness, S - Short of breath   Nursing interventions  Nurse provided reassurance to patient   Is patient/caregiver able to teach back steps to recovery at home?  Set small, achievable goals for return to baseline health, Rest and regain strength, Make a list of questions for PCP appoinment, Exercise as tolerated, Eat a balanced diet   Is the patient/caregiver able to teach back signs and symptoms of worsening condition:  Fever, Shortness of breath/rapid respiratory rate, Edema   Is the patient/caregiver able to teach back the hierarchy of who to call/visit for symptoms/problems? PCP, Specialist, Home health nurse, Urgent Care, ED, 911  Yes   Additional teach back comments  Eating some better, afebrile, skin very dry, wound care ordered as well.   Week 2 call completed?  Yes   Wrap up additional comments  Improving overall, dialysis days he is tired.  Up, moving around, on non dialysis.          Samantha Muniz RN

## 2020-08-30 NOTE — TELEPHONE ENCOUNTER
"Her Dad missed his dialysis on Friday. He is Monday Wednesday Friday. He is hallucinating and talking out of his head.He has fallen. 911 needs to be called, or bring to ED. She is wanting to know what to do.      Reason for Disposition  • Health Information question, no triage required and triager able to answer question    Additional Information  • Negative: [1] Caller is not with the adult (patient) AND [2] reporting urgent symptoms  • Negative: Lab result questions  • Negative: Medication questions  • Negative: Caller can't be reached by phone  • Negative: Caller has already spoken to PCP or another triager  • Negative: RN needs further essential information from caller in order to complete triage  • Negative: Requesting regular office appointment  • Negative: [1] Caller requesting NON-URGENT health information AND [2] PCP's office is the best resource    Answer Assessment - Initial Assessment Questions  1. REASON FOR CALL or QUESTION: \"What is your reason for calling today?\" or \"How can I best help you?\" or \"What question do you have that I can help answer?\"      See note    Protocols used: INFORMATION ONLY CALL - NO TRIAGE-ADULT-      "

## 2020-08-31 PROBLEM — R18.8 ASCITES: Status: ACTIVE | Noted: 2020-01-01

## 2020-08-31 PROBLEM — N18.6 ESRD (END STAGE RENAL DISEASE) (HCC): Status: ACTIVE | Noted: 2020-01-01

## 2020-08-31 PROBLEM — K75.81 LIVER CIRRHOSIS SECONDARY TO NASH (NONALCOHOLIC STEATOHEPATITIS) (HCC): Status: ACTIVE | Noted: 2020-01-01

## 2020-08-31 PROBLEM — A41.9 SEPSIS (HCC): Status: ACTIVE | Noted: 2020-01-01

## 2020-08-31 NOTE — OUTREACH NOTE
Sepsis Week 3 Survey      Responses   [facility] patient discharged from?  [place]   COVID-19 Test Status  Rule out   Does the patient have one of the following disease processes/diagnoses(primary or secondary)?  Sepsis   Week 3 attempt successful?  No   Revoke  Readmitted          Shannon Lu LPN

## 2020-09-01 PROBLEM — A41.02 MRSA (METHICILLIN RESISTANT STAPHYLOCOCCUS AUREUS) SEPTICEMIA (HCC): Status: ACTIVE | Noted: 2020-01-01

## 2020-09-01 NOTE — CONSULTS
INFECTIOUS DISEASE CONSULT/INITIAL HOSPITAL VISIT    Oliver Mallory  1965  [id number]    Date of Consult: 8/31/2020    Admission Date: 8/31/2020      Requesting Provider: Ant Gaston,*  Evaluating Physician: Alli Russell MD    Reason for Consultation: h/o enterbacter sepsis    History of present illness:    Patient is a 55 y.o. male formally seen by Dr. Graham in August 2020 is being readmitted for sepsis manifested by hypotension, lactic acidosis, leukocytosis.    Patient has EDUARDO cirrhosis and portal gastropathy and esophageal varices recently had prolonged hospitalization including acute renal failure which has led to dialysis.  Patient found to have Enterobacter septicemia of unclear etiology.  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    Patient has had generalized weakness and several falls.  Patient missed dialysis 3 days ago.  On presentation to emergency room had lactic acidosis.    Has been restarted on broad-spectrum antibiotics including vancomycin and Zosyn.      Biggest complaint is abdominal pain, has mild diarrhea, says that he is extremely thirsty that his mouth feels like snakeskin that his mouth is so dry that he is bleeding.      Past Medical History:   Diagnosis Date   • Arthritis    • Cancer (CMS/HCC)    • COPD (chronic obstructive pulmonary disease) (CMS/HCC)    • Fall 05/12/2020   • Hypertension    • Pancreatitis    • Renal disorder    • Tonsillar cancer (CMS/HCC)        Past Surgical History:   Procedure Laterality Date   • ANKLE TENDON REPAIR     • CARDIAC CATHETERIZATION N/A 5/31/2018    Procedure: Left Heart Cath;  Surgeon: Ray Farley MD;  Location:  [facility] CATH [facility]ASIVE [facility];  Service: Cardiovascular   • CHOLECYSTECTOMY     • COLONOSCOPY N/A 7/10/2020    Procedure: COLONOSCOPY;  Surgeon: Brunner, Mark I, MD;  Location:  [facility] ENDOSCOPY;  Service: Gastroenterology;  Laterality: N/A;   • CYST REMOVAL      coccyx    • ENDOSCOPY N/A 7/9/2020    Procedure: ESOPHAGOGASTRODUODENOSCOPY;  Surgeon: Brunner, Mark I, MD;  Location: [facility] ENDOSCOPY;  Service: Gastroenterology;  Laterality: N/A;   • HERNIA MESH REMOVAL     • HERNIA REPAIR     • KNEE SURGERY  1981   • TONSILLECTOMY         Family History   Problem Relation Age of Onset   • Hypertension Mother    • Heart disease Father        Social History     Socioeconomic History   • Marital status:      Spouse name: Not on file   • Number of children: Not on file   • Years of education: Not on file   • Highest education level: Not on file   Tobacco Use   • Smoking status: Former Smoker     Types: Cigarettes   • Smokeless tobacco: Never Used   • Tobacco comment: maybe 3 times/week   Substance and Sexual Activity   • Alcohol use: Not Currently     Alcohol/week: 0.0 standard drinks     Comment: rarely   • Drug use: Yes     Types: Marijuana   • Sexual activity: Not Currently   Social History Narrative    Patient ambulatory without assistive device, but has ordered       No Known Allergies      Medication:    Current Facility-Administered Medications:   •  albuterol sulfate HFA (PROVENTIL HFA;VENTOLIN HFA;PROAIR HFA) inhaler 2 puff, 2 puff, Inhalation, Q4H PRN, Ant Gaston MD  •  [START ON 9/1/2020] aspirin chewable tablet 81 mg, 81 mg, Oral, Daily, Ant Gaston MD  •  lactulose (CHRONULAC) 10 GM/15ML solution 10 g, 10 g, Oral, TID, Ant Gaston MD, 10 g at 08/31/20 2030  •  midodrine (PROAMATINE) tablet 20 mg, 20 mg, Oral, TID AC, Ant Gaston MD, 20 mg at 08/31/20 1801  •  [START ON 9/1/2020] pantoprazole (PROTONIX) EC tablet 40 mg, 40 mg, Oral, Daily, Ant Gaston MD  •  [COMPLETED] vancomycin 2000 mg/500 mL 0.9% NS IVPB ([facility]), 20 mg/kg, Intravenous, Once, 2,000 mg at 08/31/20 1656 **AND** Pharmacy to dose vancomycin, , Does not apply, Continuous PRN, Ant Gaston MD  •  [START ON 9/1/2020]  piperacillin-tazobactam (ZOSYN) 3.375 g in iso-osmotic dextrose 50 ml (premix), 3.375 g, Intravenous, Q12H, Ant Gaston MD  •  [START ON 2020] sertraline (ZOLOFT) tablet 25 mg, 25 mg, Oral, Daily, Ant Gaston MD  •  sodium chloride 0.9 % flush 10 mL, 10 mL, Intravenous, PRN, Brandan Pierce DO  •  sodium chloride 0.9 % flush 10 mL, 10 mL, Intravenous, Q12H, Ant Gaston MD, 10 mL at 20  •  sodium chloride 0.9 % flush 10 mL, 10 mL, Intravenous, PRN, Ant Gaston MD  •  [START ON 2020] vancomycin (dosing per levels), , Does not apply, Daily, Shyam Low, PharmD    Antibiotics:  Anti-Infectives (From admission, onward)    Ordered     Dose/Rate Route Frequency Start Stop    20 1932  vancomycin (dosing per levels)     Ordering Provider:  Shyam Low, PharmD     Does not apply Daily 20 0900 20 0859    20 1517  piperacillin-tazobactam (ZOSYN) 3.375 g in iso-osmotic dextrose 50 ml (premix)     Ordering Provider:  Ant Gaston MD    3.375 g  over 4 Hours Intravenous Every 12 Hours 20 0200 20 0159    20 1517  vancomycin 2000 mg/500 mL 0.9% NS IVPB ([facility])     Ordering Provider:  Ant Gaston MD    20 mg/kg × 95.3 kg Intravenous Once 20 1519 20 1656    20 1517  Pharmacy to dose vancomycin  Review   Ordering Provider:  Ant Gaston MD     Does not apply Continuous PRN 20 1516 20 1515    20 1318  piperacillin-tazobactam (ZOSYN) 3.375 g in iso-osmotic dextrose 50 ml (premix)     Ordering Provider:  [place], Brandan, DO    3.375 g  over 30 Minutes Intravenous Once 20 1320 20 1502            Review of Systems:  See HPI  Patient denies fevers or chills   Reports that his throat is parched  Admits to abdominal pain without nausea or vomiting  Denies burning upon urination        Physical Exam:   Vital Signs  Temp (24hrs), Av.2 °F  (36.8 °C), Min:97.6 °F (36.4 °C), Max:99.1 °F (37.3 °C)    Temp  Min: 97.6 °F (36.4 °C)  Max: 99.1 °F (37.3 °C)  BP  Min: 86/59  Max: 115/52  Pulse  Min: 101  Max: 130  Resp  Min: 20  Max: 22  SpO2  Min: 85 %  Max: 98 %    GENERAL: Awake and alert, in no acute distress.   HEENT: Normocephalic, atraumatic.  PERRL. EOMI. No conjunctival injection. No icterus. Oropharynx clear without evidence of thrush or exudate. No evidence of peridontal disease.      HEART: RRR; No murmur  LUNGS: Clear to auscultation bilaterally without wheezing, rales, rhonchi. Normal respiratory effort. Nonlabored. No dullness.  ABDOMEN: Soft, nontender, abdomen is distended  EXT:  No cyanosis, clubbing or edema. No cord.  :  Without Abbasi catheter.  MSK: No joint deformity  SKIN: Warm and dry without cutaneous eruptions on Inspection/palpation.    NEURO: Confused      Laboratory Data    Results from last 7 days   Lab Units 08/31/20  1238   WBC 10*3/mm3 22.30*   HEMOGLOBIN g/dL 10.1*   HEMATOCRIT % 31.0*   PLATELETS 10*3/mm3 62*     Results from last 7 days   Lab Units 08/31/20  1238   SODIUM mmol/L 135*   POTASSIUM mmol/L 4.7   CHLORIDE mmol/L 92*   CO2 mmol/L 19.0*   BUN mg/dL 53*   CREATININE mg/dL 7.39*   GLUCOSE mg/dL 122*   CALCIUM mg/dL 9.0     Results from last 7 days   Lab Units 08/31/20  1238   ALK PHOS U/L 270*   BILIRUBIN mg/dL 6.3*   ALT (SGPT) U/L 42*   AST (SGOT) U/L 83*             Results from last 7 days   Lab Units 08/31/20  1740   LACTATE mmol/L 8.4*             Estimated Creatinine Clearance: 13.4 mL/min (A) (by C-G formula based on SCr of 7.39 mg/dL (H)).      Microbiology:  No results found for: ACANTHNAEG, AFBCX, BPERTUSSISCX, BLOODCX  No results found for: BCIDPCR, CXREFLEX, CSFCX, CULTURETIS  No results found for: CULTURES, HSVCX, URCX  No results found for: EYECULTURE, GCCX, LABHSV  No results found for: LEGIONELLA, MRSACX, MUMPSCX, MYCOPLASCX  No results found for: NOCARDIACX, STOOLCX  No results found for:  THROATCX, UNSTIMCULT, URINECX, CULTURE, VZVCULTUR  No results found for: VIRALCULTU, WOUNDCX        Radiology:  Imaging Results (Last 72 Hours)     Procedure Component Value Units Date/Time    CT Chest Without Contrast [[id number]] Collected:  08/31/20 1536     Updated:  08/31/20 1543    Narrative:       EXAMINATION: CT CHEST WO CONTRAST-, CT ABDOMEN PELVIS WO CONTRAST-      INDICATION: sepsis, soa, cough; A41.9-Sepsis, unspecified organism     TECHNIQUE: Axial noncontrast CT of the chest, abdomen and pelvis with  multiplanar reconstructions     The radiation dose reduction device was turned on for each scan per the  ALARA (As Low as Reasonably Achievable) protocol.     COMPARISON: CT chest 7/31/2020     FINDINGS: The thyroid gland is homogeneous. A right-sided central line  terminates in the SVC. No suspicious soft tissue body wall findings.  Moderate diffuse anasarca. Evaluation of the osseous structures  demonstrates lumbar spondylosis changes, otherwise without suspicious  lytic or sclerotic lesion. No bulky mediastinal or hilar adenopathy.  Unchanged somewhat prominent pericardial lymph node, likely congestive.     Evaluation of the lung fields demonstrates small moderate left pleural  effusion with some associated compressive atelectasis. Otherwise no  evidence of active infectious or inflammatory process.     Within the abdomen and pelvis, osteoarthrosis is noted with moderate to  large volume ascites. No focal liver lesion noted on noncontrast exam.  The spleen, pancreas and bilateral adrenal glands demonstrate  homogeneous attenuation without evidence of focal lesion. Numerous  splenic collaterals are noted. Small and large bowel loops are  nondilated, without evidence of suspicious focal bowel wall thickening.  No free air. No bulky retroperitoneal adenopathy. The kidneys  demonstrate no evidence of obstructing calculi, contour deforming mass  or hydronephrosis.       Impression:       Cirrhosis with  moderate to large volume ascites. No  additional acute process identified in the abdomen and pelvis.     Small left pleural effusion with some associated compressive  atelectasis. No additional acute process in the chest.        This report was finalized on 8/31/2020 3:40 PM by Gino Narayan.       CT Abdomen Pelvis Without Contrast [[id number]] Collected:  08/31/20 1536     Updated:  08/31/20 1543    Narrative:       EXAMINATION: CT CHEST WO CONTRAST-, CT ABDOMEN PELVIS WO CONTRAST-      INDICATION: sepsis, soa, cough; A41.9-Sepsis, unspecified organism     TECHNIQUE: Axial noncontrast CT of the chest, abdomen and pelvis with  multiplanar reconstructions     The radiation dose reduction device was turned on for each scan per the  ALARA (As Low as Reasonably Achievable) protocol.     COMPARISON: CT chest 7/31/2020     FINDINGS: The thyroid gland is homogeneous. A right-sided central line  terminates in the SVC. No suspicious soft tissue body wall findings.  Moderate diffuse anasarca. Evaluation of the osseous structures  demonstrates lumbar spondylosis changes, otherwise without suspicious  lytic or sclerotic lesion. No bulky mediastinal or hilar adenopathy.  Unchanged somewhat prominent pericardial lymph node, likely congestive.     Evaluation of the lung fields demonstrates small moderate left pleural  effusion with some associated compressive atelectasis. Otherwise no  evidence of active infectious or inflammatory process.     Within the abdomen and pelvis, osteoarthrosis is noted with moderate to  large volume ascites. No focal liver lesion noted on noncontrast exam.  The spleen, pancreas and bilateral adrenal glands demonstrate  homogeneous attenuation without evidence of focal lesion. Numerous  splenic collaterals are noted. Small and large bowel loops are  nondilated, without evidence of suspicious focal bowel wall thickening.  No free air. No bulky retroperitoneal adenopathy. The kidneys  demonstrate no  evidence of obstructing calculi, contour deforming mass  or hydronephrosis.       Impression:       Cirrhosis with moderate to large volume ascites. No  additional acute process identified in the abdomen and pelvis.     Small left pleural effusion with some associated compressive  atelectasis. No additional acute process in the chest.        This report was finalized on 8/31/2020 3:40 PM by Gino Narayan.       CT Head Without Contrast [[id number]] Collected:  08/31/20 1525     Updated:  08/31/20 1529    Narrative:       EXAMINATION: CT HEAD WO CONTRAST-      INDICATION: weakness, falls contusion; A41.9-Sepsis, unspecified  organism     TECHNIQUE: Noncontrast axial CT of the head with multiplanar  reconstructions.     The radiation dose reduction device was turned on for each scan per the  ALARA (As Low as Reasonably Achievable) protocol.     COMPARISON: NONE     FINDINGS: No intracranial hemorrhage, mass, mass effect or evidence of  acute territorial ischemia. Ventricles are normal in size and  configuration. No acute extra-axial hemorrhage. The orbits are normal  and the paranasal sinuses are grossly clear.       Impression:       No acute intracranial abnormality.        This report was finalized on 8/31/2020 3:25 PM by Gino Narayan.       XR Chest 1 View [[id number]] Collected:  08/31/20 1317     Updated:  08/31/20 1322    Narrative:       EXAMINATION: XR CHEST 1 VW-      INDICATION: Weak/Dizzy/AMS triage protocol      COMPARISON: NONE     FINDINGS: Tunneled dialysis catheter terminates at the cavoatrial  junction. The lungs are hypoinflated and there are associated areas of  subsegmental atelectasis. No focal consolidation. No pleural effusion or  pneumothorax. Unchanged heart and mediastinal contours.       Impression:       Hypoventilatory chest without evidence of acute disease  otherwise.     This report was finalized on 8/31/2020 1:18 PM by Gino Narayan.               Impression:   Severe  sepsis  Leukocytosis with neutrophilia  Thrombocytopenia  EDUARDO cirrhosis  End-stage renal disease now on hemodialysis  Lactic acidosis  History of Citrobacter bacteremia 7/31/2020  Abdominal pain    PLAN/RECOMMENDATIONS:   Thank you for asking us to see Oliver Mallory, I recommend the following:    COVID swab -ve x 2     Physical exam mostly remarkable for abdominal pain;    Paracentesis r/o bacterial/fungal process would be helpful     Patient is critically ill; cover for line sepsis, spontaneous bacterial peritonitis, fungal superinfection, ischemic bowel, mesenteric venous thrombosis.    Zosyn will probably adequate to treat colonic garrett adequately    We will add empiric micafungin 100 mg IV now and then daily until patient is clinically better    Continue vancomycin per pharmacy dosing aim for bank trough of 15    Follow-up blood cultures  I  will asked Dr. Graham to resume care starting tomorrow    Critical care time 40 minutes time in 2118 time out 2218    Can have ice chips and water from my standpoint  Alli Russell MD  8/31/2020  22:01

## 2020-09-01 NOTE — PROGRESS NOTES
INFECTIOUS DISEASE Progress Note    Oliver Mallory  1965  [id number]    Date of consult: 8/1/20; 8/31/20    Admit date: 8/31/2020    Evaluating physician: Dr. Joey Graham  Reason for Consultation: Citrobacter sepsis with bacteremia, 4 out of 4 bottles positive from 7/31/2020 resolved.  New MRSA high grade sepsis 8/31/20, prob line infection/intravascular.    Chief Complaint: above      Subjective   History of present illness:  Mr. Oliver Mallory 55 y.o.  Yr old male who is being evaluated for new MRSA sepsis high grade 4/4 bottles positive from 8/31/20.  Recent treatment for Citrobacter sepsis (prob from dialysis catheter finished fortaz 8/17/20.  He has a past medical history significant for hypertension, COPD, pancreatitis, cirrhosis, tonsillar cancer status post tonsillectomy undergoing chemo and radiation therapy.      9/1/2020 history reviewed.  Tolerating vancomycin and Zosyn renal dose adjusted.  Blood cultures from 8/31/2020 are positive for MRSA by PCR in 4 out of 4 bottles.  Duration of vancomycin likely to be until 2 weeks after dialysis catheter removal.    Past Medical History:   Diagnosis Date   • Arthritis    • Cancer (CMS/HCC)    • COPD (chronic obstructive pulmonary disease) (CMS/HCC)    • Fall 05/12/2020   • Hypertension    • Pancreatitis    • Renal disorder    • Tonsillar cancer (CMS/HCC)        Past Surgical History:   Procedure Laterality Date   • ANKLE TENDON REPAIR     • CARDIAC CATHETERIZATION N/A 5/31/2018    Procedure: Left Heart Cath;  Surgeon: Ray Farley MD;  Location:  [facility];  Service: Cardiovascular   • CHOLECYSTECTOMY     • COLONOSCOPY N/A 7/10/2020    Procedure: COLONOSCOPY;  Surgeon: Brunner, Mark I, MD;  Location:  [facility];  Service: Gastroenterology;  Laterality: N/A;   • CYST REMOVAL      coccyx   • ENDOSCOPY N/A 7/9/2020    Procedure: ESOPHAGOGASTRODUODENOSCOPY;  Surgeon: Brunner, Mark I, MD;  Location:  [facility];  Service:  Gastroenterology;  Laterality: N/A;   • HERNIA MESH REMOVAL     • HERNIA REPAIR     • KNEE SURGERY  1981   • TONSILLECTOMY         Pediatric History   Patient Guardian Status   • Not on file     Other Topics Concern   • Not on file   Social History Narrative    Patient ambulatory without assistive device, but has ordered       family history includes Heart disease in his father; Hypertension in his mother.    No Known Allergies    Immunization History   Administered Date(s) Administered   • Hepatitis A 07/11/2020   • Hepatitis B Vaccine Adult IM 07/11/2020       Medication:    Current Facility-Administered Medications:   •  albumin human 25 % IV SOLN 12.5 g, 12.5 g, Intravenous, PRN, Gino Davis MD  •  albuterol sulfate HFA (PROVENTIL HFA;VENTOLIN HFA;PROAIR HFA) inhaler 2 puff, 2 puff, Inhalation, Q4H PRN, Ant Gaston MD  •  aspirin chewable tablet 81 mg, 81 mg, Oral, Daily, Ant Gaston MD, 81 mg at 09/01/20 0816  •  calcium gluconate 1 g in sodium chloride 0.9 % 50 mL IVPB, 1 g, Intravenous, PRN **OR** calcium gluconate 2 g in sodium chloride 0.9 % 50 mL IVPB, 2 g, Intravenous, PRN, Rickey Hays MD  •  citrate dextrose (ACD FORMULA A) solution 0-2 mL, 0-2 mL, Intravenous, PRN, Rickey Hays MD  •  hydrocortisone sodium succinate (Solu-CORTEF) injection 100 mg, 100 mg, Intravenous, Q8H, Mart De Leon MD, 100 mg at 09/01/20 0949  •  lactulose (CHRONULAC) 10 GM/15ML solution 10 g, 10 g, Oral, TID, Ant Gaston MD, 10 g at 08/31/20 2030  •  lidocaine (LIDODERM) 5 % 1 patch, 1 patch, Transdermal, Q24H, Bhavesh Bryson APRN, 1 patch at 08/31/20 2303  •  magnesium sulfate 2g/50 mL (PREMIX) infusion, 2 g, Intravenous, PRN, Rickey Hays MD  •  micafungin 100 mg/100 mL 0.9% NS IVPB (mbp), 100 mg, Intravenous, Q24H, Alli Russell MD, 100 mg at 08/31/20 8977  •  midodrine (PROAMATINE) tablet 20 mg, 20 mg, Oral, TID Marilin ALCANTARA,  Ant BUTLER MD, 20 mg at 09/01/20 0815  •  pantoprazole (PROTONIX) EC tablet 40 mg, 40 mg, Oral, Daily, Ant Gaston MD, 40 mg at 09/01/20 0816  •  [COMPLETED] vancomycin 2000 mg/500 mL 0.9% NS IVPB ([facility]), 20 mg/kg, Intravenous, Once, 2,000 mg at 08/31/20 1656 **AND** Pharmacy to dose vancomycin, , Does not apply, Continuous PRN, Ant Gaston MD  •  phenylephrine (ZHOU-SYNEPHRINE) 50 mg/250 mL (0.2 mg/mL) in 0.9% NS  infusion, 0.5-3 mcg/kg/min, Intravenous, Titrated, Bhavesh Bryson, FARHAN, Last Rate: 40 mL/hr at 09/01/20 0530, 1.4 mcg/kg/min at 09/01/20 0530  •  Phoxillum 4 K/2.5 CA dialysis solution, 1,000 mL/hr, CRRT, Continuous, Gino Davis MD  •  Phoxillum 4 K/2.5 CA dialysis solution, 1,000 mL/hr, CRRT, Continuous, [place], PharmD  •  Phoxillum 4 K/2.5 CA dialysis solution, 1,000 mL/hr, CRRT, Continuous, Gino Davis MD  •  piperacillin-tazobactam (ZOSYN) 3.375 g in iso-osmotic dextrose 50 ml (premix), 3.375 g, Intravenous, Q12H, Ant Gaston MD, 3.375 g at 09/01/20 0145  •  potassium chloride 20 mEq in 50 mL IVPB, 20 mEq, Intravenous, PRN, Saúl, Rickey Rush MD  •  sertraline (ZOLOFT) tablet 25 mg, 25 mg, Oral, Daily, Ant Gaston MD, 25 mg at 09/01/20 0816  •  sodium chloride 0.9 % flush 10 mL, 10 mL, Intravenous, PRN, Brandan Pierce,   •  sodium chloride 0.9 % flush 10 mL, 10 mL, Intravenous, Q12H, Ant Gaston MD, 10 mL at 08/31/20 2030  •  sodium chloride 0.9 % flush 10 mL, 10 mL, Intravenous, PRN, Ant Gaston MD  •  sodium phosphates 10 mmol in sodium chloride 0.9 % 100 mL IVPB, 10 mmol, Intravenous, PRN, Saúl, Rickey Rush MD  •  thiamine (B-1) 250 mg in sodium chloride 0.9 % 100 mL IVPB, 250 mg, Intravenous, BID, Bhavesh Bryson, APRN, Last Rate: 200 mL/hr at 09/01/20 0322, 250 mg at 09/01/20 0322  •  vancomycin (dosing per levels), , Does not apply, Daily, Shyam Low,  "PharmD    Please refer to the medical record for a full medication list    Review of Systems:    Constitutional--low-grade fevers, no fatigue  HEENT-- No new vision, hearing or throat complaints.  No epistaxis or oral sores.  Denies odynophagia or dysphagia.  No odynophagia or dysphagia. No headache, photophobia or neck stiffness.  CV-- No chest pain, palpitation or syncope  Resp-- No SOB/cough/Hemoptysis, or wheezing  GI- No nausea, vomiting, or diarrhea.  No hematochezia, melena, or hematemesis. Denies jaundice or chronic liver disease.  -- No dysuria, hematuria, or flank pain.  Denies hesitancy, urgency or flank pain.  Lymph- no swollen lymph nodes in neck/axilla or groin.   Heme-positive ecchymosis across chest.  MS-- no swelling or pain in the bones or joints of arms/legs.  No new back pain.  Neuro-- No acute focal weakness or numbness in the arms or legs.  No seizures.  Skin--positive for bruising, no nodules.    Physical Exam:   Vital Signs   Temp:  [97.6 °F (36.4 °C)-99.1 °F (37.3 °C)] 97.9 °F (36.6 °C)  Heart Rate:  [] 90  Resp:  [20-24] 20  BP: ()/(31-70) 97/43    Temp  Min: 97.6 °F (36.4 °C)  Max: 99.1 °F (37.3 °C)  BP  Min: 66/48  Max: 121/67  Pulse  Min: 81  Max: 130  Resp  Min: 20  Max: 24  SpO2  Min: 85 %  Max: 98 %    Blood pressure 97/43, pulse 90, temperature 97.9 °F (36.6 °C), temperature source Axillary, resp. rate 20, height 182.9 cm (72\"), weight 95.3 kg (210 lb), SpO2 (!) 85 %.  GENERAL: Awake and alert, in moderate distress.  Resting in bed.  HEENT:  Normocephalic, atraumatic.  Ears externally normal, Nose externally normal.  EYES: No icterus.   LYMPHATICS: No cervical lymphadenopathy.  HEART: No obvious murmur.  RRR.  No JVD.  LUNGS: Clear to auscultation. No respiratory distress, no use of accessory muscles.  No wheezes.  ABDOMEN: Soft, tender x4 quadrants, nondistended. Bowel sounds normal.  Obese.  SKIN: Warm and dry without cutaneous eruptions.  Ecchymosis noted across " bilateral chest and scattered across bilateral upper extremities.  Dialysis access catheter unremarkable.  PSYCHIATRIC: Mental status lucid. No confusion.  EXT:  No cellulitic change.  Normal range of motion.  NEURO: Oriented to name, nonfocal, cranial nerves II through XII intact  LINES: IV access okay.      Results Review:       Results from last 7 days   Lab Units 09/01/20  0609 08/31/20  1238   WBC 10*3/mm3 17.76* 22.30*   HEMOGLOBIN g/dL 7.4* 10.1*   HEMATOCRIT % 23.3* 31.0*   PLATELETS 10*3/mm3 46* 62*     Results from last 7 days   Lab Units 09/01/20  0609   SODIUM mmol/L 131*   POTASSIUM mmol/L 5.0   CHLORIDE mmol/L 95*   CO2 mmol/L 17.0*   BUN mg/dL 61*   CREATININE mg/dL 7.70*   GLUCOSE mg/dL 92   CALCIUM mg/dL 8.4*     Results from last 7 days   Lab Units 09/01/20  0609   ALK PHOS U/L 190*   BILIRUBIN mg/dL 5.4*   ALT (SGPT) U/L 28   AST (SGOT) U/L 62*                 Results from last 7 days   Lab Units 09/01/20  0609   LACTATE mmol/L 5.6*     Estimated Creatinine Clearance: 12.8 mL/min (A) (by C-G formula based on SCr of 7.7 mg/dL (H)).    Microbiology:  Microbiology Results (last 10 days)     Procedure Component Value - Date/Time    COVID-19, [facility] IN-HOUSE,NP Swab (NO TRANSPORT MEDIA) 2 HR TAT - Swab, Nasopharynx [[id number]]  (Normal) Collected:  08/31/20 1536    Lab Status:  Final result Specimen:  Swab from Nasopharynx Updated:  08/31/20 1633     COVID19 Not Detected    Narrative:       Fact sheet for providers: https://www.fda.gov/media/937474/download     Fact sheet for patients: https://www.fda.gov/media/167458/download    COVID PRE-OP / PRE-PROCEDURE SCREENING ORDER (NO ISOLATION) - Swab, Nasopharynx [[id number]] Collected:  08/31/20 1533    Lab Status:  Final result Specimen:  Swab from Nasopharynx Updated:  08/31/20 1809    Narrative:       The following orders were created for panel order COVID PRE-OP / PRE-PROCEDURE SCREENING ORDER (NO ISOLATION) - Swab, Nasopharynx.  Procedure                                Abnormality         Status                     ---------                               -----------         ------                     COVID-19,CEPHEID,[facility] IN-...[[id number]]  Normal              Final result                 Please view results for these tests on the individual orders.    COVID-19,CEPHEID,[facility] IN-HOUSE(OR EMERGENT/ADD-ON),NP SWAB IN TRANSPORT MEDIA 3-4 HR TAT - Swab, Nasopharynx [[id number]]  (Normal) Collected:  08/31/20 1533    Lab Status:  Final result Specimen:  Swab from Nasopharynx Updated:  08/31/20 1809     COVID19 Not Detected    Narrative:       Fact sheet for providers: https://www.fda.gov/media/580981/download     Fact sheet for patients: https://www.fda.gov/media/073123/download    Blood Culture - Blood, Arm, Right [[id number]]  (Abnormal) Collected:  08/31/20 1247    Lab Status:  Preliminary result Specimen:  Blood from Arm, Right Updated:  09/01/20 0315     Blood Culture Abnormal Stain     Gram Stain Aerobic Bottle Gram positive cocci in groups      Anaerobic Bottle Gram positive cocci in groups    Blood Culture ID, PCR - Blood, Arm, Right [[id number]]  (Abnormal) Collected:  08/31/20 1247    Lab Status:  Final result Specimen:  Blood from Arm, Right Updated:  09/01/20 0211     BCID, PCR Staphylococcus aureus. mecA (methicillin resistance gene) detected. Identification by BCID PCR.    Blood Culture - Blood, Arm, Right [[id number]]  (Abnormal) Collected:  08/31/20 1239    Lab Status:  Preliminary result Specimen:  Blood from Arm, Right Updated:  09/01/20 0316     Blood Culture Abnormal Stain     Gram Stain Aerobic Bottle Gram positive cocci in groups      Anaerobic Bottle Gram positive cocci in groups            Radiology:  Imaging Results (Last 72 Hours)     Procedure Component Value Units Date/Time    CT Chest Without Contrast [[id number]] Collected:  08/31/20 1536     Updated:  08/31/20 1543    Narrative:       EXAMINATION: CT CHEST WO CONTRAST-, CT ABDOMEN PELVIS WO  CONTRAST-      INDICATION: sepsis, soa, cough; A41.9-Sepsis, unspecified organism     TECHNIQUE: Axial noncontrast CT of the chest, abdomen and pelvis with  multiplanar reconstructions     The radiation dose reduction device was turned on for each scan per the  ALARA (As Low as Reasonably Achievable) protocol.     COMPARISON: CT chest 7/31/2020     FINDINGS: The thyroid gland is homogeneous. A right-sided central line  terminates in the SVC. No suspicious soft tissue body wall findings.  Moderate diffuse anasarca. Evaluation of the osseous structures  demonstrates lumbar spondylosis changes, otherwise without suspicious  lytic or sclerotic lesion. No bulky mediastinal or hilar adenopathy.  Unchanged somewhat prominent pericardial lymph node, likely congestive.     Evaluation of the lung fields demonstrates small moderate left pleural  effusion with some associated compressive atelectasis. Otherwise no  evidence of active infectious or inflammatory process.     Within the abdomen and pelvis, osteoarthrosis is noted with moderate to  large volume ascites. No focal liver lesion noted on noncontrast exam.  The spleen, pancreas and bilateral adrenal glands demonstrate  homogeneous attenuation without evidence of focal lesion. Numerous  splenic collaterals are noted. Small and large bowel loops are  nondilated, without evidence of suspicious focal bowel wall thickening.  No free air. No bulky retroperitoneal adenopathy. The kidneys  demonstrate no evidence of obstructing calculi, contour deforming mass  or hydronephrosis.       Impression:       Cirrhosis with moderate to large volume ascites. No  additional acute process identified in the abdomen and pelvis.     Small left pleural effusion with some associated compressive  atelectasis. No additional acute process in the chest.        This report was finalized on 8/31/2020 3:40 PM by Gino Narayan.       CT Abdomen Pelvis Without Contrast [[id number]] Collected:   08/31/20 1536     Updated:  08/31/20 1543    Narrative:       EXAMINATION: CT CHEST WO CONTRAST-, CT ABDOMEN PELVIS WO CONTRAST-      INDICATION: sepsis, soa, cough; A41.9-Sepsis, unspecified organism     TECHNIQUE: Axial noncontrast CT of the chest, abdomen and pelvis with  multiplanar reconstructions     The radiation dose reduction device was turned on for each scan per the  ALARA (As Low as Reasonably Achievable) protocol.     COMPARISON: CT chest 7/31/2020     FINDINGS: The thyroid gland is homogeneous. A right-sided central line  terminates in the SVC. No suspicious soft tissue body wall findings.  Moderate diffuse anasarca. Evaluation of the osseous structures  demonstrates lumbar spondylosis changes, otherwise without suspicious  lytic or sclerotic lesion. No bulky mediastinal or hilar adenopathy.  Unchanged somewhat prominent pericardial lymph node, likely congestive.     Evaluation of the lung fields demonstrates small moderate left pleural  effusion with some associated compressive atelectasis. Otherwise no  evidence of active infectious or inflammatory process.     Within the abdomen and pelvis, osteoarthrosis is noted with moderate to  large volume ascites. No focal liver lesion noted on noncontrast exam.  The spleen, pancreas and bilateral adrenal glands demonstrate  homogeneous attenuation without evidence of focal lesion. Numerous  splenic collaterals are noted. Small and large bowel loops are  nondilated, without evidence of suspicious focal bowel wall thickening.  No free air. No bulky retroperitoneal adenopathy. The kidneys  demonstrate no evidence of obstructing calculi, contour deforming mass  or hydronephrosis.       Impression:       Cirrhosis with moderate to large volume ascites. No  additional acute process identified in the abdomen and pelvis.     Small left pleural effusion with some associated compressive  atelectasis. No additional acute process in the chest.        This report was  finalized on 8/31/2020 3:40 PM by Gino Narayan.       CT Head Without Contrast [[id number]] Collected:  08/31/20 1525     Updated:  08/31/20 1529    Narrative:       EXAMINATION: CT HEAD WO CONTRAST-      INDICATION: weakness, falls contusion; A41.9-Sepsis, unspecified  organism     TECHNIQUE: Noncontrast axial CT of the head with multiplanar  reconstructions.     The radiation dose reduction device was turned on for each scan per the  ALARA (As Low as Reasonably Achievable) protocol.     COMPARISON: NONE     FINDINGS: No intracranial hemorrhage, mass, mass effect or evidence of  acute territorial ischemia. Ventricles are normal in size and  configuration. No acute extra-axial hemorrhage. The orbits are normal  and the paranasal sinuses are grossly clear.       Impression:       No acute intracranial abnormality.        This report was finalized on 8/31/2020 3:25 PM by Gino Narayan.       XR Chest 1 View [[id number]] Collected:  08/31/20 1317     Updated:  08/31/20 1322    Narrative:       EXAMINATION: XR CHEST 1 VW-      INDICATION: Weak/Dizzy/AMS triage protocol      COMPARISON: NONE     FINDINGS: Tunneled dialysis catheter terminates at the cavoatrial  junction. The lungs are hypoinflated and there are associated areas of  subsegmental atelectasis. No focal consolidation. No pleural effusion or  pneumothorax. Unchanged heart and mediastinal contours.       Impression:       Hypoventilatory chest without evidence of acute disease  otherwise.     This report was finalized on 8/31/2020 1:18 PM by Gino Narayan.             IMPRESSION:     1. Sepsis with high-grade MRSA sepsis and bacteremia and 4 out of 4 bottles from 8/31/2020, identified by PCR.  Most likely etiology is an intravascular infection from tunneled dialysis catheter, with unremarkable CT scan of the chest, abdomen, and pelvis for occult infection.  He has increased susceptibility for sepsis given his advanced cirrhosis and liver disease.  As  well as his underlying cancer and chemotherapy.  2. Recent sepsis with Enterobacteriaceae group bacteremia identified as Citrobacter koseri by bio fire report-blood cultures positive in 4/4 bottles 7/31/2020.  Clinically resolved. Prior echo negative on 7/8.  8/1 TTE was performed with possible mitral valve vegetation.  GABRIELA unremarkable 8/4/2020. Repeat blood cx 8/5 were negative.  3. TTE from 8/1 with possible mitral valve vegetation.  GABRIELA neg 8/4/20.  4. Liver cirrhosis with ascites status post multiple CT-guided paracentesis.  Not a site of infection.  Increased risk for infection.  5. 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  6. Acute kidney injury likely secondary to ATN however could also be hepatorenal syndrome.  Creatinine 7.70, worse.  7. Intractable nausea and vomiting.  Improved.  8. Thrombocytopenia- could be secondary to chemotherapy or hepatic disease.  Worse.  9. Elevated bilirubin- 5.4, worse, likely secondary to hepatic EDUARDO and cirrhosis disease, negative ultrasound recently.  Elevated AST- 62.  10. Elevated alkaline phosphatase 190, worse.  11. Hypocalcemia, 8.4, worse.   12. Anemia, chronic disease, worse.  13. Hyponatremia 131, worse.  14. Lactic acid 5.6, worse.    Tolerating current regimen.    Plan:    1. Diagnostically continue to follow patient's physical exam, follow labs include CBC, CMP, CRP.  Repeat transthoracic echocardiogram.  Last imaging was 8/4/2020 with negative GABRIELA.  He has a new set of cultures that are different from previous.  Repeat blood cultures x2 on 9/1/2020.  2. Therapeutically, continue vancomycin and Zosyn.  Previous, old dialysis catheter removal explanted on 8/3 after dialysis.  Temporary dialysis catheter placed right chest 8/3.  Recently finished Fortaz on 8/17/2020 for Citrobacter sepsis.  He will need to continue the vancomycin for the MRSA in his blood for at least 2 weeks after dialysis catheter removal.  3. Continue supportive care.  4. Consider  removal of dialysis tunneled catheter.    Poor prognosis.    Our group would be pleased to follow this patient over the course of their hospitalization and assist with outpatient antimicrobial therapy, as indicated.  Further recommendations depend on the results of the cultures and clinical course.  Side effects of medications were discussed.  Patient is at increased risk for adverse drug reactions, recurrent infection, readmission.  Discussed with the hospitalist service.  Time > 30 min.  3-3:31 pm.  D/w Dr. De Leon.    Joey Graham MD  9/1/2020

## 2020-09-01 NOTE — PROGRESS NOTES
"   LOS: 1 day    Patient Care Team:  Provider, No Known as PCP - General  Yesi Nunn MD as Consulting Physician (Cardiology)  Luigi Henry MD as Consulting Physician (Otolaryngology)  Kellie Lock MD as Consulting Physician (Radiation Oncology)  Ramon Isbell MD as Consulting Physician (Hematology)  Kaitlyn Gee MD as Consulting Physician (Hospice and Palliative Medicine)  Crissy Turner, RN as Registered Nurse (Palliative Care)    Chief Complaint:   ESRD  Subjective     Interval History:   No acute events overnight. Continues to be on pressor support. Alert. No complaints. Feeling weak.     Review of Systems:   No CP or SOA    Objective     Vital Sign Min/Max for last 24 hours  Temp  Min: 97.6 °F (36.4 °C)  Max: 99.1 °F (37.3 °C)   BP  Min: 66/48  Max: 121/67   Pulse  Min: 81  Max: 130   Resp  Min: 20  Max: 24   SpO2  Min: 85 %  Max: 98 %   No data recorded   Weight  Min: 95.3 kg (210 lb)  Max: 95.3 kg (210 lb)     Flowsheet Rows      First Filed Value   Admission Height  182.9 cm (72\") Documented at 08/31/2020 1211   Admission Weight  95.3 kg (210 lb) Documented at 08/31/2020 1211          I/O this shift:  In: 293 [I.V.:293]  Out: 100 [Urine:100]  I/O last 3 completed shifts:  In: 3770.9 [I.V.:1520.9; IV Piggyback:2250]  Out: -     Physical Exam:     General Appearance:    Alert, cooperative, in no acute distress   Head:    Normocephalic, without obvious abnormality, atraumatic   Eyes:            Conjunctivae and sclerae normal, no   icterus, no pallor,            Neck:   Supple, trachea midline, no JVD       Lungs:     Clear to auscultation,respirations regular, even and                  unlabored    Heart:    Regular rhythm and normal rate, normal S1 and S2, no            murmur, no gallop, no rub, no click       Abdomen:     Normal bowel sounds, soft , non-tender, non-distended, no guarding       Extremities:   Moves all extremities well, no edema, no cyanosis, no            "  redness               Neurologic:   No focal deficit noted grossly.        WBC WBC   Date Value Ref Range Status   09/01/2020 17.76 (H) 3.40 - 10.80 10*3/mm3 Final   08/31/2020 22.30 (H) 3.40 - 10.80 10*3/mm3 Final      HGB Hemoglobin   Date Value Ref Range Status   09/01/2020 7.4 (L) 13.0 - 17.7 g/dL Final   08/31/2020 10.1 (L) 13.0 - 17.7 g/dL Final      HCT Hematocrit   Date Value Ref Range Status   09/01/2020 23.3 (L) 37.5 - 51.0 % Final   08/31/2020 31.0 (L) 37.5 - 51.0 % Final      Platlets No results found for: LABPLAT   MCV MCV   Date Value Ref Range Status   09/01/2020 99.1 (H) 79.0 - 97.0 fL Final   08/31/2020 93.9 79.0 - 97.0 fL Final          Sodium Sodium   Date Value Ref Range Status   09/01/2020 131 (L) 136 - 145 mmol/L Final   08/31/2020 135 (L) 136 - 145 mmol/L Final      Potassium Potassium   Date Value Ref Range Status   09/01/2020 5.0 3.5 - 5.2 mmol/L Final   08/31/2020 4.7 3.5 - 5.2 mmol/L Final     Comment:     Specimen hemolyzed.  Results may be affected.      Chloride Chloride   Date Value Ref Range Status   09/01/2020 95 (L) 98 - 107 mmol/L Final   08/31/2020 92 (L) 98 - 107 mmol/L Final      CO2 CO2   Date Value Ref Range Status   09/01/2020 17.0 (L) 22.0 - 29.0 mmol/L Final   08/31/2020 19.0 (L) 22.0 - 29.0 mmol/L Final      BUN BUN   Date Value Ref Range Status   09/01/2020 61 (H) 6 - 20 mg/dL Final   08/31/2020 53 (H) 6 - 20 mg/dL Final      Creatinine Creatinine   Date Value Ref Range Status   09/01/2020 7.70 (H) 0.76 - 1.27 mg/dL Final   08/31/2020 7.39 (H) 0.76 - 1.27 mg/dL Final      Calcium Calcium   Date Value Ref Range Status   09/01/2020 8.4 (L) 8.6 - 10.5 mg/dL Final   08/31/2020 9.0 8.6 - 10.5 mg/dL Final      PO4 No results found for: CAPO4   Albumin Albumin   Date Value Ref Range Status   09/01/2020 2.90 (L) 3.50 - 5.20 g/dL Final   08/31/2020 3.20 (L) 3.50 - 5.20 g/dL Final      Magnesium Magnesium   Date Value Ref Range Status   09/01/2020 2.4 1.6 - 2.6 mg/dL Final    08/31/2020 1.9 1.6 - 2.6 mg/dL Final      Uric Acid No results found for: URICACID        Results Review:     I reviewed the patient's new clinical results.      aspirin 81 mg Oral Daily   hydrocortisone sodium succinate 100 mg Intravenous Q8H   lactulose 10 g Oral TID   lidocaine 1 patch Transdermal Q24H   micafungin (MYCAMINE)  mg Intravenous Q24H   midodrine 20 mg Oral TID AC   pantoprazole 40 mg Oral Daily   piperacillin-tazobactam 3.375 g Intravenous Q12H   sertraline 25 mg Oral Daily   sodium chloride 10 mL Intravenous Q12H   thiamine (VITAMIN B1) IVPB 250 mg Intravenous BID   vancomycin (dosing per levels)  Does not apply Daily       Pharmacy to dose vancomycin     phenylephrine 0.5-3 mcg/kg/min Last Rate: 1.4 mcg/kg/min (09/01/20 0530)   Phoxillum 4 K/2.5 CA 1,000 mL/hr    Phoxillum 4 K/2.5 CA 1,000 mL/hr    Phoxillum 4 K/2.5 CA 1,000 mL/hr        Medication Review:     Assessment/Plan       Sepsis (CMS/HCC)    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 UF 30-40 ml as tolerated.   - Electrolytes will be corrected with HD   - Continue with midodrine   - Albumin infusion   - Renal diet.   - Adjust meds for GFR 30 while on CRRT.   - Monitor H/H and transfuse for Hgb less than 7.0.     Case discussed with FADI Hays MD  09/01/20  10:58

## 2020-09-01 NOTE — PLAN OF CARE
Problem: Patient Care Overview  Goal: Plan of Care Review  Flowsheets (Taken 9/1/2020 1750)  Progress: declining  Plan of Care Reviewed With: patient; daughter  Outcome Summary: Pt was started on CRRT today but had to be stopped after two hours due to probable line infection that was causing his sepsis. Paracentesis was performed bedside where 6600 ml was drained from the abdominal cavity. Right tunnelled dialysis catheter was removed per Dr. De Leon today. Pt still requires neosynephrine to maintain an adequate blood pressure. Pt had very minimal urine output and an I&O cath was performed with only 100 mL drained. Pt is becoming increasingly confused and hallucinating. All other vitals are stable at this time.

## 2020-09-01 NOTE — PLAN OF CARE
Problem: Patient Care Overview  Goal: Plan of Care Review  Outcome: Ongoing (interventions implemented as appropriate)  Note:   Patient alert and oriented. 2LNC while sleeping. Albumin given for low SBP per order, minimally effective. Abdon gtt started. Lidocaine patch for pain. Will continue to monitor.

## 2020-09-01 NOTE — PROCEDURES
"Bedside Paracentesis Without  Radiology  Date/Time: 9/1/2020 3:20 PM  Performed by: Mart De Leon MD  Authorized by: Mart De Leon MD   Consent: Written consent obtained.  Risks and benefits: risks, benefits and alternatives were discussed  Consent given by: power of   Patient identity confirmed: verbally with patient, arm band and provided demographic data  Time out: Immediately prior to procedure a \"time out\" was called to verify the correct patient, procedure, equipment, support staff and site/side marked as required.  Initial or subsequent exam: initial  Procedure purpose: diagnostic and therapeutic  Indications: abdominal discomfort secondary to ascites, respiratory distress secondary to ascites and suspected peritonitis  Anesthesia: local infiltration    Anesthesia:  Local Anesthetic: lidocaine 1% without epinephrine  Anesthetic total: 10 mL    Sedation:  Patient sedated: no    Preparation: Patient was prepped and draped in the usual sterile fashion.  Needle gauge: 20  Ultrasound guidance: yes  Puncture site: right lower quadrant  Fluid removed: 6600(ml)  Fluid appearance: serous and clear  Dressing: 4x4 sterile gauze  Patient tolerance: Patient tolerated the procedure well with no immediate complications        "

## 2020-09-01 NOTE — PAYOR COMM NOTE
"Ella Maloney RN   Phone [phone]  Fax [phone]      Danielle Mallory (55 y.o. Male)     Date of Birth Social Security Number Address Home Phone MRN    1965  [facility] [facility] [address]  [facility] [id number] [phone] [id number]    [employer] Marital Status          Buddhism        Admission Date Admission Type Admitting Provider Attending Provider Department, Room/Bed    8/31/20 Emergency Ant Gaston MD Tzouanakis, Alexander E, MD [facility] 2B ICU, N227/1    Discharge Date Discharge Disposition Discharge Destination                       Attending Provider:  Ant Gaston MD    Allergies:  No Known Allergies    Isolation:  None   Infection:  MRSA (09/01/20)   Code Status:  CPR    Ht:  182.9 cm (72\")   Wt:  95.3 kg (210 lb)    Admission Cmt:  None   Principal Problem:  Sepsis (CMS/[facility]) [A41.9]                 Active Insurance as of 8/31/2020     Primary Coverage     Payor Plan Insurance Group Employer/Plan Group    ANTHEM MEDICAID ANTHEM MEDICAID [id number]     Payor Plan Address Payor Plan Phone Number Payor Plan Fax Number Effective Dates    [address] [address] [phone]  4/1/2018 - None Entered    [facility] [id number]       Subscriber Name Subscriber Birth Date Member ID       DANIELLE MALLORY 1965 [id number]                 Emergency Contacts      (Rel.) Home Phone Work Phone Mobile Phone    MELISSA MALLORY (Daughter) -- -- [phone]    Alfonso Mallory (Son) -- -- [phone]               History & Physical      Ant Gaston MD at 08/31/20 1501                Chief complaint: Weakness and falls    Subjective     55-year-old male presented to the emergency room today with generalized weakness and describes several falls.  He missed dialysis on Friday, 3 days ago.  He was found to be hypotensive with a lactic acidosis and leukocytosis in the emergency room.    He has a history of hypertension, EDUARDO cirrhosis COPD, " pancreatitis, tonsillar cancer status post tonsillectomy as well as chemotherapy and radiation with a recent prolonged hospital admission here in July and August for acute renal failure, Enterobacter sepsis, and GI bleeding due to varices and portal gastropathy.  He had significant problems with hypotension while in the hospital.  It was felt at the time of discharge that his prognosis was limited but he did want continued aggressive care and was not interested in DNR status.  The etiology of his sepsis was unclear but dialysis access was theorized.  There is no evidence of vegetations by echocardiogram.        History  Past Medical History:   Diagnosis Date   • Arthritis    • Cancer (CMS/HCC)    • COPD (chronic obstructive pulmonary disease) (CMS/HCC)    • Fall 05/12/2020   • Hypertension    • Pancreatitis    • Renal disorder    • Tonsillar cancer (CMS/HCC)      Past Surgical History:   Procedure Laterality Date   • ANKLE TENDON REPAIR     • CARDIAC CATHETERIZATION N/A 5/31/2018    Procedure: Left Heart Cath;  Surgeon: Ray Farley MD;  Location:  [facility]ASIVE [facility]ATION;  Service: Cardiovascular   • CHOLECYSTECTOMY     • COLONOSCOPY N/A 7/10/2020    Procedure: COLONOSCOPY;  Surgeon: Brunner, Mark I, MD;  Location:  [facility] ENDOSCOPY;  Service: Gastroenterology;  Laterality: N/A;   • CYST REMOVAL      coccyx   • ENDOSCOPY N/A 7/9/2020    Procedure: ESOPHAGOGASTRODUODENOSCOPY;  Surgeon: Brunner, Mark I, MD;  Location:  [facility] ENDOSCOPY;  Service: Gastroenterology;  Laterality: N/A;   • HERNIA MESH REMOVAL     • HERNIA REPAIR     • KNEE SURGERY  1981   • TONSILLECTOMY       Family History   Problem Relation Age of Onset   • Hypertension Mother    • Heart disease Father      Social History     Tobacco Use   • Smoking status: Former Smoker     Types: Cigarettes   • Smokeless tobacco: Never Used   • Tobacco comment: maybe 3 times/week   Substance Use Topics   • Alcohol use: Not Currently     Alcohol/week: 0.0  "standard drinks     Comment: rarely   • Drug use: Yes     Types: Marijuana       Review of Systems   Pertinent items are noted in HPI, all other systems reviewed and negative      Objective     Vital Signs  Temp:  [99.1 °F (37.3 °C)] 99.1 °F (37.3 °C)  Heart Rate:  [122-130] 130  Resp:  [21] 21  BP: ()/(52-64) 104/64    Physical Exam:    Objective:  General Appearance:  Ill-appearing and uncomfortable.    Vital signs: (most recent): Blood pressure 104/64, pulse (!) 130, temperature 99.1 °F (37.3 °C), temperature source Oral, resp. rate 21, height 182.9 cm (72\"), weight 95.3 kg (210 lb), SpO2 96 %.    HEENT: Normal HEENT exam.    Lungs:  Normal effort and tachypnea.  He is not in respiratory distress.  There are decreased breath sounds.  No rales, wheezes or rhonchi.    Heart: Tachycardia.  Regular rhythm.  S1 normal and S2 normal.  No murmur, gallop or friction rub.   Chest: Symmetric chest wall expansion.   Abdomen: Abdomen is distended.  (Ascites).  Bowel sounds are normal.   (Mild tenderness).   There is no mass. There is no splenomegaly. There is no hepatomegaly.   Extremities: There is no deformity or dependent edema.    Neurological: Patient is alert and oriented to person, place and time.    Pupils:  Pupils are equal, round, and reactive to light.    Skin:  Warm and dry.              Results Review:    I reviewed the patient's new clinical results.  I reviewed the patient's new imaging results and agree with the interpretation.  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  He has recently been diagnosed with EDUARDO cirrhosis which has been complicated by portal gastropathy and grade 1 esophageal varices with bleeding during his recent prolonged hospital stay here in July.  He also developed acute renal failure prompting the initiation of dialysis.  He had sepsis due to Enterobacter without clear etiology.  Dialysis access was suspected.  He also has stage II tonsillar cancer with history of chemotherapy and radiation therapy as well as tonsillectomy but was not deemed to be a good candidate for further treatment.  He has considered a more palliative approach but at the time of his discharge earlier this month was interested in all reasonable measures.    He now presents with nonspecific weakness and hypotension.  He does have a history of chronic hypotension prompting midodrine therapy.  He missed dialysis on Friday.  He has no other specific complaints but does have a leukocytosis and lactic acidosis    Plan:    1. Fluid resuscitation despite having missed dialysis and at risk for volume overload  2. Will likely need to accept a lower blood pressure likely an MAP around 60 and systolic blood pressure around 90  3. Panculture  4. CT chest and abdomen  5. Renal consultation for ongoing dialysis  6. ID consultation as he was recently followed by Dr. Graham after his recent discharge for Enterobacter sepsis    I discussed the patients findings and my recommendations with patient and nursing staff.     Level of Risk High due to:  illness with threat to life or bodily function    Ant Gaston MD  Pulmonary and Critical Care Medicine  08/31/20  15:04            Electronically signed by Ant Gaston MD at 08/31/20 1520       Emergency Department Notes    No notes of this type exist for this encounter.         Vital Signs (last day)     Date/Time   Temp   Temp src   Pulse   Resp   BP   Patient Position   SpO2    09/01/20 0819    --   --   91   --   93/54   --   90    09/01/20 0815   --   --   86   --   (!) 66/48   --   90    09/01/20 0800   97.9 (36.6)   Axillary   90   22   121/67   Lying   (!) 89    09/01/20 0745   --   --   89   --   96/58   --   91    09/01/20 0730   --   --   90   --   (!) 82/66   --   92 09/01/20 0715   --   --   86   --   95/51   --   91    09/01/20 0700   --   --   81   --   105/55   --   91    09/01/20 0600   97.6 (36.4)   Oral   89   24   (!) 77/59   Lying   (!) 89    09/01/20 0400   --   --   91   --   (!) 86/57   Lying   92    09/01/20 0200   --   --   95   --   (!) 87/31   Lying   90    09/01/20 0000   97.8 (36.6)   Oral   94   20   (!) 87/54   Lying   92    08/31/20 2200   --   --   98   --   (!) 86/32   Lying   93    08/31/20 2000   97.8 (36.6)   Oral   101   22   112/47   Lying   98    08/31/20 1815   --   --   101   --   (!) 88/63   --   93 08/31/20 1800   97.6 (36.4)   Axillary   106   20   109/51   Lying   96    08/31/20 1745   --   --   105   --   108/60   --   96    08/31/20 1715   --   --   105   --   98/70   --   92 08/31/20 1700   --   --   107   20   101/52   Lying   94    08/31/20 1627   --   --   108   --   --   --   92 08/31/20 1600   --   --   --   --   96/63   --   92 08/31/20 1555   --   --   107   --   --   --   92 08/31/20 1530   --   --   --   --   101/57   --   95    08/31/20 1520   --   --   113   --   --   --   93 08/31/20 1500   --   --   111   --   90/49   --   --    08/31/20 1415   --   --   116   --   --   --   94    08/31/20 1400   --   --   118   --   (!) 89/47   --   92    08/31/20 1345   --   --   (!) 125   --   108/63   --   (!) 85    08/31/20 1304   99.1 (37.3)   Oral   --   --   --   --   --    08/31/20 1302   --   --   (!) 130   --   104/64   --   96    08/31/20 1230   --   --   --   --   (!) 86/59   --   92    08/31/20 1229   --   --   (!) 125   --   --   --   94    08/31/20 1211   --   --   (!) 122   21   115/52   Sitting   95                 Facility-Administered Medications as of 9/1/2020   Medication Dose Route Frequency Provider Last Rate Last Dose   • albumin human 25 % IV SOLN 12.5 g  12.5 g Intravenous PRN Gino Davis MD       • albumin human 25 % IV SOLN 25 g  25 g Intravenous Once Mart De Leon MD       • [COMPLETED] albumin human 5 % solution 1,000 mL  1,000 mL Intravenous Once Bhavesh Bryson APRN   1,000 mL at 09/01/20 0201   • albuterol sulfate HFA (PROVENTIL HFA;VENTOLIN HFA;PROAIR HFA) inhaler 2 puff  2 puff Inhalation Q4H PRN Ant Gaston MD       • aspirin chewable tablet 81 mg  81 mg Oral Daily Ant Gaston MD   81 mg at 09/01/20 0816   • hydrocortisone sodium succinate (Solu-CORTEF) injection 100 mg  100 mg Intravenous Q8H Mart De Leon MD       • lactulose (CHRONULAC) 10 GM/15ML solution 10 g  10 g Oral TID Ant Gaston MD   10 g at 08/31/20 2030   • lidocaine (LIDODERM) 5 % 1 patch  1 patch Transdermal Q24H Bhavesh Bryson APRN   1 patch at 08/31/20 2303   • micafungin 100 mg/100 mL 0.9% NS IVPB (mbp)  100 mg Intravenous Q24H Alli Russell MD   100 mg at 08/31/20 2244   • midodrine (PROAMATINE) tablet 20 mg  20 mg Oral TID AC Ant Gaston MD   20 mg at 09/01/20 0815   • pantoprazole (PROTONIX) EC tablet 40 mg  40 mg Oral Daily Ant Gaston MD   40 mg at 09/01/20 0816   • [COMPLETED] vancomycin 2000 mg/500 mL 0.9% NS IVPB ([facility])  20 mg/kg Intravenous Once Ant Gaston MD   2,000 mg at 08/31/20 1656    And   • Pharmacy to dose vancomycin   Does not apply Continuous PRN Ant Gaston MD       • phenylephrine (ZHOU-SYNEPHRINE) 50 mg/250 mL (0.2 mg/mL) in 0.9% NS  infusion  0.5-3 mcg/kg/min Intravenous Titrated Bhavesh Bryson, FARHAN 40 mL/hr at 09/01/20 0530 1.4 mcg/kg/min at 09/01/20 0530   • [COMPLETED] piperacillin-tazobactam (ZOSYN) 3.375 g in iso-osmotic dextrose 50 ml (premix)  3.375 g Intravenous  Once TrappeBrandan estrada DO   Stopped at 08/31/20 1502   • piperacillin-tazobactam (ZOSYN) 3.375 g in iso-osmotic dextrose 50 ml (premix)  3.375 g Intravenous Q12H Ant Gaston MD   3.375 g at 09/01/20 0145   • sertraline (ZOLOFT) tablet 25 mg  25 mg Oral Daily Ant Gaston MD   25 mg at 09/01/20 0816   • [COMPLETED] sodium chloride 0.9 % bolus 1,000 mL  1,000 mL Intravenous Once TrappeBrandan DO   Stopped at 08/31/20 1629   • [COMPLETED] sodium chloride 0.9 % bolus 1,000 mL  1,000 mL Intravenous Once TrappeBrandan estrada DO   Stopped at 08/31/20 1502   • sodium chloride 0.9 % flush 10 mL  10 mL Intravenous PRN Brandan Pierce DO       • sodium chloride 0.9 % flush 10 mL  10 mL Intravenous Q12H Ant Gaston MD   10 mL at 08/31/20 2030   • sodium chloride 0.9 % flush 10 mL  10 mL Intravenous PRN Ant Gaston MD       • thiamine (B-1) 250 mg in sodium chloride 0.9 % 100 mL IVPB  250 mg Intravenous BID Bhavesh Bryson, APRAMELIA 200 mL/hr at 09/01/20 0322 250 mg at 09/01/20 0322   • vancomycin (dosing per levels)   Does not apply Daily Shyam Low, PharmD           Lab Results (last 24 hours)     Procedure Component Value Units Date/Time    CBC & Differential [[id number]] Collected:  09/01/20 0609    Specimen:  Blood Updated:  09/01/20 0852    Narrative:       The following orders were created for panel order CBC & Differential.  Procedure                               Abnormality         Status                     ---------                               -----------         ------                     Manual Differential[[id number]]          Abnormal            Final result               CBC Auto Differential[[id number]]        Abnormal            Final result                 Please view results for these tests on the individual orders.    Manual Differential [[id number]]  (Abnormal) Collected:  09/01/20 0609    Specimen:  Blood Updated:  09/01/20 0852     Neutrophil % 88.0 %       Lymphocyte % 4.0 %      Monocyte % 6.0 %      Eosinophil % 0.0 %      Basophil % 0.0 %      Bands %  2.0 %      Neutrophils Absolute 15.98 10*3/mm3      Lymphocytes Absolute 0.71 10*3/mm3      Monocytes Absolute 1.07 10*3/mm3      Eosinophils Absolute 0.00 10*3/mm3      Basophils Absolute 0.00 10*3/mm3      Basophilic Stippling Slight/1+     Ovalocytes Slight/1+     Polychromasia Slight/1+     Target Cells Slight/1+     WBC Morphology Normal     Platelet Estimate Decreased     Large Platelets Slight/1+    CBC Auto Differential [[id number]]  (Abnormal) Collected:  09/01/20 0609    Specimen:  Blood Updated:  09/01/20 0852     WBC 17.76 10*3/mm3      RBC 2.35 10*6/mm3      Hemoglobin 7.4 g/dL      Hematocrit 23.3 %      MCV 99.1 fL      MCH 31.5 pg      MCHC 31.8 g/dL      RDW 21.4 %      RDW-SD 73.7 fl      MPV 13.9 fL      Platelets 46 10*3/mm3     LSAC Slide Creation [[id number]] Collected:  09/01/20 0609    Specimen:  Blood Updated:  09/01/20 0815    Magnesium [[id number]]  (Normal) Collected:  09/01/20 0609    Specimen:  Blood Updated:  09/01/20 0813     Magnesium 2.4 mg/dL     Comprehensive Metabolic Panel [[id number]]  (Abnormal) Collected:  09/01/20 0609    Specimen:  Blood Updated:  09/01/20 0813     Glucose 92 mg/dL      BUN 61 mg/dL      Creatinine 7.70 mg/dL      Sodium 131 mmol/L      Potassium 5.0 mmol/L      Chloride 95 mmol/L      CO2 17.0 mmol/L      Calcium 8.4 mg/dL      Total Protein 6.0 g/dL      Albumin 2.90 g/dL      ALT (SGPT) 28 U/L      AST (SGOT) 62 U/L      Alkaline Phosphatase 190 U/L      Total Bilirubin 5.4 mg/dL      eGFR Non African Amer 7 mL/min/1.73      Comment: <15 Indicative of kidney failure.        eGFR   Amer --     Comment: <15 Indicative of kidney failure.        Globulin 3.1 gm/dL      A/G Ratio 0.9 g/dL      BUN/Creatinine Ratio 7.9     Anion Gap 19.0 mmol/L     Narrative:       GFR Normal >60  Chronic Kidney Disease <60  Kidney Failure <15      Phosphorus [[id number]]   "(Abnormal) Collected:  09/01/20 0609    Specimen:  Blood Updated:  09/01/20 0812     Phosphorus 6.7 mg/dL     Procalcitonin [[id number]]  (Abnormal) Collected:  09/01/20 0609    Specimen:  Blood Updated:  09/01/20 0801     Procalcitonin 3.22 ng/mL     Narrative:       As a Marker for Sepsis (Non-Neonates):   1. <0.5 ng/mL represents a low risk of severe sepsis and/or septic shock.  1. >2 ng/mL represents a high risk of severe sepsis and/or septic shock.    As a Marker for Lower Respiratory Tract Infections that require antibiotic therapy:  PCT on Admission     Antibiotic Therapy             6-12 Hrs later  > 0.5                Strongly Recommended            >0.25 - <0.5         Recommended  0.1 - 0.25           Discouraged                   Remeasure/reassess PCT  <0.1                 Strongly Discouraged          Remeasure/reassess PCT      As 28 day mortality risk marker: \"Change in Procalcitonin Result\" (> 80 % or <=80 %) if Day 0 (or Day 1) and Day 4 values are available. Refer to http://www.[facility] [place]-pct-calculator.com/   Change in PCT <=80 %   A decrease of PCT levels below or equal to 80 % defines a positive change in PCT test result representing a higher risk for 28-day all-cause mortality of patients diagnosed with severe sepsis or septic shock.  Change in PCT > 80 %   A decrease of PCT levels of more than 80 % defines a negative change in PCT result representing a lower risk for 28-day all-cause mortality of patients diagnosed with severe sepsis or septic shock.                Results may be falsely decreased if patient taking Biotin.     Lactic Acid, Plasma [[id number]]  (Abnormal) Collected:  09/01/20 0609    Specimen:  Blood Updated:  09/01/20 0711     Lactate 5.6 mmol/L      Comment: Falsely depressed results may occur on samples drawn from patients receiving N-Acetylcysteine (NAC) or Metamizole.       Blood Culture - Blood, Arm, Right [[id number]]  (Abnormal) Collected:  08/31/20 1239    Specimen:  Blood " from Arm, Right Updated:  09/01/20 0316     Blood Culture Abnormal Stain     Gram Stain Aerobic Bottle Gram positive cocci in groups      Anaerobic Bottle Gram positive cocci in groups    Blood Culture - Blood, Arm, Right [[id number]]  (Abnormal) Collected:  08/31/20 1247    Specimen:  Blood from Arm, Right Updated:  09/01/20 0315     Blood Culture Abnormal Stain     Gram Stain Aerobic Bottle Gram positive cocci in groups      Anaerobic Bottle Gram positive cocci in groups    Blood Culture ID, PCR - Blood, Arm, Right [[id number]]  (Abnormal) Collected:  08/31/20 1247    Specimen:  Blood from Arm, Right Updated:  09/01/20 0211     BCID, PCR Staphylococcus aureus. mecA (methicillin resistance gene) detected. Identification by BCID PCR.    Lactic Acid, Reflex [[id number]]  (Abnormal) Collected:  08/31/20 1740    Specimen:  Blood Updated:  08/31/20 1818     Lactate 8.4 mmol/L      Comment: Falsely depressed results may occur on samples drawn from patients receiving N-Acetylcysteine (NAC) or Metamizole.       COVID PRE-OP / PRE-PROCEDURE SCREENING ORDER (NO ISOLATION) - Swab, Nasopharynx [[id number]] Collected:  08/31/20 1533    Specimen:  Swab from Nasopharynx Updated:  08/31/20 1809    Narrative:       The following orders were created for panel order COVID PRE-OP / PRE-PROCEDURE SCREENING ORDER (NO ISOLATION) - Swab, Nasopharynx.  Procedure                               Abnormality         Status                     ---------                               -----------         ------                     COVID-19,CEPHEID,CLAUDIA IN-...[[id number]]  Normal              Final result                 Please view results for these tests on the individual orders.    COVID-19,CEPHEID,CLAUDIA IN-HOUSE(OR EMERGENT/ADD-ON),NP SWAB IN TRANSPORT MEDIA 3-4 HR TAT - Swab, Nasopharynx [[id number]]  (Normal) Collected:  08/31/20 1533    Specimen:  Swab from Nasopharynx Updated:  08/31/20 1809     COVID19 Not Detected    Narrative:       Fact sheet  for providers: https://www.fda.gov/media/143972/download     Fact sheet for patients: https://www.fda.gov/media/933455/download    COVID-19, ABBOTT IN-HOUSE,NP Swab (NO TRANSPORT MEDIA) 2 HR TAT - Swab, Nasopharynx [[id number]]  (Normal) Collected:  08/31/20 1536    Specimen:  Swab from Nasopharynx Updated:  08/31/20 1633     COVID19 Not Detected    Narrative:       Fact sheet for providers: https://www.fda.gov/media/209579/download     Fact sheet for patients: https://www.fda.gov/media/284128/download    Lactic Acid, Reflex Timer (This will reflex a repeat order 3-3:15 hours after ordered.) [[id number]] Collected:  08/31/20 1238    Specimen:  Blood Updated:  08/31/20 1615     Hold Tube Hold for add-ons.     Comment: Auto resulted.       Troponin [[id number]]  (Abnormal) Collected:  08/31/20 1528    Specimen:  Blood Updated:  08/31/20 1558     Troponin T 0.055 ng/mL     Narrative:       Troponin T Reference Range:  <= 0.03 ng/mL-   Negative for AMI  >0.03 ng/mL-     Abnormal for myocardial necrosis.  Clinicians would have to utilize clinical acumen, EKG, Troponin and serial changes to determine if it is an Acute Myocardial Infarction or myocardial injury due to an underlying chronic condition.       Results may be falsely decreased if patient taking Biotin.      CBC & Differential [[id number]] Collected:  08/31/20 1238    Specimen:  Blood Updated:  08/31/20 1418    Narrative:       The following orders were created for panel order CBC & Differential.  Procedure                               Abnormality         Status                     ---------                               -----------         ------                     CBC Auto Differential[[id number]]        Abnormal            Final result                 Please view results for these tests on the individual orders.    CBC Auto Differential [[id number]]  (Abnormal) Collected:  08/31/20 1238    Specimen:  Blood Updated:  08/31/20 1418     WBC 22.30 10*3/mm3      RBC  3.30 10*6/mm3      Hemoglobin 10.1 g/dL      Hematocrit 31.0 %      MCV 93.9 fL      MCH 30.6 pg      MCHC 32.6 g/dL      RDW 21.3 %      RDW-SD 67.5 fl      MPV 11.3 fL      Platelets 62 10*3/mm3      Neutrophil % 93.3 %      Lymphocyte % 1.7 %      Monocyte % 3.0 %      Eosinophil % 0.1 %      Basophil % 0.2 %      Immature Grans % 1.7 %      Neutrophils, Absolute 20.79 10*3/mm3      Lymphocytes, Absolute 0.39 10*3/mm3      Monocytes, Absolute 0.67 10*3/mm3      Eosinophils, Absolute 0.02 10*3/mm3      Basophils, Absolute 0.04 10*3/mm3      Immature Grans, Absolute 0.39 10*3/mm3      nRBC 0.4 /100 WBC     Narrative:       Verified by repeat analysis.    Appended report. These results have been appended to a previously verified report.    Scan Slide [[id number]] Collected:  08/31/20 1238    Specimen:  Blood Updated:  08/31/20 1418     Basophilic Stippling Slight/1+     Macrocytes Slight/1+     Ovalocytes Slight/1+     Polychromasia Slight/1+     WBC Morphology Normal     Platelet Estimate Decreased     Large Platelets Slight/1+    Ammonia [[id number]]  (Abnormal) Collected:  08/31/20 1339    Specimen:  Blood Updated:  08/31/20 1411     Ammonia 78 umol/L     [place] Draw [[id number]] Collected:  08/31/20 1238    Specimen:  Blood Updated:  08/31/20 1345    Narrative:       The following orders were created for panel order [place] Draw.  Procedure                               Abnormality         Status                     ---------                               -----------         ------                     Light Blue Top[[id number]]                                   Final result               Green Top (Gel)[[id number]]                                  Final result               Lavender Top[[id number]]                                     Final result               Gold Top - SST[[id number]]                                   Final result                 Please view results for these tests on the individual orders.    Light  Blue Top [[id number]] Collected:  08/31/20 1238    Specimen:  Blood Updated:  08/31/20 1345     Extra Tube hold for add-on     Comment: Auto resulted       Green Top (Gel) [[id number]] Collected:  08/31/20 1238    Specimen:  Blood Updated:  08/31/20 1345     Extra Tube Hold for add-ons.     Comment: Auto resulted.       Lavender Top [[id number]] Collected:  08/31/20 1238    Specimen:  Blood Updated:  08/31/20 1345     Extra Tube hold for add-on     Comment: Auto resulted       Gold Top - SST [[id number]] Collected:  08/31/20 1238    Specimen:  Blood Updated:  08/31/20 1345     Extra Tube Hold for add-ons.     Comment: Auto resulted.       Lactic Acid, Plasma [[id number]]  (Abnormal) Collected:  08/31/20 1238    Specimen:  Blood Updated:  08/31/20 1312     Lactate 7.8 mmol/L      Comment: Falsely depressed results may occur on samples drawn from patients receiving N-Acetylcysteine (NAC) or Metamizole.       Comprehensive Metabolic Panel [[id number]]  (Abnormal) Collected:  08/31/20 1238    Specimen:  Blood Updated:  08/31/20 1311     Glucose 122 mg/dL      BUN 53 mg/dL      Creatinine 7.39 mg/dL      Sodium 135 mmol/L      Potassium 4.7 mmol/L      Comment: Specimen hemolyzed.  Results may be affected.        Chloride 92 mmol/L      CO2 19.0 mmol/L      Calcium 9.0 mg/dL      Total Protein 7.4 g/dL      Albumin 3.20 g/dL      ALT (SGPT) 42 U/L      AST (SGOT) 83 U/L      Alkaline Phosphatase 270 U/L      Total Bilirubin 6.3 mg/dL      eGFR Non African Amer 8 mL/min/1.73      Comment: <15 Indicative of kidney failure.        eGFR   Amer --     Comment: <15 Indicative of kidney failure.        Globulin 4.2 gm/dL      A/G Ratio 0.8 g/dL      BUN/Creatinine Ratio 7.2     Anion Gap 24.0 mmol/L     Narrative:       GFR Normal >60  Chronic Kidney Disease <60  Kidney Failure <15      Magnesium [[id number]]  (Normal) Collected:  08/31/20 1238    Specimen:  Blood Updated:  08/31/20 1311     Magnesium 1.9 mg/dL     Troponin  [[id number]]  (Abnormal) Collected:  08/31/20 1238    Specimen:  Blood Updated:  08/31/20 1305     Troponin T 0.068 ng/mL     Narrative:       Troponin T Reference Range:  <= 0.03 ng/mL-   Negative for AMI  >0.03 ng/mL-     Abnormal for myocardial necrosis.  Clinicians would have to utilize clinical acumen, EKG, Troponin and serial changes to determine if it is an Acute Myocardial Infarction or myocardial injury due to an underlying chronic condition.       Results may be falsely decreased if patient taking Biotin.      POC Glucose Once [[id number]]  (Normal) Collected:  08/31/20 1253    Specimen:  Blood Updated:  08/31/20 1255     Glucose 105 mg/dL         Imaging Results (Last 24 Hours)     Procedure Component Value Units Date/Time    CT Chest Without Contrast [[id number]] Collected:  08/31/20 1536     Updated:  08/31/20 1543    Narrative:       EXAMINATION: CT CHEST WO CONTRAST-, CT ABDOMEN PELVIS WO CONTRAST-      INDICATION: sepsis, soa, cough; A41.9-Sepsis, unspecified organism     TECHNIQUE: Axial noncontrast CT of the chest, abdomen and pelvis with  multiplanar reconstructions     The radiation dose reduction device was turned on for each scan per the  ALARA (As Low as Reasonably Achievable) protocol.     COMPARISON: CT chest 7/31/2020     FINDINGS: The thyroid gland is homogeneous. A right-sided central line  terminates in the SVC. No suspicious soft tissue body wall findings.  Moderate diffuse anasarca. Evaluation of the osseous structures  demonstrates lumbar spondylosis changes, otherwise without suspicious  lytic or sclerotic lesion. No bulky mediastinal or hilar adenopathy.  Unchanged somewhat prominent pericardial lymph node, likely congestive.     Evaluation of the lung fields demonstrates small moderate left pleural  effusion with some associated compressive atelectasis. Otherwise no  evidence of active infectious or inflammatory process.     Within the abdomen and pelvis, osteoarthrosis is noted with  moderate to  large volume ascites. No focal liver lesion noted on noncontrast exam.  The spleen, pancreas and bilateral adrenal glands demonstrate  homogeneous attenuation without evidence of focal lesion. Numerous  splenic collaterals are noted. Small and large bowel loops are  nondilated, without evidence of suspicious focal bowel wall thickening.  No free air. No bulky retroperitoneal adenopathy. The kidneys  demonstrate no evidence of obstructing calculi, contour deforming mass  or hydronephrosis.       Impression:       Cirrhosis with moderate to large volume ascites. No  additional acute process identified in the abdomen and pelvis.     Small left pleural effusion with some associated compressive  atelectasis. No additional acute process in the chest.        This report was finalized on 8/31/2020 3:40 PM by Gino Narayan.       CT Abdomen Pelvis Without Contrast [[id number]] Collected:  08/31/20 1536     Updated:  08/31/20 1543    Narrative:       EXAMINATION: CT CHEST WO CONTRAST-, CT ABDOMEN PELVIS WO CONTRAST-      INDICATION: sepsis, soa, cough; A41.9-Sepsis, unspecified organism     TECHNIQUE: Axial noncontrast CT of the chest, abdomen and pelvis with  multiplanar reconstructions     The radiation dose reduction device was turned on for each scan per the  ALARA (As Low as Reasonably Achievable) protocol.     COMPARISON: CT chest 7/31/2020     FINDINGS: The thyroid gland is homogeneous. A right-sided central line  terminates in the SVC. No suspicious soft tissue body wall findings.  Moderate diffuse anasarca. Evaluation of the osseous structures  demonstrates lumbar spondylosis changes, otherwise without suspicious  lytic or sclerotic lesion. No bulky mediastinal or hilar adenopathy.  Unchanged somewhat prominent pericardial lymph node, likely congestive.     Evaluation of the lung fields demonstrates small moderate left pleural  effusion with some associated compressive atelectasis. Otherwise  no  evidence of active infectious or inflammatory process.     Within the abdomen and pelvis, osteoarthrosis is noted with moderate to  large volume ascites. No focal liver lesion noted on noncontrast exam.  The spleen, pancreas and bilateral adrenal glands demonstrate  homogeneous attenuation without evidence of focal lesion. Numerous  splenic collaterals are noted. Small and large bowel loops are  nondilated, without evidence of suspicious focal bowel wall thickening.  No free air. No bulky retroperitoneal adenopathy. The kidneys  demonstrate no evidence of obstructing calculi, contour deforming mass  or hydronephrosis.       Impression:       Cirrhosis with moderate to large volume ascites. No  additional acute process identified in the abdomen and pelvis.     Small left pleural effusion with some associated compressive  atelectasis. No additional acute process in the chest.        This report was finalized on 8/31/2020 3:40 PM by Gino Narayan.       CT Head Without Contrast [[id number]] Collected:  08/31/20 1525     Updated:  08/31/20 1529    Narrative:       EXAMINATION: CT HEAD WO CONTRAST-      INDICATION: weakness, falls contusion; A41.9-Sepsis, unspecified  organism     TECHNIQUE: Noncontrast axial CT of the head with multiplanar  reconstructions.     The radiation dose reduction device was turned on for each scan per the  ALARA (As Low as Reasonably Achievable) protocol.     COMPARISON: NONE     FINDINGS: No intracranial hemorrhage, mass, mass effect or evidence of  acute territorial ischemia. Ventricles are normal in size and  configuration. No acute extra-axial hemorrhage. The orbits are normal  and the paranasal sinuses are grossly clear.       Impression:       No acute intracranial abnormality.        This report was finalized on 8/31/2020 3:25 PM by Gino Narayan.       XR Chest 1 View [[id number]] Collected:  08/31/20 1317     Updated:  08/31/20 1322    Narrative:       EXAMINATION: XR CHEST 1  VW-      INDICATION: Weak/Dizzy/AMS triage protocol      COMPARISON: NONE     FINDINGS: Tunneled dialysis catheter terminates at the cavoatrial  junction. The lungs are hypoinflated and there are associated areas of  subsegmental atelectasis. No focal consolidation. No pleural effusion or  pneumothorax. Unchanged heart and mediastinal contours.       Impression:       Hypoventilatory chest without evidence of acute disease  otherwise.     This report was finalized on 8/31/2020 1:18 PM by Gino Narayan.           Physician Progress Notes (last 24 hours) (Notes from 08/31/20 0938 through 09/01/20 0938)    No notes of this type exist for this encounter.            Consult Notes (last 24 hours) (Notes from 08/31/20 0938 through 09/01/20 0938)      Alli Russell MD at 08/31/20 2201      Consult Orders    1. Inpatient Infectious Diseases Consult [[id number]] ordered by Ant Gaston MD at 08/31/20 1517                INFECTIOUS DISEASE CONSULT/INITIAL HOSPITAL VISIT    Oliver Mallory  1965  [id number]    Date of Consult: 8/31/2020    Admission Date: 8/31/2020      Requesting Provider: Ant Gaston,*  Evaluating Physician: Alli Russell MD    Reason for Consultation: h/o enterbacter sepsis    History of present illness:    Patient is a 55 y.o. male formally seen by Dr. Graham in August 2020 is being readmitted for sepsis manifested by hypotension, lactic acidosis, leukocytosis.    Patient has EDUARDO cirrhosis and portal gastropathy and esophageal varices recently had prolonged hospitalization including acute renal failure which has led to dialysis.  Patient found to have Enterobacter septicemia of unclear etiology.  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    Patient has had generalized weakness and several falls.  Patient missed dialysis 3 days ago.  On presentation to emergency room had lactic acidosis.    Has been restarted on  broad-spectrum antibiotics including vancomycin and Zosyn.      Biggest complaint is abdominal pain, has mild diarrhea, says that he is extremely thirsty that his mouth feels like snakeskin that his mouth is so dry that he is bleeding.      Past Medical History:   Diagnosis Date   • Arthritis    • Cancer (CMS/[facility])    • COPD (chronic obstructive pulmonary disease) (CMS/[facility])    • Fall 05/12/2020   • Hypertension    • Pancreatitis    • Renal disorder    • Tonsillar cancer (CMS/[facility])        Past Surgical History:   Procedure Laterality Date   • ANKLE TENDON REPAIR     • CARDIAC CATHETERIZATION N/A 5/31/2018    Procedure: Left Heart Cath;  Surgeon: Ray Farley MD;  Location:  [facility] CATH INVASIVE LOCATION;  Service: Cardiovascular   • CHOLECYSTECTOMY     • COLONOSCOPY N/A 7/10/2020    Procedure: COLONOSCOPY;  Surgeon: Brunner, Mark I, MD;  Location:  [facility] ENDOSCOPY;  Service: Gastroenterology;  Laterality: N/A;   • CYST REMOVAL      coccyx   • ENDOSCOPY N/A 7/9/2020    Procedure: ESOPHAGOGASTRODUODENOSCOPY;  Surgeon: Brunner, Mark I, MD;  Location:  [facility] ENDOSCOPY;  Service: Gastroenterology;  Laterality: N/A;   • HERNIA MESH REMOVAL     • HERNIA REPAIR     • KNEE SURGERY  1981   • TONSILLECTOMY         Family History   Problem Relation Age of Onset   • Hypertension Mother    • Heart disease Father        Social History     Socioeconomic History   • Marital status:      Spouse name: Not on file   • Number of children: Not on file   • Years of education: Not on file   • Highest education level: Not on file   Tobacco Use   • Smoking status: Former Smoker     Types: Cigarettes   • Smokeless tobacco: Never Used   • Tobacco comment: maybe 3 times/week   Substance and Sexual Activity   • Alcohol use: Not Currently     Alcohol/week: 0.0 standard drinks     Comment: rarely   • Drug use: Yes     Types: Marijuana   • Sexual activity: Not Currently   Social History Narrative    Patient ambulatory without assistive  device, but has ordered       No Known Allergies      Medication:    Current Facility-Administered Medications:   •  albuterol sulfate HFA (PROVENTIL HFA;VENTOLIN HFA;PROAIR HFA) inhaler 2 puff, 2 puff, Inhalation, Q4H PRN, Ant Gaston MD  •  [START ON 9/1/2020] aspirin chewable tablet 81 mg, 81 mg, Oral, Daily, Ant Gaston MD  •  lactulose (CHRONULAC) 10 GM/15ML solution 10 g, 10 g, Oral, TID, Ant Gaston MD, 10 g at 08/31/20 2030  •  midodrine (PROAMATINE) tablet 20 mg, 20 mg, Oral, TID AC, Ant Gaston MD, 20 mg at 08/31/20 1801  •  [START ON 9/1/2020] pantoprazole (PROTONIX) EC tablet 40 mg, 40 mg, Oral, Daily, Ant Gaston MD  •  [COMPLETED] vancomycin 2000 mg/500 mL 0.9% NS IVPB ([facility]), 20 mg/kg, Intravenous, Once, 2,000 mg at 08/31/20 1656 **AND** Pharmacy to dose vancomycin, , Does not apply, Continuous PRN, Ant Gaston MD  •  [START ON 9/1/2020] piperacillin-tazobactam (ZOSYN) 3.375 g in iso-osmotic dextrose 50 ml (premix), 3.375 g, Intravenous, Q12H, Ant Gaston MD  •  [START ON 9/1/2020] sertraline (ZOLOFT) tablet 25 mg, 25 mg, Oral, Daily, Ant Gaston MD  •  sodium chloride 0.9 % flush 10 mL, 10 mL, Intravenous, PRN, Brandan Pierce,   •  sodium chloride 0.9 % flush 10 mL, 10 mL, Intravenous, Q12H, Ant Gaston MD, 10 mL at 08/31/20 2030  •  sodium chloride 0.9 % flush 10 mL, 10 mL, Intravenous, PRN, Ant Gaston MD  •  [START ON 9/1/2020] vancomycin (dosing per levels), , Does not apply, Daily, Shyam Low, PharmD    Antibiotics:  Anti-Infectives (From admission, onward)    Ordered     Dose/Rate Route Frequency Start Stop    08/31/20 1932  vancomycin (dosing per levels)     Ordering Provider:  Shyam Low, PharmD     Does not apply Daily 09/01/20 0900 09/08/20 0859    08/31/20 1517  piperacillin-tazobactam (ZOSYN) 3.375 g in iso-osmotic dextrose 50 ml (premix)      Ordering Provider:  Ant Gaston MD    3.375 g  over 4 Hours Intravenous Every 12 Hours 20 0200 20 0159    20 1517  vancomycin 2000 mg/500 mL 0.9% NS IVPB ([facility])     Ordering Provider:  Ant Gaston MD    20 mg/kg × 95.3 kg Intravenous Once 20 1519 20 1656    20 1517  Pharmacy to dose vancomycin  Review   Ordering Provider:  Ant Gaston MD     Does not apply Continuous PRN 20 1516 20 1515    20 1318  piperacillin-tazobactam (ZOSYN) 3.375 g in iso-osmotic dextrose 50 ml (premix)     Ordering Provider:  Brandan Pierce DO    3.375 g  over 30 Minutes Intravenous Once 20 1320 20 1502            Review of Systems:  See HPI  Patient denies fevers or chills   Reports that his throat is parched  Admits to abdominal pain without nausea or vomiting  Denies burning upon urination        Physical Exam:   Vital Signs  Temp (24hrs), Av.2 °F (36.8 °C), Min:97.6 °F (36.4 °C), Max:99.1 °F (37.3 °C)    Temp  Min: 97.6 °F (36.4 °C)  Max: 99.1 °F (37.3 °C)  BP  Min: 86/59  Max: 115/52  Pulse  Min: 101  Max: 130  Resp  Min: 20  Max: 22  SpO2  Min: 85 %  Max: 98 %    GENERAL: Awake and alert, in no acute distress.   HEENT: Normocephalic, atraumatic.  PERRL. EOMI. No conjunctival injection. No icterus. Oropharynx clear without evidence of thrush or exudate. No evidence of peridontal disease.      HEART: RRR; No murmur  LUNGS: Clear to auscultation bilaterally without wheezing, rales, rhonchi. Normal respiratory effort. Nonlabored. No dullness.  ABDOMEN: Soft, nontender, abdomen is distended  EXT:  No cyanosis, clubbing or edema. No cord.  :  Without Abbasi catheter.  MSK: No joint deformity  SKIN: Warm and dry without cutaneous eruptions on Inspection/palpation.    NEURO: Confused      Laboratory Data    Results from last 7 days   Lab Units 20  1238   WBC 10*3/mm3 22.30*   HEMOGLOBIN g/dL 10.1*   HEMATOCRIT % 31.0*   PLATELETS  10*3/mm3 62*     Results from last 7 days   Lab Units 08/31/20  1238   SODIUM mmol/L 135*   POTASSIUM mmol/L 4.7   CHLORIDE mmol/L 92*   CO2 mmol/L 19.0*   BUN mg/dL 53*   CREATININE mg/dL 7.39*   GLUCOSE mg/dL 122*   CALCIUM mg/dL 9.0     Results from last 7 days   Lab Units 08/31/20  1238   ALK PHOS U/L 270*   BILIRUBIN mg/dL 6.3*   ALT (SGPT) U/L 42*   AST (SGOT) U/L 83*             Results from last 7 days   Lab Units 08/31/20  1740   LACTATE mmol/L 8.4*             Estimated Creatinine Clearance: 13.4 mL/min (A) (by C-G formula based on SCr of 7.39 mg/dL (H)).      Microbiology:  No results found for: ACANTHNAEG, AFBCX, BPERTUSSISCX, BLOODCX  No results found for: BCIDPCR, CXREFLEX, CSFCX, CULTURETIS  No results found for: CULTURES, HSVCX, URCX  No results found for: EYECULTURE, GCCX, LABHSV  No results found for: LEGIONELLA, MRSACX, MUMPSCX, MYCOPLASCX  No results found for: NOCARDIACX, STOOLCX  No results found for: THROATCX, UNSTIMCULT, URINECX, CULTURE, VZVCULTUR  No results found for: VIRALCULTU, WOUNDCX        Radiology:  Imaging Results (Last 72 Hours)     Procedure Component Value Units Date/Time    CT Chest Without Contrast [[id number]] Collected:  08/31/20 1536     Updated:  08/31/20 1543    Narrative:       EXAMINATION: CT CHEST WO CONTRAST-, CT ABDOMEN PELVIS WO CONTRAST-      INDICATION: sepsis, soa, cough; A41.9-Sepsis, unspecified organism     TECHNIQUE: Axial noncontrast CT of the chest, abdomen and pelvis with  multiplanar reconstructions     The radiation dose reduction device was turned on for each scan per the  ALARA (As Low as Reasonably Achievable) protocol.     COMPARISON: CT chest 7/31/2020     FINDINGS: The thyroid gland is homogeneous. A right-sided central line  terminates in the SVC. No suspicious soft tissue body wall findings.  Moderate diffuse anasarca. Evaluation of the osseous structures  demonstrates lumbar spondylosis changes, otherwise without suspicious  lytic or sclerotic  lesion. No bulky mediastinal or hilar adenopathy.  Unchanged somewhat prominent pericardial lymph node, likely congestive.     Evaluation of the lung fields demonstrates small moderate left pleural  effusion with some associated compressive atelectasis. Otherwise no  evidence of active infectious or inflammatory process.     Within the abdomen and pelvis, osteoarthrosis is noted with moderate to  large volume ascites. No focal liver lesion noted on noncontrast exam.  The spleen, pancreas and bilateral adrenal glands demonstrate  homogeneous attenuation without evidence of focal lesion. Numerous  splenic collaterals are noted. Small and large bowel loops are  nondilated, without evidence of suspicious focal bowel wall thickening.  No free air. No bulky retroperitoneal adenopathy. The kidneys  demonstrate no evidence of obstructing calculi, contour deforming mass  or hydronephrosis.       Impression:       Cirrhosis with moderate to large volume ascites. No  additional acute process identified in the abdomen and pelvis.     Small left pleural effusion with some associated compressive  atelectasis. No additional acute process in the chest.        This report was finalized on 8/31/2020 3:40 PM by Gino Narayan.       CT Abdomen Pelvis Without Contrast [[id number]] Collected:  08/31/20 1536     Updated:  08/31/20 1543    Narrative:       EXAMINATION: CT CHEST WO CONTRAST-, CT ABDOMEN PELVIS WO CONTRAST-      INDICATION: sepsis, soa, cough; A41.9-Sepsis, unspecified organism     TECHNIQUE: Axial noncontrast CT of the chest, abdomen and pelvis with  multiplanar reconstructions     The radiation dose reduction device was turned on for each scan per the  ALARA (As Low as Reasonably Achievable) protocol.     COMPARISON: CT chest 7/31/2020     FINDINGS: The thyroid gland is homogeneous. A right-sided central line  terminates in the SVC. No suspicious soft tissue body wall findings.  Moderate diffuse anasarca. Evaluation of  the osseous structures  demonstrates lumbar spondylosis changes, otherwise without suspicious  lytic or sclerotic lesion. No bulky mediastinal or hilar adenopathy.  Unchanged somewhat prominent pericardial lymph node, likely congestive.     Evaluation of the lung fields demonstrates small moderate left pleural  effusion with some associated compressive atelectasis. Otherwise no  evidence of active infectious or inflammatory process.     Within the abdomen and pelvis, osteoarthrosis is noted with moderate to  large volume ascites. No focal liver lesion noted on noncontrast exam.  The spleen, pancreas and bilateral adrenal glands demonstrate  homogeneous attenuation without evidence of focal lesion. Numerous  splenic collaterals are noted. Small and large bowel loops are  nondilated, without evidence of suspicious focal bowel wall thickening.  No free air. No bulky retroperitoneal adenopathy. The kidneys  demonstrate no evidence of obstructing calculi, contour deforming mass  or hydronephrosis.       Impression:       Cirrhosis with moderate to large volume ascites. No  additional acute process identified in the abdomen and pelvis.     Small left pleural effusion with some associated compressive  atelectasis. No additional acute process in the chest.        This report was finalized on 8/31/2020 3:40 PM by Gino Narayan.       CT Head Without Contrast [[id number]] Collected:  08/31/20 1525     Updated:  08/31/20 1529    Narrative:       EXAMINATION: CT HEAD WO CONTRAST-      INDICATION: weakness, falls contusion; A41.9-Sepsis, unspecified  organism     TECHNIQUE: Noncontrast axial CT of the head with multiplanar  reconstructions.     The radiation dose reduction device was turned on for each scan per the  ALARA (As Low as Reasonably Achievable) protocol.     COMPARISON: NONE     FINDINGS: No intracranial hemorrhage, mass, mass effect or evidence of  acute territorial ischemia. Ventricles are normal in size  and  configuration. No acute extra-axial hemorrhage. The orbits are normal  and the paranasal sinuses are grossly clear.       Impression:       No acute intracranial abnormality.        This report was finalized on 8/31/2020 3:25 PM by Gino Narayan.       XR Chest 1 View [[id number]] Collected:  08/31/20 1317     Updated:  08/31/20 1322    Narrative:       EXAMINATION: XR CHEST 1 VW-      INDICATION: Weak/Dizzy/AMS triage protocol      COMPARISON: NONE     FINDINGS: Tunneled dialysis catheter terminates at the cavoatrial  junction. The lungs are hypoinflated and there are associated areas of  subsegmental atelectasis. No focal consolidation. No pleural effusion or  pneumothorax. Unchanged heart and mediastinal contours.       Impression:       Hypoventilatory chest without evidence of acute disease  otherwise.     This report was finalized on 8/31/2020 1:18 PM by Gino Narayan.               Impression:   Severe sepsis  Leukocytosis with neutrophilia  Thrombocytopenia  EDUARDO cirrhosis  End-stage renal disease now on hemodialysis  Lactic acidosis  History of Citrobacter bacteremia 7/31/2020  Abdominal pain    PLAN/RECOMMENDATIONS:   Thank you for asking us to see Oliver Mallory, I recommend the following:    COVID swab -ve x 2     Physical exam mostly remarkable for abdominal pain;    Paracentesis r/o bacterial/fungal process would be helpful     Patient is critically ill; cover for line sepsis, spontaneous bacterial peritonitis, fungal superinfection, ischemic bowel, mesenteric venous thrombosis.    Zosyn will probably adequate to treat colonic garrett adequately    We will add empiric micafungin 100 mg IV now and then daily until patient is clinically better    Continue vancomycin per pharmacy dosing aim for bank trough of 15    Follow-up blood cultures  I  will asked Dr. Graham to resume care starting tomorrow    Critical care time 40 minutes time in 2118 time out 2218    Can have ice chips and water from my  standpoint  Alli Russell MD  8/31/2020  22:01                    Electronically signed by Alli Russell MD at 08/31/20 2218     Gino Davis MD at 08/31/20 2038      Consult Orders    1. Inpatient Nephrology Consult [[id number]] ordered by Ant Gaston MD at 08/31/20 1517                [facility] Consult Note    Oliver Mallory  1965  [id number]    Date of Admit:  8/31/2020    Date of Consult: 8/31/2020        Requesting Provider: Ant Gaston,*  Evaluating Physician: Gino Davis MD        Reason for Consultation:  ESRD  History of present illness:    Patient is a 55 y.o.  Yr old male KNOWN TO [facility] WITH ESRD ON M-W-F HD ( [facility] ), EDUARDO, COPD, TONSILLAR CANCER ADMITTED WITH WEAKNESS AND FREQUENT FALLS AT HOME AND WE ARE ASKED TO TO MANAGE ESRD/DIALYSIS. PATIENT IS A POOR HISTORIAN AND APPARENTLY HAS NOT HD TX IN 5 DAYS. USES A THD CATH.    Past Medical History:   Diagnosis Date   • Arthritis    • Cancer (CMS/[facility])    • COPD (chronic obstructive pulmonary disease) (CMS/[facility])    • Fall 05/12/2020   • Hypertension    • Pancreatitis    • Renal disorder    • Tonsillar cancer (CMS/[facility])        Past Surgical History:   Procedure Laterality Date   • ANKLE TENDON REPAIR     • CARDIAC CATHETERIZATION N/A 5/31/2018    Procedure: Left Heart Cath;  Surgeon: Ray Farley MD;  Location:  [facility] CATH INVASIVE LOCATION;  Service: Cardiovascular   • CHOLECYSTECTOMY     • COLONOSCOPY N/A 7/10/2020    Procedure: COLONOSCOPY;  Surgeon: Brunner, Mark I, MD;  Location:  [facility] ENDOSCOPY;  Service: Gastroenterology;  Laterality: N/A;   • CYST REMOVAL      coccyx   • ENDOSCOPY N/A 7/9/2020    Procedure: ESOPHAGOGASTRODUODENOSCOPY;  Surgeon: Brunner, Mark I, MD;  Location:  [facility];  Service: Gastroenterology;  Laterality: N/A;   • HERNIA MESH REMOVAL     • HERNIA REPAIR     • KNEE SURGERY  1981   • TONSILLECTOMY         Social History     Socioeconomic History   • Marital  status:      Spouse name: Not on file   • Number of children: Not on file   • Years of education: Not on file   • Highest education level: Not on file   Tobacco Use   • Smoking status: Former Smoker     Types: Cigarettes   • Smokeless tobacco: Never Used   • Tobacco comment: maybe 3 times/week   Substance and Sexual Activity   • Alcohol use: Not Currently     Alcohol/week: 0.0 standard drinks     Comment: rarely   • Drug use: Yes     Types: Marijuana   • Sexual activity: Not Currently   Social History Narrative    Patient ambulatory without assistive device, but has ordered       family history includes Heart disease in his father; Hypertension in his mother.    No Known Allergies    Medication:    Current Facility-Administered Medications:   •  albuterol sulfate HFA (PROVENTIL HFA;VENTOLIN HFA;PROAIR HFA) inhaler 2 puff, 2 puff, Inhalation, Q4H PRN, Ant Gaston MD  •  [START ON 9/1/2020] aspirin chewable tablet 81 mg, 81 mg, Oral, Daily, Ant Gaston MD  •  lactulose (CHRONULAC) 10 GM/15ML solution 10 g, 10 g, Oral, TID, Ant Gaston MD, 10 g at 08/31/20 2030  •  midodrine (PROAMATINE) tablet 20 mg, 20 mg, Oral, TID AC, Ant Gaston MD, 20 mg at 08/31/20 1801  •  [START ON 9/1/2020] pantoprazole (PROTONIX) EC tablet 40 mg, 40 mg, Oral, Daily, Ant Gaston MD  •  [COMPLETED] vancomycin 2000 mg/500 mL 0.9% NS IVPB ([facility]), 20 mg/kg, Intravenous, Once, 2,000 mg at 08/31/20 1656 **AND** Pharmacy to dose vancomycin, , Does not apply, Continuous PRN, Ant Gaston MD  •  [START ON 9/1/2020] piperacillin-tazobactam (ZOSYN) 3.375 g in iso-osmotic dextrose 50 ml (premix), 3.375 g, Intravenous, Q12H, Ant Gaston MD  •  [START ON 9/1/2020] sertraline (ZOLOFT) tablet 25 mg, 25 mg, Oral, Daily, Ant Gaston MD  •  sodium chloride 0.9 % flush 10 mL, 10 mL, Intravenous, PRN, Brandan Pierce DO  •  sodium chloride 0.9 % flush 10 mL,  10 mL, Intravenous, Q12H, Ant Gaston MD, 10 mL at 08/31/20 2030  •  sodium chloride 0.9 % flush 10 mL, 10 mL, Intravenous, PRN, Ant Gaston MD  •  [START ON 9/1/2020] vancomycin (dosing per levels), , Does not apply, Daily, Shyam Low, PharmD    Medications Prior to Admission   Medication Sig Dispense Refill Last Dose   • albuterol sulfate  (90 Base) MCG/ACT inhaler Inhale 2 puffs Every 4 (Four) Hours As Needed for Wheezing or Shortness of Air. 18 g 3 Past Week at Unknown time   • aspirin 81 MG chewable tablet Chew 81 mg Daily.   8/31/2020 at Unknown time   • bumetanide (BUMEX) 2 MG tablet Take 2 tablets by mouth Daily. 60 tablet 0 8/31/2020 at Unknown time   • diphenhydrAMINE (BENADRYL) 25 mg capsule Take 1 capsule by mouth Every 6 (Six) Hours As Needed for Itching for up to 2 doses.   Past Month at Unknown time   • docusate sodium (COLACE) 100 MG capsule Take 100 mg by mouth Daily.   Past Week at Unknown time   • lactulose (CHRONULAC) 10 GM/15ML solution Take 15 mL by mouth 3 (Three) Times a Day. 90 mL 0 Past Week at Unknown time   • metoclopramide (REGLAN) 5 MG tablet Take 1 tablet by mouth 3 (Three) Times a Day Before Meals. 90 tablet 0 8/31/2020 at Unknown time   • midodrine (PROAMATINE) 10 MG tablet Take 2 tablets by mouth 3x/Day on Mon., Wed., and Fri. dialysis days, and 1 tablet 3x/day on Sun., Tue., Thu., and Sat. 40 tablet 0 8/31/2020 at Unknown time   • Naloxegol Oxalate (MOVANTIK) 12.5 MG tablet Take 1 tablet by mouth Every Morning for constipation. 30 tablet 0 8/31/2020 at Unknown time   • ondansetron (ZOFRAN) 8 MG tablet Take 1 tablet by mouth 3 (Three) Times a Day As Needed for Nausea or Vomiting. 30 tablet 5 Past Month at Unknown time   • oxyCODONE (ROXICODONE) 10 MG tablet Take 1 tablet by mouth Every 6 (Six) Hours As Needed for Moderate Pain  or Severe Pain  for up to 15 days. 60 tablet 0 8/31/2020 at Unknown time   • pantoprazole (PROTONIX) 40 MG EC  tablet Take 40 mg by mouth Daily.   8/31/2020 at Unknown time   • sennosides-docusate (senna-docusate sodium) 8.6-50 MG per tablet Take 2 tablets by mouth 2 (two) times a day. 120 tablet 0 8/30/2020 at Unknown time   • sertraline (ZOLOFT) 25 MG tablet Take 1 tablet by mouth Daily. 30 tablet 1 8/31/2020 at Unknown time   • Magic mouthwash Radonc Swish and swallow 10 mL 4 (Four) Times a Day Before Meals & at Bedtime. 480 mL 3 Unknown at Unknown time   • midodrine (PROAMATINE) 10 MG tablet Take 2 tablets by mouth 3x/Day on Mon., Wed., and Fri. dialysis days, and 1 tablet 3x/day on Sun., Tue., Thu., and Sat. 40 tablet 0 Unknown at Unknown time   • naloxone (NARCAN) 4 MG/0.1ML nasal spray Use 1 spray into the nostril(s) as directed by provider As Needed for opioid reversal for up to 1 dose. 2 each 0 Unknown at Unknown time   • OLANZapine zydis (zyPREXA) 5 MG disintegrating tablet Place 1 tablet on the tongue Every Night for severe nausea. 10 tablet 1 Unknown at Unknown time   • phenylephrine-mineral oil-petrolatum (PREPARATION H) 0.25-14-74.9 % ointment hemorrhoidal ointment Insert 1 application into the rectum 3 (Three) Times a Day As Needed (rectal pain/ bleeding). 56 g 0 Unknown at Unknown time       Review of Systems:    Constitutional-- No Fever, chills or sweats. No significant change in weight. WEAKNESS. FREQUENT FALLS.  Eye-- no diplopia, no conjunctivitis  ENT-- No new hearing or throat complaints.  No epistaxis or oral sores. No odynophagia or dysphagia. No headache, photophobia or neck stiffness.  CV-- No chest pain, palpitations, soa, or edema  Resp-- No cough/Hemoptysis  GI- No nausea, vomiting, or diarrhea.  No hematochezia, melena, or hematemesis.  -- No dysuria, hematuria, or flank pain. No lower tract obstructive symptoms  Skin-- No rash OR ITCHING.THD CATH  Lymph- no painful or swollen lymph nodes in neck/axilla or groin.   Heme- No active bruising or bleeding; no Hx of DVT or PE.  MS-- no swelling or  "pain in the joints  Neuro-- No acute focal weakness or numbness in the arms or legs.  No seizures.  Psych--No anxiety or depression  Endo--No cold or heat intolerance.  No polyuria, polydipsia, or polyphagia    Full review of systems reviewed and negative otherwise for acute complaints    Physical Exam:   Vital Signs   Blood pressure (!) 88/63, pulse 101, temperature 97.6 °F (36.4 °C), temperature source Axillary, resp. rate 20, height 182.9 cm (72\"), weight 95.3 kg (210 lb), SpO2 93 %.     GENERAL: Awake and alert, in no acute distress. CHRONICALLY ILL  HEENT: Normocephalic, atraumatic.  PER.  No conjunctivitis. No icterus. Oropharynx clear without evidence of thrush or exudate. No evidence of peridontal disease.    NECK: Supple without nuchal rigidity. No mass. THD CATH.  LYMPH: No painful cervical, axillary or inguinal lymphadenopathy.  HEART: RRR; No murmur, rubs, gallops. No bruits in neck, abdomen, or groins. 1+ edema  LUNGS: Normal respiratory effort. Nonlabored. No dullness.  No crepitus.  FEW wheezing, rales & rhonchi.  ABDOMEN: Soft, nontender, distended. + FLUID WAVE. Positive bowel sounds. No rebound or guarding. NO mass or HSM.  JOINTS:  Full range of motion, no redness or tenderness.  EXT:  No cyanosis/clubbing.  :  BLADDER NOT DISTENDED.  SKIN: Warm and dry without rash  NEURO: CONFUSED. No focal neurological deficits. Strength equal bilateral  PSYCHIATRIC:  Cooperative with PE    Laboratory Data  Results from last 7 days   Lab Units 08/31/20  1238   HEMOGLOBIN g/dL 10.1*   HEMATOCRIT % 31.0*     Results from last 7 days   Lab Units 08/31/20  1238   SODIUM mmol/L 135*   POTASSIUM mmol/L 4.7   CHLORIDE mmol/L 92*   CO2 mmol/L 19.0*   BUN mg/dL 53*   CREATININE mg/dL 7.39*   CALCIUM mg/dL 9.0   MAGNESIUM mg/dL 1.9   ALBUMIN g/dL 3.20*     Results from last 7 days   Lab Units 08/31/20  1238   GLUCOSE mg/dL 122*         Results from last 7 days   Lab Units 08/31/20  1238   ALK PHOS U/L 270*   BILIRUBIN " mg/dL 6.3*   ALT (SGPT) U/L 42*   AST (SGOT) U/L 83*     Estimated Creatinine Clearance: 13.4 mL/min (A) (by C-G formula based on SCr of 7.39 mg/dL (H)).    Radiology:        Impression: ESRD. ANEMIA. EDUARDO.HYPOTENSION.      PLAN: Thank you for asking us to see Oliver Mallory, I recommend the following:HD 9/1/20. WILL FOLLOW.       Gino Davis MD  8/31/2020  20:38                    Electronically signed by Gino Davis MD at 08/31/20 2046

## 2020-09-01 NOTE — PROGRESS NOTES
Continued Stay Note  [facility]     Patient Name: Oliver Mallory  MRN: [id number]  Today's Date: 9/1/2020    Admit Date: 8/31/2020    Discharge Plan     Row Name 09/01/20 1134       Plan    Plan  ongoing    Final Discharge Disposition Code  30 - still a patient    Final Note  Met with Mr. Mallory at bedside to discuss d/c planning.  ? O2 use at home but does not know how much??  He has RX coverage and gets scripts filled at [facility] in [place].  He states transport van takes him to dialysis.  CM following        Discharge Codes    No documentation.       Expected Discharge Date and Time     Expected Discharge Date Expected Discharge Time    Sep 8, 2020             Anabel Espino RN

## 2020-09-01 NOTE — NURSING NOTE
CRRT initiated.  Policy and procedure reviewed with FADI Carpenter. Orders and documentation reviewed. Hematocrit updated. Supplies adequate. Denies further need at this time. Encouraged to call [phone] for assistance or for questions.

## 2020-09-01 NOTE — PROGRESS NOTES
"Adult Nutrition  Assessment/PES    Patient Name:  Oliver Mallory  YOB: 1965  MRN: [id number]  Admit Date:  8/31/2020    Assessment Date:  9/1/2020    Comments:      Reason for Assessment     Row Name 09/01/20 1106          Reason for Assessment    Reason For Assessment  MDR/identified at risk by screening criteria/30\"     Diagnosis  -- SEPSIS, ASCITES. PMH: PORTAL GASTROPATHY, STAGE 2 TONSIL CA/CHEMO, EDUARDO, LIVER CIRRHOSIS, ESOPH VARICES, ESRD/HD M-W-F, CHRONIC PAIN, CONSTIPATION DEU TO OPIOIDS, SUICIDAL THOUGHTS, HTN, UI WT LOSS, PANCREATITIS, COPD/ FORMER SMOKER. PSH: TONSILLECTOMY,     Identified At Risk by Screening Criteria  MST SCORE 2+;other (see comments) PSH (CONT): CHOLY, HERNIA, COCCYX CYST REMOVAL, PARACENTESIS (MULT), ANKLE TENDON, KNEE.  UNSURE IF UNPLANNED WEIGHT LOSS.          Nutrition/Diet History     Row Name 09/01/20 1118          Nutrition/Diet History    Typical Food/Fluid Intake  PT REPORTS HIS APPETITE & INTAKE OF FOOD HAS BEEN DOWN FOR ~ 2 MONTHS (< 50%). HE REPORTS NOT SURE IF HE'S LOST WEIGHT AS HE TENDS TO RETAIN FLUID. HE REPORTS IS TOLERATING LIQUIDS SO FAR.          Anthropometrics     Row Name 09/01/20 1120          Anthropometrics    Height  182.9 cm (72\")     Weight  -- WEIGHTS MAY BE SKEWED BY FLUID SHIFTS.         Admit Weight    Admit Weight Method  stated     Admit Weight  95.3 kg (210 lb)        Ideal Body Weight (IBW)    Ideal Body Weight (IBW) (kg)  82.07        Usual Body Weight (UBW)    Usual Body Weight  -- BASED ON EPIC RECORDS, WT 8/7/20 276.4# (STANDING SCALE), 7/3/20 293.8# (STANDING SCALE), 6/3/20 311# (MD OFFICE), 5/19/20 315.1# (MD OFFICE) & 4/16/20 324.4# (MD OFFICE)         Labs/Tests/Procedures/Meds     Row Name 09/01/20 1122          Labs/Procedures/Meds    Lab Results Reviewed  reviewed        Medications    Pertinent Medications Reviewed  reviewed     Pertinent Medications Comments  LACTULOSE         Physical Findings     Row Name 09/01/20 1123    "       Physical Findings    Overall Physical Appearance  other (see comments) NFPE DEFERRED DUE TO ASCITES & @ RISK FOR VOLUME OVERLOAD      Gastrointestinal  other (see comments);abdominal distension FIRM, TAUT & TENDER ABD PER NURSING DOCUMENTATION     Skin  other (see comments) WDL PER NURSING DOCUMENTATION                 Nutrition Prescription Ordered     Row Name 09/01/20 1124          Nutrition Prescription PO    Current PO Diet  Clear Liquid     Common Modifiers  Renal;Low Sodium     Low Sodium Details  2,000 mg Sodium         Evaluation of Received Nutrient/Fluid Intake     Row Name 09/01/20 1124          PO Evaluation    Number of Days PO Intake Evaluated  Insufficient Data NO MEALS RECORDED YET               Problem/Interventions:  Problem 1     Row Name 09/01/20 1129          Nutrition Diagnoses Problem 1    Problem 1  Inadequate Nutrient Intake     Etiology (related to)  MNT for Treatment/Condition     Signs/Symptoms (evidenced by)  Clear Liquid Diet;Report of Mnimal PO Intake               Intervention Goal     Row Name 09/01/20 1129          Intervention Goal    General  Nutrition support treatment     PO  Establish PO;Other (comment) & ADVANCE DIET ONCE MEDICALLY APPROPRIATE.          Nutrition Intervention     Row Name 09/01/20 1130          Nutrition Intervention    RD/Tech Action  Care plan reviewd;Follow Tx progress;Encourage intake           Education/Evaluation     Row Name 09/01/20 1130          Monitor/Evaluation    Monitor  Per protocol;I&O;PO intake;Pertinent labs;Weight;Skin status;Food/activity diary;Symptoms           Electronically signed by:  Monica Cobb RD  09/01/20 11:30

## 2020-09-01 NOTE — NURSING NOTE
0900 - Pt was refusing lactulose this AM. He was educated on the importance of medication for his liver failure. MD was notified in rounds.     1030 - Pt still has not voided and is stating that he has the urge to go and is uncomfortable. He wanted to get out of bed to the bathroom and I educated him on the importance of his bed rest order. Bladder scan showed 987 mL. Pt attempted to refuse I&O catheter but was educated on the importance of draining the bladder. After the I&O cath only 100 mL was drained. MD was notified.    1400 - Spoke with Dr. De Leon about patient' refusing lactulose. Pt is starting to hallucinate and is now getting confused.    CRRT initiated    1500 - Dr. De Leon at bedside to do paracentesis. Dr. Graham present during some of it, stating that the line is the probable source of infection. Per FATUMA REAL leave CRRT running until a decision is made whether or not to pull the line.     1520 - Verbal order received from A Robert REAL to stop running CRRT.     1600 - Notified Dr. Hays that we stopped running CRRT. He said that he would like CRRT to be run but if it needs to be stopped that we are to keep a close eye on patient's labs for the evening and leave routine labs going every 8 hours.

## 2020-09-01 NOTE — CONSULTS
NAL Consult Note    Oliver Mallory  1965  [id number]    Date of Admit:  8/31/2020    Date of Consult: 8/31/2020        Requesting Provider: Ant Gaston,*  Evaluating Physician: Gino Davis MD        Reason for Consultation:  ESRD  History of present illness:    Patient is a 55 y.o.  Yr old male KNOWN TO [facility] WITH ESRD ON M-W-F HD ( [facility] ), EDUARDO, COPD, TONSILLAR CANCER ADMITTED WITH WEAKNESS AND FREQUENT FALLS AT HOME AND WE ARE ASKED TO TO MANAGE ESRD/DIALYSIS. PATIENT IS A POOR HISTORIAN AND APPARENTLY HAS NOT HD TX IN 5 DAYS. USES A THD CATH.    Past Medical History:   Diagnosis Date   • Arthritis    • Cancer (CMS/HCC)    • COPD (chronic obstructive pulmonary disease) (CMS/HCC)    • Fall 05/12/2020   • Hypertension    • Pancreatitis    • Renal disorder    • Tonsillar cancer (CMS/HCC)        Past Surgical History:   Procedure Laterality Date   • ANKLE TENDON REPAIR     • CARDIAC CATHETERIZATION N/A 5/31/2018    Procedure: Left Heart Cath;  Surgeon: Ray Farley MD;  Location:  [facility];  Service: Cardiovascular   • CHOLECYSTECTOMY     • COLONOSCOPY N/A 7/10/2020    Procedure: COLONOSCOPY;  Surgeon: Brunner, Mark I, MD;  Location:  [facility];  Service: Gastroenterology;  Laterality: N/A;   • CYST REMOVAL      coccyx   • ENDOSCOPY N/A 7/9/2020    Procedure: ESOPHAGOGASTRODUODENOSCOPY;  Surgeon: Brunner, Mark I, MD;  Location:  [facility];  Service: Gastroenterology;  Laterality: N/A;   • HERNIA MESH REMOVAL     • HERNIA REPAIR     • KNEE SURGERY  1981   • TONSILLECTOMY         Social History     Socioeconomic History   • Marital status:      Spouse name: Not on file   • Number of children: Not on file   • Years of education: Not on file   • Highest education level: Not on file   Tobacco Use   • Smoking status: Former Smoker     Types: Cigarettes   • Smokeless tobacco: Never Used   • Tobacco comment: maybe 3 times/week   Substance and Sexual  Activity   • Alcohol use: Not Currently     Alcohol/week: 0.0 standard drinks     Comment: rarely   • Drug use: Yes     Types: Marijuana   • Sexual activity: Not Currently   Social History Narrative    Patient ambulatory without assistive device, but has ordered       family history includes Heart disease in his father; Hypertension in his mother.    No Known Allergies    Medication:    Current Facility-Administered Medications:   •  albuterol sulfate HFA (PROVENTIL HFA;VENTOLIN HFA;PROAIR HFA) inhaler 2 puff, 2 puff, Inhalation, Q4H PRN, Ant Gaston MD  •  [START ON 9/1/2020] aspirin chewable tablet 81 mg, 81 mg, Oral, Daily, Ant Gaston MD  •  lactulose (CHRONULAC) 10 GM/15ML solution 10 g, 10 g, Oral, TID, Ant Gaston MD, 10 g at 08/31/20 2030  •  midodrine (PROAMATINE) tablet 20 mg, 20 mg, Oral, TID AC, Ant Gaston MD, 20 mg at 08/31/20 1801  •  [START ON 9/1/2020] pantoprazole (PROTONIX) EC tablet 40 mg, 40 mg, Oral, Daily, Ant Gaston MD  •  [COMPLETED] vancomycin 2000 mg/500 mL 0.9% NS IVPB ([facility]), 20 mg/kg, Intravenous, Once, 2,000 mg at 08/31/20 1656 **AND** Pharmacy to dose vancomycin, , Does not apply, Continuous PRN, Ant Gaston MD  •  [START ON 9/1/2020] piperacillin-tazobactam (ZOSYN) 3.375 g in iso-osmotic dextrose 50 ml (premix), 3.375 g, Intravenous, Q12H, Ant Gaston MD  •  [START ON 9/1/2020] sertraline (ZOLOFT) tablet 25 mg, 25 mg, Oral, Daily, Ant Gaston MD  •  sodium chloride 0.9 % flush 10 mL, 10 mL, Intravenous, PRN, Brandan Pierce,   •  sodium chloride 0.9 % flush 10 mL, 10 mL, Intravenous, Q12H, Ant Gaston MD, 10 mL at 08/31/20 2030  •  sodium chloride 0.9 % flush 10 mL, 10 mL, Intravenous, PRN, nAt Gaston MD  •  [START ON 9/1/2020] vancomycin (dosing per levels), , Does not apply, Daily, Shyam Low, PharmD    Medications Prior to Admission   Medication  Sig Dispense Refill Last Dose   • albuterol sulfate  (90 Base) MCG/ACT inhaler Inhale 2 puffs Every 4 (Four) Hours As Needed for Wheezing or Shortness of Air. 18 g 3 Past Week at Unknown time   • aspirin 81 MG chewable tablet Chew 81 mg Daily.   8/31/2020 at Unknown time   • bumetanide (BUMEX) 2 MG tablet Take 2 tablets by mouth Daily. 60 tablet 0 8/31/2020 at Unknown time   • diphenhydrAMINE (BENADRYL) 25 mg capsule Take 1 capsule by mouth Every 6 (Six) Hours As Needed for Itching for up to 2 doses.   Past Month at Unknown time   • docusate sodium (COLACE) 100 MG capsule Take 100 mg by mouth Daily.   Past Week at Unknown time   • lactulose (CHRONULAC) 10 GM/15ML solution Take 15 mL by mouth 3 (Three) Times a Day. 90 mL 0 Past Week at Unknown time   • metoclopramide (REGLAN) 5 MG tablet Take 1 tablet by mouth 3 (Three) Times a Day Before Meals. 90 tablet 0 8/31/2020 at Unknown time   • midodrine (PROAMATINE) 10 MG tablet Take 2 tablets by mouth 3x/Day on Mon., Wed., and Fri. dialysis days, and 1 tablet 3x/day on Sun., Tue., Thu., and Sat. 40 tablet 0 8/31/2020 at Unknown time   • Naloxegol Oxalate (MOVANTIK) 12.5 MG tablet Take 1 tablet by mouth Every Morning for constipation. 30 tablet 0 8/31/2020 at Unknown time   • ondansetron (ZOFRAN) 8 MG tablet Take 1 tablet by mouth 3 (Three) Times a Day As Needed for Nausea or Vomiting. 30 tablet 5 Past Month at Unknown time   • oxyCODONE (ROXICODONE) 10 MG tablet Take 1 tablet by mouth Every 6 (Six) Hours As Needed for Moderate Pain  or Severe Pain  for up to 15 days. 60 tablet 0 8/31/2020 at Unknown time   • pantoprazole (PROTONIX) 40 MG EC tablet Take 40 mg by mouth Daily.   8/31/2020 at Unknown time   • sennosides-docusate (senna-docusate sodium) 8.6-50 MG per tablet Take 2 tablets by mouth 2 (two) times a day. 120 tablet 0 8/30/2020 at Unknown time   • sertraline (ZOLOFT) 25 MG tablet Take 1 tablet by mouth Daily. 30 tablet 1 8/31/2020 at Unknown time   • Magic  mouthwash Radonc Swish and swallow 10 mL 4 (Four) Times a Day Before Meals & at Bedtime. 480 mL 3 Unknown at Unknown time   • midodrine (PROAMATINE) 10 MG tablet Take 2 tablets by mouth 3x/Day on Mon., Wed., and Fri. dialysis days, and 1 tablet 3x/day on Sun., Tue., Thu., and Sat. 40 tablet 0 Unknown at Unknown time   • naloxone (NARCAN) 4 MG/0.1ML nasal spray Use 1 spray into the nostril(s) as directed by provider As Needed for opioid reversal for up to 1 dose. 2 each 0 Unknown at Unknown time   • OLANZapine zydis (zyPREXA) 5 MG disintegrating tablet Place 1 tablet on the tongue Every Night for severe nausea. 10 tablet 1 Unknown at Unknown time   • phenylephrine-mineral oil-petrolatum (PREPARATION H) 0.25-14-74.9 % ointment hemorrhoidal ointment Insert 1 application into the rectum 3 (Three) Times a Day As Needed (rectal pain/ bleeding). 56 g 0 Unknown at Unknown time       Review of Systems:    Constitutional-- No Fever, chills or sweats. No significant change in weight. WEAKNESS. FREQUENT FALLS.  Eye-- no diplopia, no conjunctivitis  ENT-- No new hearing or throat complaints.  No epistaxis or oral sores. No odynophagia or dysphagia. No headache, photophobia or neck stiffness.  CV-- No chest pain, palpitations, soa, or edema  Resp-- No cough/Hemoptysis  GI- No nausea, vomiting, or diarrhea.  No hematochezia, melena, or hematemesis.  -- No dysuria, hematuria, or flank pain. No lower tract obstructive symptoms  Skin-- No rash OR ITCHING.THD CATH  Lymph- no painful or swollen lymph nodes in neck/axilla or groin.   Heme- No active bruising or bleeding; no Hx of DVT or PE.  MS-- no swelling or pain in the joints  Neuro-- No acute focal weakness or numbness in the arms or legs.  No seizures.  Psych--No anxiety or depression  Endo--No cold or heat intolerance.  No polyuria, polydipsia, or polyphagia    Full review of systems reviewed and negative otherwise for acute complaints    Physical Exam:   Vital Signs   Blood  "pressure (!) 88/63, pulse 101, temperature 97.6 °F (36.4 °C), temperature source Axillary, resp. rate 20, height 182.9 cm (72\"), weight 95.3 kg (210 lb), SpO2 93 %.     GENERAL: Awake and alert, in no acute distress. CHRONICALLY ILL  HEENT: Normocephalic, atraumatic.  PER.  No conjunctivitis. No icterus. Oropharynx clear without evidence of thrush or exudate. No evidence of peridontal disease.    NECK: Supple without nuchal rigidity. No mass. THD CATH.  LYMPH: No painful cervical, axillary or inguinal lymphadenopathy.  HEART: RRR; No murmur, rubs, gallops. No bruits in neck, abdomen, or groins. 1+ edema  LUNGS: Normal respiratory effort. Nonlabored. No dullness.  No crepitus.  FEW wheezing, rales & rhonchi.  ABDOMEN: Soft, nontender, distended. + FLUID WAVE. Positive bowel sounds. No rebound or guarding. NO mass or HSM.  JOINTS:  Full range of motion, no redness or tenderness.  EXT:  No cyanosis/clubbing.  :  BLADDER NOT DISTENDED.  SKIN: Warm and dry without rash  NEURO: CONFUSED. No focal neurological deficits. Strength equal bilateral  PSYCHIATRIC:  Cooperative with PE    Laboratory Data  Results from last 7 days   Lab Units 08/31/20  1238   HEMOGLOBIN g/dL 10.1*   HEMATOCRIT % 31.0*     Results from last 7 days   Lab Units 08/31/20  1238   SODIUM mmol/L 135*   POTASSIUM mmol/L 4.7   CHLORIDE mmol/L 92*   CO2 mmol/L 19.0*   BUN mg/dL 53*   CREATININE mg/dL 7.39*   CALCIUM mg/dL 9.0   MAGNESIUM mg/dL 1.9   ALBUMIN g/dL 3.20*     Results from last 7 days   Lab Units 08/31/20  1238   GLUCOSE mg/dL 122*         Results from last 7 days   Lab Units 08/31/20  1238   ALK PHOS U/L 270*   BILIRUBIN mg/dL 6.3*   ALT (SGPT) U/L 42*   AST (SGOT) U/L 83*     Estimated Creatinine Clearance: 13.4 mL/min (A) (by C-G formula based on SCr of 7.39 mg/dL (H)).    Radiology:        Impression: ESRD. ANEMIA. EDUARDO.HYPOTENSION.      PLAN: Thank you for asking us to see Oliver Mallory, I recommend the following:HD 9/1/20. WILL FOLLOW.   "     Gino Davis MD  8/31/2020  20:38

## 2020-09-01 NOTE — PROGRESS NOTES
Intensive Care Follow-up     Hospital:  LOS: 1 day   Mr. Oliver Mallory, 55 y.o. male is followed for:   Sepsis (CMS/HCC)        Subjective   Interval History:  The chart has been reviewed and full.  The patient remains on Abdon-Synephrine secondary to hypotension.  I note that his cortisol level is relatively low at 20.  He is complaining of a lot of discomfort in his abdomen though it is not outright tender.  He states that he is short of breath and is having some difficulty breathing secondary to the swelling in his abdomen.  His blood cultures have been reported as positive with staph aureus and bio fire is positive for MRSA.    The patient's past medical, surgical and social history were reviewed and updated in Epic as appropriate.        Objective     Infusions:    Pharmacy to dose vancomycin     phenylephrine 0.5-3 mcg/kg/min Last Rate: 1.4 mcg/kg/min (09/01/20 1100)   Phoxillum 4 K/2.5 CA 1,000 mL/hr Last Rate: 1,000 mL/hr (09/01/20 1356)   Phoxillum 4 K/2.5 CA 1,000 mL/hr Last Rate: 1,000 mL/hr (09/01/20 1356)   Phoxillum 4 K/2.5 CA 1,000 mL/hr Last Rate: 1,000 mL/hr (09/01/20 1356)     Medications:    [START ON 9/3/2020] Pharmacy Consult  Does not apply Once   albumin human 25 g Intravenous Once   aspirin 81 mg Oral Daily   hydrocortisone sodium succinate 100 mg Intravenous Q8H   lactulose 10 g Oral TID   lidocaine 1 patch Transdermal Q24H   micafungin (MYCAMINE)  mg Intravenous Q24H   midodrine 20 mg Oral TID AC   pantoprazole 40 mg Oral Daily   piperacillin-tazobactam 3.375 g Intravenous Q8H   rifAXIMin 550 mg Oral Q12H   sertraline 25 mg Oral Daily   sodium chloride 10 mL Intravenous Q12H   thiamine (VITAMIN B1) IVPB 250 mg Intravenous BID   vancomycin 15 mg/kg Intravenous Q24H       Vital Sign Min/Max for last 24 hours  Temp  Min: 97.6 °F (36.4 °C)  Max: 97.9 °F (36.6 °C)   BP  Min: 66/48  Max: 123/61   Pulse  Min: 81  Max: 113   Resp  Min: 20  Max: 24   SpO2  Min: 85 %  Max: 100 %   Flow (L/min)  Min:  "2  Max: 2       Input/Output for last 24 hour shift  08/31 0701 - 09/01 0700  In: 3770.9 [I.V.:1520.9]  Out: -       Objective:  General Appearance:  Uncomfortable, ill-appearing, in pain and in no acute distress.    Vital signs: (most recent): Blood pressure 123/61, pulse 90, temperature 97.7 °F (36.5 °C), temperature source Axillary, resp. rate 20, height 182.9 cm (72\"), weight 95.3 kg (210 lb), SpO2 100 %.  No fever.    HEENT: Normal HEENT exam.    Lungs:  Normal effort and normal respiratory rate.  There are decreased breath sounds.  No rales, wheezes or rhonchi.    Heart: Normal rate.  Regular rhythm.  S1 normal and S2 normal.  No murmur.   Abdomen: Abdomen is distended.  There are signs of ascites. Bowel sounds are normal.   There is no abdominal tenderness.     Extremities: Normal range of motion.  There is dependent edema.    Neurological: Patient is alert and oriented to person, place and time.    Pupils:  Pupils are equal, round, and reactive to light.  Pupils are equal.   Skin:  Warm.  (Jaundice)            Results from last 7 days   Lab Units 09/01/20  0609 08/31/20  1238   WBC 10*3/mm3 17.76* 22.30*   HEMOGLOBIN g/dL 7.4* 10.1*   PLATELETS 10*3/mm3 46* 62*     Results from last 7 days   Lab Units 09/01/20  1356 09/01/20  0609 08/31/20  1238   SODIUM mmol/L 133* 131* 135*   POTASSIUM mmol/L 5.0 5.0 4.7   CO2 mmol/L 18.0* 17.0* 19.0*   BUN mg/dL 63* 61* 53*   CREATININE mg/dL 8.31* 7.70* 7.39*   MAGNESIUM mg/dL 2.0 2.4 1.9   PHOSPHORUS mg/dL 6.7* 6.7*  --    GLUCOSE mg/dL 92 92 122*     Estimated Creatinine Clearance: 11.9 mL/min (A) (by C-G formula based on SCr of 8.31 mg/dL (H)).            I reviewed the patient's results and images.     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  We will need to get the tunneled dialysis catheter removed as it is likely compromised at this point.  He will need a temporary hemodialysis catheter.  I will discuss this with Dr. Priscilla howard.  Continue current antimicrobials.  Continue CRRT as able.  I would go ahead and start stress dose steroids for a few doses to see if this benefits him as well as provide albumin replacement.  Wean pressors as tolerated.  Diagnostic as well as therapeutic paracentesis later today.  This patient is critically ill from sepsis and has a high potential for worsening.    Plan of care and goals reviewed with mulitdisciplinary/antibiotic stewardship team during rounds.   I discussed the patient's findings and my recommendations with patient and nursing staff     High level of risk due to:  severe exacerbation of chronic illness, illness with threat to life or bodily function, parenteral controlled substances and drugs requiring intensive monitoring for toxicity.    Time spent Critical care 35 min (exclusive of procedure time)  including high complexity decision making to assess, manipulate, and support vital organ system failure in this individual who has impairment of one or more vital organ systems such that there is a high probability of imminent or life threatening deterioration in the patient’s condition.      Mart De Leon MD, [facility]  Pulmonology and Critical Care Medicine

## 2020-09-01 NOTE — PROCEDURES
Procedure note:    Removal of right internal jugular tunneled hemodialysis catheter    Indication: MRSA septicemia    Consent was emergent.    Anesthesia was a total of 10 mL of 1% lidocaine.    The head of the bed was placed at 30 degrees.  The area around the line was prepped and draped effectively in a sterile fashion with ChloraPrep.  After anesthesia of the skin and track site, the suture was removed without difficulty.  While we were prepared to bluntly dissect around the cuff of the tract, this actually had not fibrosed in place as it is a relatively fresh line.  The line was removed without any resistance or difficulty.  The line was intact.  Pressure was held over both the vena puncture site as well as the tract for a total of 10 minutes.  Hemostasis was good.  The tract incision was covered with a 4 x 4 and a DuoDERM.  Hemostasis was deemed to be good.  Patient tolerated procedure without any immediate complications.    Mart De Leon MD

## 2020-09-02 NOTE — PLAN OF CARE
Patient has been alert with confusion, and episodes of delusions and hallucinations. Mostly uncooperative; refusing hygiene, oral care, linen changes, turning/repositioning, and removing lines and devices frequently. Initiated Right wrist restraint only (after removing left) due to frequent episodes of hemoptosis. He spits sputum into his blanket. Emesis basin and suction given, but he throws them on the floor. Frequently removes clothing and linens. Attempted to in-and-out cath due to bladder scan read of 560, but pulled only a small amount of dark, clear, tea colored urine into catheter. Upon removal blood was noted. Patient has been bleeding from nose, mouth, anus and urethra in small amounts. FARHAN Riley notified of each update with new orders of Albumin, which aided in stopping Badon drip. Occult stool obtained and sent to lab. X-Ray of chest pending at this time. Sinus Tach noted most of shift with PVCs. Refusing PO fluids, but did take po meds earlier. Skin and scleras are jaundiced. Abdomen is tender and round. Suspect, bladder scan may be reading ascites fluid also. IV remains patent and without s/s of bleeding or infiltration presently. Afebrile. Creatinine improved slightly. GI consult will be suggested in am and expressed concern for DIC.

## 2020-09-02 NOTE — DISCHARGE SUMMARY
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   • Hypotension  • Liver cirrhosis secondary to EDUARDO (nonalcoholic steatohepatitis) (CMS/HCC)  • ESRD (end stage renal disease) (CMS/HCC)  • Ascites  • Sepsis (CMS/HCC)      Discharge Diagnosis:   Problem List Items Addressed This Visit        Unprioritized    Liver cirrhosis secondary to EDUARDO (nonalcoholic steatohepatitis) (CMS/HCC)    Relevant Orders    Bedside Paracentesis Without  Radiology (Completed)    * (Principal) Sepsis (CMS/HCC) - Primary    MRSA (methicillin resistant Staphylococcus aureus) septicemia (CMS/HCC)    Relevant Orders    Bedside Paracentesis Without  Radiology (Completed)      Other Visit Diagnoses     Generalized weakness        Altered mental status, unspecified altered mental status type                Hospital Course:  Patient is a 55 y.o. male who presented to the ED w/ generalized weakness on .  He was found to be hypotensive w/ a lactic acidosis and leukocytosis.  The patient was started on empiric broad spectrum antibiotics and admitted to the ICU.  Nephrology and Infectious Disease were consulted to aid in the management of his care.  His hypotension was managed w/ colloid infusions and phenylephrine gtt.  Blood Cultures were positive for MRSA bacteremia.  The tunneled catheter was removed as a possible infectious source.  An ECHO showed no valvular vegetations and EF > 70%.  He had a paracentesis done to aid in his respiration status and to rule out spontaneous bacterial peritonitis.      During the morning of the  the patient had a large volume aspiration and developed cardiopulmonary arrest.  A Code Blue was initiated and the patient received multiple ACLS therapies for asystole/VF arrest; unfortunately these were unsucessful in obtaining ROSC.  The patient was pronounced  @ 0602.      Procedures  Performed:     1604  Tunneled Catheter removal - Mart De Leon MD    1519 Bedside Paracentesis - Mart De Leon MD    Consults:   Dr. BOUBACAR Russell, Infectious Disease  Dr. MARILU Davis, Nephrology    Condition on Discharge:    Bhavesh Bryson, APRN  20  06:08    Time: Discharge 19 min

## 2020-09-03 LAB
BACTERIA SPEC AEROBE CULT: ABNORMAL
GRAM STN SPEC: ABNORMAL
ISOLATED FROM: ABNORMAL
LAB AP CASE REPORT: NORMAL
PATH REPORT.FINAL DX SPEC: NORMAL

## 2020-09-04 LAB
BACTERIA FLD CULT: NORMAL
GRAM STN SPEC: NORMAL

## 2020-09-06 LAB
BACTERIA SPEC AEROBE CULT: NORMAL
BACTERIA SPEC ANAEROBE CULT: NORMAL

## 2020-10-13 LAB — FUNGUS WND CULT: NORMAL

## (undated) DEVICE — SOL IRR H2O BTL 1000ML STRL

## (undated) DEVICE — SINGLE-USE BIOPSY FORCEPS: Brand: RADIAL JAW 4

## (undated) DEVICE — THE BITE BLOCK MAXI, LATEX FREE STRAP IS USED TO PROTECT THE ENDOSCOPE INSERTION TUBE FROM BEING BITTEN BY THE PATIENT.

## (undated) DEVICE — MSK ENDO PORT O2 POM ELITE CURAPLEX A/

## (undated) DEVICE — CANNULA,ADULT,SOFT-TOUCH,7'TUBE,UC: Brand: PENDING

## (undated) DEVICE — DEV COMP RAD PRELUDESYNC 24CM

## (undated) DEVICE — CATH DIAG EXPO .056 FL3.5 6F 100CM

## (undated) DEVICE — GLIDESHEATH BASIC HYDROPHILIC COATED INTRODUCER SHEATH: Brand: GLIDESHEATH

## (undated) DEVICE — GRADUATE CONTN 1000ML

## (undated) DEVICE — HYBRID CO2 TUBING/CAP SET FOR OLYMPUS® SCOPES & CO2 SOURCE: Brand: ERBE

## (undated) DEVICE — MODEL AT P65, P/N 701554-001KIT CONTENTS: HAND CONTROLLER, 3-WAY HIGH-PRESSURE STOPCOCK WITH ROTATING END AND PREMIUM HIGH-PRESSURE TUBING: Brand: ANGIOTOUCH® KIT

## (undated) DEVICE — PK CATH CARD 10

## (undated) DEVICE — MODEL BT2000 P/N 700287-012KIT CONTENTS: MANIFOLD WITH SALINE AND CONTRAST PORTS, SALINE TUBING WITH SPIKE AND HAND SYRINGE, TRANSDUCER: Brand: BT2000 AUTOMATED MANIFOLD KIT

## (undated) DEVICE — LUBE GEL ENDOGLIDE 1.1OZ

## (undated) DEVICE — INTRO ACCSR BLNT TP

## (undated) DEVICE — Device

## (undated) DEVICE — CONTN GRAD MEAS TRIANG 32OZ BLK

## (undated) DEVICE — SYR LUERLOK 50ML

## (undated) DEVICE — SPNG ENDO BEDSIDE TUB ENZYM

## (undated) DEVICE — CATH DIAG EXPO M/ PK 6FR FL4/FR4 PIG 3PK

## (undated) DEVICE — TUBING, SUCTION, 1/4" X 10', STRAIGHT: Brand: MEDLINE

## (undated) DEVICE — KT ORCA ORCAPOD DISP STRL